# Patient Record
Sex: MALE | Race: WHITE | NOT HISPANIC OR LATINO | Employment: OTHER | URBAN - METROPOLITAN AREA
[De-identification: names, ages, dates, MRNs, and addresses within clinical notes are randomized per-mention and may not be internally consistent; named-entity substitution may affect disease eponyms.]

---

## 2017-04-19 ENCOUNTER — ALLSCRIPTS OFFICE VISIT (OUTPATIENT)
Dept: OTHER | Facility: OTHER | Age: 77
End: 2017-04-19

## 2017-04-19 DIAGNOSIS — E11.42 TYPE 2 DIABETES MELLITUS WITH DIABETIC POLYNEUROPATHY (HCC): ICD-10-CM

## 2017-04-19 DIAGNOSIS — E11.65 TYPE 2 DIABETES MELLITUS WITH HYPERGLYCEMIA (HCC): ICD-10-CM

## 2017-04-22 ENCOUNTER — APPOINTMENT (OUTPATIENT)
Dept: LAB | Facility: CLINIC | Age: 77
End: 2017-04-22
Payer: MEDICARE

## 2017-04-22 ENCOUNTER — TRANSCRIBE ORDERS (OUTPATIENT)
Dept: LAB | Facility: CLINIC | Age: 77
End: 2017-04-22

## 2017-04-22 DIAGNOSIS — E11.65 TYPE 2 DIABETES MELLITUS WITH HYPERGLYCEMIA (HCC): ICD-10-CM

## 2017-04-22 DIAGNOSIS — E11.42 TYPE 2 DIABETES MELLITUS WITH DIABETIC POLYNEUROPATHY (HCC): ICD-10-CM

## 2017-04-22 LAB
ALBUMIN SERPL BCP-MCNC: 4.1 G/DL (ref 3.5–5)
ALP SERPL-CCNC: 61 U/L (ref 46–116)
ALT SERPL W P-5'-P-CCNC: 33 U/L (ref 12–78)
ANION GAP SERPL CALCULATED.3IONS-SCNC: 10 MMOL/L (ref 4–13)
AST SERPL W P-5'-P-CCNC: 27 U/L (ref 5–45)
BILIRUB SERPL-MCNC: 0.7 MG/DL (ref 0.2–1)
BUN SERPL-MCNC: 18 MG/DL (ref 5–25)
CALCIUM SERPL-MCNC: 9.5 MG/DL (ref 8.3–10.1)
CHLORIDE SERPL-SCNC: 105 MMOL/L (ref 100–108)
CO2 SERPL-SCNC: 28 MMOL/L (ref 21–32)
CREAT SERPL-MCNC: 1.01 MG/DL (ref 0.6–1.3)
CREAT UR-MCNC: 82.2 MG/DL
EST. AVERAGE GLUCOSE BLD GHB EST-MCNC: 146 MG/DL
GFR SERPL CREATININE-BSD FRML MDRD: >60 ML/MIN/1.73SQ M
GLUCOSE P FAST SERPL-MCNC: 158 MG/DL (ref 65–99)
HBA1C MFR BLD: 6.7 % (ref 4.2–6.3)
MICROALBUMIN UR-MCNC: 132 MG/L (ref 0–20)
MICROALBUMIN/CREAT 24H UR: 161 MG/G CREATININE (ref 0–30)
POTASSIUM SERPL-SCNC: 4.7 MMOL/L (ref 3.5–5.3)
PROT SERPL-MCNC: 7 G/DL (ref 6.4–8.2)
SODIUM SERPL-SCNC: 143 MMOL/L (ref 136–145)
T4 FREE SERPL-MCNC: 1.51 NG/DL (ref 0.76–1.46)
TSH SERPL DL<=0.05 MIU/L-ACNC: 1.53 UIU/ML (ref 0.36–3.74)

## 2017-04-22 PROCEDURE — 80053 COMPREHEN METABOLIC PANEL: CPT

## 2017-04-22 PROCEDURE — 82570 ASSAY OF URINE CREATININE: CPT

## 2017-04-22 PROCEDURE — 84439 ASSAY OF FREE THYROXINE: CPT

## 2017-04-22 PROCEDURE — 86341 ISLET CELL ANTIBODY: CPT

## 2017-04-22 PROCEDURE — 86337 INSULIN ANTIBODIES: CPT

## 2017-04-22 PROCEDURE — 82043 UR ALBUMIN QUANTITATIVE: CPT

## 2017-04-22 PROCEDURE — 83036 HEMOGLOBIN GLYCOSYLATED A1C: CPT

## 2017-04-22 PROCEDURE — 36415 COLL VENOUS BLD VENIPUNCTURE: CPT

## 2017-04-22 PROCEDURE — 83519 RIA NONANTIBODY: CPT

## 2017-04-22 PROCEDURE — 84443 ASSAY THYROID STIM HORMONE: CPT

## 2017-04-24 ENCOUNTER — GENERIC CONVERSION - ENCOUNTER (OUTPATIENT)
Dept: OTHER | Facility: OTHER | Age: 77
End: 2017-04-24

## 2017-04-24 LAB — GAD65 AB SER-ACNC: <5 U/ML (ref 0–5)

## 2017-04-25 ENCOUNTER — ALLSCRIPTS OFFICE VISIT (OUTPATIENT)
Dept: OTHER | Facility: OTHER | Age: 77
End: 2017-04-25

## 2017-04-25 ENCOUNTER — GENERIC CONVERSION - ENCOUNTER (OUTPATIENT)
Dept: OTHER | Facility: OTHER | Age: 77
End: 2017-04-25

## 2017-04-27 ENCOUNTER — LAB (OUTPATIENT)
Dept: LAB | Facility: CLINIC | Age: 77
End: 2017-04-27
Payer: MEDICARE

## 2017-04-27 ENCOUNTER — TRANSCRIBE ORDERS (OUTPATIENT)
Dept: LAB | Facility: CLINIC | Age: 77
End: 2017-04-27

## 2017-04-27 DIAGNOSIS — R35.1 NOCTURIA: Primary | ICD-10-CM

## 2017-04-27 DIAGNOSIS — R35.1 NOCTURIA: ICD-10-CM

## 2017-04-27 LAB
INSULIN AB SER-ACNC: <5 UU/ML
ISLET CELL512 AB SER-ACNC: <1 U/ML
PSA SERPL-MCNC: 0.9 NG/ML (ref 0–4)

## 2017-04-27 PROCEDURE — 84153 ASSAY OF PSA TOTAL: CPT

## 2017-04-28 ENCOUNTER — GENERIC CONVERSION - ENCOUNTER (OUTPATIENT)
Dept: OTHER | Facility: OTHER | Age: 77
End: 2017-04-28

## 2017-05-02 ENCOUNTER — ALLSCRIPTS OFFICE VISIT (OUTPATIENT)
Dept: OTHER | Facility: OTHER | Age: 77
End: 2017-05-02

## 2017-05-09 ENCOUNTER — ALLSCRIPTS OFFICE VISIT (OUTPATIENT)
Dept: OTHER | Facility: OTHER | Age: 77
End: 2017-05-09

## 2017-05-23 ENCOUNTER — ALLSCRIPTS OFFICE VISIT (OUTPATIENT)
Dept: OTHER | Facility: OTHER | Age: 77
End: 2017-05-23

## 2017-06-13 DIAGNOSIS — E11.65 TYPE 2 DIABETES MELLITUS WITH HYPERGLYCEMIA (HCC): ICD-10-CM

## 2017-06-13 DIAGNOSIS — E11.42 TYPE 2 DIABETES MELLITUS WITH DIABETIC POLYNEUROPATHY (HCC): ICD-10-CM

## 2017-06-15 ENCOUNTER — LAB CONVERSION - ENCOUNTER (OUTPATIENT)
Dept: OTHER | Facility: OTHER | Age: 77
End: 2017-06-15

## 2017-06-15 LAB
T4 FREE SERPL-MCNC: 1.3 NG/DL (ref 0.8–1.8)
TSH SERPL DL<=0.05 MIU/L-ACNC: 0.85 MIU/L (ref 0.4–4.5)

## 2017-06-20 ENCOUNTER — GENERIC CONVERSION - ENCOUNTER (OUTPATIENT)
Dept: OTHER | Facility: OTHER | Age: 77
End: 2017-06-20

## 2017-07-18 ENCOUNTER — ALLSCRIPTS OFFICE VISIT (OUTPATIENT)
Dept: OTHER | Facility: OTHER | Age: 77
End: 2017-07-18

## 2017-07-20 ENCOUNTER — ALLSCRIPTS OFFICE VISIT (OUTPATIENT)
Dept: OTHER | Facility: OTHER | Age: 77
End: 2017-07-20

## 2017-08-03 ENCOUNTER — ALLSCRIPTS OFFICE VISIT (OUTPATIENT)
Dept: OTHER | Facility: OTHER | Age: 77
End: 2017-08-03

## 2017-08-03 DIAGNOSIS — E78.5 HYPERLIPIDEMIA: ICD-10-CM

## 2017-08-03 DIAGNOSIS — E11.65 TYPE 2 DIABETES MELLITUS WITH HYPERGLYCEMIA (HCC): ICD-10-CM

## 2017-08-16 ENCOUNTER — GENERIC CONVERSION - ENCOUNTER (OUTPATIENT)
Dept: OTHER | Facility: OTHER | Age: 77
End: 2017-08-16

## 2017-08-16 ENCOUNTER — LAB CONVERSION - ENCOUNTER (OUTPATIENT)
Dept: OTHER | Facility: OTHER | Age: 77
End: 2017-08-16

## 2017-08-16 LAB
A/G RATIO (HISTORICAL): 2.1 (CALC) (ref 1–2.5)
ALBUMIN SERPL BCP-MCNC: 4.5 G/DL (ref 3.6–5.1)
ALP SERPL-CCNC: 49 U/L (ref 40–115)
ALT SERPL W P-5'-P-CCNC: 23 U/L (ref 9–46)
AST SERPL W P-5'-P-CCNC: 24 U/L (ref 10–35)
BILIRUB SERPL-MCNC: 0.7 MG/DL (ref 0.2–1.2)
BUN SERPL-MCNC: 27 MG/DL (ref 7–25)
BUN/CREA RATIO (HISTORICAL): 28 (CALC) (ref 6–22)
C-PEPTIDE (HISTORICAL): 0.88 NG/ML (ref 0.8–3.85)
CALCIUM SERPL-MCNC: 9.4 MG/DL (ref 8.6–10.3)
CHLORIDE SERPL-SCNC: 108 MMOL/L (ref 98–110)
CHOLEST SERPL-MCNC: 133 MG/DL (ref 125–200)
CHOLEST/HDLC SERPL: 2.3 (CALC)
CO2 SERPL-SCNC: 29 MMOL/L (ref 20–31)
CREAT SERPL-MCNC: 0.98 MG/DL (ref 0.7–1.18)
EGFR AFRICAN AMERICAN (HISTORICAL): 88 ML/MIN/1.73M2
EGFR-AMERICAN CALC (HISTORICAL): 76 ML/MIN/1.73M2
GAMMA GLOBULIN (HISTORICAL): 2.1 G/DL (CALC) (ref 1.9–3.7)
GLUCOSE (HISTORICAL): 96 MG/DL (ref 65–99)
GLUCOSE, PLASMA (HISTORICAL): 91 MG/DL (ref 65–99)
HBA1C MFR BLD HPLC: 6.4 % OF TOTAL HGB
HDLC SERPL-MCNC: 59 MG/DL
LDL CHOLESTEROL (HISTORICAL): 64 MG/DL (CALC)
NON-HDL-CHOL (CHOL-HDL) (HISTORICAL): 74 MG/DL (CALC)
POTASSIUM SERPL-SCNC: 4 MMOL/L (ref 3.5–5.3)
SODIUM SERPL-SCNC: 145 MMOL/L (ref 135–146)
TOTAL PROTEIN (HISTORICAL): 6.6 G/DL (ref 6.1–8.1)
TRIGL SERPL-MCNC: 51 MG/DL

## 2017-11-06 ENCOUNTER — ALLSCRIPTS OFFICE VISIT (OUTPATIENT)
Dept: OTHER | Facility: OTHER | Age: 77
End: 2017-11-06

## 2017-11-07 NOTE — PROGRESS NOTES
Assessment  1  Controlled type 2 diabetes mellitus with diabetic polyneuropathy, with long-term current   use of insulin (250 60,357 2,V58 67) (E11 42,Z79 4)   2  Essential hypertension (401 9) (I10)   3  Dyslipidemia (272 4) (E78 5)    Plan  Controlled type 2 diabetes mellitus with diabetic polyneuropathy, with long-term current  use of insulin    · (1) HEMOGLOBIN A1C; Status:Active; Requested for:06Tpy5195;    Perform:[facility]; OXO:30GSY4913; Ordered; For:Controlled type 2 diabetes mellitus with diabetic polyneuropathy, with long-term current use of insulin; Ordered By:Lew Estevez;   · Continuous Glucose Monitoring CGM; Status:Hold For - Scheduling; Requested  for:20Noo2938;    Perform:Not Applicable; Order Comments:yordan; SNV:02DAV9977; Ordered; For:Controlled type 2 diabetes mellitus with diabetic polyneuropathy, with long-term current use of insulin; Ordered By:Lew Estevez;   · Follow-up visit in 4 Months Evaluation and Treatment  Follow-up  Status: Hold For -  Scheduling  Requested for: 89ZRF5682   Ordered; For: Controlled type 2 diabetes mellitus with diabetic polyneuropathy, with long-term current use of insulin; Ordered By: Bebo Ventura Performed:  Due: 53VJM6977   · Follow-Up With Advanced Practitioner Evaluation and Treatment  Follow-up  Status: Hold  For - Scheduling  Requested for: 40FAI1275   Ordered; For: Controlled type 2 diabetes mellitus with diabetic polyneuropathy, with long-term current use of insulin; Ordered By: Bebo Ventura Performed:  Due: 55EWO9698  Unlinked    · Clopidogrel Bisulfate 75 MG Oral Tablet   Dispense: 0 Days ; #:0 TABS; Refill: 0; SHASHANK = N; Record; Last Updated By: Jared Martínez; 11/6/2017 10:31:52 AM    Discussion/Summary  Discussion Summary:   1  Type 2 diabetes is well controlled based on hemoglobin A1c  We did discuss stopping Lantus and replacing with an oral agent   Today, we have decided to do a continuous glucose monitor and make medication changes based on those results  Check hemoglobin A1c later this month  Hypertension is close to goal   Hyperlipidemia-continue statin  Counseling Documentation With Imm: The patient was counseled regarding diagnostic results,-- impressions  Medication SE Review and Pt Understands Tx: The treatment plan was reviewed with the patient/guardian  The patient/guardian understands and agrees with the treatment plan      Chief Complaint  Chief Complaint Free Text Note Form: Follow Up      History of Present Illness  Diabetes: The patient is being seen for routine follow-up of Diabetes Mellitus 2  The HbA1c was 6 4% performed on 8-16-17  See Medication List for current medication(s)  See Medication List for dosage(s)  By report, there is good compliance with treatment,-- good tolerance of treatment-- and-- good symptom control  Current pertinent lifestyle factors include no obesity,-- no past or present history of a sedentary lifestyle-- and-- no inactivity  The patient is currently asymptomatic   Disease Course and Complications: He has no diabetes complications  Hyperlipidemia (Follow-Up): The patient states his hyperlipidemia has been stable since the last visit  Comorbid Illnesses: diabetes mellitus-- and-- hypertension  He has no significant interval events  Symptoms: The patient is currently asymptomatic  Associated symptoms include no focal neurologic deficits  Hypertension (Follow-Up): The patient presents for follow-up of essential hypertension  The patient states he has been stable with his blood pressure control since the last visit  He has no comorbid illnesses  He has no significant interval events  Symptoms: The patient is currently asymptomatic  Associated symptoms include no headache        Review of Systems  Endo Adult ROS Male Established v2 - [facility]:   Constitutional/General: no recent weight gain,-- no recent weight loss,-- no poor energy/fatigue,-- no increased energy level,-- no insomnia/sleep problems,-- no fever-- and-- no feeling weak  Heart: no high blood pressure,-- no chest pain/tightness,-- no rapid/racing heart rate-- and-- no palpitations  Genitourinary - Urinary excess urination-- and-- urinating during the night, but-- no frequent urination  Eyes: no blurred vision,-- no double vision,-- no bulging eyes,-- no gritty/scratchy eyes-- and-- no excessive tearing  Mouth / Throat: hoarseness, but-- no difficulty swallowing  Neck: no lumps,-- no swollen glands,-- no neck pain,-- no neck stiffness-- and-- no enlarged thyroid  Respiratory: no wheezing,-- no asthma-- and-- no persistent cough  Musculoskeletal: no muscle aches/pain,-- no joint aches/pain-- and-- no muscle weakness  Skin & Hair: no dry skin,-- no acne,-- the hair texture was not oily,-- no hair loss-- and-- no excessive hair growth  Gastrointestinal: no constipation,-- no diarrhea,-- no waking at night to drink-- and-- no stomach ache  Neurological: no blackouts,-- no weakness-- and-- no tremors  Genital: no testicular pain,-- no testicular lumps/bumps/mass-- and-- does not perform monthly testicular exam    Endocrine: no feeling hot frequently,-- no feeling cold frequently,-- no shifts between feeling hot and cold,-- no cold hands or feet,-- no excessive sweating,-- no thyroid problems,-- no blood sugar problems,-- no excessive thirst,-- no excessive hunger,-- no change in shoe size,-- no nausea or vomiting-- and-- no shaky hands  ROS Reviewed:   ROS reviewed  Active Problems  1  Arteriosclerosis of coronary artery (414 00) (I25 10)   2  Atypical chest pain (786 59) (R07 89)   3  Bruising (924 9) (T14 8)   4  Chronic constipation (564 00) (K59 09)   5  Chronic hepatitis B (070 32) (B18 1)   6  Chronic hepatitis C virus infection (070 54) (B18 2)   7  Chronic lymphocytic leukemia (204 10) (C91 10)   8   Controlled type 2 diabetes mellitus with diabetic polyneuropathy, with long-term current   use of insulin (250 60,357 2,V58 67) (E11 42,Z79 4)   9  Dyslipidemia (272 4) (E78 5)   10  Essential hypertension (401 9) (I10)   11  Flu vaccine need (V04 81) (Z23)   12  Lower back pain (724 2) (M54 5)   13  Lymphadenopathy (785 6) (R59 1)   14  Microalbuminuria (791 0) (R80 9)   15  Need for vaccination with 13-polyvalent pneumococcal conjugate vaccine (V03 82) (Z23)   16  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 A1)   17  Rhinitis (472 0) (J31 0)   18  Screening for neurological condition (V80 09) (Z13 89)    Past Medical History  1  History of Cervical lymphadenopathy (785 6) (R59 0)  Active Problems And Past Medical History Reviewed: The active problems and past medical history were reviewed and updated today  Surgical History  1  History of Tonsillectomy With Adenoidectomy  Surgical History Reviewed: The surgical history was reviewed and updated today  Family History  Sister    1  Family history of Non-Hodgkin's Lymphoma  Maternal Grandfather    2  Family history of Diabetes Mellitus (V18 0)  Family History Reviewed: The family history was reviewed and updated today  Social History   · Being A Social Drinker   · Exercising Regularly   · Never A Smoker  Social History Reviewed: The social history was reviewed and updated today  The social history was reviewed and is unchanged  Current Meds   1  Aspirin 81 MG TABS; TAKE 1 TABLET DAILY; Therapy: (Recorded:84Opa0791) to Recorded   2  Atorvastatin Calcium 40 MG Oral Tablet; Therapy: (Recorded:19Apr2017) to Recorded   3  BD Pen Needle Beatrice U/F 32G X 4 MM Miscellaneous; use three times a day as directed; Therapy: 59BFY2002 to (Evaluate:24Apr2016)  Requested for: 72HDX0126; Last   Rx:27Oct2015 Ordered   4  BD Pen Needle Short U/F 31G X 8 MM Miscellaneous; USE ONCE DAILY WITH LANTUS   SOLOSTAR  Requested for: 10FXM6150; Last Rx:01Nov2013 Ordered   5  Clopidogrel Bisulfate 75 MG Oral Tablet; Therapy: (Recorded:19Apr2017) to Recorded   6   Fish Oil CAPS; Therapy: (Recorded:18Npd1702) to Recorded   7  Lantus SoloStar 100 UNIT/ML Subcutaneous Solution Pen-injector; 10units SQ QHS    Requested for: 86OMR8734; Last Rx:40Unp1014 Ordered   8  MetFORMIN HCl - 1000 MG Oral Tablet; TAKE ONE TABLET BY MOUTH TWICE A DAY    Requested for: 77WOV7666; Last Rx:06Dzp5686 Ordered   9  Milk Thistle 500 MG Oral Capsule; 2 caps daily; Therapy: (Recorded:85Whi0316) to Recorded   10  OneTouch Ultra Blue In [employer]; Test 3 times daily or as directed; Therapy: 06PXB5334 to (Evaluate:99Aqk9882)  Requested for: 69IQT3484; Last    Rx:63Uzh0178 Ordered   11  Trulicity 7 42 GJ/1 9JL Subcutaneous Solution Pen-injector; Use 1 per week; Therapy: 55TSV3576 to (Evaluate:13Kky3476) Recorded  Medication List Reviewed: The medication list was reviewed and updated today  Allergies  1  No Known Drug Allergies  2  No Known Environmental Allergies   3  No Known Food Allergies    Vitals  Vital Signs    Recorded: 85JET1060 10:30AM   Heart Rate 82   Systolic 707   Diastolic 78   Height 5 ft 9 in   Weight 148 lb 2 08 oz   BMI Calculated 21 87   BSA Calculated 1 82     Physical Exam    Constitutional   General appearance: No acute distress, well appearing and well nourished  Eyes   Conjunctiva and lids: No swelling, erythema, or discharge  Pupils: Equal, round and reactive to light  The sclera are anicteric  Extraocular movements are intact  Ears, Nose, Mouth, and Throat   External inspection of ears, nose and lips: Normal     Oropharynx: Normal with no erythema, edema, exudate or lesions  Exam of Head: The head is atraumatic and normocephalic  Neck: The neck is supple  The thyroid is normal in size with no palpable nodules  Pulmonary   Auscultation of lungs: Clear to auscultation bilaterally with normal chest expansion  Cardiovascular   Auscultation of heart: Normal rate and rhythm with no murmurs, gallops or rubs      Abdomen   Abdomen: Abdomen is soft, non-tender with normal bowel sounds  Lymphatic   Palpation of lymph nodes: No supraclavicular or suboccipital lymphadenopathy  Musculoskeletal   Gait and station: Normal     Inspection/palpation of joints, bones, and muscles: Muscle bulk and tone is normal     Skin   Skin and subcutaneous tissue: Normal skin temperature and color  Neurologic   Cranial nerves: Cranial nerves 2-12 intact  Reflexes: 2+ and symmetric  Motor Strength: Strength is 5/5 bilaterally  Psychiatric   Orientation to person, place and time: Normal     Mood and affect: Affect and attention span are normal        Results/Data  (1) C-PEPTIDE 86WFM3563 07:47AM Morna      Test Name Result Flag Reference   C PEPTIDE 0 88 ng/mL  0 80-3 85     (1) COMPREHENSIVE METABOLIC PANEL 64XYR6066 32:03BI Morna      Test Name Result Flag Reference   GLUCOSE 96 mg/dL  65-99   Fasting reference interval   UREA NITROGEN (BUN) 27 mg/dL H 7-25   CREATININE 0 98 mg/dL  0 70-1 18   For patients >52years of age, the reference limit  for Creatinine is approximately 13% higher for people  identified as -American  eGFR NON-AFR   AMERICAN 76 mL/min/1 73m2  > OR = 60   eGFR AFRICAN AMERICAN 88 mL/min/1 73m2  > OR = 60   BUN/CREATININE RATIO 28 (calc) H 6-22   SODIUM 145 mmol/L  135-146   POTASSIUM 4 0 mmol/L  3 5-5 3   CHLORIDE 108 mmol/L     CARBON DIOXIDE 29 mmol/L  20-31   CALCIUM 9 4 mg/dL  8 6-10 3   PROTEIN, TOTAL 6 6 g/dL  6 1-8 1   ALBUMIN 4 5 g/dL  3 6-5 1   GLOBULIN 2 1 g/dL (calc)  1 9-3 7   ALBUMIN/GLOBULIN RATIO 2 1 (calc)  1 0-2 5   BILIRUBIN, TOTAL 0 7 mg/dL  0 2-1 2   ALKALINE PHOSPHATASE 49 U/L     AST 24 U/L  10-35   ALT 23 U/L  9-46     (1) LIPID PANEL, FASTING 33EXS9833 07:47AM Collin Mcpherson     Test Name Result Flag Reference   CHOLESTEROL, TOTAL 133 mg/dL  125-200   HDL CHOLESTEROL 59 mg/dL  > OR = 40   TRIGLICERIDES 51 mg/dL  <657   LDL-CHOLESTEROL 64 mg/dL (calc)  <130   Desirable range <100 mg/dL for patients with CHD or  diabetes and <70 mg/dL for diabetic patients with  known heart disease  CHOL/HDLC RATIO 2 3 (calc)  < OR = 5 0   NON HDL CHOLESTEROL 74 mg/dL (calc)     Target for non-HDL cholesterol is 30 mg/dL higher than   LDL cholesterol target  (Q) GLUCOSE, PLASMA 88Yny1499 07:47AM Milan Mcguire     Test Name Result Flag Reference   GLUCOSE, PLASMA 91 mg/dL  65-99   Fasting reference interval     (Q) HEMOGLOBIN A1c 97XVI2874 07:47AM Collin Mcpherson   REPORT COMMENT:  FASTING:YES     Test Name Result Flag Reference   HEMOGLOBIN A1c 6 4 % of total Hgb H <5 7   For someone without known diabetes, a hemoglobin   A1c value between 5 7% and 6 4% is consistent with  prediabetes and should be confirmed with a   follow-up test      For someone with known diabetes, a value <7%  indicates that their diabetes is well controlled  A1c  targets should be individualized based on duration of  diabetes, age, comorbid conditions, and other  considerations  This assay result is consistent with an increased risk  of diabetes  Currently, no consensus exists regarding use of  hemoglobin A1c for diagnosis of diabetes for children       (1) T4, FREE 07UCU6752 10:32AM Lew Estevez     Test Name Result Flag Reference   T4, FREE 1 3 ng/dL  0 8-1 8     (Q) TSH, 3RD GENERATION 48RDX3483 10:32AM Lew Estevez   REPORT COMMENT:  FASTING:NO     Test Name Result Flag Reference   TSH 0 85 mIU/L  0 40-4 50     Signatures   Electronically signed by : MORE Crowder ; Nov 6 2017 10:59AM EST                       (Author)

## 2017-11-15 DIAGNOSIS — E11.42 TYPE 2 DIABETES MELLITUS WITH DIABETIC POLYNEUROPATHY (HCC): ICD-10-CM

## 2017-11-18 ENCOUNTER — APPOINTMENT (OUTPATIENT)
Dept: LAB | Facility: CLINIC | Age: 77
End: 2017-11-18
Payer: MEDICARE

## 2017-11-18 DIAGNOSIS — E11.42 TYPE 2 DIABETES MELLITUS WITH DIABETIC POLYNEUROPATHY (HCC): ICD-10-CM

## 2017-11-18 LAB
EST. AVERAGE GLUCOSE BLD GHB EST-MCNC: 123 MG/DL
HBA1C MFR BLD: 5.9 % (ref 4.2–6.3)

## 2017-11-18 PROCEDURE — 83036 HEMOGLOBIN GLYCOSYLATED A1C: CPT

## 2017-11-18 PROCEDURE — 36415 COLL VENOUS BLD VENIPUNCTURE: CPT

## 2017-11-20 ENCOUNTER — GENERIC CONVERSION - ENCOUNTER (OUTPATIENT)
Dept: OTHER | Facility: OTHER | Age: 77
End: 2017-11-20

## 2018-01-09 NOTE — PROGRESS NOTES
Plan    1  DSMT/MNT Time Record; Status:Complete;   Done: 96VLN2460 12:22PM    Discussion/Summary    PATIENT EDUCATION RECORD   Indication for Services: type 2 Diabetes Mellitus  He is ready to learn  He has no barriers to learning  Living Well with Diabetes Class 3 Education Content:Oral/Injectable medication, Prevention, Short-term complications, Long-term complications, Foot care, Sick day management (illness and stress), Travel       Chief Complaint  Attended diabetes class      Current Meds   1  Aspirin 81 MG TABS; TAKE 1 TABLET DAILY; Therapy: (Recorded:30Dho9524) to Recorded   2  Atorvastatin Calcium 40 MG Oral Tablet; Therapy: (Recorded:19Apr2017) to Recorded   3  BD Pen Needle Beatrice U/F 32G X 4 MM Miscellaneous; use three times a day as directed; Therapy: 03OSK4141 to (Evaluate:24Apr2016)  Requested for: 48IWO2003; Last   Rx:27Oct2015 Ordered   4  BD Pen Needle Short U/F 31G X 8 MM Miscellaneous; USE ONCE DAILY WITH LANTUS   SOLOSTAR  Requested for: 40OCA6370; Last Rx:01Nov2013 Ordered   5  Clopidogrel Bisulfate 75 MG Oral Tablet; Therapy: (Recorded:19Apr2017) to Recorded   6  DrRx Medrol Dose Pack 4 MG #21; take as directed; Therapy: 93GWG5313 to (Last Rx:18Jun2015) Ordered   7  Fish Oil CAPS; Therapy: (Recorded:19Apr2017) to Recorded   8  Lantus SoloStar 100 UNIT/ML Subcutaneous Solution Pen-injector; 10units SQ QHS    Requested for: 61OOT4245; Last Rx:18Jun2015 Ordered   9  Losartan Potassium 100 MG Oral Tablet; take 1 tablet by mouth every day; Therapy: 13WSY6957 to (Evaluate:12Jun2016)  Requested for: 74IXF9093; Last   Rx:18Jun2015 Ordered   10  MetFORMIN HCl - 1000 MG Oral Tablet; TAKE ONE TABLET BY MOUTH TWICE A DAY     Requested for: 54LDY9250; Last Rx:48Qet9283 Ordered   11  Milk Thistle 500 MG Oral Capsule; 2 caps daily; Therapy: (Recorded:57Kwv8529) to Recorded   12   NovoLOG FlexPen 100 UNIT/ML Subcutaneous Solution Pen-injector; 3-5 units before    lunch and dinner; Therapy: 26Knt1077 to Recorded   13  OneTouch Ultra Blue In [employer]; Test 3 times daily or as directed; Therapy: 98JWW0682 to (Evaluate:09Feb2018)  Requested for: 02PRE6403; Last    Rx:15May2017 Ordered    End of Encounter Meds    1  BD Pen Needle Beatrice U/F 32G X 4 MM Miscellaneous; use three times a day as directed; Therapy: 50EBT0183 to (Evaluate:24Apr2016)  Requested for: 28POT7163; Last   Rx:27Oct2015 Ordered   2  Lantus SoloStar 100 UNIT/ML Subcutaneous Solution Pen-injector; 10units SQ QHS    Requested for: 71UQV9322; Last Rx:18Jun2015 Ordered   3  MetFORMIN HCl - 1000 MG Oral Tablet; TAKE ONE TABLET BY MOUTH TWICE A DAY    Requested for: 21BTI2196; Last Rx:15May2017 Ordered   4  NovoLOG FlexPen 100 UNIT/ML Subcutaneous Solution Pen-injector; 3-5 units before   lunch and dinner; Therapy: 99Cvb4480 to Recorded   5  OneTouch Ultra Blue In [employer]; Test 3 times daily or as directed; Therapy: 47DQQ0122 to (Evaluate:09Feb2018)  Requested for: 12ZZN1947; Last   Rx:15May2017 Ordered    6  Losartan Potassium 100 MG Oral Tablet; take 1 tablet by mouth every day; Therapy: 24SJA5590 to (Evaluate:12Jun2016)  Requested for: 61MKC0033; Last   Rx:18Jun2015 Ordered    7  Aspirin 81 MG TABS; TAKE 1 TABLET DAILY; Therapy: (Recorded:92Zhm9859) to Recorded   8  Milk Thistle 500 MG Oral Capsule; 2 caps daily; Therapy: (Recorded:57Tqc1619) to Recorded    9  DrRx Medrol Dose Pack 4 MG #21; take as directed; Therapy: 69UFC0484 to (Last Rx:18Jun2015) Ordered    10  BD Pen Needle Short U/F 31G X 8 MM Miscellaneous; USE ONCE DAILY WITH LANTUS    SOLOSTAR  Requested for: 03OZA5291; Last Rx:01Nov2013 Ordered    11  Atorvastatin Calcium 40 MG Oral Tablet; Therapy: (Recorded:19Apr2017) to Recorded   12  Clopidogrel Bisulfate 75 MG Oral Tablet; Therapy: (Recorded:19Apr2017) to Recorded   13  Fish Oil CAPS;     Therapy: (Recorded:19Apr2017) to Recorded    Future Appointments    Date/Time Provider Specialty Site 07/20/2017 06:00 PM Kimberly Apodaca RD Diabetes Educator [address] [facility] DIABETES EDUCATION   08/03/2017 09:00 AM Donato Blunt [facility] [address] [facility] ENDOCRINOLOGY     Signatures   Electronically signed by : HATTIE Alaniz; Jul 19 2017 12:24PM EST                       (Author)    Electronically signed by : MORE Chacon ; Aug  3 2017  3:40PM EST

## 2018-01-10 NOTE — RESULT NOTES
Discussion/Summary   One of the type 1 diabetes antibodies is negative  Verified Results  (1) GLUTAMIC ACID DECARBOXYLASE 90Wlx6464 09:31AM Fela Zazueta Order Number: [id number]     Test Name Result Flag Reference   GLUTAMIC ACID DECARBOXYLASE <5 0 U/mL  0 0 - 5 0   Performed at:  [address]  [id number]  : Seda Rocha MD, Phone:  [phone]       Plan  Controlled type 2 diabetes mellitus with diabetic polyneuropathy, with long-term current  use of insulin, Diabetes mellitus type 2, uncontrolled    · (1) T4, FREE; Status:Active; Requested GVE:18WHX1780;    · (1) TSH; Status:Active;  Requested ZIS:23HFT3346;

## 2018-01-11 NOTE — RESULT NOTES
Discussion/Summary   Normal     Verified Results  (1) T4, FREE 43OZW4685 10:32AM Lew Estevez     Test Name Result Flag Reference   T4, FREE 1 3 ng/dL  0 8-1 8     (Q) TSH, 3RD GENERATION 03IHA9907 10:32AM Lew Estevez   REPORT COMMENT:  FASTING:NO     Test Name Result Flag Reference   TSH 0 85 mIU/L  0 40-4 50

## 2018-01-12 VITALS
WEIGHT: 148.13 LBS | HEART RATE: 82 BPM | BODY MASS INDEX: 21.94 KG/M2 | HEIGHT: 69 IN | SYSTOLIC BLOOD PRESSURE: 138 MMHG | DIASTOLIC BLOOD PRESSURE: 78 MMHG

## 2018-01-12 NOTE — PROGRESS NOTES
Plan    1  DSMT/MNT Time Record; Status:Complete;   Done: 39BYP5928 06:09PM    Discussion/Summary    PATIENT EDUCATION RECORD   Indication for Services: type 2 Diabetes Mellitus and hyperlipidemia  He is ready to learn  Healthy Eating:   Discussed importance of meal timing/consistency: Method: Instruction and Handout  Response: Verbalizes Understanding   Discussed nutrient types ( Cho/Fat/Protein): Method: Instruction and Handout  Response: Verbalizes Understanding   Discussed portion sizes: Method: Instruction, Handout and Demonstration  Response: Verbalizes Understanding   Discussed food label reading: Method: Instruction, Handout and Demonstration  Response: Verbalizes Understanding  Provided food diary and instructions on use: Method: Instruction and HandoutResponse: Verbalizes Understanding   Provided meal planning: Method: Instruction, Handout and Demonstration  Response: Verbalizes Understanding His CHO's per meal are 128 g/day 30% carb 3,3,3  He/She was provided a meal plan for: fixed carbohydrates  Discussed weight management/weight loss: Method: Instruction  Response: Verbalizes Understanding   His current BMI 22  Discussed basic carbohydrate counting: Method: Instruction, Handout and Demonstration  Response: Verbalizes Understanding   Monitoring:   Discussed target blood glucose ranges: Method: Instruction and Handout  Response: Verbalizes Understanding  Discussed testing times: Method: Instruction and Handout  Response: Verbalizes Understanding  Healthy Coping Class:   Identified lifestyle behaviors that need to change: Method: Instruction  Response: Verbalizes Understanding   Discussed motivation to change: Method: Instruction  Response: Verbalizes Understanding   Identified goals for behavior change: Method: Instruction  Response: Verbalizes Understanding   Discussed strategies for change: Method: Instruction  Response: Verbalizes Understanding   He participated in goal setting   He was given the following educational materials: Personal Meal Plan calories, Planning Healthy Meals and Calorie and Carbohydrate Tracking Books/Websites/Phone Apps   Chief Complaint  Patient is here today for Medical Nutrition Therapy for T2DM      History of Present Illness  Patient is a 77 y/o male with uncontrolled T2DM, Polyneuropathy, Dyslipidemia, CAD, A1c 6 7 (from 8 9), BMI 23  Patient is testing his BG fasting, before dinner and bedtime  Readings are above target (170-210)  Today PA discontinued bolus insulin and put on GLP1  Patient admits to sometimes overeating carbs like pasta and binge eating sweets on occasion  He stated that he has been trying to reduce his carb intake and portion sizes  His food history shows a lower carb intake  We discussed keeping calories adequate to limit further weight loss  He was educated on meal planning/carb counting and given a low carb (30%) personal meal plan  This is his initial assessment    Present at session: patient    He has no special learning needs  His caloric needs are 1701  Recent weight change: -4    Spouse  shops for food  Spouse  cooks the food  He eats breakfast at  AM 1/2 protein bar, 1 pc  bread with peanut butter and jelly   He snacks at AM Cheese   He eats lunch at  PM Lunchmeat sandwich, beer    He eats dinner at  PM Chicken, potatoes, salad, wine   He snacks at PM 1 jackie cracker if -140     NUTRITION DIAGNOSES   Food And Nutrition Related Knowledge Defici   Food and nutrition related knowledge deficit related to  lack of prior exposure to accurate nutrition related information  As evidenced by  no prior knowledge of need for food and nutrition related recommendations  Medical Nutrition Therapy Intervention: Carbohydrate counting, Meal planning, Individualized meal plan, Strategies to monitor portion control, Label reading, Meal timing, Behavior modification strategies and Weight/BMI Goals  His comprehension was good      His motivation was good   His compliance was good   Goals:  1  Follow meal plan/count carbs  2  SMBG using paired testing  3  Decrease portion sizes of high carb foods  4  Eat throughout day (get adequate calories) to avoid hunger and binge eating      Active Problems    1  Arteriosclerosis of coronary artery (414 00) (I25 10)   2  Atypical chest pain (786 59) (R07 89)   3  Bruising (924 9) (T14 8)   4  Chronic constipation (564 00) (K59 09)   5  Chronic hepatitis B (070 32) (B18 1)   6  Chronic hepatitis C virus infection (070 54) (B18 2)   7  Chronic lymphocytic leukemia (204 10) (C91 10)   8  Controlled type 2 diabetes mellitus with diabetic polyneuropathy, with long-term current   use of insulin (250 60,357 2,V58 67) (E11 42,Z79 4)   9  Diabetes mellitus type 2, uncontrolled (250 02) (E11 65)   10  Dyslipidemia (272 4) (E78 5)   11  Essential hypertension (401 9) (I10)   12  Flu vaccine need (V04 81) (Z23)   13  Lower back pain (724 2) (M54 5)   14  Lymphadenopathy (785 6) (R59 1)   15  Microalbuminuria (791 0) (R80 9)   16  Need for vaccination with 13-polyvalent pneumococcal conjugate vaccine (V03 82) (Z23)   17  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 A1)   18  Rhinitis (472 0) (J31 0)   19  Screening for neurological condition (V80 09) (Z13 89)    Past Medical History    1  History of Cervical lymphadenopathy (785 6) (R59 0)    Surgical History    1  History of Tonsillectomy With Adenoidectomy    Family History  Sister    1  Family history of Non-Hodgkin's Lymphoma  Maternal Grandfather    2  Family history of Diabetes Mellitus (V18 0)    Social History    · Being A Social Drinker   · Exercising Regularly   · Never A Smoker    Current Meds   1  Aspirin 81 MG TABS; TAKE 1 TABLET DAILY; Therapy: (Recorded:15Wwn8211) to Recorded   2  Atorvastatin Calcium 40 MG Oral Tablet; Therapy: (Recorded:31Ymq7846) to Recorded   3   BD Pen Needle Beatrice U/F 32G X 4 MM Miscellaneous; use three times a day as directed; Therapy: 05HXL5666 to (Evaluate:24Apr2016)  Requested for: 53LVP1876; Last   Rx:27Oct2015 Ordered   4  BD Pen Needle Short U/F 31G X 8 MM Miscellaneous; USE ONCE DAILY WITH LANTUS   SOLOSTAR  Requested for: 11FZP6275; Last Rx:01Nov2013 Ordered   5  Clopidogrel Bisulfate 75 MG Oral Tablet; Therapy: (Recorded:19Apr2017) to Recorded   6  Fish Oil CAPS; Therapy: (Recorded:19Apr2017) to Recorded   7  Lantus SoloStar 100 UNIT/ML Subcutaneous Solution Pen-injector; 10units SQ QHS    Requested for: 57FLY2003; Last Rx:18Jun2015 Ordered   8  MetFORMIN HCl - 1000 MG Oral Tablet; TAKE ONE TABLET BY MOUTH TWICE A DAY    Requested for: 15TSK0333; Last Rx:15May2017 Ordered   9  Milk Thistle 500 MG Oral Capsule; 2 caps daily; Therapy: (Recorded:33Xla7381) to Recorded   10  OneTouch Ultra Blue In [employer]; Test 3 times daily or as directed; Therapy: 37YNI6016 to (Evaluate:09Feb2018)  Requested for: 70PYA9732; Last    Rx:62Wir3461 Ordered   11  Trulicity 3 21 KQ/6 9WL Subcutaneous Solution Pen-injector; Use 1 per week; Therapy: 89Hts3918 to (Evaluate:30Jan2018); Last Rx:72Bmi6938 Ordered    Allergies    1  No Known Drug Allergies    2  No Known Environmental Allergies   3  No Known Food Allergies    Vitals  Signs   Recorded: 03Aug2017 06:09PM   Weight: 151 lb 2 oz  BMI Calculated: 22 32  BSA Calculated: 1 83    Future Appointments    Date/Time Provider Specialty Site   11/06/2017 10:50 AM Beryle Kayser, M D   Endocrinology [facility] ENDOCRINOLOGY     Signatures   Electronically signed by : Mechelle Salomon RD; Aug  3 2017  6:31PM EST                       (Author)    Electronically signed by : MORE Goodman ; Aug  8 2017  9:09AM EST

## 2018-01-13 VITALS — WEIGHT: 151.13 LBS | BODY MASS INDEX: 22.32 KG/M2

## 2018-01-13 VITALS
WEIGHT: 150.03 LBS | BODY MASS INDEX: 22.22 KG/M2 | SYSTOLIC BLOOD PRESSURE: 126 MMHG | HEIGHT: 69 IN | DIASTOLIC BLOOD PRESSURE: 76 MMHG | HEART RATE: 80 BPM

## 2018-01-14 VITALS
SYSTOLIC BLOOD PRESSURE: 128 MMHG | BODY MASS INDEX: 22.96 KG/M2 | HEIGHT: 69 IN | WEIGHT: 155 LBS | DIASTOLIC BLOOD PRESSURE: 78 MMHG | HEART RATE: 80 BPM

## 2018-01-14 NOTE — MISCELLANEOUS
Provider Comments  Pt no showed for appointment on 5/23/17        Signatures   Electronically signed by : Francesco Funez, ; May 24 2017  3:25PM EST                       (Author)

## 2018-01-15 NOTE — RESULT NOTES
Discussion/Summary   A1C 6 4- Diabetes is under good control  Rest of labs look good  Verified Results  (1) C-PEPTIDE 43MDG0645 07:47AM Revivn     Test Name Result Flag Reference   C PEPTIDE 0 88 ng/mL  0 80-3 85     (1) COMPREHENSIVE METABOLIC PANEL 77EGM5794 13:09DK Revivn     Test Name Result Flag Reference   GLUCOSE 96 mg/dL  65-99   Fasting reference interval   UREA NITROGEN (BUN) 27 mg/dL H 7-25   CREATININE 0 98 mg/dL  0 70-1 18   For patients >52years of age, the reference limit  for Creatinine is approximately 13% higher for people  identified as -American  eGFR NON-AFR  AMERICAN 76 mL/min/1 73m2  > OR = 60   eGFR AFRICAN AMERICAN 88 mL/min/1 73m2  > OR = 60   BUN/CREATININE RATIO 28 (calc) H 6-22   SODIUM 145 mmol/L  135-146   POTASSIUM 4 0 mmol/L  3 5-5 3   CHLORIDE 108 mmol/L     CARBON DIOXIDE 29 mmol/L  20-31   CALCIUM 9 4 mg/dL  8 6-10 3   PROTEIN, TOTAL 6 6 g/dL  6 1-8 1   ALBUMIN 4 5 g/dL  3 6-5 1   GLOBULIN 2 1 g/dL (calc)  1 9-3 7   ALBUMIN/GLOBULIN RATIO 2 1 (calc)  1 0-2 5   BILIRUBIN, TOTAL 0 7 mg/dL  0 2-1 2   ALKALINE PHOSPHATASE 49 U/L     AST 24 U/L  10-35   ALT 23 U/L  9-46     (1) LIPID PANEL, FASTING 59GRZ7941 07:47AM Collin Mcpherson     Test Name Result Flag Reference   CHOLESTEROL, TOTAL 133 mg/dL  125-200   HDL CHOLESTEROL 59 mg/dL  > OR = 40   TRIGLICERIDES 51 mg/dL  <341   LDL-CHOLESTEROL 64 mg/dL (calc)  <130   Desirable range <100 mg/dL for patients with CHD or  diabetes and <70 mg/dL for diabetic patients with  known heart disease  CHOL/HDLC RATIO 2 3 (calc)  < OR = 5 0   NON HDL CHOLESTEROL 74 mg/dL (calc)     Target for non-HDL cholesterol is 30 mg/dL higher than   LDL cholesterol target       (Q) GLUCOSE, PLASMA 95Wlk4334 07:47AM Rev[facility]     Test Name Result Flag Reference   GLUCOSE, PLASMA 91 mg/dL  65-99   Fasting reference interval     (Q) HEMOGLOBIN A1c 22XTU7509 07:47AM Collin Mcpherson   REPORT COMMENT:  FASTING:YES     Test Name Result Flag Reference   HEMOGLOBIN A1c 6 4 % of total Hgb H <5 7   For someone without known diabetes, a hemoglobin   A1c value between 5 7% and 6 4% is consistent with  prediabetes and should be confirmed with a   follow-up test      For someone with known diabetes, a value <7%  indicates that their diabetes is well controlled  A1c  targets should be individualized based on duration of  diabetes, age, comorbid conditions, and other  considerations  This assay result is consistent with an increased risk  of diabetes  Currently, no consensus exists regarding use of  hemoglobin A1c for diagnosis of diabetes for children

## 2018-01-15 NOTE — RESULT NOTES
Discussion/Summary   Diabetes is under good control  The free T4 level is slightly elevated  Repeat TSH and free T4 in 6-8 weeks  The rest of the laboratory testing is essentially normal      Verified Results  (1) COMPREHENSIVE METABOLIC PANEL 73MOW9347 23:20UZ [employer] Order Number: [id number]     Test Name Result Flag Reference   SODIUM 143 mmol/L  136-145   POTASSIUM 4 7 mmol/L  3 5-5 3   CHLORIDE 105 mmol/L  100-108   CARBON DIOXIDE 28 mmol/L  21-32   ANION GAP (CALC) 10 mmol/L  4-13   BLOOD UREA NITROGEN 18 mg/dL  5-25   CREATININE 1 01 mg/dL  0 60-1 30   Standardized to IDMS reference method   CALCIUM 9 5 mg/dL  8 3-10 1   BILI, TOTAL 0 70 mg/dL  0 20-1 00   ALK PHOSPHATAS 61 U/L     ALT (SGPT) 33 U/L  12-78   AST(SGOT) 27 U/L  5-45   ALBUMIN 4 1 g/dL  3 5-5 0   TOTAL PROTEIN 7 0 g/dL  6 4-8 2   eGFR Non-African American      >60 0 ml/min/1 73sq [facility] Disease Education Program recommendations are as follows:  GFR calculation is accurate only with a steady state creatinine  Chronic Kidney disease less than 60 ml/min/1 73 sq  meters  Kidney failure less than 15 ml/min/1 73 sq  meters  GLUCOSE FASTING 158 mg/dL H 65-99     (1) HEMOGLOBIN A1C 22Apr2017 09:31AM [employer] Order Number: [id number]     Test Name Result Flag Reference   HEMOGLOBIN A1C 6 7 % H 4 2-6 3   EST  AVG   GLUCOSE 146 mg/dl       (1) MICROALBUMIN CREATININE RATIO, RANDOM URINE 22Apr2017 09:31AM [employer] Order Number: [id number]     Test Name Result Flag Reference   MICROALBUMIN/ CREAT R 161 mg/g creatinine H 0-30   MICROALBUMIN,URINE 132 0 mg/L H 0 0-20 0   CREATININE URINE 82 2 mg/dL       (1) T4, FREE 22Apr2017 09:31AM [employer] Order Number: [id number]     Test Name Result Flag Reference   T4,FREE 1 51 ng/dL H 0 76-1 46     (1) TSH 22Apr2017 09:31AM Aura Finnegan Order Number: [id number]     Test Name Result Flag Reference TSH 1 531 uIU/mL  0 358-3 740   Patients undergoing fluorescein dye angiography may retain small amounts of fluorescein in the body for 48-72 hours post procedure  Samples containing fluorescein can produce falsely depressed TSH values  If the patient had this procedure,a specimen should be resubmitted post fluorescein clearance

## 2018-01-15 NOTE — PROGRESS NOTES
Plan    1  DSMT/MNT Time Record; Status:Complete;   Done: 66QOR7986 12:32PM    Discussion/Summary    PATIENT EDUCATION RECORD   Indication for Services: type 2 Diabetes Mellitus  He is ready to learn  He has no barriers to learning  Living Well with Diabetes Class 1 Education Content: What is diabetes, Types of diabetes, How is diabetes diagnosed, Management skills, Role of exercise, Glucose monitoring, Target range goals        Chief Complaint  Attending diabetes class      Current Meds   1  Aspirin 81 MG TABS; TAKE 1 TABLET DAILY; Therapy: (Recorded:44Lia0422) to Recorded   2  Atorvastatin Calcium 40 MG Oral Tablet; Therapy: (Recorded:19Apr2017) to Recorded   3  BD Pen Needle Beatrice U/F 32G X 4 MM Miscellaneous; use three times a day as directed; Therapy: 50YKC4601 to (Evaluate:24Apr2016)  Requested for: 25HSN8584; Last   Rx:27Oct2015 Ordered   4  BD Pen Needle Short U/F 31G X 8 MM Miscellaneous; USE ONCE DAILY WITH LANTUS   SOLOSTAR  Requested for: 15YZJ4916; Last Rx:01Nov2013 Ordered   5  Clopidogrel Bisulfate 75 MG Oral Tablet; Therapy: (Recorded:19Apr2017) to Recorded   6  DrRx Medrol Dose Pack 4 MG #21; take as directed; Therapy: 82KOK6787 to (Last Rx:18Jun2015) Ordered   7  Fish Oil CAPS; Therapy: (Recorded:19Apr2017) to Recorded   8  Lantus SoloStar 100 UNIT/ML Subcutaneous Solution Pen-injector; 10units SQ QHS    Requested for: 95XFR8487; Last Rx:18Jun2015 Ordered   9  Losartan Potassium 100 MG Oral Tablet; take 1 tablet by mouth every day; Therapy: 10XDO3939 to (Evaluate:12Jun2016)  Requested for: 57FKP8621; Last   Rx:18Jun2015 Ordered   10  MetFORMIN HCl - 1000 MG Oral Tablet; TAKE ONE TABLET BY MOUTH TWICE A DAY     Requested for: 84JZG3848; Last Rx:18Jun2015 Ordered   11  Milk Thistle 500 MG Oral Capsule; 2 caps daily; Therapy: (Recorded:80Hoy7139) to Recorded   12  NovoLOG FlexPen 100 UNIT/ML Subcutaneous Solution Pen-injector; 3-5 units before    lunch and dinner;     Therapy: 70XHL8860 to Recorded   13  OneTouch Ultra Blue In [employer]; Test 3 times daily or as directed; Therapy: 57WHY3751 to [phone])  Requested for: 10Aug2015; Last    Rx:10Aug2015 Ordered    Results/Data  DSMT/MNT Time Record 35VIN4868 12:32PM Jeremiah Exon     Test Name Result Flag Reference   Date of Service 5/2/17     Start - Stop Time 6 pm - 8 pm     Total MInutes 120     Group Or Individual Instruction grp - C 1       End of Encounter Meds    1  BD Pen Needle Beatrice U/F 32G X 4 MM Miscellaneous; use three times a day as directed; Therapy: 55UGK8642 to (Evaluate:24Apr2016)  Requested for: 01JLG7593; Last   Rx:27Oct2015 Ordered   2  Lantus SoloStar 100 UNIT/ML Subcutaneous Solution Pen-injector; 10units SQ QHS    Requested for: 56QRH2795; Last Rx:18Jun2015 Ordered   3  MetFORMIN HCl - 1000 MG Oral Tablet; TAKE ONE TABLET BY MOUTH TWICE A DAY    Requested for: 12VZF7207; Last Rx:18Jun2015 Ordered   4  NovoLOG FlexPen 100 UNIT/ML Subcutaneous Solution Pen-injector; 3-5 units before   lunch and dinner; Therapy: 61Xrm6630 to Recorded   5  OneTouch Ultra Blue In [employer]; Test 3 times daily or as directed; Therapy: 42HIS1918 to [phone])  Requested for: 10Aug2015; Last   Rx:10Aug2015 Ordered    6  Losartan Potassium 100 MG Oral Tablet; take 1 tablet by mouth every day; Therapy: 16RTT2045 to (Evaluate:12Jun2016)  Requested for: 85WQB5153; Last   Rx:18Jun2015 Ordered    7  Aspirin 81 MG TABS; TAKE 1 TABLET DAILY; Therapy: (Recorded:37Lox0929) to Recorded   8  Milk Thistle 500 MG Oral Capsule; 2 caps daily; Therapy: (Recorded:93Rwl7054) to Recorded    9  DrRx Medrol Dose Pack 4 MG #21; take as directed; Therapy: 08SIT3268 to (Last Rx:18Jun2015) Ordered    10  BD Pen Needle Short U/F 31G X 8 MM Miscellaneous; USE ONCE DAILY WITH LANTUS    SOLOSTAR  Requested for: 98DWU6971; Last Rx:01Nov2013 Ordered    11  Atorvastatin Calcium 40 MG Oral Tablet;     Therapy: (Recorded:19Apr2017) to Recorded   12  Clopidogrel Bisulfate 75 MG Oral Tablet; Therapy: (Recorded:03Plt6373) to Recorded   13  Fish Oil CAPS; Therapy: (Recorded:57Pzi1418) to Recorded    Future Appointments    Date/Time Provider Specialty Site   08/03/2017 08:10 MORE Abdul   Endocrinology [facility] ENDOCRINOLOGY   05/09/2017 06:00 PM Charo Castaneda RD Diabetes Educator [place] [address] DIABETES EDUCATION   05/23/2017 06:00 PM [facility]   05/16/2017 06:00 PM HATTIE Ellison Diabetes Educator [address] DIABETES EDUCATION     Signatures   Electronically signed by : HATTIE Larson; May  3 2017 12:35PM EST                       (Author)    Electronically signed by : MORE Loving ; May  4 2017 12:56PM EST

## 2018-01-15 NOTE — PROGRESS NOTES
Plan    1  DSMT/MNT Time Record; Status:Complete;   Done: 03HDG8962 09:20AM    Discussion/Summary    PATIENT EDUCATION RECORD   Indication for Services: type 2 Diabetes Mellitus  He is ready to learn  He has no barriers to learning  Living Well with Diabetes Class 2 Education Content: Macronutrients, Carbohydrate sources, What one serving of carbohydrate equals, Effects of diet on blood glucose levels, effects of carbohydrates on blood glucose levels, Basics of meal planning: balance, portions, and meal times, Measurements, Reading food labels to determine carbohydrates       Active Problems    1  Arteriosclerosis of coronary artery (414 00) (I25 10)   2  Atypical chest pain (786 59) (R07 89)   3  Bruising (924 9) (T14 8)   4  Chronic constipation (564 00) (K59 09)   5  Chronic hepatitis B (070 32) (B18 1)   6  Chronic hepatitis C virus infection (070 54) (B18 2)   7  Chronic lymphocytic leukemia (204 10) (C91 10)   8  Controlled type 2 diabetes mellitus with diabetic polyneuropathy, with long-term current   use of insulin (250 60,357 2,V58 67) (E11 42,Z79 4)   9  Diabetes mellitus type 2, uncontrolled (250 02) (E11 65)   10  Dyslipidemia (272 4) (E78 5)   11  Essential hypertension (401 9) (I10)   12  Flu vaccine need (V04 81) (Z23)   13  Lower back pain (724 2) (M54 5)   14  Lymphadenopathy (785 6) (R59 1)   15  Microalbuminuria (791 0) (R80 9)   16  Need for vaccination with 13-polyvalent pneumococcal conjugate vaccine (V03 82) (Z23)   17  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 A1)   18  Rhinitis (472 0) (J31 0)   19  Screening for neurological condition (V80 09) (Z13 89)    Past Medical History    1  History of Cervical lymphadenopathy (785 6) (R59 0)    Surgical History    1  History of Tonsillectomy With Adenoidectomy    Family History  Sister    1  Family history of Non-Hodgkin's Lymphoma  Maternal Grandfather    2   Family history of Diabetes Mellitus (V18 0)    Social History    · Being A Social Drinker   · Exercising Regularly   · Never A Smoker    Current Meds   1  Aspirin 81 MG TABS; TAKE 1 TABLET DAILY; Therapy: (Recorded:06Rtv1446) to Recorded   2  Atorvastatin Calcium 40 MG Oral Tablet; Therapy: (Recorded:19Apr2017) to Recorded   3  BD Pen Needle Beatrice U/F 32G X 4 MM Miscellaneous; use three times a day as directed; Therapy: 21CAR9175 to (Evaluate:24Apr2016)  Requested for: 48VOY1775; Last   Rx:27Oct2015 Ordered   4  BD Pen Needle Short U/F 31G X 8 MM Miscellaneous; USE ONCE DAILY WITH LANTUS   SOLOSTAR  Requested for: 58FWF0601; Last Rx:01Nov2013 Ordered   5  Clopidogrel Bisulfate 75 MG Oral Tablet; Therapy: (Recorded:19Apr2017) to Recorded   6  DrRx Medrol Dose Pack 4 MG #21; take as directed; Therapy: 15XPJ0956 to (Last Rx:18Jun2015) Ordered   7  Fish Oil CAPS; Therapy: (Recorded:19Apr2017) to Recorded   8  Lantus SoloStar 100 UNIT/ML Subcutaneous Solution Pen-injector; 10units SQ QHS    Requested for: 71YUV6759; Last Rx:18Jun2015 Ordered   9  Losartan Potassium 100 MG Oral Tablet; take 1 tablet by mouth every day; Therapy: 11NGI7365 to (Evaluate:12Jun2016)  Requested for: 22IOI9697; Last   Rx:18Jun2015 Ordered   10  MetFORMIN HCl - 1000 MG Oral Tablet; TAKE ONE TABLET BY MOUTH TWICE A DAY     Requested for: 88FMX4534; Last Rx:18Jun2015 Ordered   11  Milk Thistle 500 MG Oral Capsule; 2 caps daily; Therapy: (Recorded:62Ksv0971) to Recorded   12  NovoLOG FlexPen 100 UNIT/ML Subcutaneous Solution Pen-injector; 3-5 units before    lunch and dinner; Therapy: 15Smx2273 to Recorded   13  OneTouch Ultra Blue In [employer]; Test 3 times daily or as directed; Therapy: 16SUN5747 to (Claudetta Overlie)  Requested for: 10Aug2015; Last    Rx:10Aug2015 Ordered    Allergies    1  No Known Drug Allergies    2  No Known Environmental Allergies   3  No Known Food Allergies    End of Encounter Meds    1   BD Pen Needle Beatrice U/F 32G X 4 MM Miscellaneous; use three times a day as directed; Therapy: 04JZH0916 to (Evaluate:24Apr2016)  Requested for: 44NRQ6519; Last   Rx:27Oct2015 Ordered   2  Lantus SoloStar 100 UNIT/ML Subcutaneous Solution Pen-injector; 10units SQ QHS    Requested for: 72ZPY0216; Last Rx:18Jun2015 Ordered   3  MetFORMIN HCl - 1000 MG Oral Tablet; TAKE ONE TABLET BY MOUTH TWICE A DAY    Requested for: 67VYQ8740; Last Rx:18Jun2015 Ordered   4  NovoLOG FlexPen 100 UNIT/ML Subcutaneous Solution Pen-injector; 3-5 units before   lunch and dinner; Therapy: 33Yll2835 to Recorded   5  OneTouch Ultra Blue In [employer]; Test 3 times daily or as directed; Therapy: 61AJF7245 to (Nikolai Mcmanus)  Requested for: 10Aug2015; Last   Rx:10Aug2015 Ordered    6  Losartan Potassium 100 MG Oral Tablet; take 1 tablet by mouth every day; Therapy: 11WEA8727 to (Evaluate:12Jun2016)  Requested for: 39YXR4859; Last   Rx:18Jun2015 Ordered    7  Aspirin 81 MG TABS; TAKE 1 TABLET DAILY; Therapy: (Recorded:06Oed5322) to Recorded   8  Milk Thistle 500 MG Oral Capsule; 2 caps daily; Therapy: (Recorded:79Tns6021) to Recorded    9  DrRx Medrol Dose Pack 4 MG #21; take as directed; Therapy: 58EFN1404 to (Last Rx:18Jun2015) Ordered    10  BD Pen Needle Short U/F 31G X 8 MM Miscellaneous; USE ONCE DAILY WITH LANTUS    SOLOSTAR  Requested for: 16YCD7794; Last Rx:01Nov2013 Ordered    11  Atorvastatin Calcium 40 MG Oral Tablet; Therapy: (Recorded:87Hsa6185) to Recorded   12  Clopidogrel Bisulfate 75 MG Oral Tablet; Therapy: (Recorded:34Hfa3652) to Recorded   13  Fish Oil CAPS; Therapy: (Recorded:10Ddz4888) to Recorded    Future Appointments    Date/Time Provider Specialty Site   08/03/2017 08:10 MORE Posadas   Endocrinology [facility] ENDOCRINOLOGY   05/23/2017 06:00 PM Danial Chacon RD Diabetes Educator [facility] DIABETES EDUCATION   05/16/2017 06:00 PM HATTIE Sullivan Diabetes Educator [facility] DIABETES EDUCATION     Signatures   Electronically signed by : Sy Rico RD; May 10 2017  9:23AM EST                       (Author)    Electronically signed by : MORE Santos ; May 11 2017  8:23AM EST

## 2018-01-16 NOTE — PROGRESS NOTES
History of Present Illness  HPI: Patient here for [employer] Pro CGM placement as ordered by Dr Marla Cobb  Active Problems    1  Arteriosclerosis of coronary artery (414 00) (I25 10)   2  Atypical chest pain (786 59) (R07 89)   3  Bruising (924 9) (T14 8)   4  Chronic constipation (564 00) (K59 09)   5  Chronic hepatitis B (070 32) (B18 1)   6  Chronic hepatitis C virus infection (070 54) (B18 2)   7  Chronic lymphocytic leukemia (204 10) (C91 10)   8  Controlled type 2 diabetes mellitus with diabetic polyneuropathy, with long-term current   use of insulin (250 60,357 2,V58 67) (E11 42,Z79 4)   9  Dyslipidemia (272 4) (E78 5)   10  Essential hypertension (401 9) (I10)   11  Flu vaccine need (V04 81) (Z23)   12  Lower back pain (724 2) (M54 5)   13  Lymphadenopathy (785 6) (R59 1)   14  Microalbuminuria (791 0) (R80 9)   15  Need for vaccination with 13-polyvalent pneumococcal conjugate vaccine (V03 82) (Z23)   16  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 A1)   17  Rhinitis (472 0) (J31 0)   18  Screening for neurological condition (V80 09) (Z13 89)    Current Meds   1  Aspirin 81 MG TABS; TAKE 1 TABLET DAILY; Therapy: (Recorded:19Kes1174) to Recorded   2  Atorvastatin Calcium 40 MG Oral Tablet; Therapy: (Recorded:19Apr2017) to Recorded   3  BD Pen Needle Beatrice U/F 32G X 4 MM Miscellaneous; use three times a day as directed; Therapy: 15LPI3151 to (Evaluate:24Apr2016)  Requested for: 48TFR7962; Last   Rx:27Oct2015 Ordered   4  BD Pen Needle Short U/F 31G X 8 MM Miscellaneous; USE ONCE DAILY WITH LANTUS   SOLOSTAR  Requested for: 12EVU3723; Last Rx:01Nov2013 Ordered   5  Clopidogrel Bisulfate 75 MG Oral Tablet; Therapy: (Recorded:19Apr2017) to Recorded   6  Fish Oil CAPS; Therapy: (Recorded:19Apr2017) to Recorded   7  Lantus SoloStar 100 UNIT/ML Subcutaneous Solution Pen-injector; 10units SQ QHS    Requested for: 41JMR5547; Last Rx:18Jun2015 Ordered   8   MetFORMIN HCl - 1000 MG Oral Tablet; TAKE ONE TABLET BY MOUTH TWICE A DAY    Requested for: 37TIA6762; Last Rx:76Ull9329 Ordered   9  Milk Thistle 500 MG Oral Capsule; 2 caps daily; Therapy: (Recorded:93Uip1065) to Recorded   10  OneTouch Ultra Blue In [employer]; Test 3 times daily or as directed; Therapy: 02IIX8773 to (Evaluate:01Ypm0498)  Requested for: 26SNJ1459; Last    Rx:37Rhf8756 Ordered   11  Trulicity 3 66 NE/0 8BW Subcutaneous Solution Pen-injector; Use 1 per week; Therapy: 22UBT6917 to (Evaluate:41Ixu0822) Recorded    Allergies    1  No Known Drug Allergies    2  No Known Environmental Allergies   3  No Known Food Allergies    Vitals  Signs    Heart Rate: 82  Systolic: 084  Diastolic: 78  Height: 5 ft 9 in  Weight: 148 lb 2 08 oz  BMI Calculated: 21 87  BSA Calculated: 1 82    Assessment    1  Controlled type 2 diabetes mellitus with diabetic polyneuropathy, with long-term current   use of insulin (250 60,357 2,V58 67) (E11 42,Z79 4)   2  Essential hypertension (401 9) (I10)   3  Dyslipidemia (272 4) (E78 5)    Plan  Controlled type 2 diabetes mellitus with diabetic polyneuropathy, with long-term current  use of insulin    · (1) HEMOGLOBIN A1C; Status:Active; Requested for:96Ktj4116;    · Continuous Glucose Monitoring CGM; Status:Hold For - Scheduling; Requested  for:56Wea1206;    · Follow-up visit in 4 Months Evaluation and Treatment  Follow-up  Status: Hold For -  Scheduling  Requested for: 98SIE4825   · Follow-Up With Advanced Practitioner Evaluation and Treatment  Follow-up  Status: Hold  For - Scheduling  Requested for: 98KFP2722  Unlinked    · Clopidogrel Bisulfate 75 MG Oral Tablet    Sensor placed at visit today on left arm  Consent signed  Documents scanned  Signatures   Electronically signed by : Mary Grace, [phone];  Nov 6 2017 11:02AM EST                       (Author)    Electronically signed by : MORE Reed ; Nov 6 2017 11:03AM EST

## 2018-01-16 NOTE — PROGRESS NOTES
Plan    1  DSMT/MNT Time Record; Status:Complete;   Done: 43TLT9080 01:53PM    Discussion/Summary    PATIENT EDUCATION RECORD   Indication for Services: type 2 Diabetes Mellitus  He is ready to learn  He has no barriers to learning  Diabetes Disease Process:   He understands the pathophysiology of diabetes: Method: Instruction  Response: Verbalizes Understanding   Discussed patient's type of diabetes: Method: Instruction  Response: Verbalizes Understanding   Discussed diagnosis criteria: Method: Instruction  Response: Verbalizes Understanding   Discussed treatment goals: Method: Instruction  Response: Verbalizes Understanding   Discussed benefits of control: Method: Instruction  Response: Verbalizes Understanding   Discussed treatment options: Method: Instruction  Response: Verbalizes Understanding   Healthy Eating:   Discussed general nutrition topics: Method: Instruction  Response: Verbalizes Understanding   His blood glucose targets are: Pre-meal target , Post-meal target <140 and HS target 100-140  Monitoring:   Discussed target blood glucose ranges: Method: Instruction and Handout  Response: Verbalizes Understanding  Discussed target hemoglobin A1c: Method: Instruction  Response: Verbalizes Understanding  Discussed testing times: Method: Instruction  Response: Verbalizes Understanding  Discussed safe lancet disposal: Method: Instruction  Response: Verbalizes Understanding  Discussed options for record keeping: Method: Instruction  Response: Verbalizes Understanding  Discussed reporting of readings to M D : Method: Instruction  Response: Verbalizes Understanding  He is currently using a One Touch Mini meter  Discussed Insulin Types  Method: Instruction  Taking Medication:   Discussed devices-syringe/pen  Method: Instruction  Response: [facility]   He uses Pen  Discussed administration of insulin using syringe/pen  Method: Instruction  Response: [facility] [employer] [facility]  Discussed site selection and rotation of injections  Method: Instruction  Response: [facility]  Discussed safe needle disposal  Method: Instruction  Response: [facility]  Discussed medication storage  Method: Instruction  Response: [facility]  Response: [facility]  He is taking   He is taking insulin Novolog before meals and Lantus before bed  Discussed onset, peak, and duration of insulin  Method: Instruction  Response: [facility]  Discussed side effects and precautions of insulin  Method: Instruction  Response: [facility]  Discussed basal-bolus concept  Method: Instruction  Response: [facility]  He was given Fast 15 List   Problem Solving: He is on medications that cause hypoglycemia, He is able to state the symptoms, prevention, and treatment of hypoglycemia   Hypoglycemia: Stated definition and causes: Method: Instruction  Response: Verbalizes Understanding   Described signs and symptoms: Method: Instruction and Handout  Response: Verbalizes Understanding   Discussed prevention: Method: Instruction  Response: Verbalizes Instruction   Discussed treatment: Method: Instruction  Response: Verbalizes Instruction   Hyperglycemia: Stated definition and causes: Method: Instruction  Response: Verbalizes Understanding   Described signs and symptoms: Method: Instruction and Handout  Response: Verbalizes Instruction   Reducing Risk:   Discussed yearly recommended exams  Method: Instruction  Response: [facility]  I recommend he see a Podiatrist yearly  an Eye Doctor yearly  Education Plan/Path:  He needs the Living Well Class  TBD He was given the following educational materials: Know Your Blood Glucose Numbers, The 15/15 Rule for Treating Low Blood Sugar and Hyperglycemia/Hypoglycemia         Chief Complaint  Patient with T2DM seen for class assessment per MD request       History of Present Illness  Patient reports he does not always take 5 units of insulin before meals  He admits to adjusting the insulin according to his BG and sometimes only takes 2 units  Jarednena Mejia states, "The doctor is trying to determine what kind of diabetes I have  He said I may not have to continue taking insulin  I might be able to take oral medication"  Patient was given a class schedule to take with him so his wife can choose classes based on their schedules  Educator will remain available for further questions/concerns  Results/Data  DSMT/MNT Time Record 25Apr2017 01:53PM Gutierrez William     Test Name Result Flag Reference   Date of Service 4/25/2017     Start - Stop Time 10:45-11:45AM     Total MInutes 60 minutes     Group Or Individual Instruction DSMT-I       Future Appointments    Date/Time Provider Specialty Site   08/03/2017 08:10 AM MORE Callaway   Endocrinology [facility] ENDOCRINOLOGY     Signatures   Electronically signed by : Trena Garcia Bar Jeffery; Apr 25 2017  2:14PM EST                       (Author)    Electronically signed by : MORE Lopez ; May  1 2017 10:10AM EST

## 2018-01-16 NOTE — PROGRESS NOTES
Plan    1  DSMT/MNT Time Record; Status:Complete;   Done: 91YAU1956 09:24PM    Discussion/Summary    PATIENT EDUCATION RECORD   Living Well with Diabetes Class 4 Education Content: Types of cholesterol, Dietary sources of cholesterol, Types of fat, Types of fiber, Reading food labels- in depth, Healthy choices when dining out        Active Problems    1  Arteriosclerosis of coronary artery (414 00) (I25 10)   2  Atypical chest pain (786 59) (R07 89)   3  Bruising (924 9) (T14 8)   4  Chronic constipation (564 00) (K59 09)   5  Chronic hepatitis B (070 32) (B18 1)   6  Chronic hepatitis C virus infection (070 54) (B18 2)   7  Chronic lymphocytic leukemia (204 10) (C91 10)   8  Controlled type 2 diabetes mellitus with diabetic polyneuropathy, with long-term current   use of insulin (250 60,357 2,V58 67) (E11 42,Z79 4)   9  Diabetes mellitus type 2, uncontrolled (250 02) (E11 65)   10  Dyslipidemia (272 4) (E78 5)   11  Essential hypertension (401 9) (I10)   12  Flu vaccine need (V04 81) (Z23)   13  Lower back pain (724 2) (M54 5)   14  Lymphadenopathy (785 6) (R59 1)   15  Microalbuminuria (791 0) (R80 9)   16  Need for vaccination with 13-polyvalent pneumococcal conjugate vaccine (V03 82) (Z23)   17  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 A1)   18  Rhinitis (472 0) (J31 0)   19  Screening for neurological condition (V80 09) (Z13 89)    Past Medical History    1  History of Cervical lymphadenopathy (785 6) (R59 0)    Surgical History    1  History of Tonsillectomy With Adenoidectomy    Family History  Sister    1  Family history of Non-Hodgkin's Lymphoma  Maternal Grandfather    2  Family history of Diabetes Mellitus (V18 0)    Social History    · Being A Social Drinker   · Exercising Regularly   · Never A Smoker    Current Meds   1  Aspirin 81 MG TABS; TAKE 1 TABLET DAILY; Therapy: (Recorded:33Pgi1050) to Recorded   2  Atorvastatin Calcium 40 MG Oral Tablet; Therapy: (Recorded:26Lmo4444) to Recorded   3  BD Pen Needle Beatrice U/F 32G X 4 MM Miscellaneous; use three times a day as directed; Therapy: 64BQE0112 to (Evaluate:24Apr2016)  Requested for: 94UZE4166; Last   Rx:27Oct2015 Ordered   4  BD Pen Needle Short U/F 31G X 8 MM Miscellaneous; USE ONCE DAILY WITH LANTUS   SOLOSTAR  Requested for: 71TBX1068; Last Rx:01Nov2013 Ordered   5  Clopidogrel Bisulfate 75 MG Oral Tablet; Therapy: (Recorded:19Apr2017) to Recorded   6  DrRx Medrol Dose Pack 4 MG #21; take as directed; Therapy: 96DIA1290 to (Last Rx:18Jun2015) Ordered   7  Fish Oil CAPS; Therapy: (Recorded:19Apr2017) to Recorded   8  Lantus SoloStar 100 UNIT/ML Subcutaneous Solution Pen-injector; 10units SQ QHS    Requested for: 22YRI5651; Last Rx:18Jun2015 Ordered   9  Losartan Potassium 100 MG Oral Tablet; take 1 tablet by mouth every day; Therapy: 27SAP3795 to (Evaluate:12Jun2016)  Requested for: 12ODJ9419; Last   Rx:18Jun2015 Ordered   10  MetFORMIN HCl - 1000 MG Oral Tablet; TAKE ONE TABLET BY MOUTH TWICE A DAY     Requested for: 17DVY7315; Last Rx:58Hon7818 Ordered   11  Milk Thistle 500 MG Oral Capsule; 2 caps daily; Therapy: (Recorded:28Jnq9506) to Recorded   12  NovoLOG FlexPen 100 UNIT/ML Subcutaneous Solution Pen-injector; 3-5 units before    lunch and dinner; Therapy: 58Bsh2482 to Recorded   13  OneTouch Ultra Blue In [employer]; Test 3 times daily or as directed; Therapy: 42LDN6888 to (Evaluate:87Gdw8556)  Requested for: 45OSR3299; Last    Rx:57Gsy9825 Ordered    Allergies    1  No Known Drug Allergies    2  No Known Environmental Allergies   3  No Known Food Allergies    Results/Data  DSMT/MNT Time Record 68Lwx4714 09:24PM Dania Bath     Test Name Result Flag Reference   Date of Service 7/20/17     Start - Stop Time 6-8     Total MInutes 120     Group Or Individual Instruction G-[id number]       End of Encounter Meds    1  BD Pen Needle Beatrice U/F 32G X 4 MM Miscellaneous; use three times a day as directed;    Therapy: 01YUU7331 to (Evaluate:24Apr2016)  Requested for: 48WQD3489; Last   Rx:27Oct2015 Ordered   2  Lantus SoloStar 100 UNIT/ML Subcutaneous Solution Pen-injector; 10units SQ QHS    Requested for: 85YSP7848; Last Rx:18Jun2015 Ordered   3  MetFORMIN HCl - 1000 MG Oral Tablet; TAKE ONE TABLET BY MOUTH TWICE A DAY    Requested for: 52AKJ1883; Last Rx:15May2017 Ordered   4  NovoLOG FlexPen 100 UNIT/ML Subcutaneous Solution Pen-injector; 3-5 units before   lunch and dinner; Therapy: 29Mkh3634 to Recorded   5  OneTouch Ultra Blue In [employer]; Test 3 times daily or as directed; Therapy: 44NUX8794 to (Evaluate:14Mfb4809)  Requested for: 85QZZ3209; Last   Rx:15May2017 Ordered    6  Losartan Potassium 100 MG Oral Tablet; take 1 tablet by mouth every day; Therapy: 42PHL6132 to (Evaluate:12Jun2016)  Requested for: 89IKR2577; Last   Rx:18Jun2015 Ordered    7  Aspirin 81 MG TABS; TAKE 1 TABLET DAILY; Therapy: (Recorded:84Pht7553) to Recorded   8  Milk Thistle 500 MG Oral Capsule; 2 caps daily; Therapy: (Recorded:99Kgu6321) to Recorded    9  DrRx Medrol Dose Pack 4 MG #21; take as directed; Therapy: 85QGT0661 to (Last Rx:18Jun2015) Ordered    10  BD Pen Needle Short U/F 31G X 8 MM Miscellaneous; USE ONCE DAILY WITH LANTUS    SOLOSTAR  Requested for: 91OCU7175; Last Rx:01Nov2013 Ordered    11  Atorvastatin Calcium 40 MG Oral Tablet; Therapy: (Recorded:41Qqa3501) to Recorded   12  Clopidogrel Bisulfate 75 MG Oral Tablet; Therapy: (Recorded:19Apr2017) to Recorded   13  Fish Oil CAPS;     Therapy: (Recorded:19Apr2017) to Recorded    Future Appointments    Date/Time Provider Specialty Site   08/03/2017 09:00 AM Fatimah Archuleta [facility] ENDOCRINOLOGY     Signatures   Electronically signed by : Alee Tuttle RD; Jul 20 2017  9:25PM EST                       (Author)    Electronically signed by : MORE Garrett ; Jul 20 2017 11:08PM EST                       (Author)

## 2018-01-17 NOTE — RESULT NOTES
Discussion/Summary   Hemoglobin A1c has improved  Continue current regimen as long as she is not having low blood sugar  Verified Results  (1) HEMOGLOBIN A1C 57CFJ1469 10:21AM [facility] Order Number: [id number]     Test Name Result Flag Reference   HEMOGLOBIN A1C 5 9 %  4 2-6 3   EST  AVG   GLUCOSE 123 mg/dl

## 2018-01-18 NOTE — RESULT NOTES
Discussion/Summary   Normal antibody testing  Verified Results  (1) IA2 AUTOANTIBODIES 22Apr2017 09:31AM [facility]: [id number]     Test Name Result Flag Reference   IA2 AUTOANTIBODIES <1 0 U/mL     Reference Range:  <1 0        Negative  > or = 1 0  Positive    Performed at:  [facility]  [facility]  [address] [place] [address]  [de-identified]  : Sparkle Alarcon MD, Phone:  [phone]     (1) INSULIN ANTIBODIES 22Apr2017 09:31AM [facility] Order Number: [id number]     Test Name Result Flag Reference   INSULIN ANTIBODIES <5 0 uU/mL     This test is also known as insulin autoantibody or IAA    Reference Range:  <5 0        Negative  > or = 5 0  Positive    Performed at:  01 [facility]IR[facility] DANI[facility] Endocrinology  [address] [place] [address]  [de-identified]  : Sparkle Alarcon MD, Phone:  [phone]

## 2018-03-05 RX ORDER — MILK THISTLE 500 MG
1 CAPSULE ORAL DAILY
COMMUNITY
End: 2018-07-17

## 2018-03-05 RX ORDER — ATORVASTATIN CALCIUM 40 MG/1
1 TABLET, FILM COATED ORAL EVERY EVENING
COMMUNITY
End: 2019-01-28 | Stop reason: ALTCHOICE

## 2018-03-06 ENCOUNTER — OFFICE VISIT (OUTPATIENT)
Dept: ENDOCRINOLOGY | Facility: CLINIC | Age: 78
End: 2018-03-06
Payer: MEDICARE

## 2018-03-06 VITALS
BODY MASS INDEX: 22.81 KG/M2 | HEIGHT: 69 IN | WEIGHT: 154 LBS | DIASTOLIC BLOOD PRESSURE: 70 MMHG | SYSTOLIC BLOOD PRESSURE: 122 MMHG | HEART RATE: 88 BPM

## 2018-03-06 DIAGNOSIS — R20.9 DISTURBANCE OF SKIN SENSATION: ICD-10-CM

## 2018-03-06 DIAGNOSIS — E11.42 CONTROLLED TYPE 2 DIABETES MELLITUS WITH DIABETIC POLYNEUROPATHY, WITH LONG-TERM CURRENT USE OF INSULIN (HCC): ICD-10-CM

## 2018-03-06 DIAGNOSIS — I10 ESSENTIAL HYPERTENSION: ICD-10-CM

## 2018-03-06 DIAGNOSIS — Z79.4 CONTROLLED TYPE 2 DIABETES MELLITUS WITH DIABETIC POLYNEUROPATHY, WITH LONG-TERM CURRENT USE OF INSULIN (HCC): ICD-10-CM

## 2018-03-06 DIAGNOSIS — E11.8 TYPE 2 DIABETES MELLITUS WITH COMPLICATION, UNSPECIFIED LONG TERM INSULIN USE STATUS: Primary | ICD-10-CM

## 2018-03-06 DIAGNOSIS — E78.5 DYSLIPIDEMIA: ICD-10-CM

## 2018-03-06 PROBLEM — I25.10 ARTERIOSCLEROSIS OF CORONARY ARTERY: Status: ACTIVE | Noted: 2017-04-19

## 2018-03-06 LAB
CREAT UR-MCNC: 34.7 MG/DL
EST. AVERAGE GLUCOSE BLD GHB EST-MCNC: 128 MG/DL
HBA1C MFR BLD: 6.1 % (ref 4.2–6.3)
MICROALBUMIN UR-MCNC: 63.6 MG/L (ref 0–20)
MICROALBUMIN/CREAT 24H UR: 183 MG/G CREATININE (ref 0–30)
VIT B12 SERPL-MCNC: 365 PG/ML (ref 100–900)

## 2018-03-06 PROCEDURE — 99214 OFFICE O/P EST MOD 30 MIN: CPT | Performed by: PHYSICIAN ASSISTANT

## 2018-03-06 PROCEDURE — 36415 COLL VENOUS BLD VENIPUNCTURE: CPT | Performed by: PHYSICIAN ASSISTANT

## 2018-03-06 PROCEDURE — 82607 VITAMIN B-12: CPT | Performed by: PHYSICIAN ASSISTANT

## 2018-03-06 PROCEDURE — 82570 ASSAY OF URINE CREATININE: CPT | Performed by: PHYSICIAN ASSISTANT

## 2018-03-06 PROCEDURE — 83036 HEMOGLOBIN GLYCOSYLATED A1C: CPT | Performed by: PHYSICIAN ASSISTANT

## 2018-03-06 PROCEDURE — 82043 UR ALBUMIN QUANTITATIVE: CPT | Performed by: PHYSICIAN ASSISTANT

## 2018-03-06 NOTE — PROGRESS NOTES
Established Patient Progress Note      Chief Complaint   Patient presents with    Diabetes Type 2    Hyperlipidemia          History of Present Illness:   Annabelle Cabral is a 78 y o  male with a history of type 2 diabetes with long term use of insulin   Lab testing showed normal antibodies and normal C-Peptide consistent with Type 2 Diabetes   Reports complications of Neuropathy and microalbuminuria  Since last visit, he had a cardiac stent after abnormal stress testing  Since last visit he wore yordan diagnostic CGM which showed some hyperglycemia throughout the day but adjustments were not made  He has met with dietician and has tried to make dietary improvements but says he still sometimes eats too much  Home blood glucose readings:   Before breakfast: usually 100-140s  Before lunch: nto checking  Before dinner: not checking  Bedtime: two readings of 198 and 256- he says this was after eating a big meal      Current regimen: Lantus 10 units daily in morning, Metofrmin, Trulicity 0 78SV weekly  Last Eye Exam: yearly in may  Last Foot Exam: today at visit, gets occasional tingling/burning which is mild  Has hypertension: Taking no meds previously treated with ARB  Has hyperlipidemia: Taking atorvastatin and fish oil      Hx CLL-- follows with hematology  Hx Hep B/Hep C--- hep C treated/cured with Harvoni  Patient Active Problem List   Diagnosis    Arteriosclerosis of coronary artery    Atypical chest pain    Bruising    CAD (coronary artery disease), native coronary artery    Chronic constipation    Chronic hepatitis B (HCC)    Chronic lymphocytic leukemia (HCC)    Controlled type 2 diabetes mellitus with diabetic polyneuropathy, with long-term current use of insulin (HCC)    Dyslipidemia    Essential hypertension    Hepatitis C    S/P coronary artery stent placement      No past medical history on file  No past surgical history on file     No family history on file   Social History   Substance Use Topics    Smoking status: Never Smoker    Smokeless tobacco: Never Used    Alcohol use No     No Known Allergies      Current Outpatient Prescriptions:     aspirin 81 MG tablet, Take 1 tablet by mouth daily, Disp: , Rfl:     atorvastatin (LIPITOR) 40 mg tablet, Take 1 tablet by mouth daily, Disp: , Rfl:     glucose blood (ONE TOUCH ULTRA TEST) test strip, 1 applicator by In Vitro route 3 (three) times a day, Disp: , Rfl:     metFORMIN (GLUCOPHAGE) 1000 MG tablet, Take 1 tablet by mouth 2 (two) times a day, Disp: , Rfl:     Milk Thistle 500 MG CAPS, Take 1 capsule by mouth daily, Disp: , Rfl:     Omega-3 Fatty Acids (FISH OIL) 645 MG CAPS, Take 1 capsule by mouth daily, Disp: , Rfl:     Dulaglutide 1 5 MG/0 5ML SOPN, Inject 0 5 mL (1 5 mg total) under the skin once a week for 30 days, Disp: 12 pen, Rfl: 1    Review of Systems    Physical Exam:  Body mass index is 22 74 kg/m²    /70   Pulse 88   Ht 5' 9" (1 753 m)   Wt 69 9 kg (154 lb)   BMI 22 74 kg/m²    Wt Readings from Last 3 Encounters:   03/06/18 69 9 kg (154 lb)   11/06/17 67 2 kg (148 lb 2 1 oz)   08/03/17 68 6 kg (151 lb 2 1 oz)       Physical Exam  Diabetic Foot Exam    Labs:     Lab Results   Component Value Date    HGBA1C 5 9 11/18/2017     No results found for: GLU  Lab Results   Component Value Date    HGBA1C 5 9 11/18/2017       Lab Results   Component Value Date    CREATININE 0 98 08/14/2017    CREATININE 1 01 04/22/2017    CREATININE 1 16 08/31/2016    BUN 27 (H) 08/14/2017     08/14/2017    K 4 0 08/14/2017     08/14/2017    CO2 29 08/14/2017     eGFR   Date Value Ref Range Status   04/22/2017 >60 0 ml/min/1 73sq m Final       Lab Results   Component Value Date    CHOL 133 08/14/2017    HDL 59 08/14/2017    TRIG 51 08/14/2017       Lab Results   Component Value Date    ALT 23 08/14/2017    AST 24 08/14/2017    ALKPHOS 49 08/14/2017    BILITOT 0 7 08/14/2017       Lab Results Component Value Date    KOK7FJQSDVSX 0 85 06/14/2017    IWV0OBHGYARA 1 531 04/22/2017    KRG6YAPCWDCG 1 423 12/08/2015     Lab Results   Component Value Date    FREET4 1 3 06/14/2017       Impression & Plan:    Problem List Items Addressed This Visit     Controlled type 2 diabetes mellitus with diabetic polyneuropathy, with long-term current use of insulin ([address] )     Diabetes is under good control based on last A1C of 5 8  He is on very low dose of lantus 10 units daily  At this time, will try to discontinue lantus and increase trulicity to 2 3VI weekly  Check BG 2x per day and send log in two weeks for review  If he notices significant hyperglycemia after stopping insulin (blood sugars frequently over 150) he should resume lantus 10 units daily  Check A1C today  He was given list of alternative GLP-1 agonists to trulicity including victoza which may have some additional cardiac benefits, ozempic, and bydureon  If any of these are less expensive, we can switch medications  Relevant Medications    metFORMIN (GLUCOPHAGE) 1000 MG tablet    Dulaglutide 1 5 MG/0 5ML SOPN    Dyslipidemia     Continue Atorvastatin  Essential hypertension     He is not on any medications  BP under good control  IF microalbuminuria persists will consider low dose ACE inhibitor or ARB              Other Visit Diagnoses     Type 2 diabetes mellitus with complication, unspecified long term insulin use status (HCC)    -  Primary    Relevant Medications    metFORMIN (GLUCOPHAGE) 1000 MG tablet    Dulaglutide 1 5 MG/0 5ML SOPN    Other Relevant Orders    POCT hemoglobin A1c    Vitamin B12    HEMOGLOBIN A1C W/ EAG ESTIMATION    Microalbumin / creatinine urine ratio    Disturbance of skin sensation         Relevant Orders    Vitamin B12          Orders Placed This Encounter   Procedures    Vitamin B12    HEMOGLOBIN A1C W/ EAG ESTIMATION    Microalbumin / creatinine urine ratio    POCT hemoglobin A1c       Patient Instructions   Stop Lantus  Increase Trulicity to 5 8JG weekly  Check BG 2x per day at various times before meals and bedtime  If blood sugars significantly higher, resume lantus 10 units daily and let us know  Discussed with the patient and all questioned fully answered  He will call me if any problems arise  Follow-up appointment in 3 months       Counseled patient on diagnostic results, prognosis, risk and benefit of treatment options, instruction for management, importance of treatment compliance, Risk  factor reduction and impressions      Melida Mayer PA-C

## 2018-03-06 NOTE — ASSESSMENT & PLAN NOTE
Diabetes is under good control based on last A1C of 5 8  He is on very low dose of lantus 10 units daily  At this time, will try to discontinue lantus and increase trulicity to 0 5UK weekly  Check BG 2x per day and send log in two weeks for review  If he notices significant hyperglycemia after stopping insulin (blood sugars frequently over 150) he should resume lantus 10 units daily  Check A1C today  He was given list of alternative GLP-1 agonists to trulicity including victoza which may have some additional cardiac benefits, ozempic, and bydureon  If any of these are less expensive, we can switch medications

## 2018-03-06 NOTE — PATIENT INSTRUCTIONS
Stop Lantus  Increase Trulicity to 2 7GO weekly  Check BG 2x per day at various times before meals and bedtime  If blood sugars significantly higher, resume lantus 10 units daily and let us know

## 2018-03-06 NOTE — ASSESSMENT & PLAN NOTE
He is not on any medications  BP under good control  IF microalbuminuria persists will consider low dose ACE inhibitor or ARB

## 2018-03-19 NOTE — PROGRESS NOTES
Added as original note did not contain PE/ROS      Review of Systems   Constitutional: Negative for activity change, appetite change and fatigue  HENT: Negative for sore throat, trouble swallowing and voice change  Eyes: Negative for visual disturbance  Respiratory: Negative for choking, chest tightness and shortness of breath  Cardiovascular: Negative for chest pain, palpitations and leg swelling  Gastrointestinal: Negative for abdominal pain, constipation and diarrhea  Endocrine: Negative for cold intolerance, heat intolerance, polydipsia, polyphagia and polyuria  Genitourinary: Negative for frequency  Musculoskeletal: Negative for arthralgias and myalgias  Skin: Negative for rash  Neurological: Negative for dizziness and syncope  Hematological: Negative for adenopathy  Psychiatric/Behavioral: Negative for sleep disturbance  All other systems reviewed and are negative  Physical Exam:  Body mass index is 22 74 kg/m²  /70   Pulse 88   Ht 5' 9" (1 753 m)   Wt 69 9 kg (154 lb)   BMI 22 74 kg/m²    Wt Readings from Last 3 Encounters:   03/06/18 69 9 kg (154 lb)   11/06/17 67 2 kg (148 lb 2 1 oz)   08/03/17 68 6 kg (151 lb 2 1 oz)       Physical Exam   Constitutional: He is oriented to person, place, and time  He appears well-developed and well-nourished  No distress  HENT:   Head: Normocephalic and atraumatic  Mouth/Throat: Oropharynx is clear and moist    Eyes: Conjunctivae and EOM are normal  Pupils are equal, round, and reactive to light  Neck: Normal range of motion  Neck supple  No thyromegaly present  Cardiovascular: Normal rate, regular rhythm and normal heart sounds  No murmur heard  Pulses:       Dorsalis pedis pulses are 2+ on the right side, and 2+ on the left side  Posterior tibial pulses are 2+ on the right side, and 2+ on the left side  Pulmonary/Chest: Effort normal and breath sounds normal  No respiratory distress  He has no wheezes   He has no rales  Abdominal: Soft  Bowel sounds are normal  He exhibits no distension  There is no tenderness  Musculoskeletal: Normal range of motion  He exhibits no edema  Feet:   Right Foot:   Skin Integrity: Negative for ulcer, skin breakdown, erythema, warmth, callus or dry skin  Left Foot:   Skin Integrity: Negative for ulcer, skin breakdown, erythema, warmth, callus or dry skin  Lymphadenopathy:     He has no cervical adenopathy  Neurological: He is alert and oriented to person, place, and time  Skin: Skin is warm and dry  Psychiatric: He has a normal mood and affect  Vitals reviewed  Patient's shoes and socks removed  Right Foot/Ankle   Right Foot Inspection  Skin Exam: skin normal and skin intact no dry skin, no warmth, no callus, no erythema, no maceration, no abnormal color, no pre-ulcer, no ulcer and no callus                          Toe Exam: ROM and strength within normal limitsno swelling, no tenderness, erythema and  no right toe deformity  Sensory       Monofilament testing: intact  Vascular  Capillary refills: < 3 seconds  The right DP pulse is 2+  The right PT pulse is 2+  Left Foot/Ankle  Left Foot Inspection  Skin Exam: skin normal and skin intactno dry skin, no warmth, no erythema, no maceration, normal color, no pre-ulcer, no ulcer and no callus                         Toe Exam: ROM and strength within normal limitsno swelling, no tenderness, no erythema and no left toe deformity                   Sensory       Monofilament: intact  Vascular  Capillary refills: < 3 seconds  The left DP pulse is 2+  The left PT pulse is 2+

## 2018-03-21 ENCOUNTER — TELEPHONE (OUTPATIENT)
Dept: ENDOCRINOLOGY | Facility: CLINIC | Age: 78
End: 2018-03-21

## 2018-03-21 DIAGNOSIS — I10 HYPERTENSION, UNSPECIFIED TYPE: Primary | ICD-10-CM

## 2018-03-21 NOTE — TELEPHONE ENCOUNTER
----- Message from Herve Martin PA-C sent at 3/19/2018  8:52 AM EDT -----  A1C 6 1- Diabetes is under good control  There is again a small amount of protein in urine which is an early sign of kidney disease in Diabetes  Would start a medication called Ramipril 5mg daily for kidney protection  If he has prior history of allergy/intolerance to these medications let us know  Side effects may include dry cough or low blood pressure or high potassium  Rarely can casuse an allergic reaction with lip/tongue swelling  If she has this problem, should stop medication go to Er  Please mail order to Check BMP in two weeks to make sure kidney function/potassium stable after starting new medication

## 2018-03-26 RX ORDER — RAMIPRIL 5 MG/1
5 CAPSULE ORAL DAILY
Qty: 90 CAPSULE | Refills: 1 | Status: SHIPPED | OUTPATIENT
Start: 2018-03-26 | End: 2018-06-12 | Stop reason: ALTCHOICE

## 2018-04-07 LAB
BUN SERPL-MCNC: 23 MG/DL (ref 7–25)
BUN/CREAT SERPL: ABNORMAL (CALC) (ref 6–22)
CALCIUM SERPL-MCNC: 9.9 MG/DL (ref 8.6–10.3)
CHLORIDE SERPL-SCNC: 103 MMOL/L (ref 98–110)
CO2 SERPL-SCNC: 29 MMOL/L (ref 20–31)
CREAT SERPL-MCNC: 1.05 MG/DL (ref 0.7–1.18)
GLUCOSE SERPL-MCNC: 119 MG/DL (ref 65–99)
POTASSIUM SERPL-SCNC: 4.3 MMOL/L (ref 3.5–5.3)
SL AMB EGFR AFRICAN AMERICAN: 80 ML/MIN/1.73M2
SL AMB EGFR NON AFRICAN AMERICAN: 69 ML/MIN/1.73M2
SODIUM SERPL-SCNC: 141 MMOL/L (ref 135–146)

## 2018-04-16 ENCOUNTER — TELEPHONE (OUTPATIENT)
Dept: ENDOCRINOLOGY | Facility: CLINIC | Age: 78
End: 2018-04-16

## 2018-04-19 ENCOUNTER — HOSPITAL ENCOUNTER (EMERGENCY)
Facility: HOSPITAL | Age: 78
Discharge: LEFT BEFORE DISCHARGE | End: 2018-04-20
Attending: EMERGENCY MEDICINE
Payer: MEDICARE

## 2018-04-19 VITALS
HEART RATE: 84 BPM | OXYGEN SATURATION: 99 % | SYSTOLIC BLOOD PRESSURE: 153 MMHG | RESPIRATION RATE: 18 BRPM | DIASTOLIC BLOOD PRESSURE: 77 MMHG | TEMPERATURE: 97.6 F

## 2018-04-19 DIAGNOSIS — K59.00 CONSTIPATION: ICD-10-CM

## 2018-04-19 DIAGNOSIS — R10.9 ABDOMINAL PAIN: Primary | ICD-10-CM

## 2018-04-19 PROCEDURE — 93005 ELECTROCARDIOGRAM TRACING: CPT

## 2018-04-19 RX ORDER — ONDANSETRON 2 MG/ML
4 INJECTION INTRAMUSCULAR; INTRAVENOUS ONCE
Status: DISCONTINUED | OUTPATIENT
Start: 2018-04-19 | End: 2018-04-20

## 2018-04-19 RX ORDER — SODIUM CHLORIDE 9 MG/ML
125 INJECTION, SOLUTION INTRAVENOUS CONTINUOUS
Status: DISCONTINUED | OUTPATIENT
Start: 2018-04-19 | End: 2018-04-20

## 2018-04-20 ENCOUNTER — APPOINTMENT (EMERGENCY)
Dept: RADIOLOGY | Facility: HOSPITAL | Age: 78
End: 2018-04-20
Payer: MEDICARE

## 2018-04-20 ENCOUNTER — APPOINTMENT (EMERGENCY)
Dept: CT IMAGING | Facility: HOSPITAL | Age: 78
End: 2018-04-20
Payer: MEDICARE

## 2018-04-20 LAB
ATRIAL RATE: 78 BPM
P AXIS: 62 DEGREES
PR INTERVAL: 162 MS
QRS AXIS: 36 DEGREES
QRSD INTERVAL: 96 MS
QT INTERVAL: 374 MS
QTC INTERVAL: 426 MS
T WAVE AXIS: 61 DEGREES
VENTRICULAR RATE: 78 BPM

## 2018-04-20 PROCEDURE — 99284 EMERGENCY DEPT VISIT MOD MDM: CPT

## 2018-04-20 PROCEDURE — 93010 ELECTROCARDIOGRAM REPORT: CPT | Performed by: INTERNAL MEDICINE

## 2018-04-20 NOTE — ED NOTES
Pt refusing tx  Pt states "i'm here because I'm constipated  I just had blood work, my urine examined, and and EKG I don't need more testing " Explained the reasoning for the testing to the pt  Gave pt several minutes alone in the room to make a decision  Informed Dr Fiorella Baldwin of the pts wishes  Pt not upset with the care he received        Katha Gosselin, RN  04/20/18 9941

## 2018-04-20 NOTE — DISCHARGE INSTRUCTIONS
Abdominal Pain   WHAT YOU NEED TO KNOW:   Abdominal pain can be dull, achy, or sharp  You may have pain in one area of your abdomen, or in your entire abdomen  Your pain may be caused by a condition such as constipation, food sensitivity or poisoning, infection, or a blockage  Abdominal pain can also be from a hernia, appendicitis, or an ulcer  Liver, gallbladder, or kidney conditions can also cause abdominal pain  The cause of your abdominal pain may be unknown  DISCHARGE INSTRUCTIONS:   Return to the emergency department if:   · You have new chest pain or shortness of breath  · You have pulsing pain in your upper abdomen or lower back that suddenly becomes constant  · Your pain is in the right lower abdominal area and worsens with movement  · You have a fever over 100 4°F (38°C) or shaking chills  · You are vomiting and cannot keep food or liquids down  · Your pain does not improve or gets worse over the next 8 to 12 hours  · You see blood in your vomit or bowel movements, or they look black and tarry  · Your skin or the whites of your eyes turn yellow  · You are a woman and have a large amount of vaginal bleeding that is not your monthly period  Contact your healthcare provider if:   · You have pain in your lower back  · You are a man and have pain in your testicles  · You have pain when you urinate  · You have questions or concerns about your condition or care  Follow up with your healthcare provider within 24 hours or as directed:  Write down your questions so you remember to ask them during your visits  Medicines:   · Medicines  may be given to calm your stomach and prevent vomiting or to decrease pain  Ask how to take pain medicine safely  · Take your medicine as directed  Contact your healthcare provider if you think your medicine is not helping or if you have side effects  Tell him of her if you are allergic to any medicine   Keep a list of the medicines, vitamins, and herbs you take  Include the amounts, and when and why you take them  Bring the list or the pill bottles to follow-up visits  Carry your medicine list with you in case of an emergency  © 2017 [employer]  Information is for End User's use only and may not be sold, redistributed or otherwise used for commercial purposes  All illustrations and images included in CareNotes® are the copyrighted property of A D [employer] M , Inc  or [employer]  The above information is an  only  It is not intended as medical advice for individual conditions or treatments  Talk to your doctor, nurse or pharmacist before following any medical regimen to see if it is safe and effective for you  Constipation   WHAT YOU NEED TO KNOW:   Constipation is when you have hard, dry bowel movements, or you go longer than usual between bowel movements  DISCHARGE INSTRUCTIONS:   Return to the emergency department if:   · You have blood in your bowel movements  · You have a fever and abdominal pain with the constipation  Contact your healthcare provider if:   · Your constipation gets worse  · You start to vomit  · You have questions or concerns about your condition or care  Medicines:   · Medicine or a fiber supplement  may help make your bowel movement softer  A laxative may help relax and loosen your intestines to help you have a bowel movement  You may also be given medicine to increase fluid in your intestines  The fluid may help move bowel movements through your intestines  · Take your medicine as directed  Contact your healthcare provider if you think your medicine is not helping or if you have side effects  Tell him of her if you are allergic to any medicine  Keep a list of the medicines, vitamins, and herbs you take  Include the amounts, and when and why you take them  Bring the list or the pill bottles to follow-up visits   Carry your medicine list with you in case of an emergency  Manage your constipation:   · Drink liquids as directed  You may need to drink extra liquids to help soften and move your bowels  Ask how much liquid to drink each day and which liquids are best for you  · Eat high-fiber foods  This may help decrease constipation by adding bulk to your bowel movements  High-fiber foods include fruit, vegetables, whole-grain breads and cereals, and beans  Your healthcare provider or dietitian can help you create a high-fiber meal plan  · Exercise regularly  Regular physical activity can help stimulate your intestines  Ask which exercises are best for you  · Schedule a time each day to have a bowel movement  This may help train your body to have regular bowel movements  Bend forward while you are on the toilet to help move the bowel movement out  Sit on the toilet for at least 10 minutes, even if you do not have a bowel movement  Follow up with your healthcare provider as directed:  Write down your questions so you remember to ask them during your visits  © 2017 [employer]  Information is for End User's use only and may not be sold, redistributed or otherwise used for commercial purposes  All illustrations and images included in CareNotes® are the copyrighted property of [employer]  or [employer]  The above information is an  only  It is not intended as medical advice for individual conditions or treatments  Talk to your doctor, nurse or pharmacist before following any medical regimen to see if it is safe and effective for you

## 2018-04-20 NOTE — ED PROVIDER NOTES
History  Chief Complaint   Patient presents with    Abdominal Pain     pt states he is having abd pain with nausea and constipation no bowel movement since      Patient is a 78year old male with about 4-5 days of worsening constant abdominal pain with nausea  No vomiting or diarrhea  No GI bleeding  No urinary sx  No fever  Patient states he drove here  Has been constipated for past 6 days with no BM but has had flatus  No abdominal surgery  Has a h/o CLL  Was seen in this ED on 3/3/15 for chest pain  [facility] website checked on this patient and patient not found  History provided by:  Patient   used: No    Abdominal Pain   Associated symptoms: constipation and nausea    Associated symptoms: no diarrhea, no fever and no vomiting        Prior to Admission Medications   Prescriptions Last Dose Informant Patient Reported? Taking? Dulaglutide 1 5 MG/0 5ML SOPN   No No   Sig: Inject 0 5 mL (1 5 mg total) under the skin once a week for 30 days   Milk Thistle 500 MG CAPS  Self Yes No   Sig: Take 1 capsule by mouth daily   Omega-3 Fatty Acids (FISH OIL) 645 MG CAPS  Self Yes No   Sig: Take 1 capsule by mouth daily   aspirin 81 MG tablet  Self Yes No   Sig: Take 1 tablet by mouth daily   atorvastatin (LIPITOR) 40 mg tablet  Self Yes No   Sig: Take 1 tablet by mouth daily   glucose blood (ONE TOUCH ULTRA TEST) test strip  Self Yes No   Si applicator by In Vitro route 3 (three) times a day   metFORMIN (GLUCOPHAGE) 1000 MG tablet  Self Yes No   Sig: Take 1 tablet by mouth 2 (two) times a day   ramipril (ALTACE) 5 mg capsule   No No   Sig: Take 1 capsule (5 mg total) by mouth daily for 180 days      Facility-Administered Medications: None       Past Medical History:   Diagnosis Date    Cancer [facility])     Cardiac disease        Past Surgical History:   Procedure Laterality Date    CARDIAC SURGERY         No family history on file    I have reviewed and agree with the history as documented  Social History   Substance Use Topics    Smoking status: Never Smoker    Smokeless tobacco: Never Used    Alcohol use No        Review of Systems   Constitutional: Negative for fever  Gastrointestinal: Positive for abdominal pain, constipation and nausea  Negative for blood in stool, diarrhea and vomiting  Genitourinary: Negative for difficulty urinating  All other systems reviewed and are negative  Physical Exam  ED Triage Vitals [04/19/18 2254]   Temperature Pulse Respirations Blood Pressure SpO2   97 6 °F (36 4 °C) (!) 5 18 153/77 99 %      Temp Source Heart Rate Source Patient Position - Orthostatic VS BP Location FiO2 (%)   Oral Monitor Sitting Left arm --      Pain Score       6           Orthostatic Vital Signs  Vitals:    04/19/18 2254 04/19/18 2257   BP: 153/77    Pulse: (!) 5 84   Patient Position - Orthostatic VS: Sitting        Physical Exam   Constitutional: He is oriented to person, place, and time  He appears distressed (moderate)  HENT:   Head: Normocephalic and atraumatic  Mouth/Throat: Oropharynx is clear and moist    Eyes: No scleral icterus  Neck: No tracheal deviation present  Cardiovascular: Normal rate, regular rhythm and normal heart sounds  No murmur heard  Pulmonary/Chest: Effort normal and breath sounds normal  No stridor  No respiratory distress  Abdominal: Soft  Bowel sounds are normal  He exhibits no distension  There is tenderness (diffuse)  There is no rebound and no guarding  Musculoskeletal: He exhibits no edema or deformity  Neurological: He is alert and oriented to person, place, and time  Skin: Skin is warm and dry  No rash noted  Psychiatric: He has a normal mood and affect  Nursing note and vitals reviewed        ED Medications  Medications - No data to display    Diagnostic Studies  Results Reviewed     None                 CT abdomen pelvis with contrast    (Results Pending)              Procedures  ECG 12 Lead Documentation  Date/Time: 4/19/2018 11:56 PM  Performed by: Wilberto Rios  Authorized by: Wilberto Rios     Indications / Diagnosis:  Abdominal pain  ECG reviewed by me, the ED Provider: yes    Patient location:  ED  Previous ECG:     Previous ECG:  Compared to current    Comparison ECG info:  3/3/15    Similarity:  No change  Interpretation:     Interpretation: normal    Rate:     ECG rate:  78    ECG rate assessment: normal    Rhythm:     Rhythm: sinus rhythm    Ectopy:     Ectopy: none    QRS:     QRS axis:  Normal    QRS intervals:  Normal  Conduction:     Conduction: normal    ST segments:     ST segments:  Normal  T waves:     T waves: normal             Phone Contacts  ED Phone Contact    ED Course  ED Course as of Apr 20 0042   Fri Apr 20, 2018   0010 Patient refused CXR      0039 Patient does not want any lab testing or CT at this time  Has the capacity to understand implications of signing out AMA  Patient signed out AMA  MDM  Number of Diagnoses or Management Options  Diagnosis management comments: DDx including but not limited to: appendicitis, gastroenteritis, gastritis, PUD, GERD, gastroparesis, hepatitis, pancreatitis, colitis, enteritis, diverticulitis, food poisoning, mesenteric adenitis, mesenteric ischemia, IBD, IBS, ileus, bowel obstruction, volvulus, internal hernia, cholecystitis, biliary colic, choledocholithiasis, perforated viscus, splenic etiology, constipation, AAA, renal colic, pyelonephritis, UTI, constipation, obstipation; doubt cardiac etiology          Amount and/or Complexity of Data Reviewed  Clinical lab tests: ordered and reviewed  Tests in the radiology section of CPT®: ordered and reviewed  Decide to obtain previous medical records or to obtain history from someone other than the patient: yes  Independent visualization of images, tracings, or specimens: yes      CritCare Time    Disposition  Final diagnoses:   Abdominal pain Constipation     Time reflects when diagnosis was documented in both MDM as applicable and the Disposition within this note     Time User Action Codes Description Comment    4/20/2018 12:40 AM Levi Room Add [R10 9] Abdominal pain     4/20/2018 12:40 AM Levi Room Add [K59 00] Constipation       ED Disposition     ED Disposition Condition Comment    AMA  Date: 4/20/2018  Patient: Ernesto Tim  Admitted: 4/19/2018 11:32 PM  Attending Provider: Maranda Ledbetter MD    Ernesto Tim or his authorized caregiver has made the decision for the patient to leave the emergency department against t he advice of the emergency department staff  He or his authorized caregiver has been informed and understands the inherent risks, including death, permanent disability, worsening pain, obstruction  He or his authorized caregiver has decided to accept th e responsibility for this decision  Kelly Tim and all necessary parties have been advised that he may return for further evaluation or treatment  His condition at time of discharge was stable  Ernesto Tim had current vital signs as f ollows:  /77 (BP Location: Left arm)   Pulse 84   Temp 97 6 °F (36 4 °C) (Oral)   Resp 18         Follow-up Information     Follow up With Specialties Details Why Contact Info    Jacobo Pacheco MD Family Medicine Call in 1 day Return sooner if increased pain, fever, vomiting, diarrhea, difficulty breathing or urinating, bleeding  [address]  [address]  [address] [place],          Patient's Medications   Discharge Prescriptions    No medications on file     No discharge procedures on file      ED Provider  Electronically Signed by           Maranda Ledbetter MD  04/20/18 0774

## 2018-05-11 DIAGNOSIS — E11.65 TYPE 2 DIABETES MELLITUS WITH HYPERGLYCEMIA, UNSPECIFIED WHETHER LONG TERM INSULIN USE (HCC): Primary | ICD-10-CM

## 2018-05-31 ENCOUNTER — APPOINTMENT (OUTPATIENT)
Dept: LAB | Facility: CLINIC | Age: 78
End: 2018-05-31
Payer: MEDICARE

## 2018-05-31 ENCOUNTER — TRANSCRIBE ORDERS (OUTPATIENT)
Dept: LAB | Facility: CLINIC | Age: 78
End: 2018-05-31

## 2018-05-31 DIAGNOSIS — B18.2 CHRONIC HEPATITIS C WITH HEPATIC COMA (HCC): ICD-10-CM

## 2018-05-31 DIAGNOSIS — K59.00 CONSTIPATION, UNSPECIFIED CONSTIPATION TYPE: ICD-10-CM

## 2018-05-31 DIAGNOSIS — K59.00 CONSTIPATION, UNSPECIFIED CONSTIPATION TYPE: Primary | ICD-10-CM

## 2018-05-31 LAB
T4 FREE SERPL-MCNC: 1.32 NG/DL (ref 0.76–1.46)
TSH SERPL DL<=0.05 MIU/L-ACNC: 0.85 UIU/ML (ref 0.36–3.74)

## 2018-05-31 PROCEDURE — 84439 ASSAY OF FREE THYROXINE: CPT

## 2018-05-31 PROCEDURE — 84443 ASSAY THYROID STIM HORMONE: CPT

## 2018-05-31 PROCEDURE — 87522 HEPATITIS C REVRS TRNSCRPJ: CPT

## 2018-05-31 PROCEDURE — 36415 COLL VENOUS BLD VENIPUNCTURE: CPT

## 2018-06-04 LAB
HCV RNA SERPL NAA+PROBE-ACNC: NORMAL IU/ML
TEST INFORMATION: NORMAL

## 2018-06-07 DIAGNOSIS — E11.65 TYPE 2 DIABETES MELLITUS WITH HYPERGLYCEMIA, UNSPECIFIED WHETHER LONG TERM INSULIN USE (HCC): Primary | ICD-10-CM

## 2018-06-12 ENCOUNTER — OFFICE VISIT (OUTPATIENT)
Dept: ENDOCRINOLOGY | Facility: CLINIC | Age: 78
End: 2018-06-12
Payer: MEDICARE

## 2018-06-12 VITALS
HEART RATE: 80 BPM | BODY MASS INDEX: 21.48 KG/M2 | WEIGHT: 145 LBS | SYSTOLIC BLOOD PRESSURE: 128 MMHG | DIASTOLIC BLOOD PRESSURE: 80 MMHG | HEIGHT: 69 IN

## 2018-06-12 DIAGNOSIS — E11.65 TYPE 2 DIABETES MELLITUS WITH HYPERGLYCEMIA, UNSPECIFIED WHETHER LONG TERM INSULIN USE (HCC): ICD-10-CM

## 2018-06-12 DIAGNOSIS — I10 ESSENTIAL HYPERTENSION: ICD-10-CM

## 2018-06-12 DIAGNOSIS — E78.5 DYSLIPIDEMIA: ICD-10-CM

## 2018-06-12 DIAGNOSIS — E11.42 CONTROLLED TYPE 2 DIABETES MELLITUS WITH DIABETIC POLYNEUROPATHY, WITH LONG-TERM CURRENT USE OF INSULIN (HCC): Primary | ICD-10-CM

## 2018-06-12 DIAGNOSIS — Z79.4 CONTROLLED TYPE 2 DIABETES MELLITUS WITH DIABETIC POLYNEUROPATHY, WITH LONG-TERM CURRENT USE OF INSULIN (HCC): Primary | ICD-10-CM

## 2018-06-12 LAB
ALBUMIN SERPL BCP-MCNC: 4.2 G/DL (ref 3.5–5)
ALP SERPL-CCNC: 58 U/L (ref 46–116)
ALT SERPL W P-5'-P-CCNC: 20 U/L (ref 12–78)
ANION GAP SERPL CALCULATED.3IONS-SCNC: 8 MMOL/L (ref 4–13)
AST SERPL W P-5'-P-CCNC: 14 U/L (ref 5–45)
BILIRUB SERPL-MCNC: 0.79 MG/DL (ref 0.2–1)
BUN SERPL-MCNC: 21 MG/DL (ref 5–25)
CALCIUM SERPL-MCNC: 9.8 MG/DL (ref 8.3–10.1)
CHLORIDE SERPL-SCNC: 103 MMOL/L (ref 100–108)
CHOLEST SERPL-MCNC: 116 MG/DL (ref 50–200)
CO2 SERPL-SCNC: 29 MMOL/L (ref 21–32)
CREAT SERPL-MCNC: 1.13 MG/DL (ref 0.6–1.3)
CREAT UR-MCNC: 58.9 MG/DL
EST. AVERAGE GLUCOSE BLD GHB EST-MCNC: 169 MG/DL
GFR SERPL CREATININE-BSD FRML MDRD: 63 ML/MIN/1.73SQ M
GLUCOSE P FAST SERPL-MCNC: 122 MG/DL (ref 65–99)
HBA1C MFR BLD: 7.5 % (ref 4.2–6.3)
HDLC SERPL-MCNC: 58 MG/DL (ref 40–60)
LDLC SERPL CALC-MCNC: 44 MG/DL (ref 0–100)
MICROALBUMIN UR-MCNC: 43.8 MG/L (ref 0–20)
MICROALBUMIN/CREAT 24H UR: 74 MG/G CREATININE (ref 0–30)
POTASSIUM SERPL-SCNC: 4.4 MMOL/L (ref 3.5–5.3)
PROT SERPL-MCNC: 7 G/DL (ref 6.4–8.2)
SODIUM SERPL-SCNC: 140 MMOL/L (ref 136–145)
TRIGL SERPL-MCNC: 68 MG/DL

## 2018-06-12 PROCEDURE — 82043 UR ALBUMIN QUANTITATIVE: CPT | Performed by: PHYSICIAN ASSISTANT

## 2018-06-12 PROCEDURE — 80053 COMPREHEN METABOLIC PANEL: CPT | Performed by: PHYSICIAN ASSISTANT

## 2018-06-12 PROCEDURE — 80061 LIPID PANEL: CPT | Performed by: PHYSICIAN ASSISTANT

## 2018-06-12 PROCEDURE — 83036 HEMOGLOBIN GLYCOSYLATED A1C: CPT | Performed by: PHYSICIAN ASSISTANT

## 2018-06-12 PROCEDURE — 82570 ASSAY OF URINE CREATININE: CPT | Performed by: PHYSICIAN ASSISTANT

## 2018-06-12 PROCEDURE — 36415 COLL VENOUS BLD VENIPUNCTURE: CPT | Performed by: PHYSICIAN ASSISTANT

## 2018-06-12 PROCEDURE — 99214 OFFICE O/P EST MOD 30 MIN: CPT | Performed by: PHYSICIAN ASSISTANT

## 2018-06-12 NOTE — PROGRESS NOTES
Established Patient Progress Note      Chief Complaint   Patient presents with    Diabetes Type 2          History of Present Illness:   Linda Cuellar is a 78 y o  male with a history of type 2 diabetes without long term use of insulin  Reports complications of neuropathy and microalbuminuria  Denies recent illness or hospitalizations  Denies recent severe hypoglycemic or severe hyperglycemic episodes  Denies any issues with his current regimen  home glucose monitoring: are performed regularly  Last A1C 6 1  Morning blood sugars have been a little higher since Lantus was stopped at last visit  He has been having problems with constipation also decreased appetite and weight loss  Saw GI and given linzess which helped but was too expensive  HE is taking miralax daily which is helping with constipation  Home blood glucose readings:   Before breakfast: 160-170 in the morning, checking morning only  Before lunch: not checking  Before dinner: not checking  Bedtime: not checking    Current regimen: Trulicity 2 4LO weekly, Metformin 1000mg twice per day  Last Eye Exam: due in july  Last Foot Exam: last visit 3/6/18- no problems with feet  Has hypertension: Taking no meds  Ramipril prescribed after last visit, doesn't think he is taking  Has hyperlipidemia: Taking atorvastatin  Has HX CAD with Stent       Patient Active Problem List   Diagnosis    Arteriosclerosis of coronary artery    Atypical chest pain    Bruising    CAD (coronary artery disease), native coronary artery    Chronic constipation    Chronic hepatitis B (HCC)    Chronic lymphocytic leukemia (HCC)    Controlled type 2 diabetes mellitus with diabetic polyneuropathy, with long-term current use of insulin (HCC)    Dyslipidemia    Essential hypertension    Hepatitis C    S/P coronary artery stent placement      Past Medical History:   Diagnosis Date    Cancer [facility])     Cardiac disease       Past Surgical History: Procedure Laterality Date    CARDIAC SURGERY        Family History   Problem Relation Age of Onset    Diabetes unspecified Brother     Diabetes unspecified Maternal Grandfather      Social History   Substance Use Topics    Smoking status: Never Smoker    Smokeless tobacco: Never Used    Alcohol use No     No Known Allergies      Current Outpatient Prescriptions:     aspirin 81 MG tablet, Take 1 tablet by mouth daily, Disp: , Rfl:     atorvastatin (LIPITOR) 40 mg tablet, Take 1 tablet by mouth daily, Disp: , Rfl:     metFORMIN (GLUCOPHAGE) 1000 MG tablet, TAKE ONE TABLET BY MOUTH TWICE A DAY, Disp: 180 tablet, Rfl: 3    ONE TOUCH ULTRA TEST test strip, TEST 3 TIMES DAILY OR AS DIRECTED , Disp: 300 each, Rfl: 3    Empagliflozin (JARDIANCE) 25 MG TABS, Take 1 tablet (25 mg total) by mouth every morning for 14 days, Disp: 14 tablet, Rfl: 0    glucose blood test strip, One touch ultra test strips Check BG 2x per day DX E11 9, Disp: 200 each, Rfl: 3    Milk Thistle 500 MG CAPS, Take 1 capsule by mouth daily, Disp: , Rfl:     Omega-3 Fatty Acids (FISH OIL) 645 MG CAPS, Take 1 capsule by mouth daily, Disp: , Rfl:     Review of Systems   Constitutional: Positive for appetite change  Negative for activity change and fatigue  Unexpected weight change: weight loss  HENT: Negative for sore throat, trouble swallowing and voice change  Eyes: Negative for visual disturbance  Respiratory: Negative for choking, chest tightness and shortness of breath  Cardiovascular: Negative for chest pain, palpitations and leg swelling  Gastrointestinal: Positive for constipation  Negative for abdominal pain and diarrhea  Endocrine: Negative for cold intolerance, heat intolerance, polydipsia, polyphagia and polyuria  Genitourinary: Negative for frequency  Musculoskeletal: Negative for arthralgias and myalgias  Skin: Negative for rash  Neurological: Negative for dizziness and syncope     Hematological: Negative for adenopathy  Psychiatric/Behavioral: Negative for sleep disturbance  All other systems reviewed and are negative  Physical Exam:  Body mass index is 21 41 kg/m²  /80   Pulse 80   Ht 5' 9" (1 753 m)   Wt 65 8 kg (145 lb)   BMI 21 41 kg/m²    Wt Readings from Last 3 Encounters:   06/12/18 65 8 kg (145 lb)   03/06/18 69 9 kg (154 lb)   11/06/17 67 2 kg (148 lb 2 1 oz)       Physical Exam   Constitutional: He is oriented to person, place, and time  He appears well-developed and well-nourished  No distress  HENT:   Head: Normocephalic and atraumatic  Mouth/Throat: Oropharynx is clear and moist    Eyes: Conjunctivae and EOM are normal  Pupils are equal, round, and reactive to light  Neck: Normal range of motion  Neck supple  No thyromegaly present  Cardiovascular: Normal rate, regular rhythm and normal heart sounds  No murmur heard  Pulmonary/Chest: Effort normal and breath sounds normal  No respiratory distress  He has no wheezes  He has no rales  Abdominal: Soft  Bowel sounds are normal  He exhibits no distension  There is no tenderness  Musculoskeletal: Normal range of motion  He exhibits no edema  Lymphadenopathy:     He has no cervical adenopathy  Neurological: He is alert and oriented to person, place, and time  Skin: Skin is warm and dry  Psychiatric: He has a normal mood and affect  Vitals reviewed      Diabetic Foot Exam    Labs:     Lab Results   Component Value Date    HGBA1C 6 1 03/06/2018       Lab Results   Component Value Date    CREATININE 1 05 04/07/2018    CREATININE 0 98 08/14/2017    CREATININE 1 01 04/22/2017    BUN 23 04/07/2018     08/14/2017    K 4 0 08/14/2017     08/14/2017    CO2 29 08/14/2017     eGFR   Date Value Ref Range Status   04/22/2017 >60 0 ml/min/1 73sq m Final       Lab Results   Component Value Date    CHOL 133 08/14/2017    HDL 59 08/14/2017    TRIG 51 08/14/2017       Lab Results   Component Value Date    ALT 23 08/14/2017    AST 24 08/14/2017    ALKPHOS 49 08/14/2017    BILITOT 0 7 08/14/2017       Lab Results   Component Value Date    GKL5KCIVJAJV 0 850 05/31/2018    NNM4BHCYGNQY 0 85 06/14/2017    QSJ7BEDDEFFU 1 531 04/22/2017     Lab Results   Component Value Date    FREET4 1 32 05/31/2018       Impression & Plan:    Problem List Items Addressed This Visit     Controlled type 2 diabetes mellitus with diabetic polyneuropathy, with long-term current use of insulin ([address] ) - Primary     Well controlled based on last A1C of 6 1  He is reporting appetite reduction, weight loss, and constipation which may be side effects of Trulicity  For now, stop the trulicity and start Jardiance 25mg daily  Reviewed medication risks/side effects  Check BG 2x per day and send log in two weeks for review  If feeling better and blood sugars are acceptable will RX medication  If symptoms haven't changed or if he is having problems or high blood sugars on jardiance, will resume the Trulicity  Refer to dietician for annual medical nutrition therapy follow up   Ramipril was prescribed after last visit for kidney protection for microalbuminuria but he doesn't think he is taking this medication  Complete lab testing as ordered and will prescribe again if needed  He will double check at home if he is taking this medication  Relevant Medications    glucose blood test strip    Empagliflozin (JARDIANCE) 25 MG TABS    Other Relevant Orders    Comprehensive metabolic panel    Hemoglobin A1C    Lipid Panel with Direct LDL reflex    Microalbumin / creatinine urine ratio    Ambulatory referral to medical nutrition therapy for diabetes    Dyslipidemia     Continue Statin  Relevant Orders    Lipid Panel with Direct LDL reflex    Essential hypertension     Under good control  He doesn't think he is taking the ramipril  He will double check and let us know              Other Visit Diagnoses     Type 2 diabetes mellitus with hyperglycemia, unspecified whether long term insulin use (HCC)        Relevant Medications    Empagliflozin (JARDIANCE) 25 MG TABS          Orders Placed This Encounter   Procedures    Comprehensive metabolic panel     This is a patient instruction: Patient fasting for 8 hours or longer recommended   Hemoglobin A1C    Lipid Panel with Direct LDL reflex     This is a patient instruction: This test requires patient fasting for 10-12 hours or longer  Drinking of black coffee or black tea is acceptable   Microalbumin / creatinine urine ratio    Ambulatory referral to medical nutrition therapy for diabetes     Standing Status:   Future     Standing Expiration Date:   12/12/2018     Referral Priority:   Routine     Referral Type:   Consult - AMB     Referral Reason:   Specialty Services Required     Requested Specialty:   Endocrinology     Number of Visits Requested:   1     Expiration Date:   6/12/2019       Patient Instructions   Starting tomorrow,  STOP Trulicity as it can be the cause of your appetite loss and constipation  Start Jardiance 25mg daily  Keep well hydrated  Check Blood sugars 2x per day and send log in two weeks or sooner if problems  Check if taking altace (ramipril)        Discussed with the patient and all questioned fully answered  He will call me if any problems arise  Follow-up appointment in 3 months       Counseled patient on diagnostic results, prognosis, risk and benefit of treatment options, instruction for management, importance of treatment compliance, Risk  factor reduction and impressions      Gerry Nunez PA-C

## 2018-06-12 NOTE — LETTER
June 12, 2018     MD [address]  [address]  [address] [facility] [address]    Patient: Eveline Tim   YOB: 1940   Date of Visit: 6/12/2018       Dear Dr Katie Sullivan: Thank you for referring Josh Aranda to me for evaluation  Below are my notes for this consultation  If you have questions, please do not hesitate to call me  I look forward to following your patient along with you  Sincerely,        Nhan Cleary PA-C        CC: No Recipients  Nhan Cleary [place]  6/12/2018  2:04 PM  Sign at close encounter      Established Patient Progress Note      Chief Complaint   Patient presents with    Diabetes Type 2          History of Present Illness:   Kumar Sharp is a 78 y o  male with a history of type 2 diabetes without long term use of insulin  Reports complications of neuropathy and microalbuminuria  Denies recent illness or hospitalizations  Denies recent severe hypoglycemic or severe hyperglycemic episodes  Denies any issues with his current regimen  home glucose monitoring: are performed regularly  Last A1C 6 1  Morning blood sugars have been a little higher since Lantus was stopped at last visit  He has been having problems with constipation also decreased appetite and weight loss  Saw GI and given linzess which helped but was too expensive  HE is taking miralax daily which is helping with constipation  Home blood glucose readings:   Before breakfast: 160-170 in the morning, checking morning only  Before lunch: not checking  Before dinner: not checking  Bedtime: not checking    Current regimen: Trulicity 5 3KF weekly, Metformin 1000mg twice per day  Last Eye Exam: due in july  Last Foot Exam: last visit 3/6/18- no problems with feet  Has hypertension: Taking no meds  Ramipril prescribed after last visit, doesn't think he is taking  Has hyperlipidemia: Taking atorvastatin  Has HX CAD with Stent       Patient Active Problem List   Diagnosis    Arteriosclerosis of coronary artery    Atypical chest pain    Bruising    CAD (coronary artery disease), native coronary artery    Chronic constipation    Chronic hepatitis B (HCC)    Chronic lymphocytic leukemia (HCC)    Controlled type 2 diabetes mellitus with diabetic polyneuropathy, with long-term current use of insulin (HCC)    Dyslipidemia    Essential hypertension    Hepatitis C    S/P coronary artery stent placement      Past Medical History:   Diagnosis Date    Cancer [facility])     Cardiac disease       Past Surgical History:   Procedure Laterality Date    CARDIAC SURGERY        Family History   Problem Relation Age of Onset    Diabetes unspecified Brother     Diabetes unspecified Maternal Grandfather      Social History   Substance Use Topics    Smoking status: Never Smoker    Smokeless tobacco: Never Used    Alcohol use No     No Known Allergies      Current Outpatient Prescriptions:     aspirin 81 MG tablet, Take 1 tablet by mouth daily, Disp: , Rfl:     atorvastatin (LIPITOR) 40 mg tablet, Take 1 tablet by mouth daily, Disp: , Rfl:     metFORMIN (GLUCOPHAGE) 1000 MG tablet, TAKE ONE TABLET BY MOUTH TWICE A DAY, Disp: 180 tablet, Rfl: 3    ONE TOUCH ULTRA TEST test strip, TEST 3 TIMES DAILY OR AS DIRECTED , Disp: 300 each, Rfl: 3    Empagliflozin (JARDIANCE) 25 MG TABS, Take 1 tablet (25 mg total) by mouth every morning for 14 days, Disp: 14 tablet, Rfl: 0    glucose blood test strip, One touch ultra test strips Check BG 2x per day DX E11 9, Disp: 200 each, Rfl: 3    Milk Thistle 500 MG CAPS, Take 1 capsule by mouth daily, Disp: , Rfl:     Omega-3 Fatty Acids (FISH OIL) 645 MG CAPS, Take 1 capsule by mouth daily, Disp: , Rfl:     Review of Systems   Constitutional: Positive for appetite change  Negative for activity change and fatigue  Unexpected weight change: weight loss  HENT: Negative for sore throat, trouble swallowing and voice change      Eyes: Negative for visual disturbance  Respiratory: Negative for choking, chest tightness and shortness of breath  Cardiovascular: Negative for chest pain, palpitations and leg swelling  Gastrointestinal: Positive for constipation  Negative for abdominal pain and diarrhea  Endocrine: Negative for cold intolerance, heat intolerance, polydipsia, polyphagia and polyuria  Genitourinary: Negative for frequency  Musculoskeletal: Negative for arthralgias and myalgias  Skin: Negative for rash  Neurological: Negative for dizziness and syncope  Hematological: Negative for adenopathy  Psychiatric/Behavioral: Negative for sleep disturbance  All other systems reviewed and are negative  Physical Exam:  Body mass index is 21 41 kg/m²  /80   Pulse 80   Ht 5' 9" (1 753 m)   Wt 65 8 kg (145 lb)   BMI 21 41 kg/m²     Wt Readings from Last 3 Encounters:   06/12/18 65 8 kg (145 lb)   03/06/18 69 9 kg (154 lb)   11/06/17 67 2 kg (148 lb 2 1 oz)       Physical Exam   Constitutional: He is oriented to person, place, and time  He appears well-developed and well-nourished  No distress  HENT:   Head: Normocephalic and atraumatic  Mouth/Throat: Oropharynx is clear and moist    Eyes: Conjunctivae and EOM are normal  Pupils are equal, round, and reactive to light  Neck: Normal range of motion  Neck supple  No thyromegaly present  Cardiovascular: Normal rate, regular rhythm and normal heart sounds  No murmur heard  Pulmonary/Chest: Effort normal and breath sounds normal  No respiratory distress  He has no wheezes  He has no rales  Abdominal: Soft  Bowel sounds are normal  He exhibits no distension  There is no tenderness  Musculoskeletal: Normal range of motion  He exhibits no edema  Lymphadenopathy:     He has no cervical adenopathy  Neurological: He is alert and oriented to person, place, and time  Skin: Skin is warm and dry  Psychiatric: He has a normal mood and affect     Vitals reviewed  Diabetic Foot Exam    Labs:     Lab Results   Component Value Date    HGBA1C 6 1 03/06/2018       Lab Results   Component Value Date    CREATININE 1 05 04/07/2018    CREATININE 0 98 08/14/2017    CREATININE 1 01 04/22/2017    BUN 23 04/07/2018     08/14/2017    K 4 0 08/14/2017     08/14/2017    CO2 29 08/14/2017     eGFR   Date Value Ref Range Status   04/22/2017 >60 0 ml/min/1 73sq m Final       Lab Results   Component Value Date    CHOL 133 08/14/2017    HDL 59 08/14/2017    TRIG 51 08/14/2017       Lab Results   Component Value Date    ALT 23 08/14/2017    AST 24 08/14/2017    ALKPHOS 49 08/14/2017    BILITOT 0 7 08/14/2017       Lab Results   Component Value Date    JDK3CFHOQXHI 0 850 05/31/2018    UOA3BNJYAWRT 0 85 06/14/2017    LXW8DOFSFMOT 1 531 04/22/2017     Lab Results   Component Value Date    FREET4 1 32 05/31/2018       Impression & Plan:    Problem List Items Addressed This Visit     Controlled type 2 diabetes mellitus with diabetic polyneuropathy, with long-term current use of insulin ([address] ) - Primary     Well controlled based on last A1C of 6 1  He is reporting appetite reduction, weight loss, and constipation which may be side effects of Trulicity  For now, stop the trulicity and start Jardiance 25mg daily  Reviewed medication risks/side effects  Check BG 2x per day and send log in two weeks for review  If feeling better and blood sugars are acceptable will RX medication  If symptoms haven't changed or if he is having problems or high blood sugars on jardiance, will resume the Trulicity  Refer to dietician for annual medical nutrition therapy follow up   Ramipril was prescribed after last visit for kidney protection for microalbuminuria but he doesn't think he is taking this medication  Complete lab testing as ordered and will prescribe again if needed  He will double check at home if he is taking this medication            Relevant Medications    glucose blood test strip    Empagliflozin (JARDIANCE) 25 MG TABS    Other Relevant Orders    Comprehensive metabolic panel    Hemoglobin A1C    Lipid Panel with Direct LDL reflex    Microalbumin / creatinine urine ratio    Ambulatory referral to medical nutrition therapy for diabetes    Dyslipidemia     Continue Statin  Relevant Orders    Lipid Panel with Direct LDL reflex    Essential hypertension     Under good control  He doesn't think he is taking the ramipril  He will double check and let us know  Other Visit Diagnoses     Type 2 diabetes mellitus with hyperglycemia, unspecified whether long term insulin [facility] )        Relevant Medications    Empagliflozin (JARDIANCE) 25 MG TABS          Orders Placed This Encounter   Procedures    Comprehensive metabolic panel     This is a patient instruction: Patient fasting for 8 hours or longer recommended   Hemoglobin A1C    Lipid Panel with Direct LDL reflex     This is a patient instruction: This test requires patient fasting for 10-12 hours or longer  Drinking of black coffee or black tea is acceptable   Microalbumin / creatinine urine ratio    Ambulatory referral to medical nutrition therapy for diabetes     Standing Status:   Future     Standing Expiration Date:   12/12/2018     Referral Priority:   Routine     Referral Type:   Consult - AMB     Referral Reason:   Specialty Services Required     Requested Specialty:   Endocrinology     Number of Visits Requested:   1     Expiration Date:   6/12/2019       Patient Instructions   Starting tomorrow,  STOP Trulicity as it can be the cause of your appetite loss and constipation  Start Jardiance 25mg daily  Keep well hydrated  Check Blood sugars 2x per day and send log in two weeks or sooner if problems  Check if taking altace (ramipril)        Discussed with the patient and all questioned fully answered  He will call me if any problems arise  Follow-up appointment in 3 months  Counseled patient on diagnostic results, prognosis, risk and benefit of treatment options, instruction for management, importance of treatment compliance, Risk  factor reduction and impressions      Gerry Nunez PA-C

## 2018-06-12 NOTE — ASSESSMENT & PLAN NOTE
Under good control  He doesn't think he is taking the ramipril  He will double check and let us know  Depth Of Tumor Invasion (For Histology): dermis

## 2018-06-12 NOTE — ASSESSMENT & PLAN NOTE
Well controlled based on last A1C of 6 1  He is reporting appetite reduction, weight loss, and constipation which may be side effects of Trulicity  For now, stop the trulicity and start Jardiance 25mg daily  Reviewed medication risks/side effects  Check BG 2x per day and send log in two weeks for review  If feeling better and blood sugars are acceptable will RX medication  If symptoms haven't changed or if he is having problems or high blood sugars on jardiance, will resume the Trulicity  Refer to dietician for annual medical nutrition therapy follow up   Ramipril was prescribed after last visit for kidney protection for microalbuminuria but he doesn't think he is taking this medication  Complete lab testing as ordered and will prescribe again if needed  He will double check at home if he is taking this medication

## 2018-06-12 NOTE — PATIENT INSTRUCTIONS
Starting tomorrow,  STOP Trulicity as it can be the cause of your appetite loss and constipation  Start Jardiance 25mg daily  Keep well hydrated  Check Blood sugars 2x per day and send log in two weeks or sooner if problems       Check if taking altace (ramipril)

## 2018-06-25 ENCOUNTER — TELEPHONE (OUTPATIENT)
Dept: ENDOCRINOLOGY | Facility: CLINIC | Age: 78
End: 2018-06-25

## 2018-06-25 DIAGNOSIS — E11.8 UNCONTROLLED TYPE 2 DIABETES MELLITUS WITH COMPLICATION, UNSPECIFIED WHETHER LONG TERM INSULIN USE: Primary | ICD-10-CM

## 2018-06-25 DIAGNOSIS — E11.65 UNCONTROLLED TYPE 2 DIABETES MELLITUS WITH COMPLICATION, UNSPECIFIED WHETHER LONG TERM INSULIN USE: Primary | ICD-10-CM

## 2018-06-25 NOTE — TELEPHONE ENCOUNTER
Patient called and wanted to let you know that he does not want to be on Jardiance and doesn't care for it  Patient will prefer to be on Lantus as discuss before

## 2018-06-25 NOTE — TELEPHONE ENCOUNTER
OK stop jardiance and resume Lantus at 10 units daily  Send Bg log for review in 2 weeks or sooner if problems  Is the appetite/constipation better off trulicity?

## 2018-06-25 NOTE — TELEPHONE ENCOUNTER
Spoke to patient and advised ok to stop Jardiance and resume Lantus 10 units daily   Prescription for Lantus submitted  He will send in BS log in two weeks    He stated that the appetite/constipation are getting better since stopping trulicity

## 2018-06-26 ENCOUNTER — TELEPHONE (OUTPATIENT)
Dept: ENDOCRINOLOGY | Facility: CLINIC | Age: 78
End: 2018-06-26

## 2018-06-26 DIAGNOSIS — IMO0002 UNCONTROLLED TYPE 2 DIABETES MELLITUS WITH COMPLICATION, WITHOUT LONG-TERM CURRENT USE OF INSULIN: Primary | ICD-10-CM

## 2018-06-26 DIAGNOSIS — E11.65 UNCONTROLLED TYPE 2 DIABETES MELLITUS WITH COMPLICATION, UNSPECIFIED WHETHER LONG TERM INSULIN USE: ICD-10-CM

## 2018-06-26 DIAGNOSIS — E11.8 UNCONTROLLED TYPE 2 DIABETES MELLITUS WITH COMPLICATION, UNSPECIFIED WHETHER LONG TERM INSULIN USE: ICD-10-CM

## 2018-06-26 DIAGNOSIS — I10 ESSENTIAL HYPERTENSION: Primary | ICD-10-CM

## 2018-06-26 RX ORDER — RAMIPRIL 5 MG/1
5 CAPSULE ORAL DAILY
Qty: 30 CAPSULE | Refills: 5 | Status: SHIPPED | OUTPATIENT
Start: 2018-06-26 | End: 2018-09-28 | Stop reason: SDUPTHER

## 2018-06-26 RX ORDER — INSULIN GLARGINE 100 [IU]/ML
INJECTION, SOLUTION SUBCUTANEOUS
Qty: 5 PEN | Refills: 3 | Status: SHIPPED | OUTPATIENT
Start: 2018-06-26 | End: 2019-06-04 | Stop reason: SDUPTHER

## 2018-06-26 NOTE — TELEPHONE ENCOUNTER
----- Message from Arielle Tran PA-C sent at 6/25/2018  1:52 PM EDT -----  A1C higher at 7 5-- per his phone call today going to remain off trulicity for now and jardiance due to possible side effects and resume Lantus at 10 units per day  Their is again protein in the urine  He should resume the ramipril for kidney protection (or start the ramipril, if never actually started it) at 5mg per day and repeat BMP in two weeks

## 2018-07-03 ENCOUNTER — OFFICE VISIT (OUTPATIENT)
Dept: DIABETES SERVICES | Facility: CLINIC | Age: 78
End: 2018-07-03
Payer: MEDICARE

## 2018-07-03 VITALS — BODY MASS INDEX: 21.62 KG/M2 | WEIGHT: 146 LBS | HEIGHT: 69 IN

## 2018-07-03 DIAGNOSIS — E11.42 TYPE 2 DIABETES MELLITUS WITH DIABETIC POLYNEUROPATHY, WITH LONG-TERM CURRENT USE OF INSULIN (HCC): Primary | ICD-10-CM

## 2018-07-03 DIAGNOSIS — Z79.4 TYPE 2 DIABETES MELLITUS WITH DIABETIC POLYNEUROPATHY, WITH LONG-TERM CURRENT USE OF INSULIN (HCC): Primary | ICD-10-CM

## 2018-07-03 PROCEDURE — 97803 MED NUTRITION INDIV SUBSEQ: CPT | Performed by: DIETITIAN, REGISTERED

## 2018-07-03 NOTE — PATIENT INSTRUCTIONS
1  Test blood sugar before and 2 hours after different meals or get [employer] glucose monitor to find where blood sugar is high  This will allow you to change portion sizes of carbs so that your blood sugars are at target    2  Follow meal plan and reduce high carb snacks and processed foods

## 2018-07-03 NOTE — PROGRESS NOTES
Medical Nutrition Therapy     Assessment    Visit Type: Follow-up visit  Chief complaint:  T2DM    HPI:  Patient is a 79 y/o male with T2DM with polyneuropathy, microalbuminuria  He stated that his blood sugar was under control when taking Trulicity but was stopped due to constipation  He continues on Lantus and Metformin  We discussed ways to decrease constipation (physillium husk fiber) and adequate fluid intake  He stated he has been lack on his diabetes self management and hasn't been testing his blood sugar and has been eating lots of snacks and high carb foods  He sometimes tests his fasting sugar and is usually 110-115  We discussed testing before and 2 hours after different meals to find areas of hyperglycemia to facilitate changes in diet or medications  Also suggested Catalina CGM personal and gave information for his review  He stated he has no energy and asked about beet root supplement which increases nitric oxide for vessel dilation  Chelsie be low energy due to high blood sugar after consuming larger than allowed processed carb intake  We reviewed his meal plan and gave suggestions for changes to adhere to plan and achieve better control of his blood sugar  He was advised to speak with his physician if dietary improvements to not bring him to target and he is in need of medication adjustments  Ht Readings from Last 1 Encounters:   07/03/18 5' 9" (1 753 m)     Wt Readings from Last 3 Encounters:   07/03/18 66 2 kg (146 lb)   06/12/18 65 8 kg (145 lb)   03/06/18 69 9 kg (154 lb)        Body mass index is 21 56 kg/m²       Lab Results   Component Value Date    HGBA1C 7 5 (H) 06/12/2018    HGBA1C 6 1 03/06/2018    HGBA1C 5 9 11/18/2017       Lab Results   Component Value Date    CHOL 116 06/12/2018    CHOL 133 08/14/2017    CHOL 190 11/28/2014     Lab Results   Component Value Date    HDL 58 06/12/2018    HDL 59 08/14/2017    HDL 92 11/28/2014     Lab Results   Component Value Date    LDLCALC 44 06/12/2018 Lab Results   Component Value Date    TRIG 68 06/12/2018    TRIG 51 08/14/2017    TRIG 47 11/28/2014     No components found for: CHOLHDL        Weight Change: No  Barriers to Learning: no barriers    Do you follow any special diet presently?: No  Who shops: spouse  Who cooks: spouse    Food Log: Completed via the method of food recall    Breakfast:1 bowl cereal, 1/2 banana or blueberries, milk, 2 c decaf coffee with half and half  Morning Snack:1 pc toast  Lunch:Chicken and rice with vegetables  Afternoon Snack: Chips or medium apple  Dinner:Chicken and rice with vegetables  Evening Snack:Rodney crackers or wasabi pea  Beverages: coffee, water  Eating out/Take out:1-2 x week  Exercise None      Nutrition Diagnosis:  Limited adherence to nutrition related recommendations  related to Food and nutrition-related knowledge deficit concerning how to make nutrition related changes as evidenced by Uncertainty as to how to consistently apply food/nutrition information    Intervention: behavior modification strategies and carbohydrate counting     Treatment Goals: Patient will monitor portion control and Patient will count carbohydrates    Monitoring and evaluation:    Term code indicator  CH 1 1 Personal Data Criteria: Hba1c <6 5    Education Material Given  Baldo Connell was provided the following education material: Low Carb Snacks     Patients Response to Instruction  Comprehensiongood  Motivationgood  Expected Compliancegood    Thank you for coming to the [address] [place] [address] for education today  Please feel free to call with any questions or concerns      Ervin  [phone]

## 2018-07-17 RX ORDER — DOCUSATE SODIUM 100 MG/1
100 CAPSULE, LIQUID FILLED ORAL EVERY MORNING
COMMUNITY
End: 2020-01-20 | Stop reason: ALTCHOICE

## 2018-07-17 RX ORDER — PRAMIPEXOLE DIHYDROCHLORIDE 0.25 MG/1
0.25 TABLET ORAL EVERY MORNING
Status: ON HOLD | COMMUNITY
End: 2022-01-01

## 2018-07-17 RX ORDER — SENNA AND DOCUSATE SODIUM 50; 8.6 MG/1; MG/1
1 TABLET, FILM COATED ORAL
COMMUNITY
End: 2018-09-27

## 2018-07-17 NOTE — PRE-PROCEDURE INSTRUCTIONS
Pre-Surgery Instructions:   Medication Instructions    aspirin 81 MG tablet Instructed patient per Anesthesia Guidelines   atorvastatin (LIPITOR) 40 mg tablet Instructed patient per Anesthesia Guidelines   docusate sodium (COLACE) 100 mg capsule Instructed patient per Anesthesia Guidelines   glucose blood test strip Instructed patient per Anesthesia Guidelines   LANTUS SOLOSTAR 100 units/mL injection pen Instructed patient per Anesthesia Guidelines   metFORMIN (GLUCOPHAGE) 1000 MG tablet Instructed patient per Anesthesia Guidelines   ONE TOUCH ULTRA TEST test strip Instructed patient per Anesthesia Guidelines   pramipexole (MIRAPEX) 0 25 mg tablet Instructed patient per Anesthesia Guidelines   ramipril (ALTACE) 5 mg capsule Instructed patient per Anesthesia Guidelines   senna-docusate sodium (SENNALAX-S) 8 6-50 mg per tablet Instructed patient per Anesthesia Guidelines  Pre op instructions reviewed with pt via phone  FBS on arrival  Pt to take lantus per anesthesia guidelines  Pt to take ramipril a m of surgery   wife Maximiliano Caceres, to provide mobile # on arrival  My Surgical Experience    The following information was developed to assist you to prepare for your operation  What do I need to do before coming to the hospital?   Arrange for a responsible person to drive you to and from the hospital    Arrange care for your children at home  Children are not allowed in the recovery areas of the hospital   Plan to wear clothing that is easy to put on and take off  If you are having shoulder surgery, wear a shirt that buttons or zippers in the front  Bathing  o Shower the evening before and the morning of your surgery with an antibacterial soap   Please refer to the Pre Op Showering Instructions for Surgery Patients Sheet   o Remove nail polish and all body piercing jewelry  o Do not shave any body part for at least 24 hours before surgery-this includes face, arms, legs and upper body  Food  o Nothing to eat or drink after midnight the night before your surgery  This includes candy and chewing gum  o Exception: If your surgery is after 12:00pm (noon), you may have clear liquids such as 7-Up®, ginger ale, apple or cranberry juice, Jell-O®, water, or clear broth until 8:00 am  o Do not drink milk or juice with pulp on the morning before surgery  o Do not drink alcohol 24 hours before surgery  Medicine  o Follow instructions you received from your surgeon about which medicines you may take on the day of surgery  o If instructed to take medicine on the morning of surgery, take pills with just a small sip of water  Call your prescribing doctor for specific information on what to do if you take insulin    What should I bring to the hospital?    Bring:  Liliane Hauser or a walker, if you have them, for foot or knee surgery   A list of the daily medicines, vitamins, minerals, herbals and nutritional supplements you take  Include the dosages of medicines and the time you take them each day   Glasses, dentures or hearing aids   Minimal clothing; you will be wearing hospital sleepwear   Photo ID; required to verify your identity   If you have a Living Will or Power of , bring a copy of the documents   If you have an ostomy, bring an extra pouch and any supplies you use    Do not bring   Medicines or inhalers   Money, valuables or jewelry    What other information should I know about the day of surgery?  Notify your surgeons if you develop a cold, sore throat, cough, fever, rash or any other illness   Report to the Ambulatory Surgical/Same Day Surgery Unit   You will be instructed to stop at Registration only if you have not been pre-registered   Inform your  fi they do not stay that they will be asked by the staff to leave a phone number where they can be reached   Be available to be reached before surgery   In the event the operating room schedule changes, you may be asked to come in earlier or later than expected    *It is important to tell your doctor and others involved in your health care if you are taking or have been taking any non-prescription drugs, vitamins, minerals, herbals or other nutritional supplements   Any of these may interact with some food or medicines and cause a reaction

## 2018-07-23 ENCOUNTER — ANESTHESIA (OUTPATIENT)
Dept: PERIOP | Facility: AMBULARY SURGERY CENTER | Age: 78
End: 2018-07-23
Payer: MEDICARE

## 2018-07-23 ENCOUNTER — HOSPITAL ENCOUNTER (OUTPATIENT)
Facility: AMBULARY SURGERY CENTER | Age: 78
Setting detail: OUTPATIENT SURGERY
Discharge: HOME | End: 2018-07-23
Attending: OPHTHALMOLOGY | Admitting: OPHTHALMOLOGY
Payer: MEDICARE

## 2018-07-23 ENCOUNTER — ANESTHESIA EVENT (OUTPATIENT)
Dept: PERIOP | Facility: AMBULARY SURGERY CENTER | Age: 78
End: 2018-07-23
Payer: MEDICARE

## 2018-07-23 VITALS
HEIGHT: 69 IN | RESPIRATION RATE: 18 BRPM | HEART RATE: 68 BPM | TEMPERATURE: 97 F | DIASTOLIC BLOOD PRESSURE: 75 MMHG | WEIGHT: 146 LBS | OXYGEN SATURATION: 97 % | SYSTOLIC BLOOD PRESSURE: 128 MMHG | BODY MASS INDEX: 21.62 KG/M2

## 2018-07-23 DIAGNOSIS — H25.11 AGE-RELATED NUCLEAR CATARACT OF RIGHT EYE: Primary | ICD-10-CM

## 2018-07-23 LAB
GLUCOSE SERPL-MCNC: 112 MG/DL (ref 65–140)
GLUCOSE SERPL-MCNC: 95 MG/DL (ref 65–140)

## 2018-07-23 PROCEDURE — V2632 POST CHMBR INTRAOCULAR LENS: HCPCS | Performed by: OPHTHALMOLOGY

## 2018-07-23 PROCEDURE — 82948 REAGENT STRIP/BLOOD GLUCOSE: CPT

## 2018-07-23 DEVICE — IOL SN60WF 21.0: Type: IMPLANTABLE DEVICE | Site: EYE | Status: FUNCTIONAL

## 2018-07-23 RX ORDER — KETOROLAC TROMETHAMINE 5 MG/ML
1 SOLUTION OPHTHALMIC
Status: COMPLETED | OUTPATIENT
Start: 2018-07-23 | End: 2018-07-23

## 2018-07-23 RX ORDER — PHENYLEPHRINE HCL 2.5 %
1 DROPS OPHTHALMIC (EYE)
Status: COMPLETED | OUTPATIENT
Start: 2018-07-23 | End: 2018-07-23

## 2018-07-23 RX ORDER — GATIFLOXACIN 5 MG/ML
SOLUTION/ DROPS OPHTHALMIC AS NEEDED
Status: DISCONTINUED | OUTPATIENT
Start: 2018-07-23 | End: 2018-07-23 | Stop reason: HOSPADM

## 2018-07-23 RX ORDER — LIDOCAINE HYDROCHLORIDE 20 MG/ML
1 JELLY TOPICAL
Status: COMPLETED | OUTPATIENT
Start: 2018-07-23 | End: 2018-07-23

## 2018-07-23 RX ORDER — TETRACAINE HYDROCHLORIDE 5 MG/ML
SOLUTION OPHTHALMIC AS NEEDED
Status: DISCONTINUED | OUTPATIENT
Start: 2018-07-23 | End: 2018-07-23 | Stop reason: HOSPADM

## 2018-07-23 RX ORDER — TETRACAINE HYDROCHLORIDE 5 MG/ML
1 SOLUTION OPHTHALMIC ONCE
Status: COMPLETED | OUTPATIENT
Start: 2018-07-23 | End: 2018-07-23

## 2018-07-23 RX ORDER — MIDAZOLAM HYDROCHLORIDE 1 MG/ML
INJECTION INTRAMUSCULAR; INTRAVENOUS AS NEEDED
Status: DISCONTINUED | OUTPATIENT
Start: 2018-07-23 | End: 2018-07-23 | Stop reason: SURG

## 2018-07-23 RX ORDER — CYCLOPENTOLATE HYDROCHLORIDE 10 MG/ML
1 SOLUTION/ DROPS OPHTHALMIC
Status: COMPLETED | OUTPATIENT
Start: 2018-07-23 | End: 2018-07-23

## 2018-07-23 RX ORDER — BALANCED SALT SOLUTION 6.4; .75; .48; .3; 3.9; 1.7 MG/ML; MG/ML; MG/ML; MG/ML; MG/ML; MG/ML
SOLUTION OPHTHALMIC AS NEEDED
Status: DISCONTINUED | OUTPATIENT
Start: 2018-07-23 | End: 2018-07-23 | Stop reason: HOSPADM

## 2018-07-23 RX ORDER — LIDOCAINE HYDROCHLORIDE 10 MG/ML
INJECTION, SOLUTION EPIDURAL; INFILTRATION; INTRACAUDAL; PERINEURAL AS NEEDED
Status: DISCONTINUED | OUTPATIENT
Start: 2018-07-23 | End: 2018-07-23 | Stop reason: HOSPADM

## 2018-07-23 RX ORDER — GATIFLOXACIN 5 MG/ML
1 SOLUTION/ DROPS OPHTHALMIC 2 TIMES DAILY
Qty: 3 ML | Refills: 0
Start: 2018-07-23 | End: 2018-09-27

## 2018-07-23 RX ADMIN — CYCLOPENTOLATE HYDROCHLORIDE 1 DROP: 10 SOLUTION/ DROPS OPHTHALMIC at 06:50

## 2018-07-23 RX ADMIN — KETOROLAC TROMETHAMINE 1 DROP: 5 SOLUTION OPHTHALMIC at 07:05

## 2018-07-23 RX ADMIN — LIDOCAINE HYDROCHLORIDE 1 APPLICATION: 20 JELLY TOPICAL at 06:50

## 2018-07-23 RX ADMIN — LIDOCAINE HYDROCHLORIDE 1 APPLICATION: 20 JELLY TOPICAL at 07:20

## 2018-07-23 RX ADMIN — TETRACAINE HYDROCHLORIDE 1 DROP: 5 SOLUTION OPHTHALMIC at 06:50

## 2018-07-23 RX ADMIN — CYCLOPENTOLATE HYDROCHLORIDE 1 DROP: 10 SOLUTION/ DROPS OPHTHALMIC at 07:05

## 2018-07-23 RX ADMIN — LIDOCAINE HYDROCHLORIDE 1 APPLICATION: 20 JELLY TOPICAL at 07:05

## 2018-07-23 RX ADMIN — PHENYLEPHRINE HYDROCHLORIDE 1 DROP: 25 SOLUTION/ DROPS OPHTHALMIC at 06:50

## 2018-07-23 RX ADMIN — LIDOCAINE HYDROCHLORIDE 1 APPLICATION: 20 JELLY TOPICAL at 07:36

## 2018-07-23 RX ADMIN — KETOROLAC TROMETHAMINE 1 DROP: 5 SOLUTION OPHTHALMIC at 07:20

## 2018-07-23 RX ADMIN — MIDAZOLAM HYDROCHLORIDE 2 MG: 1 INJECTION, SOLUTION INTRAMUSCULAR; INTRAVENOUS at 07:37

## 2018-07-23 RX ADMIN — KETOROLAC TROMETHAMINE 1 DROP: 5 SOLUTION OPHTHALMIC at 06:50

## 2018-07-23 RX ADMIN — CYCLOPENTOLATE HYDROCHLORIDE 1 DROP: 10 SOLUTION/ DROPS OPHTHALMIC at 07:35

## 2018-07-23 RX ADMIN — PHENYLEPHRINE HYDROCHLORIDE 1 DROP: 25 SOLUTION/ DROPS OPHTHALMIC at 07:05

## 2018-07-23 RX ADMIN — PHENYLEPHRINE HYDROCHLORIDE 1 DROP: 25 SOLUTION/ DROPS OPHTHALMIC at 07:35

## 2018-07-23 RX ADMIN — CYCLOPENTOLATE HYDROCHLORIDE 1 DROP: 10 SOLUTION/ DROPS OPHTHALMIC at 07:20

## 2018-07-23 RX ADMIN — PHENYLEPHRINE HYDROCHLORIDE 1 DROP: 25 SOLUTION/ DROPS OPHTHALMIC at 07:20

## 2018-07-23 RX ADMIN — KETOROLAC TROMETHAMINE 1 DROP: 5 SOLUTION OPHTHALMIC at 07:36

## 2018-07-23 NOTE — ANESTHESIA PREPROCEDURE EVALUATION
Review of Systems/Medical History  Patient summary reviewed  Chart reviewed  No history of anesthetic complications     Cardiovascular  Exercise tolerance (METS): >4,  Hyperlipidemia, Hypertension , CAD ,    Pulmonary       GI/Hepatic    Liver disease , Hepatitis Chronic and C,        Kidney disease CKD,        Endo/Other  Diabetes type 2 Insulin,      GYN       Hematology      Comment: Chronic lymphocytic leukemia [facility] Musculoskeletal    Arthritis     Neurology   Psychology           Physical Exam    Airway    Mallampati score: II  TM Distance: >3 FB  Neck ROM: full     Dental   No notable dental hx     Cardiovascular  Cardiovascular exam normal    Pulmonary  Pulmonary exam normal     Other Findings        Anesthesia Plan  ASA Score- 3     Anesthesia Type- IV sedation with anesthesia with ASA Monitors  Additional Monitors:   Airway Plan:         Plan Factors-    Induction-     Postoperative Plan-     Informed Consent- Anesthetic plan and risks discussed with patient

## 2018-07-23 NOTE — OP NOTE
OPERATIVE REPORT    PATIENT NAME: Julio Tim    :  1940  MRN: [id number]  Pt Location: [facility] OR ROOM 01    Surgery Date: 2018    Surgeon(s) and Role:     * Aminah Carrera MD - Primary    Age-related nuclear cataract of right eye [H25 11]    Post-Op Diagnosis Codes:     * Age-related nuclear cataract of right eye [H25 11]    Procedure(s):  EXTRACTION EXTRACAPSULAR CATARACT PHACO INTRAOCULAR LENS (IOL)    Anesthesia Type:   IV Sedation with Anesthesia    Operative Indications:  Age-related nuclear cataract of right eye [H25 11]  Decreased vision to 20/40  With problems driving  Pt requested cataract sx the right eye    Procedure and Technique:    Procedure Details     The patient was brought in the OR in stable condition and placed on the operative table  The right eye was prepped and draped in the usual sterile manner  Attention was directed to the right eye where a lid speculum was placed  A 2 4 mm clear corneal incision was made temperally  1/2 cc of 1% MPF Lidocaine was irrigated into the anterior chamber followed by viscoat  The side port incision was placed superiorly  The capsularrhexis was made and the nucleus was hydrodissected with BSS  The nucleus was then removed with the phaco handpiece followed by removal of the cortical material with the I/A handpiece  The capsular bag was then filled with Provisc  The IOL was folded and placed in to the capsular bag and centered well  The remaining Provisc was removed from the eye with the I/A  The wounds were hydrated with BSS and found to be water tight  The lid speculum was removed and 2 drops of Gatifloxicin were placed over the cornea  A protective eye shield was taped over the eye and the patient went to PACU in stable condition  I will see the patient in the office tomorrow and the expected post op period is a few weeks         Complications: None        Disposition: PACU   Condition: Stable    SIGNATURE: Aminah Carrera MD  DATE: July 23, 2018  TIME: 7:57 AM

## 2018-07-23 NOTE — DISCHARGE INSTRUCTIONS
Dr Dixie Marroquin Cataract Instructions    Activity:     1  No Driving until instructed   2  Keep shield on until seen tomorrow except when administering drops   3  No heavy lifting   4  No water in eye     Diet:     1  Resume normal diet    Normal Symptoms:     1  Mild Headache   2  Scratchy or picky feeling around eye    Call the office if:     1  You have any questions or concerns   2  If eye pain is not relieved by extra strength tylenol    Office phone number:  [phone]      Next appointment:     1  See Dr Dixie Marroquin at his office tomorrow as scheduled   __________________________________________________________   2  Bring blue eye kit with you and eyedrops to the office    A new set of comprehensive instructions will be given and reviewed with you during your office visit tomorrow

## 2018-09-27 NOTE — PRE-PROCEDURE INSTRUCTIONS
Pre-Surgery Instructions:   Medication Instructions    aspirin 81 MG tablet Instructed patient per Anesthesia Guidelines   atorvastatin (LIPITOR) 40 mg tablet Instructed patient per Anesthesia Guidelines   docusate sodium (COLACE) 100 mg capsule Instructed patient per Anesthesia Guidelines   glucose blood test strip Instructed patient per Anesthesia Guidelines   LANTUS SOLOSTAR 100 units/mL injection pen Instructed patient per Anesthesia Guidelines   metFORMIN (GLUCOPHAGE) 1000 MG tablet Instructed patient per Anesthesia Guidelines   NON FORMULARY Instructed patient per Anesthesia Guidelines   ONE TOUCH ULTRA TEST test strip Instructed patient per Anesthesia Guidelines   pramipexole (MIRAPEX) 0 25 mg tablet Instructed patient per Anesthesia Guidelines   ramipril (ALTACE) 5 mg capsule Instructed patient per Anesthesia Guidelines  Pre op instructions given  Pt to take blood sugar, ramipril (if he gets a new RX) the am of surgery   wife Jose L Surgical Experience    The following information was developed to assist you to prepare for your operation  What do I need to do before coming to the hospital?   Arrange for a responsible person to drive you to and from the hospital    Arrange care for your children at home  Children are not allowed in the recovery areas of the hospital   Plan to wear clothing that is easy to put on and take off  If you are having shoulder surgery, wear a shirt that buttons or zippers in the front  Bathing  o Shower the evening before and the morning of your surgery with an antibacterial soap  Please refer to the Pre Op Showering Instructions for Surgery Patients Sheet   o Remove nail polish and all body piercing jewelry  o Do not shave any body part for at least 24 hours before surgery-this includes face, arms, legs and upper body  Food  o Nothing to eat or drink after midnight the night before your surgery   This includes candy and chewing gum  o Exception: If your surgery is after 12:00pm (noon), you may have clear liquids such as 7-Up®, ginger ale, apple or cranberry juice, Jell-O®, water, or clear broth until 8:00 am  o Do not drink milk or juice with pulp on the morning before surgery  o Do not drink alcohol 24 hours before surgery  Medicine  o Follow instructions you received from your surgeon about which medicines you may take on the day of surgery  o If instructed to take medicine on the morning of surgery, take pills with just a small sip of water  Call your prescribing doctor for specific infroamtion on what to do if you take insulin    What should I bring to the hospital?    Bring:  Toya Duster or a walker, if you have them, for foot or knee surgery   A list of the daily medicines, vitamins, minerals, herbals and nutritional supplements you take  Include the dosages of medicines and the time you take them each day   Glasses, dentures or hearing aids   Minimal clothing; you will be wearing hospital sleepwear   Photo ID; required to verify your identity   If you have a Living Will or Power of , bring a copy of the documents   If you have an ostomy, bring an extra pouch and any supplies you use    Do not bring   Medicines or inhalers   Money, valuables or jewelry    What other information should I know about the day of surgery?  Notify your surgeons if you develop a cold, sore throat, cough, fever, rash or any other illness   Report to the Ambulatory Surgical/Same Day Surgery Unit   You will be instructed to stop at Registration only if you have not been pre-registered   Inform your  fi they do not stay that they will be asked by the staff to leave a phone number where they can be reached   Be available to be reached before surgery   In the event the operating room schedule changes, you may be asked to come in earlier or later than expected    *It is important to tell your doctor and others involved in your health care if you are taking or have been taking any non-prescription drugs, vitamins, minerals, herbals or other nutritional supplements   Any of these may interact with some food or medicines and cause a reaction

## 2018-09-28 DIAGNOSIS — IMO0002 UNCONTROLLED TYPE 2 DIABETES MELLITUS WITH COMPLICATION, WITHOUT LONG-TERM CURRENT USE OF INSULIN: ICD-10-CM

## 2018-09-28 RX ORDER — RAMIPRIL 5 MG/1
5 CAPSULE ORAL DAILY
Qty: 30 CAPSULE | Refills: 0 | Status: SHIPPED | OUTPATIENT
Start: 2018-09-28 | End: 2018-10-19 | Stop reason: SDUPTHER

## 2018-09-30 ENCOUNTER — ANESTHESIA EVENT (OUTPATIENT)
Dept: PERIOP | Facility: AMBULARY SURGERY CENTER | Age: 78
End: 2018-09-30
Payer: MEDICARE

## 2018-09-30 NOTE — ANESTHESIA PREPROCEDURE EVALUATION
Review of Systems/Medical History  Patient summary reviewed  Chart reviewed      Cardiovascular  Hyperlipidemia, Hypertension , CAD ,    Pulmonary       GI/Hepatic    Liver disease , Hepatitis ,             Endo/Other  Diabetes ,      GYN       Hematology   Musculoskeletal    Arthritis     Neurology   Psychology         Cancer [facility])    Cardiac disease abnormal stress test 2012   Constipation    Coronary artery disease    Hyperlipidemia    Hypertension    CLL (chronic lymphocytic leukemia) ([facility] [address] ) 6 months chemo   History of chemotherapy Tx for CLL currently in remission since 2016   [facility] type 2   Liver disease    Arthritis    Infectious viral hepatitis Hep C Hx remission since 2016 post tx   Chronic kidney disease protein urine   Proteinuria          Physical Exam    Airway    Mallampati score: II  TM Distance: >3 FB       Dental       Cardiovascular  Rhythm: regular, Rate: normal,     Pulmonary  Breath sounds clear to auscultation,     Other Findings        Anesthesia Plan  ASA Score- 3     Anesthesia Type- IV sedation with anesthesia with ASA Monitors  Additional Monitors:   Airway Plan:         Plan Factors-    Induction- intravenous  Postoperative Plan-     Informed Consent- Anesthetic plan and risks discussed with patient

## 2018-10-01 ENCOUNTER — ANESTHESIA (OUTPATIENT)
Dept: PERIOP | Facility: AMBULARY SURGERY CENTER | Age: 78
End: 2018-10-01
Payer: MEDICARE

## 2018-10-01 ENCOUNTER — HOSPITAL ENCOUNTER (OUTPATIENT)
Facility: AMBULARY SURGERY CENTER | Age: 78
Setting detail: OUTPATIENT SURGERY
Discharge: HOME | End: 2018-10-01
Attending: OPHTHALMOLOGY | Admitting: OPHTHALMOLOGY
Payer: MEDICARE

## 2018-10-01 VITALS
SYSTOLIC BLOOD PRESSURE: 163 MMHG | HEART RATE: 74 BPM | OXYGEN SATURATION: 99 % | HEIGHT: 69 IN | BODY MASS INDEX: 21.48 KG/M2 | DIASTOLIC BLOOD PRESSURE: 87 MMHG | WEIGHT: 145 LBS | RESPIRATION RATE: 16 BRPM | TEMPERATURE: 96.2 F

## 2018-10-01 DIAGNOSIS — H25.12 AGE-RELATED NUCLEAR CATARACT OF LEFT EYE: Primary | ICD-10-CM

## 2018-10-01 PROCEDURE — V2632 POST CHMBR INTRAOCULAR LENS: HCPCS | Performed by: OPHTHALMOLOGY

## 2018-10-01 DEVICE — IOL SN60WF 21.0: Type: IMPLANTABLE DEVICE | Site: EYE | Status: FUNCTIONAL

## 2018-10-01 RX ORDER — GATIFLOXACIN 5 MG/ML
1 SOLUTION/ DROPS OPHTHALMIC 2 TIMES DAILY
Qty: 3 ML | Refills: 0
Start: 2018-10-01 | End: 2018-10-19

## 2018-10-01 RX ORDER — SODIUM CHLORIDE, SODIUM LACTATE, POTASSIUM CHLORIDE, CALCIUM CHLORIDE 600; 310; 30; 20 MG/100ML; MG/100ML; MG/100ML; MG/100ML
75 INJECTION, SOLUTION INTRAVENOUS CONTINUOUS
Status: DISCONTINUED | OUTPATIENT
Start: 2018-10-01 | End: 2018-10-01 | Stop reason: HOSPADM

## 2018-10-01 RX ORDER — ONDANSETRON 2 MG/ML
4 INJECTION INTRAMUSCULAR; INTRAVENOUS ONCE AS NEEDED
Status: DISCONTINUED | OUTPATIENT
Start: 2018-10-01 | End: 2018-10-01 | Stop reason: HOSPADM

## 2018-10-01 RX ORDER — MIDAZOLAM HYDROCHLORIDE 1 MG/ML
INJECTION INTRAMUSCULAR; INTRAVENOUS AS NEEDED
Status: DISCONTINUED | OUTPATIENT
Start: 2018-10-01 | End: 2018-10-01 | Stop reason: SURG

## 2018-10-01 RX ORDER — LIDOCAINE HYDROCHLORIDE 20 MG/ML
1 JELLY TOPICAL
Status: COMPLETED | OUTPATIENT
Start: 2018-10-01 | End: 2018-10-01

## 2018-10-01 RX ORDER — FENTANYL CITRATE/PF 50 MCG/ML
25 SYRINGE (ML) INJECTION
Status: DISCONTINUED | OUTPATIENT
Start: 2018-10-01 | End: 2018-10-01 | Stop reason: HOSPADM

## 2018-10-01 RX ORDER — BACITRACIN 500 [USP'U]/G
OINTMENT OPHTHALMIC
Qty: 3.5 G | Refills: 0 | Status: SHIPPED | OUTPATIENT
Start: 2018-10-01 | End: 2020-01-20 | Stop reason: ALTCHOICE

## 2018-10-01 RX ORDER — PHENYLEPHRINE HCL 2.5 %
1 DROPS OPHTHALMIC (EYE)
Status: COMPLETED | OUTPATIENT
Start: 2018-10-01 | End: 2018-10-01

## 2018-10-01 RX ORDER — KETOROLAC TROMETHAMINE 5 MG/ML
1 SOLUTION OPHTHALMIC
Status: COMPLETED | OUTPATIENT
Start: 2018-10-01 | End: 2018-10-01

## 2018-10-01 RX ORDER — TETRACAINE HYDROCHLORIDE 5 MG/ML
1 SOLUTION OPHTHALMIC ONCE
Status: COMPLETED | OUTPATIENT
Start: 2018-10-01 | End: 2018-10-01

## 2018-10-01 RX ORDER — LIDOCAINE HYDROCHLORIDE 10 MG/ML
INJECTION, SOLUTION EPIDURAL; INFILTRATION; INTRACAUDAL; PERINEURAL AS NEEDED
Status: DISCONTINUED | OUTPATIENT
Start: 2018-10-01 | End: 2018-10-01 | Stop reason: HOSPADM

## 2018-10-01 RX ORDER — TETRACAINE HYDROCHLORIDE 5 MG/ML
SOLUTION OPHTHALMIC AS NEEDED
Status: DISCONTINUED | OUTPATIENT
Start: 2018-10-01 | End: 2018-10-01 | Stop reason: HOSPADM

## 2018-10-01 RX ORDER — CYCLOPENTOLATE HYDROCHLORIDE 10 MG/ML
1 SOLUTION/ DROPS OPHTHALMIC
Status: COMPLETED | OUTPATIENT
Start: 2018-10-01 | End: 2018-10-01

## 2018-10-01 RX ORDER — GATIFLOXACIN 5 MG/ML
SOLUTION/ DROPS OPHTHALMIC AS NEEDED
Status: DISCONTINUED | OUTPATIENT
Start: 2018-10-01 | End: 2018-10-01 | Stop reason: HOSPADM

## 2018-10-01 RX ORDER — BALANCED SALT SOLUTION 6.4; .75; .48; .3; 3.9; 1.7 MG/ML; MG/ML; MG/ML; MG/ML; MG/ML; MG/ML
SOLUTION OPHTHALMIC AS NEEDED
Status: DISCONTINUED | OUTPATIENT
Start: 2018-10-01 | End: 2018-10-01 | Stop reason: HOSPADM

## 2018-10-01 RX ADMIN — CYCLOPENTOLATE HYDROCHLORIDE 1 DROP: 10 SOLUTION/ DROPS OPHTHALMIC at 07:00

## 2018-10-01 RX ADMIN — CYCLOPENTOLATE HYDROCHLORIDE 1 DROP: 10 SOLUTION/ DROPS OPHTHALMIC at 06:15

## 2018-10-01 RX ADMIN — CYCLOPENTOLATE HYDROCHLORIDE 1 DROP: 10 SOLUTION/ DROPS OPHTHALMIC at 06:45

## 2018-10-01 RX ADMIN — KETOROLAC TROMETHAMINE 1 DROP: 5 SOLUTION OPHTHALMIC at 06:45

## 2018-10-01 RX ADMIN — KETOROLAC TROMETHAMINE 1 DROP: 5 SOLUTION OPHTHALMIC at 06:30

## 2018-10-01 RX ADMIN — MIDAZOLAM HYDROCHLORIDE 1 MG: 1 INJECTION, SOLUTION INTRAMUSCULAR; INTRAVENOUS at 07:10

## 2018-10-01 RX ADMIN — CYCLOPENTOLATE HYDROCHLORIDE 1 DROP: 10 SOLUTION/ DROPS OPHTHALMIC at 06:30

## 2018-10-01 RX ADMIN — LIDOCAINE HYDROCHLORIDE 1 APPLICATION: 20 JELLY TOPICAL at 06:45

## 2018-10-01 RX ADMIN — PHENYLEPHRINE HYDROCHLORIDE 1 DROP: 25 SOLUTION/ DROPS OPHTHALMIC at 06:15

## 2018-10-01 RX ADMIN — PHENYLEPHRINE HYDROCHLORIDE 1 DROP: 25 SOLUTION/ DROPS OPHTHALMIC at 06:45

## 2018-10-01 RX ADMIN — LIDOCAINE HYDROCHLORIDE 1 APPLICATION: 20 JELLY TOPICAL at 06:30

## 2018-10-01 RX ADMIN — LIDOCAINE HYDROCHLORIDE 1 APPLICATION: 20 JELLY TOPICAL at 07:00

## 2018-10-01 RX ADMIN — PHENYLEPHRINE HYDROCHLORIDE 1 DROP: 25 SOLUTION/ DROPS OPHTHALMIC at 07:00

## 2018-10-01 RX ADMIN — LIDOCAINE HYDROCHLORIDE 1 APPLICATION: 20 JELLY TOPICAL at 06:15

## 2018-10-01 RX ADMIN — TETRACAINE HYDROCHLORIDE 1 DROP: 5 SOLUTION OPHTHALMIC at 06:14

## 2018-10-01 RX ADMIN — MIDAZOLAM HYDROCHLORIDE 1 MG: 1 INJECTION, SOLUTION INTRAMUSCULAR; INTRAVENOUS at 07:13

## 2018-10-01 RX ADMIN — PHENYLEPHRINE HYDROCHLORIDE 1 DROP: 25 SOLUTION/ DROPS OPHTHALMIC at 06:30

## 2018-10-01 RX ADMIN — KETOROLAC TROMETHAMINE 1 DROP: 5 SOLUTION OPHTHALMIC at 06:15

## 2018-10-01 RX ADMIN — KETOROLAC TROMETHAMINE 1 DROP: 5 SOLUTION OPHTHALMIC at 07:00

## 2018-10-01 NOTE — OP NOTE
OPERATIVE REPORT    PATIENT NAME: Keshawn Tim    :  1940  MRN: [id number]  Pt Location: [place] [address] OR ROOM 01    Surgery Date: 10/1/2018    Surgeon(s) and Role:     * Lima Solo MD - Primary    Age-related nuclear cataract of left eye [H25 12]    Post-Op Diagnosis Codes:     * Age-related nuclear cataract of left eye [H25 12]    Procedure(s):  EXTRACTION EXTRACAPSULAR CATARACT PHACO INTRAOCULAR LENS (IOL)    Anesthesia Type:   IV Sedation with Anesthesia    Operative Indications:  Age-related nuclear cataract of left eye [H25 12]  Decreased vision to 20/50 with glare  With problems reading and driving  Pt requested cataract sx the left eye    Procedure and Technique:    Procedure Details     The patient was brought in the OR in stable condition and placed on the operative table  The left eye was prepped and draped in the usual sterile manner  Attention was directed to the left eye where a lid speculum was placed  A 2 4 mm clear corneal incision was made temperally  1/2 cc of 1% MPF Lidocaine was irrigated into the anterior chamber followed by viscoat  The side port incision was placed superiorly  The capsularrhexis was made and the nucleus was hydrodissected with BSS  The nucleus was then removed with the phaco handpiece followed by removal of the cortical material with the I/A handpiece  The capsular bag was then filled with Provisc  The IOL was folded and placed in to the capsular bag and centered well  The remaining Provisc was removed from the eye with the I/A  The wounds were hydrated with BSS and found to be water tight  The lid speculum was removed and 2 drops of Gatifloxicin were placed over the cornea  A protective eye shield was taped over the eye and the patient went to PACU in stable condition  I will see the patient in the office tomorrow and the expected post op period is a few weeks         Complications: None        Disposition: PACU   Condition: Stable    SIGNATURE: Lima Solo MD  DATE: October 1, 2018  TIME: 7:34 AM

## 2018-10-01 NOTE — DISCHARGE INSTRUCTIONS
Dr Renea Chavez Cataract Instructions    Activity:     1  No Driving until instructed   2  Keep shield on until seen tomorrow except when administering drops   3  No heavy lifting   4  No water in eye     Diet:     1  Resume normal diet    Normal Symptoms:     1  Mild Headache   2  Scratchy or picky feeling around eye    Call the office if:     1  You have any questions or concerns   2  If eye pain is not relieved by extra strength tylenol    Office phone number:  [phone]      Next appointment:     1  See Dr Renea Chavez at his office tomorrow as scheduled       10:05 AM   2  Bring blue eye kit with you and eyedrops to the office    A new set of comprehensive instructions will be given and reviewed with you during your office visit tomorrow

## 2018-10-01 NOTE — PERIOPERATIVE NURSING NOTE
0500 blood sugar at home 125  Upon arrival patient complaining of left eye discomfort after putting in drops last evening level 9 pain  Resolved after tetracaine drops

## 2018-10-12 ENCOUNTER — TELEPHONE (OUTPATIENT)
Dept: ENDOCRINOLOGY | Facility: CLINIC | Age: 78
End: 2018-10-12

## 2018-10-12 NOTE — TELEPHONE ENCOUNTER
Patient has a pending appointment on 10/19 and he states he has protein in his urine and he was wondering if a test for that needs to be done prior to his visit? Please call him at [phone]  Thank you

## 2018-10-15 NOTE — TELEPHONE ENCOUNTER
Called pt kenn, asked him to call us to let us know if he want to have them done prior to his appt, if not they can be done at visit

## 2018-10-19 ENCOUNTER — OFFICE VISIT (OUTPATIENT)
Dept: ENDOCRINOLOGY | Facility: CLINIC | Age: 78
End: 2018-10-19
Payer: MEDICARE

## 2018-10-19 VITALS
DIASTOLIC BLOOD PRESSURE: 90 MMHG | BODY MASS INDEX: 22.36 KG/M2 | HEIGHT: 69 IN | HEART RATE: 72 BPM | SYSTOLIC BLOOD PRESSURE: 152 MMHG | WEIGHT: 151 LBS

## 2018-10-19 DIAGNOSIS — I25.10 CORONARY ARTERY DISEASE INVOLVING NATIVE CORONARY ARTERY OF NATIVE HEART WITHOUT ANGINA PECTORIS: ICD-10-CM

## 2018-10-19 DIAGNOSIS — M54.31 SCIATICA OF RIGHT SIDE: ICD-10-CM

## 2018-10-19 DIAGNOSIS — IMO0002 UNCONTROLLED TYPE 2 DIABETES MELLITUS WITH COMPLICATION, WITHOUT LONG-TERM CURRENT USE OF INSULIN: ICD-10-CM

## 2018-10-19 DIAGNOSIS — I10 ESSENTIAL HYPERTENSION: ICD-10-CM

## 2018-10-19 DIAGNOSIS — Z79.4 CONTROLLED TYPE 2 DIABETES MELLITUS WITH DIABETIC POLYNEUROPATHY, WITH LONG-TERM CURRENT USE OF INSULIN (HCC): Primary | ICD-10-CM

## 2018-10-19 DIAGNOSIS — E11.42 CONTROLLED TYPE 2 DIABETES MELLITUS WITH DIABETIC POLYNEUROPATHY, WITH LONG-TERM CURRENT USE OF INSULIN (HCC): Primary | ICD-10-CM

## 2018-10-19 DIAGNOSIS — E78.5 DYSLIPIDEMIA: ICD-10-CM

## 2018-10-19 LAB — SL AMB POCT HEMOGLOBIN AIC: 7

## 2018-10-19 PROCEDURE — 83036 HEMOGLOBIN GLYCOSYLATED A1C: CPT | Performed by: INTERNAL MEDICINE

## 2018-10-19 PROCEDURE — 99214 OFFICE O/P EST MOD 30 MIN: CPT | Performed by: INTERNAL MEDICINE

## 2018-10-19 RX ORDER — RAMIPRIL 5 MG/1
5 CAPSULE ORAL DAILY
Qty: 90 CAPSULE | Refills: 3 | Status: SHIPPED | OUTPATIENT
Start: 2018-10-19 | End: 2019-12-09 | Stop reason: SDUPTHER

## 2018-10-19 NOTE — PROGRESS NOTES
Vamsi Tim 70 y o  male MRN: [id number]    Encounter: [id number]      Assessment/Plan     Assessment: This is a 70y o -year-old male with diabetes with hyperglycemia, hypertension and hyperlipidemia  He also had sciatica and coronary artery disease  Plan:  1  Type 2 diabetes with hyperglycemia-his diabetes under reasonable control  Continue current regimen  2  Hypertension-  the blood pressure is above target  We will need to monitor this over time and make adjustments to his regimen if necessary  3  Hyperlipidemia-continue statin  4  Sciatica-I showed him stretches to improve this  He did get a steroid injection today  5  Coronary artery disease is stable  CC: Diabetes    History of Present Illness     HPI:  79-year-old male with type 2 diabetes for several years who is currently on insulin as well as metformin  She denies any diarrhea and hypoglycemia  He denies any complications of diabetes  He denies any polyuria, polydipsia or nocturia  For the hyperlipidemia, he is on a statin  He denies any myalgia  For the hypertension, he is on ramipril  He denies any cough  Today, he does complain to me about right-sided sciatica  He had a steroid injection earlier today according to him  Review of Systems   Constitutional: Negative for chills and fever  Respiratory: Negative for shortness of breath  Cardiovascular: Negative for chest pain  Gastrointestinal: Negative for constipation, diarrhea, nausea and vomiting  Endocrine: Negative for polydipsia and polyuria  Neurological: Negative for light-headedness and numbness  All other systems reviewed and are negative        Historical Information   Past Medical History:   Diagnosis Date    Arthritis     Cancer [facility])     Cardiac disease     abnormal stress test 2012    Chronic kidney disease     protein urine    CLL (chronic lymphocytic leukemia) ([facility] [address] ) 2014    6 months chemo    Constipation     Coronary artery disease     Diabetes mellitus ([facility] [address] )     type 2    History of chemotherapy     Tx for CLL currently in remission since 2016    Hyperlipidemia     Hypertension     Infectious viral hepatitis     Hep C Hx remission since 2016 post tx    Liver disease     Proteinuria      Past Surgical History:   Procedure Laterality Date    ANGIOPLASTY      one stent    CARDIAC CATHETERIZATION      CARDIAC SURGERY  2016    x1 stent    CATARACT EXTRACTION      CYST REMOVAL      mid back benign    FRACTURE SURGERY Right     Fx Femur age 17, pin in place    [place] REMV CATARACT EXTRACAP,INSERT LENS Right 7/23/2018    Procedure: EXTRACTION EXTRACAPSULAR CATARACT PHACO INTRAOCULAR LENS (IOL); Surgeon: Shawn Colon MD;  Location: [facility] MAIN OR;  Service: Ophthalmology     [facility] CATARACT EXTRACAP,INSERT LENS Left 10/1/2018    Procedure: EXTRACTION EXTRACAPSULAR CATARACT PHACO INTRAOCULAR LENS (IOL);   Surgeon: Shawn Colon MD;  [facility] OR;  Service: Ophthalmology    TONSILLECTOMY       Social History   History   Alcohol Use    Yes     Comment: SOCIALLY     History   Drug Use No     History   Smoking Status    Former Smoker   Smokeless Tobacco    Never Used     Family History:   Family History   Problem Relation Age of Onset    Diabetes unspecified Brother     Diabetes Brother     Diabetes unspecified Maternal Grandfather     Diabetes Maternal Grandfather     Cancer Mother         breast    Alzheimer's disease Mother     Early death Father         age 64 peritonitis from gallbladder    Cancer Sister         small cell carcinoma    No Known Problems Daughter     No Known Problems Daughter        Meds/Allergies   Current Outpatient Prescriptions   Medication Sig Dispense Refill    aspirin 81 MG tablet Take 1 tablet by mouth every morning        atorvastatin (LIPITOR) 40 mg tablet Take 1 tablet by mouth every evening        bacitracin ophthalmic ointment Administer into the left eye daily at bedtime 3 5 g 0    Bromfenac Sodium (BROMSITE) 0 075 % SOLN Administer 1 drop into the left eye 2 (two) times a day  0    docusate sodium (COLACE) 100 mg capsule Take 100 mg by mouth every morning      glucose blood test strip One touch ultra test strips Check BG 2x per day DX E11 9 200 each 3    LANTUS SOLOSTAR 100 units/mL injection pen Inject 10 units under the skin once daily (Patient taking differently: 10 Units every morning Inject 10 units under the skin once daily ) 5 pen 3    metFORMIN (GLUCOPHAGE) 1000 MG tablet TAKE ONE TABLET BY MOUTH TWICE A  tablet 3    NON FORMULARY as needed Fiber Therapy Powder-One heaping tablespoon to 12 oz of water      ONE TOUCH ULTRA TEST test strip TEST 3 TIMES DAILY OR AS DIRECTED  300 each 3    pramipexole (MIRAPEX) 0 25 mg tablet Take 0 25 mg by mouth every morning      ramipril (ALTACE) 5 mg capsule Take 1 capsule (5 mg total) by mouth daily 90 capsule 3     No current facility-administered medications for this visit  Allergies   Allergen Reactions    Other      Seasonal allergies       Objective   Vitals: Blood pressure 152/90, pulse 72, height 5' 9" (1 753 m), weight 68 5 kg (151 lb)  Physical Exam   Constitutional: He is oriented to person, place, and time  He appears well-developed and well-nourished  No distress  HENT:   Head: Normocephalic and atraumatic  Mouth/Throat: Oropharynx is clear and moist and mucous membranes are normal  No oropharyngeal exudate  Eyes: Conjunctivae, EOM and lids are normal  Right eye exhibits no discharge  Left eye exhibits no discharge  No scleral icterus  Neck: Neck supple  No thyromegaly present  Cardiovascular: Normal rate, regular rhythm and normal heart sounds  Exam reveals no gallop and no friction rub  No murmur heard  Pulmonary/Chest: Effort normal and breath sounds normal  No respiratory distress  He has no wheezes  Abdominal: Soft  Bowel sounds are normal  He exhibits no distension   There is no tenderness  Musculoskeletal: Normal range of motion  He exhibits no edema, tenderness or deformity  Lymphadenopathy:        Head (right side): No occipital adenopathy present  Head (left side): No occipital adenopathy present  Right: No supraclavicular adenopathy present  Left: No supraclavicular adenopathy present  Neurological: He is alert and oriented to person, place, and time  No cranial nerve deficit  Coordination normal    Skin: Skin is warm and intact  No rash noted  He is not diaphoretic  No erythema  Psychiatric: He has a normal mood and affect  His behavior is normal    Vitals reviewed  The history was obtained from the review of the chart, patient  Lab Results:   Lab Results   Component Value Date/Time    Hemoglobin A1C 7 5 (H) 06/12/2018 11:37 AM    Hemoglobin A1C 6 1 03/06/2018 11:20 AM    Hemoglobin A1C 5 9 11/18/2017 10:21 AM    BUN 21 06/12/2018 11:37 AM    BUN 23 04/07/2018 08:57 AM    Sodium 140 06/12/2018 11:37 AM    SL AMB POTASSIUM 4 3 04/07/2018 08:57 AM    Potassium 4 4 06/12/2018 11:37 AM    Chloride 103 06/12/2018 11:37 AM    Chloride 103 04/07/2018 08:57 AM    CO2 29 06/12/2018 11:37 AM    CO2 29 04/07/2018 08:57 AM    Creatinine 1 13 06/12/2018 11:37 AM    AST 14 06/12/2018 11:37 AM    ALT 20 06/12/2018 11:37 AM    Albumin 4 2 06/12/2018 11:37 AM    HDL, Direct 58 06/12/2018 11:37 AM    Triglycerides 68 06/12/2018 11:37 AM       Imaging Studies: I have personally reviewed pertinent reports  Portions of the record may have been created with voice recognition software  Occasional wrong word or "sound a like" substitutions may have occurred due to the inherent limitations of voice recognition software  Read the chart carefully and recognize, using context, where substitutions have occurred

## 2018-11-30 ENCOUNTER — TELEPHONE (OUTPATIENT)
Dept: ENDOCRINOLOGY | Facility: CLINIC | Age: 78
End: 2018-11-30

## 2018-11-30 NOTE — TELEPHONE ENCOUNTER
Called pt explained reason for Ramipril and explained how labs improved with use  Pt is also concerned about on/off dry cough he is experiencing for the last 2 months  He is due to call in for a refill and was wondering if he should fill it since he's having this cough

## 2018-11-30 NOTE — TELEPHONE ENCOUNTER
If the cough is bothersome, we can certainly change his medication  If he can tolerate it then he should have it refilled

## 2018-11-30 NOTE — TELEPHONE ENCOUNTER
Called pt advised him if cough bothersome Dr Marcia Vera can change med if he can tolerate he should refill Ramipril   Pt states he will think about it and call back

## 2018-12-06 ENCOUNTER — TRANSCRIBE ORDERS (OUTPATIENT)
Dept: LAB | Facility: CLINIC | Age: 78
End: 2018-12-06

## 2018-12-06 ENCOUNTER — APPOINTMENT (OUTPATIENT)
Dept: LAB | Facility: CLINIC | Age: 78
End: 2018-12-06
Payer: MEDICARE

## 2018-12-06 DIAGNOSIS — E11.9 DIABETES MELLITUS WITHOUT COMPLICATION (HCC): Primary | ICD-10-CM

## 2018-12-06 LAB
CREAT UR-MCNC: 105 MG/DL
MICROALBUMIN UR-MCNC: 58 MG/L (ref 0–20)
MICROALBUMIN/CREAT 24H UR: 55 MG/G CREATININE (ref 0–30)

## 2018-12-06 PROCEDURE — 82043 UR ALBUMIN QUANTITATIVE: CPT | Performed by: FAMILY MEDICINE

## 2018-12-06 PROCEDURE — 82570 ASSAY OF URINE CREATININE: CPT | Performed by: FAMILY MEDICINE

## 2019-01-28 ENCOUNTER — OFFICE VISIT (OUTPATIENT)
Dept: ENDOCRINOLOGY | Facility: CLINIC | Age: 79
End: 2019-01-28
Payer: MEDICARE

## 2019-01-28 ENCOUNTER — APPOINTMENT (OUTPATIENT)
Dept: LAB | Facility: CLINIC | Age: 79
End: 2019-01-28
Payer: MEDICARE

## 2019-01-28 VITALS
DIASTOLIC BLOOD PRESSURE: 60 MMHG | HEIGHT: 69 IN | WEIGHT: 153 LBS | BODY MASS INDEX: 22.66 KG/M2 | HEART RATE: 68 BPM | SYSTOLIC BLOOD PRESSURE: 122 MMHG

## 2019-01-28 DIAGNOSIS — E78.5 DYSLIPIDEMIA: ICD-10-CM

## 2019-01-28 DIAGNOSIS — I10 ESSENTIAL HYPERTENSION: ICD-10-CM

## 2019-01-28 DIAGNOSIS — Z79.4 CONTROLLED TYPE 2 DIABETES MELLITUS WITH DIABETIC POLYNEUROPATHY, WITH LONG-TERM CURRENT USE OF INSULIN (HCC): ICD-10-CM

## 2019-01-28 DIAGNOSIS — E11.42 CONTROLLED TYPE 2 DIABETES MELLITUS WITH DIABETIC POLYNEUROPATHY, WITH LONG-TERM CURRENT USE OF INSULIN (HCC): ICD-10-CM

## 2019-01-28 DIAGNOSIS — Z79.4 INSULIN LONG-TERM USE (HCC): Primary | ICD-10-CM

## 2019-01-28 LAB
ALBUMIN SERPL BCP-MCNC: 4.1 G/DL (ref 3.5–5)
ALP SERPL-CCNC: 50 U/L (ref 46–116)
ALT SERPL W P-5'-P-CCNC: 108 U/L (ref 12–78)
ANION GAP SERPL CALCULATED.3IONS-SCNC: 5 MMOL/L (ref 4–13)
AST SERPL W P-5'-P-CCNC: 51 U/L (ref 5–45)
BILIRUB SERPL-MCNC: 0.68 MG/DL (ref 0.2–1)
BUN SERPL-MCNC: 23 MG/DL (ref 5–25)
CALCIUM SERPL-MCNC: 9.1 MG/DL (ref 8.3–10.1)
CHLORIDE SERPL-SCNC: 104 MMOL/L (ref 100–108)
CO2 SERPL-SCNC: 28 MMOL/L (ref 21–32)
CREAT SERPL-MCNC: 0.96 MG/DL (ref 0.6–1.3)
EST. AVERAGE GLUCOSE BLD GHB EST-MCNC: 169 MG/DL
GFR SERPL CREATININE-BSD FRML MDRD: 76 ML/MIN/1.73SQ M
GLUCOSE P FAST SERPL-MCNC: 155 MG/DL (ref 65–99)
HBA1C MFR BLD: 7.5 % (ref 4.2–6.3)
POTASSIUM SERPL-SCNC: 4.6 MMOL/L (ref 3.5–5.3)
PROT SERPL-MCNC: 7 G/DL (ref 6.4–8.2)
SODIUM SERPL-SCNC: 137 MMOL/L (ref 136–145)

## 2019-01-28 PROCEDURE — 99214 OFFICE O/P EST MOD 30 MIN: CPT | Performed by: PHYSICIAN ASSISTANT

## 2019-01-28 PROCEDURE — 80053 COMPREHEN METABOLIC PANEL: CPT

## 2019-01-28 PROCEDURE — 83036 HEMOGLOBIN GLYCOSYLATED A1C: CPT

## 2019-01-28 PROCEDURE — 36415 COLL VENOUS BLD VENIPUNCTURE: CPT

## 2019-01-28 RX ORDER — CHLORAL HYDRATE 500 MG
1000 CAPSULE ORAL 3 TIMES DAILY
Status: ON HOLD | COMMUNITY
End: 2022-01-01

## 2019-01-28 RX ORDER — PRAVASTATIN SODIUM 40 MG
40 TABLET ORAL DAILY
Status: ON HOLD | COMMUNITY
End: 2022-01-01

## 2019-01-28 RX ORDER — MULTIVIT WITH MINERALS/LUTEIN
1000 TABLET ORAL DAILY
Status: ON HOLD | COMMUNITY
End: 2022-01-01

## 2019-01-28 NOTE — PROGRESS NOTES
Established Patient Progress Note      Chief Complaint   Patient presents with    Diabetes Type 2        History of Present Illness:   Roslyn Kwong is a 70 y o  male with a history of type 2 diabetes with long term use of insulin  Reports complications of microalbuminuria  Denies recent illness or hospitalizations  Denies recent severe hypoglycemic or severe hyperglycemic episodes  Denies any issues with his current regimen  home glucose monitoring: are performed regularly  Has seen dietician- Bony Muñoz, last visit 7/2018  Home blood glucose readings:   Before breakfast: 130s-170s--- rest of day not checking     Current regimen: Lantus 10 units daily in morning, metformin 1000mg twice per day    Hx Trulicity intolerance-- worked well but caused appetite reduction, weight loss, constipation  Tried jardiance but didn't like it    Last Eye Exam: UTD on annual exam, had cataract surgery  Last Foot Exam: today at visit    Has hypertension: Taking ramipril  Has hyperlipidemia: Taking pravastatin    Follows with cardiology for history of CAD       Patient Active Problem List   Diagnosis    Arteriosclerosis of coronary artery    Atypical chest pain    Bruising    CAD (coronary artery disease), native coronary artery    Chronic constipation    Chronic hepatitis B (HCC)    Chronic lymphocytic leukemia ([facility] [address] )    Controlled type 2 diabetes mellitus with diabetic polyneuropathy, with long-term current use of insulin ([facility] [address] )    Dyslipidemia    Essential hypertension    Hepatitis C    S/P coronary artery stent placement      Past Medical History:   Diagnosis Date    Arthritis     Cancer [facility])     Cardiac disease     abnormal stress test 2012    Chronic kidney disease     protein urine    CLL (chronic lymphocytic leukemia) ([address] ) 2014    6 months chemo    Constipation     Coronary artery disease     Diabetes mellitus ([facility] [address] )     type 2    History of chemotherapy     Tx for CLL currently in remission since 2016    Hyperlipidemia     Hypertension     Infectious viral hepatitis     Hep C Hx remission since 2016 post tx    Liver disease     Proteinuria       Past Surgical History:   Procedure Laterality Date    ANGIOPLASTY      one stent    CARDIAC CATHETERIZATION      CARDIAC SURGERY  2016    x1 stent    CATARACT EXTRACTION      CYST REMOVAL      mid back benign    FRACTURE SURGERY Right     Fx Femur age 17, pin in place    [place] REMV CATARACT EXTRACAP,INSERT LENS Right 7/23/2018    Procedure: EXTRACTION EXTRACAPSULAR CATARACT PHACO INTRAOCULAR LENS (IOL); Surgeon: Quan Jeffrey MD;  Location: [facility] MAIN OR;  Service: Ophthalmology     [address] CATARACT EXTRACAP,INSERT LENS Left 10/1/2018    Procedure: EXTRACTION EXTRACAPSULAR CATARACT PHACO INTRAOCULAR LENS (IOL);   Surgeon: Quan Jeffrey MD;  Location: [facility] MAIN OR;  Service: Ophthalmology    TONSILLECTOMY        Family History   Problem Relation Age of Onset    Diabetes unspecified Brother     Diabetes Brother     Diabetes unspecified Maternal Grandfather     Diabetes Maternal Grandfather     Cancer Mother         breast    Alzheimer's disease Mother     Early death Father         age 64 peritonitis from gallbladder    Cancer Sister         small cell carcinoma    No Known Problems Daughter     No Known Problems Daughter      Social History   Substance Use Topics    Smoking status: Former Smoker    Smokeless tobacco: Never Used    Alcohol use Yes      Comment: SOCIALLY     Allergies   Allergen Reactions    Other      Seasonal allergies         Current Outpatient Prescriptions:     Ascorbic Acid (VITAMIN C) 1000 MG tablet, Take 1,000 mg by mouth daily, Disp: , Rfl:     aspirin 81 MG tablet, Take 1 tablet by mouth every morning  , Disp: , Rfl:     bacitracin ophthalmic ointment, Administer into the left eye daily at bedtime, Disp: 3 5 g, Rfl: 0    Coenzyme Q10 (CO Q 10) 100 MG CAPS, Take 4 capsules by mouth daily, Disp: , Rfl:     docusate sodium (COLACE) 100 mg capsule, Take 100 mg by mouth every morning, Disp: , Rfl:     glucose blood test strip, One touch ultra test strips Check BG 2x per day DX E11 9, Disp: 200 each, Rfl: 3    LANTUS SOLOSTAR 100 units/mL injection pen, Inject 10 units under the skin once daily (Patient taking differently: 10 Units every morning Inject 10 units under the skin once daily ), Disp: 5 pen, Rfl: 3    metFORMIN (GLUCOPHAGE) 1000 MG tablet, TAKE ONE TABLET BY MOUTH TWICE A DAY, Disp: 180 tablet, Rfl: 3    NON FORMULARY, as needed Fiber Therapy Powder-One heaping tablespoon to 12 oz of water, Disp: , Rfl:     Omega-3 Fatty Acids (FISH OIL) 1,000 mg, Take 1,000 mg by mouth 3 (three) times a day, Disp: , Rfl:     ONE TOUCH ULTRA TEST test strip, TEST 3 TIMES DAILY OR AS DIRECTED , Disp: 300 each, Rfl: 3    pramipexole (MIRAPEX) 0 25 mg tablet, Take 0 25 mg by mouth every morning, Disp: , Rfl:     pravastatin (PRAVACHOL) 40 mg tablet, Take 40 mg by mouth daily, Disp: , Rfl:     ramipril (ALTACE) 5 mg capsule, Take 1 capsule (5 mg total) by mouth daily, Disp: 90 capsule, Rfl: 3    Review of Systems   Constitutional: Negative for activity change, appetite change and fatigue  HENT: Negative for sore throat, trouble swallowing and voice change  Eyes: Negative for visual disturbance  Respiratory: Negative for choking, chest tightness and shortness of breath  Cardiovascular: Negative for chest pain, palpitations and leg swelling  Gastrointestinal: Negative for abdominal pain, constipation and diarrhea  Endocrine: Negative for cold intolerance, heat intolerance, polydipsia, polyphagia and polyuria  Genitourinary: Negative for frequency  Musculoskeletal: Negative for arthralgias and myalgias  Skin: Negative for rash  Neurological: Negative for dizziness and syncope  Hematological: Negative for adenopathy  Psychiatric/Behavioral: Negative for sleep disturbance     All other systems reviewed and are negative  Physical Exam:  Body mass index is 22 59 kg/m²  /60   Pulse 68   Ht 5' 9" (1 753 m)   Wt 69 4 kg (153 lb)   BMI 22 59 kg/m²    Wt Readings from Last 3 Encounters:   01/28/19 69 4 kg (153 lb)   10/19/18 68 5 kg (151 lb)   10/01/18 65 8 kg (145 lb)       Physical Exam   Constitutional: He is oriented to person, place, and time  He appears well-developed and well-nourished  No distress  HENT:   Head: Normocephalic and atraumatic  Mouth/Throat: Oropharynx is clear and moist    Eyes: Pupils are equal, round, and reactive to light  Conjunctivae and EOM are normal    Neck: Normal range of motion  Neck supple  No thyromegaly present  Cardiovascular: Normal rate, regular rhythm and normal heart sounds  Pulses are no weak pulses  No murmur heard  Pulses:       Dorsalis pedis pulses are 2+ on the right side, and 2+ on the left side  Posterior tibial pulses are 2+ on the right side, and 2+ on the left side  Pulmonary/Chest: Effort normal and breath sounds normal  No respiratory distress  He has no wheezes  He has no rales  Abdominal: Soft  Bowel sounds are normal  He exhibits no distension  There is no tenderness  Musculoskeletal: Normal range of motion  He exhibits no edema  Feet:   Right Foot:   Skin Integrity: Negative for ulcer, skin breakdown, erythema, warmth, callus or dry skin  Left Foot:   Skin Integrity: Negative for ulcer, skin breakdown, erythema, warmth, callus or dry skin  Lymphadenopathy:     He has no cervical adenopathy  Neurological: He is alert and oriented to person, place, and time  Skin: Skin is warm and dry  Psychiatric: He has a normal mood and affect  Vitals reviewed  Patient's shoes and socks removed  Right Foot/Ankle   Right Foot Inspection  Skin Exam: skin normal and skin intact no dry skin, no warmth, no callus, no erythema, no maceration, no abnormal color, no pre-ulcer, no ulcer and no callus Toe Exam: ROM and strength within normal limitsno swelling, no tenderness, erythema and  no right toe deformity  Sensory       Monofilament testing: intact  Vascular  Capillary refills: < 3 seconds  The right DP pulse is 2+  The right PT pulse is 2+  Left Foot/Ankle  Left Foot Inspection  Skin Exam: skin normal and skin intactno dry skin, no warmth, no erythema, no maceration, normal color, no pre-ulcer, no ulcer and no callus                         Toe Exam: ROM and strength within normal limitsno swelling, no tenderness, no erythema and no left toe deformity                   Sensory       Monofilament: intact  Vascular  Capillary refills: < 3 seconds  The left DP pulse is 2+  The left PT pulse is 2+  Assign Risk Category:  No deformity present; No loss of protective sensation; No weak pulses       Risk: 0      Labs:   Lab Results   Component Value Date    HGBA1C 7 0 10/19/2018    HGBA1C 7 5 (H) 06/12/2018    HGBA1C 6 1 03/06/2018     Lab Results   Component Value Date    CREATININE 1 13 06/12/2018    CREATININE 0 98 08/14/2017    CREATININE 1 01 04/22/2017    BUN 21 06/12/2018     08/14/2017    K 4 4 06/12/2018     06/12/2018    CO2 29 06/12/2018     eGFR   Date Value Ref Range Status   06/12/2018 63 ml/min/1 73sq m Final     Lab Results   Component Value Date    CHOL 133 08/14/2017    HDL 58 06/12/2018    TRIG 68 06/12/2018     Lab Results   Component Value Date    ALT 20 06/12/2018    AST 14 06/12/2018    ALKPHOS 58 06/12/2018    BILITOT 0 7 08/14/2017     Lab Results   Component Value Date    WDY5KWKJRJEN 0 850 05/31/2018    ALW5CYRQDBQA 0 85 06/14/2017    YZB0NKQYKDLL 1 531 04/22/2017     Lab Results   Component Value Date    FREET4 1 32 05/31/2018       Impression & Plan:    Problem List Items Addressed This Visit     Controlled type 2 diabetes mellitus with diabetic polyneuropathy, with long-term current use of insulin ([address] )     Well controlled based on last A1C    He reports morning BG readings sometimes high- likely due to snacking  Reduce bedtime snacking and change lantus to bedtime dosing  Check BG 2x per day and send log in two weeks  Relevant Orders    Hemoglobin A1C    Comprehensive metabolic panel    Dyslipidemia     LDL at goal on statin  Essential hypertension     Continue ramipril  Other Visit Diagnoses     Insulin long-term use ([facility] [address] )    -  Primary          Orders Placed This Encounter   Procedures    Hemoglobin A1C     Standing Status:   Future     Standing Expiration Date:   7/28/2019    Comprehensive metabolic panel     This is a patient instruction: Patient fasting for 8 hours or longer recommended  Standing Status:   Future     Standing Expiration Date:   7/28/2019       Patient Instructions   Change Lantus to bedtime dosing  Send BG log in two weeks for review  Discussed with the patient and all questioned fully answered  He will call me if any problems arise  Follow-up appointment in 4 months       Counseled patient on diagnostic results, prognosis, risk and benefit of treatment options, instruction for management, importance of treatment compliance, Risk  factor reduction and impressions    [place] Fraga PA-C

## 2019-01-28 NOTE — ASSESSMENT & PLAN NOTE
Well controlled based on last A1C  He reports morning BG readings sometimes high- likely due to snacking  Reduce bedtime snacking and change lantus to bedtime dosing  Check BG 2x per day and send log in two weeks

## 2019-01-28 NOTE — LETTER
January 28, 2019     MD [address]  [address]  [address] [facility] [address]    Patient: Saurabh Tim   YOB: 1940   Date of Visit: 1/28/2019       Dear Dr Anjel Burciaga: Thank you for referring Landy Recinos to me for evaluation  Below are my notes for this consultation  If you have questions, please do not hesitate to call me  I look forward to following your patient along with you  Sincerely,        Mirna Puga PA-C        CC: No Recipients  Mirna Puga PA-C  1/28/2019  9:51 AM  Sign at close encounter      Established Patient Progress Note      Chief Complaint   Patient presents with    Diabetes Type 2        History of Present Illness:   Rambo Granado is a 70 y o  male with a history of type 2 diabetes with long term use of insulin  Reports complications of microalbuminuria  Denies recent illness or hospitalizations  Denies recent severe hypoglycemic or severe hyperglycemic episodes  Denies any issues with his current regimen  home glucose monitoring: are performed regularly  Has seen dietician- Pao Cruz, last visit 7/2018  Home blood glucose readings:   Before breakfast: 130s-170s--- rest of day not checking     Current regimen: Lantus 10 units daily in morning, metformin 1000mg twice per day    Hx Trulicity intolerance-- worked well but caused appetite reduction, weight loss, constipation  Tried jardiance but didn't like it    Last Eye Exam: UTD on annual exam, had cataract surgery  Last Foot Exam: today at visit    Has hypertension: Taking ramipril  Has hyperlipidemia: Taking pravastatin    Follows with cardiology for history of CAD       Patient Active Problem List   Diagnosis    Arteriosclerosis of coronary artery    Atypical chest pain    Bruising    CAD (coronary artery disease), native coronary artery    Chronic constipation    Chronic hepatitis B (HCC)    Chronic lymphocytic [facility] ([facility] [address] )    Controlled type 2 diabetes mellitus with diabetic polyneuropathy, with long-term current use of insulin (HCC)    Dyslipidemia    Essential hypertension    Hepatitis C    S/P coronary artery stent placement      Past Medical History:   Diagnosis Date    Arthritis     Cancer [facility])     Cardiac disease     abnormal stress test 2012    Chronic kidney disease     protein urine    CLL (chronic lymphocytic leukemia) ([facility] [address] ) 2014    6 months chemo    Constipation     Coronary artery disease     Diabetes mellitus ([facility] [address] )     type 2    History of chemotherapy     Tx for CLL currently in remission since 2016    Hyperlipidemia     Hypertension     Infectious viral hepatitis     Hep C Hx remission since 2016 post tx    Liver disease     Proteinuria       Past Surgical History:   Procedure Laterality Date    ANGIOPLASTY      one stent    CARDIAC CATHETERIZATION      CARDIAC SURGERY  2016    x1 stent    CATARACT EXTRACTION      CYST REMOVAL      mid back benign    FRACTURE SURGERY Right     Fx Femur age 17, pin in place    UT REMV CATARACT EXTRACAP,INSERT LENS Right 7/23/2018    Procedure: EXTRACTION EXTRACAPSULAR CATARACT PHACO INTRAOCULAR LENS (IOL); Surgeon: Digna Mcfarlane MD;  Location: [facility] MAIN OR;  Service: Ophthalmology     [facility] CATARACT EXTRACAP,INSERT LENS Left 10/1/2018    Procedure: EXTRACTION EXTRACAPSULAR CATARACT PHACO INTRAOCULAR LENS (IOL);   Surgeon: Digna Mcfarlane MD;  Location: [facility] MAIN OR;  Service: Ophthalmology    TONSILLECTOMY        Family History   Problem Relation Age of Onset    Diabetes unspecified Brother     Diabetes Brother     Diabetes unspecified Maternal Grandfather     Diabetes Maternal Grandfather     Cancer Mother         breast    Alzheimer's disease Mother     Early death Father         age 64 peritonitis from gallbladder    Cancer Sister         small cell carcinoma    No Known Problems Daughter     No Known Problems Daughter      Social History   Substance Use Topics    Smoking status: Former Smoker    Smokeless tobacco: Never Used    Alcohol use Yes      Comment: SOCIALLY     Allergies   Allergen Reactions    Other      Seasonal allergies         Current Outpatient Prescriptions:     Ascorbic Acid (VITAMIN C) 1000 MG tablet, Take 1,000 mg by mouth daily, Disp: , Rfl:     aspirin 81 MG tablet, Take 1 tablet by mouth every morning  , Disp: , Rfl:     bacitracin ophthalmic ointment, Administer into the left eye daily at bedtime, Disp: 3 5 g, Rfl: 0    Coenzyme Q10 (CO Q 10) 100 MG CAPS, Take 4 capsules by mouth daily, Disp: , Rfl:     docusate sodium (COLACE) 100 mg capsule, Take 100 mg by mouth every morning, Disp: , Rfl:     glucose blood test strip, One touch ultra test strips Check BG 2x per day DX E11 9, Disp: 200 each, Rfl: 3    LANTUS SOLOSTAR 100 units/mL injection pen, Inject 10 units under the skin once daily (Patient taking differently: 10 Units every morning Inject 10 units under the skin once daily ), Disp: 5 pen, Rfl: 3    metFORMIN (GLUCOPHAGE) 1000 MG tablet, TAKE ONE TABLET BY MOUTH TWICE A DAY, Disp: 180 tablet, Rfl: 3    NON FORMULARY, as needed Fiber Therapy Powder-One heaping tablespoon to 12 oz of water, Disp: , Rfl:     Omega-3 Fatty Acids (FISH OIL) 1,000 mg, Take 1,000 mg by mouth 3 (three) times a day, Disp: , Rfl:     ONE TOUCH ULTRA TEST test strip, TEST 3 TIMES DAILY OR AS DIRECTED , Disp: 300 each, Rfl: 3    pramipexole (MIRAPEX) 0 25 mg tablet, Take 0 25 mg by mouth every morning, Disp: , Rfl:     pravastatin (PRAVACHOL) 40 mg tablet, Take 40 mg by mouth daily, Disp: , Rfl:     ramipril (ALTACE) 5 mg capsule, Take 1 capsule (5 mg total) by mouth daily, Disp: 90 capsule, Rfl: 3    Review of Systems   Constitutional: Negative for activity change, appetite change and fatigue  HENT: Negative for sore throat, trouble swallowing and voice change  Eyes: Negative for visual disturbance     Respiratory: Negative for choking, chest tightness and shortness of breath  Cardiovascular: Negative for chest pain, palpitations and leg swelling  Gastrointestinal: Negative for abdominal pain, constipation and diarrhea  Endocrine: Negative for cold intolerance, heat intolerance, polydipsia, polyphagia and polyuria  Genitourinary: Negative for frequency  Musculoskeletal: Negative for arthralgias and myalgias  Skin: Negative for rash  Neurological: Negative for dizziness and syncope  Hematological: Negative for adenopathy  Psychiatric/Behavioral: Negative for sleep disturbance  All other systems reviewed and are negative  Physical Exam:  Body mass index is 22 59 kg/m²  /60   Pulse 68   Ht 5' 9" (1 753 m)   Wt 69 4 kg (153 lb)   BMI 22 59 kg/m²     Wt Readings from Last 3 Encounters:   01/28/19 69 4 kg (153 lb)   10/19/18 68 5 kg (151 lb)   10/01/18 65 8 kg (145 lb)       Physical Exam   Constitutional: He is oriented to person, place, and time  He appears well-developed and well-nourished  No distress  HENT:   Head: Normocephalic and atraumatic  Mouth/Throat: Oropharynx is clear and moist    Eyes: Pupils are equal, round, and reactive to light  Conjunctivae and EOM are normal    Neck: Normal range of motion  Neck supple  No thyromegaly present  Cardiovascular: Normal rate, regular rhythm and normal heart sounds  Pulses are no weak pulses  No murmur heard  Pulses:       Dorsalis pedis pulses are 2+ on the right side, and 2+ on the left side  Posterior tibial pulses are 2+ on the right side, and 2+ on the left side  Pulmonary/Chest: Effort normal and breath sounds normal  No respiratory distress  He has no wheezes  He has no rales  Abdominal: Soft  Bowel sounds are normal  He exhibits no distension  There is no tenderness  Musculoskeletal: Normal range of motion  He exhibits no edema  Feet:   Right Foot:   Skin Integrity: Negative for ulcer, skin breakdown, erythema, warmth, callus or dry skin  Left Foot:   Skin Integrity: Negative for ulcer, skin breakdown, erythema, warmth, callus or dry skin  Lymphadenopathy:     He has no cervical adenopathy  Neurological: He is alert and oriented to person, place, and time  Skin: Skin is warm and dry  Psychiatric: He has a normal mood and affect  Vitals reviewed  Patient's shoes and socks removed  Right Foot/Ankle   Right Foot Inspection  Skin Exam: skin normal and skin intact no dry skin, no warmth, no callus, no erythema, no maceration, no abnormal color, no pre-ulcer, no ulcer and no callus                          Toe Exam: ROM and strength within normal limitsno swelling, no tenderness, erythema and  no right toe deformity  Sensory       Monofilament testing: intact  Vascular  Capillary refills: < 3 seconds  The right DP pulse is 2+  The right PT pulse is 2+  Left Foot/Ankle  Left Foot Inspection  Skin Exam: skin normal and skin intactno dry skin, no warmth, no erythema, no maceration, normal color, no pre-ulcer, no ulcer and no callus                         Toe Exam: ROM and strength within normal limitsno swelling, no tenderness, no erythema and no left toe deformity                   Sensory       Monofilament: intact  Vascular  Capillary refills: < 3 seconds  The left DP pulse is 2+  The left PT pulse is 2+  Assign Risk Category:  No deformity present; No loss of protective sensation;  No weak pulses       Risk: 0      Labs:   Lab Results   Component Value Date    HGBA1C 7 0 10/19/2018    HGBA1C 7 5 (H) 06/12/2018    HGBA1C 6 1 03/06/2018     Lab Results   Component Value Date    CREATININE 1 13 06/12/2018    CREATININE 0 98 08/14/2017    CREATININE 1 01 04/22/2017    BUN 21 06/12/2018     08/14/2017    K 4 4 06/12/2018     06/12/2018    CO2 29 06/12/2018     eGFR   Date Value Ref Range Status   06/12/2018 63 ml/min/1 73sq m Final     Lab Results   Component Value Date    CHOL 133 08/14/2017    HDL 58 06/12/2018    TRIG 68 06/12/2018     Lab Results   Component Value Date    ALT 20 06/12/2018    AST 14 06/12/2018    ALKPHOS 58 06/12/2018    BILITOT 0 7 08/14/2017     Lab Results   Component Value Date    FZR8ORQTJQSP 0 850 05/31/2018    ZTQ3WJSKBDAJ 0 85 06/14/2017    QUS0PXWNCHXY 1 531 04/22/2017     Lab Results   Component Value Date    FREET4 1 32 05/31/2018       Impression & Plan:    Problem List Items Addressed This Visit     Controlled type 2 diabetes mellitus with diabetic polyneuropathy, with long-term current use of insulin ([address] )     Well controlled based on last A1C  He reports morning BG readings sometimes high- likely due to snacking  Reduce bedtime snacking and change lantus to bedtime dosing  Check BG 2x per day and send log in two weeks  Relevant Orders    Hemoglobin A1C    Comprehensive metabolic panel    Dyslipidemia     LDL at goal on statin  Essential hypertension     Continue ramipril  Other Visit Diagnoses     Insulin long-term use ([address] )    -  Primary          Orders Placed This Encounter   Procedures    Hemoglobin A1C     Standing Status:   Future     Standing Expiration Date:   7/28/2019    Comprehensive metabolic panel     This is a patient instruction: Patient fasting for 8 hours or longer recommended  Standing Status:   Future     Standing Expiration Date:   7/28/2019       Patient Instructions   Change Lantus to bedtime dosing  Send BG log in two weeks for review  Discussed with the patient and all questioned fully answered  He will call me if any problems arise  Follow-up appointment in 4 months       Counseled patient on diagnostic results, prognosis, risk and benefit of treatment options, instruction for management, importance of treatment compliance, Risk  factor reduction and impressions    Daniel Iyer PA-C

## 2019-01-30 ENCOUNTER — TELEPHONE (OUTPATIENT)
Dept: ENDOCRINOLOGY | Facility: CLINIC | Age: 79
End: 2019-01-30

## 2019-01-30 NOTE — TELEPHONE ENCOUNTER
----- Message from Chuckie Parra PA-C sent at 1/28/2019  8:40 PM EST -----  A1C higher at 7 5-- Please send log for review may need lantus increase  Liver tests slightly high-- were normal on previous labs   He does have history of Hepatitis B in his chart-- suggest follow up with PCP or GI if he has a gastroenterologist    Please mail him copy of results (he requested today at visit)

## 2019-05-17 ENCOUNTER — TELEPHONE (OUTPATIENT)
Dept: ENDOCRINOLOGY | Facility: CLINIC | Age: 79
End: 2019-05-17

## 2019-05-17 ENCOUNTER — TRANSCRIBE ORDERS (OUTPATIENT)
Dept: ADMINISTRATIVE | Facility: HOSPITAL | Age: 79
End: 2019-05-17

## 2019-05-17 DIAGNOSIS — E11.65 TYPE 2 DIABETES MELLITUS WITH HYPERGLYCEMIA, UNSPECIFIED WHETHER LONG TERM INSULIN USE (HCC): ICD-10-CM

## 2019-05-17 DIAGNOSIS — R79.89 ELEVATED LFTS: Primary | ICD-10-CM

## 2019-05-17 DIAGNOSIS — Z85.6 HISTORY OF LEUKEMIA: ICD-10-CM

## 2019-05-17 DIAGNOSIS — R94.5 NONSPECIFIC ABNORMAL RESULTS OF LIVER FUNCTION STUDY: ICD-10-CM

## 2019-05-17 DIAGNOSIS — Z86.19 HISTORY OF HEPATITIS C: ICD-10-CM

## 2019-05-24 ENCOUNTER — HOSPITAL ENCOUNTER (OUTPATIENT)
Dept: ULTRASOUND IMAGING | Facility: HOSPITAL | Age: 79
Discharge: HOME | End: 2019-05-24
Attending: INTERNAL MEDICINE
Payer: MEDICARE

## 2019-05-24 ENCOUNTER — APPOINTMENT (OUTPATIENT)
Dept: LAB | Facility: CLINIC | Age: 79
End: 2019-05-24
Payer: MEDICARE

## 2019-05-24 DIAGNOSIS — Z85.6 HISTORY OF LEUKEMIA: ICD-10-CM

## 2019-05-24 DIAGNOSIS — Z86.19 HISTORY OF HEPATITIS C: ICD-10-CM

## 2019-05-24 DIAGNOSIS — R94.5 NONSPECIFIC ABNORMAL RESULTS OF LIVER FUNCTION STUDY: ICD-10-CM

## 2019-05-24 DIAGNOSIS — R79.89 ELEVATED LFTS: ICD-10-CM

## 2019-05-24 LAB — AFP-TM SERPL-MCNC: 2.3 NG/ML (ref 0.5–8)

## 2019-05-24 PROCEDURE — 82105 ALPHA-FETOPROTEIN SERUM: CPT

## 2019-05-24 PROCEDURE — 76705 ECHO EXAM OF ABDOMEN: CPT

## 2019-05-24 PROCEDURE — 36415 COLL VENOUS BLD VENIPUNCTURE: CPT

## 2019-06-04 ENCOUNTER — OFFICE VISIT (OUTPATIENT)
Dept: ENDOCRINOLOGY | Facility: CLINIC | Age: 79
End: 2019-06-04
Payer: MEDICARE

## 2019-06-04 VITALS
HEIGHT: 69 IN | BODY MASS INDEX: 22.13 KG/M2 | WEIGHT: 149.4 LBS | HEART RATE: 64 BPM | DIASTOLIC BLOOD PRESSURE: 68 MMHG | SYSTOLIC BLOOD PRESSURE: 100 MMHG

## 2019-06-04 DIAGNOSIS — Z79.4 TYPE 2 DIABETES MELLITUS WITH HYPERGLYCEMIA, WITH LONG-TERM CURRENT USE OF INSULIN (HCC): ICD-10-CM

## 2019-06-04 DIAGNOSIS — I10 ESSENTIAL HYPERTENSION: Primary | ICD-10-CM

## 2019-06-04 DIAGNOSIS — E11.65 TYPE 2 DIABETES MELLITUS WITH HYPERGLYCEMIA, WITH LONG-TERM CURRENT USE OF INSULIN (HCC): ICD-10-CM

## 2019-06-04 DIAGNOSIS — E78.5 HYPERLIPIDEMIA, UNSPECIFIED HYPERLIPIDEMIA TYPE: ICD-10-CM

## 2019-06-04 PROCEDURE — 99214 OFFICE O/P EST MOD 30 MIN: CPT | Performed by: INTERNAL MEDICINE

## 2019-06-04 RX ORDER — INSULIN GLARGINE 100 [IU]/ML
INJECTION, SOLUTION SUBCUTANEOUS
Qty: 5 PEN | Refills: 1 | Status: SHIPPED | OUTPATIENT
Start: 2019-06-04 | End: 2019-09-13 | Stop reason: SDUPTHER

## 2019-06-04 RX ORDER — BLOOD-GLUCOSE METER
KIT MISCELLANEOUS ONCE
Qty: 1 EACH | Refills: 0 | Status: SHIPPED | OUTPATIENT
Start: 2019-06-04 | End: 2019-06-14 | Stop reason: SDUPTHER

## 2019-06-04 RX ORDER — INSULIN GLARGINE 100 [IU]/ML
INJECTION, SOLUTION SUBCUTANEOUS
Qty: 5 PEN | Refills: 3
Start: 2019-06-04 | End: 2019-06-04 | Stop reason: SDUPTHER

## 2019-06-07 LAB
BUN SERPL-MCNC: 26 MG/DL (ref 7–25)
BUN/CREAT SERPL: 25 (CALC) (ref 6–22)
CALCIUM SERPL-MCNC: 9.2 MG/DL (ref 8.6–10.3)
CHLORIDE SERPL-SCNC: 107 MMOL/L (ref 98–110)
CO2 SERPL-SCNC: 27 MMOL/L (ref 20–32)
CREAT SERPL-MCNC: 1.03 MG/DL (ref 0.7–1.18)
GLUCOSE SERPL-MCNC: 151 MG/DL (ref 65–99)
HBA1C MFR BLD: 8.2 % OF TOTAL HGB
POTASSIUM SERPL-SCNC: 4.3 MMOL/L (ref 3.5–5.3)
SL AMB EGFR AFRICAN AMERICAN: 81 ML/MIN/1.73M2
SL AMB EGFR NON AFRICAN AMERICAN: 70 ML/MIN/1.73M2
SODIUM SERPL-SCNC: 142 MMOL/L (ref 135–146)

## 2019-06-13 ENCOUNTER — TELEPHONE (OUTPATIENT)
Dept: ENDOCRINOLOGY | Facility: CLINIC | Age: 79
End: 2019-06-13

## 2019-06-14 ENCOUNTER — TELEPHONE (OUTPATIENT)
Dept: ENDOCRINOLOGY | Facility: CLINIC | Age: 79
End: 2019-06-14

## 2019-06-14 DIAGNOSIS — E11.65 TYPE 2 DIABETES MELLITUS WITH HYPERGLYCEMIA, WITH LONG-TERM CURRENT USE OF INSULIN (HCC): ICD-10-CM

## 2019-06-14 DIAGNOSIS — Z79.4 TYPE 2 DIABETES MELLITUS WITH HYPERGLYCEMIA, WITH LONG-TERM CURRENT USE OF INSULIN (HCC): ICD-10-CM

## 2019-06-14 RX ORDER — BLOOD-GLUCOSE METER
KIT MISCELLANEOUS ONCE
Qty: 1 EACH | Refills: 0 | Status: SHIPPED | OUTPATIENT
Start: 2019-06-14 | End: 2020-06-16

## 2019-06-24 ENCOUNTER — TELEPHONE (OUTPATIENT)
Dept: ENDOCRINOLOGY | Facility: CLINIC | Age: 79
End: 2019-06-24

## 2019-06-26 ENCOUNTER — TELEPHONE (OUTPATIENT)
Dept: ENDOCRINOLOGY | Facility: CLINIC | Age: 79
End: 2019-06-26

## 2019-07-15 ENCOUNTER — TELEPHONE (OUTPATIENT)
Dept: ENDOCRINOLOGY | Facility: CLINIC | Age: 79
End: 2019-07-15

## 2019-07-15 DIAGNOSIS — Z79.4 TYPE 2 DIABETES MELLITUS WITH HYPERGLYCEMIA, WITH LONG-TERM CURRENT USE OF INSULIN (HCC): ICD-10-CM

## 2019-07-15 DIAGNOSIS — E11.65 TYPE 2 DIABETES MELLITUS WITH HYPERGLYCEMIA, WITH LONG-TERM CURRENT USE OF INSULIN (HCC): ICD-10-CM

## 2019-07-15 NOTE — TELEPHONE ENCOUNTER
Patient needs a refill on his One Touch Ultra Test strips sent to the [facility] on [address] in [place]  Patient tests 2x a day and he would like a #90 day supply called in  His number is [phone]  Thanks

## 2019-07-31 DIAGNOSIS — E11.65 TYPE 2 DIABETES MELLITUS WITH HYPERGLYCEMIA, WITH LONG-TERM CURRENT USE OF INSULIN (HCC): ICD-10-CM

## 2019-07-31 DIAGNOSIS — Z79.4 TYPE 2 DIABETES MELLITUS WITH HYPERGLYCEMIA, WITH LONG-TERM CURRENT USE OF INSULIN (HCC): ICD-10-CM

## 2019-07-31 NOTE — TELEPHONE ENCOUNTER
Patient called requesting a refill of test strips  Pharmacy states they never received the script sent over on July 15th  Script sent approval

## 2019-09-09 ENCOUNTER — LAB (OUTPATIENT)
Dept: LAB | Facility: CLINIC | Age: 79
End: 2019-09-09
Payer: MEDICARE

## 2019-09-09 DIAGNOSIS — Z79.4 TYPE 2 DIABETES MELLITUS WITH HYPERGLYCEMIA, WITH LONG-TERM CURRENT USE OF INSULIN (HCC): ICD-10-CM

## 2019-09-09 DIAGNOSIS — E11.65 TYPE 2 DIABETES MELLITUS WITH HYPERGLYCEMIA, WITH LONG-TERM CURRENT USE OF INSULIN (HCC): ICD-10-CM

## 2019-09-09 LAB
ANION GAP SERPL CALCULATED.3IONS-SCNC: 8 MMOL/L (ref 4–13)
BUN SERPL-MCNC: 21 MG/DL (ref 5–25)
CALCIUM SERPL-MCNC: 9.3 MG/DL (ref 8.3–10.1)
CHLORIDE SERPL-SCNC: 105 MMOL/L (ref 100–108)
CO2 SERPL-SCNC: 29 MMOL/L (ref 21–32)
CREAT SERPL-MCNC: 0.92 MG/DL (ref 0.6–1.3)
EST. AVERAGE GLUCOSE BLD GHB EST-MCNC: 146 MG/DL
GFR SERPL CREATININE-BSD FRML MDRD: 81 ML/MIN/1.73SQ M
GLUCOSE P FAST SERPL-MCNC: 149 MG/DL (ref 65–99)
HBA1C MFR BLD: 6.7 % (ref 4.2–6.3)
POTASSIUM SERPL-SCNC: 4.6 MMOL/L (ref 3.5–5.3)
SODIUM SERPL-SCNC: 142 MMOL/L (ref 136–145)

## 2019-09-09 PROCEDURE — 36415 COLL VENOUS BLD VENIPUNCTURE: CPT

## 2019-09-09 PROCEDURE — 80048 BASIC METABOLIC PNL TOTAL CA: CPT

## 2019-09-09 PROCEDURE — 83036 HEMOGLOBIN GLYCOSYLATED A1C: CPT

## 2019-09-12 PROBLEM — E11.65 TYPE 2 DIABETES MELLITUS WITH HYPERGLYCEMIA, WITH LONG-TERM CURRENT USE OF INSULIN (HCC): Status: ACTIVE | Noted: 2017-04-19

## 2019-09-13 ENCOUNTER — OFFICE VISIT (OUTPATIENT)
Dept: ENDOCRINOLOGY | Facility: CLINIC | Age: 79
End: 2019-09-13
Payer: MEDICARE

## 2019-09-13 VITALS
HEIGHT: 69 IN | DIASTOLIC BLOOD PRESSURE: 70 MMHG | HEART RATE: 58 BPM | SYSTOLIC BLOOD PRESSURE: 138 MMHG | BODY MASS INDEX: 21.42 KG/M2 | WEIGHT: 144.6 LBS

## 2019-09-13 DIAGNOSIS — E78.2 MIXED HYPERLIPIDEMIA: ICD-10-CM

## 2019-09-13 DIAGNOSIS — Z79.4 TYPE 2 DIABETES MELLITUS WITH HYPERGLYCEMIA, WITH LONG-TERM CURRENT USE OF INSULIN (HCC): ICD-10-CM

## 2019-09-13 DIAGNOSIS — E11.65 TYPE 2 DIABETES MELLITUS WITH HYPERGLYCEMIA, WITH LONG-TERM CURRENT USE OF INSULIN (HCC): ICD-10-CM

## 2019-09-13 DIAGNOSIS — I10 ESSENTIAL HYPERTENSION: Primary | ICD-10-CM

## 2019-09-13 PROCEDURE — 1124F ACP DISCUSS-NO DSCNMKR DOCD: CPT | Performed by: INTERNAL MEDICINE

## 2019-09-13 PROCEDURE — 99214 OFFICE O/P EST MOD 30 MIN: CPT | Performed by: INTERNAL MEDICINE

## 2019-09-13 RX ORDER — INSULIN GLARGINE 100 [IU]/ML
INJECTION, SOLUTION SUBCUTANEOUS
Qty: 5 PEN | Refills: 1
Start: 2019-09-13 | End: 2020-02-10 | Stop reason: SDUPTHER

## 2019-09-13 RX ORDER — INSULIN GLARGINE 100 [IU]/ML
INJECTION, SOLUTION SUBCUTANEOUS
Qty: 5 PEN | Refills: 1
Start: 2019-09-13 | End: 2019-09-13 | Stop reason: SDUPTHER

## 2019-09-13 NOTE — PROGRESS NOTES
New Patient Progress Note      No chief complaint on file  Referring Provider  [address]  [address], [address]     History of Present Illness:   Melania Ordonez is a 70 y o  male with a history of type 2 diabetes with long term use of insulin since many years   Diabetes course has been stable  Reports complications of CAD, and positive microalbuminuria  Urine microalbumin creatinine ratio is improving  Last microalbumin creatinine ratio is 55  Lab Results   Component Value Date    HGBA1C 6 7 (H) 09/09/2019      Denies recent illness or hospitalizations  Denies any issues with currently insulin regimen  He takes metformin 1000 mg twice a day and Lantus 13 units in the morning  Denies any side effects to metformin    Home blood glucose readings:   Before breakfast:80- 170 mg/dl  A1c improved to 6 7%        Medic alert tag: recommended- yes               Opthamology: sees Ophthalmology regularly, no retinopathy, history of cataract surgery  Podiatry: Up to date   Infuenza vaccine: yes     Has hypertension: followed by PCP; on Altace 5 mg daily, compliant most of the time  Has hyperlipidemia: followed by PCP; on statin, Pravachol 40 mg daily - tolerating well, no myalgias  compliant most of the time  denies any side effects from medications    He denies any history of pancreatitis  Did not finish any blood work before this appointment    On May 2019, cholesterol 185, HDL 75, triglyceride 58, LDL 96  Component      Latest Ref Rng & Units 6/6/2019 9/9/2019   Glucose, Random      65 - 99 mg/dL 151 (H)    BUN      5 - 25 mg/dL 26 (H) 21   Creatinine      0 60 - 1 30 mg/dL 1 03 0 92   eGFR Non       > OR = 60 mL/min/1 73m2 70    eGFR       > OR = 60 mL/min/1 73m2 81    SL AMB BUN/CREATININE RATIO      6 - 22 (calc) 25 (H)    Sodium      136 - 145 mmol/L 142 142   Potassium      3 5 - 5 3 mmol/L 4 3 4 6   Chloride      100 - 108 mmol/L 107 105   CO2      21 - 32 mmol/L 27 29   SL AMB CALCIUM      8 6 - 10 3 mg/dL 9 2    Anion Gap      4 - 13 mmol/L  8   GLUCOSE FASTING      65 - 99 mg/dL  149 (H)   Calcium      8 3 - 10 1 mg/dL  9 3   eGFR      ml/min/1 73sq m  81   Hemoglobin A1C      4 2 - 6 3 % 8 2 (H) 6 7 (H)   EAG      mg/dl  146     Component      Latest Ref Rng & Units 12/6/2018 1/28/2019 5/24/2019   Sodium      136 - 145 mmol/L  137    Potassium      3 5 - 5 3 mmol/L  4 6    Chloride      100 - 108 mmol/L  104    CO2      21 - 32 mmol/L  28    Anion Gap      4 - 13 mmol/L  5    BUN      5 - 25 mg/dL  23    Creatinine      0 60 - 1 30 mg/dL  0 96    GLUCOSE FASTING      65 - 99 mg/dL  155 (H)    Calcium      8 3 - 10 1 mg/dL  9 1    AST      5 - 45 U/L  51 (H)    ALT      12 - 78 U/L  108 (H)    Alkaline Phosphatase      46 - 116 U/L  50    Total Protein      6 4 - 8 2 g/dL  7 0    Albumin      3 5 - 5 0 g/dL  4 1    TOTAL BILIRUBIN      0 20 - 1 00 mg/dL  0 68    eGFR      ml/min/1 73sq m  76    EXT Creatinine Urine      mg/dL 105 0     MICROALBUM ,U,RANDOM      0 0 - 20 0 mg/L 58 0 (H)     MICROALBUMIN/CREATININE RATIO      0 - 30 mg/g creatinine 55 (H)     Hemoglobin A1C      4 2 - 6 3 %  7 5 (H)    EAG      mg/dl  169    AFP-TUMOR MARKER      0 5 - 8 ng/mL   2 3     Patient Active Problem List   Diagnosis    Arteriosclerosis of coronary artery    Atypical chest pain    Bruising    CAD (coronary artery disease), native coronary artery    Chronic constipation    Chronic hepatitis B (HCC)    Chronic lymphocytic leukemia (HCC)    Type 2 diabetes mellitus with hyperglycemia, with long-term current use of insulin (HCC)    Dyslipidemia    Essential hypertension    Hepatitis C    S/P coronary artery stent placement      Past Medical History:   Diagnosis Date    Arthritis     Cancer [facility])     Cardiac disease     abnormal stress test 2012    Chronic kidney disease     protein urine    CLL (chronic lymphocytic leukemia) ([facility] [address] ) 2014    6 months chemo    Constipation     Coronary artery disease     Diabetes mellitus ([facility] [address] )     type 2    History of chemotherapy     Tx for CLL currently in remission since 2016    Hyperlipidemia     Hypertension     Infectious viral hepatitis     Hep C Hx remission since 2016 post tx    Liver disease     Proteinuria       Past Surgical History:   Procedure Laterality Date    ANGIOPLASTY      one stent    CARDIAC CATHETERIZATION      CARDIAC SURGERY  2016    x1 stent    CATARACT EXTRACTION      CYST REMOVAL      mid back benign    FRACTURE SURGERY Right     Fx Femur age 17, pin in place    [place] REMV CATARACT EXTRACAP,INSERT LENS Right 7/23/2018    Procedure: EXTRACTION EXTRACAPSULAR CATARACT PHACO INTRAOCULAR LENS (IOL); Surgeon: Emeka Jay MD;  Location: [facility] MAIN OR;  Service: Ophthalmology     [address] CATARACT EXTRACAP,INSERT LENS Left 10/1/2018    Procedure: EXTRACTION EXTRACAPSULAR CATARACT PHACO INTRAOCULAR LENS (IOL);   Surgeon: Emeka Jay MD;  Location: [facility] MAIN OR;  Service: Ophthalmology    TONSILLECTOMY        Family History   Problem Relation Age of Onset    Diabetes unspecified Brother     Diabetes Brother     Diabetes unspecified Maternal Grandfather     Diabetes Maternal Grandfather     Cancer Mother         breast    Alzheimer's disease Mother     Early death Father         age 64 peritonitis from gallbladder    Cancer Sister         small cell carcinoma    No Known Problems Daughter     No Known Problems Daughter      Social History     Tobacco Use    Smoking status: Former Smoker    Smokeless tobacco: Never Used   Substance Use Topics    Alcohol use: Yes     Comment: SOCIALLY     Allergies   Allergen Reactions    Other      Seasonal allergies         Current Outpatient Medications:     Ascorbic Acid (VITAMIN C) 1000 MG tablet, Take 1,000 mg by mouth daily, Disp: , Rfl:     aspirin 81 MG tablet, Take 1 tablet by mouth every morning  , Disp: , Rfl:     Blood Glucose Monitoring Suppl (ONE TOUCH ULTRA MINI) w/Device KIT, by Does not apply route once for 1 dose, Disp: 1 each, Rfl: 0    Coenzyme Q10 (CO Q 10) 100 MG CAPS, Take 4 capsules by mouth daily, Disp: , Rfl:     glucose blood (ONE TOUCH ULTRA TEST) test strip, Check blood sugar twice a day, Disp: 200 each, Rfl: 1    LANTUS SOLOSTAR 100 units/mL injection pen, Inject 10 units in AM, Disp: 5 pen, Rfl: 1    metFORMIN (GLUCOPHAGE) 1000 MG tablet, Take 1 tablet (1,000 mg total) by mouth 2 (two) times a day, Disp: 180 tablet, Rfl: 1    NON FORMULARY, as needed Fiber Therapy Powder-One heaping tablespoon to 12 oz of water, Disp: , Rfl:     Omega-3 Fatty Acids (FISH OIL) 1,000 mg, Take 1,000 mg by mouth 3 (three) times a day, Disp: , Rfl:     ONETOUCH DELICA LANCETS FINE MISC, Twice a day, Disp: 200 each, Rfl: 1    pramipexole (MIRAPEX) 0 25 mg tablet, Take 0 25 mg by mouth every morning, Disp: , Rfl:     pravastatin (PRAVACHOL) 40 mg tablet, Take 40 mg by mouth daily, Disp: , Rfl:     ramipril (ALTACE) 5 mg capsule, Take 1 capsule (5 mg total) by mouth daily, Disp: 90 capsule, Rfl: 3    bacitracin ophthalmic ointment, Administer into the left eye daily at bedtime, Disp: 3 5 g, Rfl: 0    docusate sodium (COLACE) 100 mg capsule, Take 100 mg by mouth every morning, Disp: , Rfl:   Review of Systems   Constitutional: Negative for activity change, diaphoresis, fatigue, fever and unexpected weight change  HENT: Negative  Eyes: Negative for visual disturbance  Respiratory: Negative for cough, chest tightness and shortness of breath  Cardiovascular: Negative for chest pain, palpitations and leg swelling  Gastrointestinal: Positive for constipation  Negative for abdominal pain, diarrhea, nausea and vomiting  Endocrine: Negative for cold intolerance, heat intolerance, polydipsia, polyphagia and polyuria  Genitourinary: Positive for frequency  Negative for dysuria, enuresis and urgency  Musculoskeletal: Negative for arthralgias and myalgias  Skin: Negative for pallor, rash and wound  Allergic/Immunologic: Negative  Neurological: Negative for dizziness, tremors, weakness and numbness  Hematological: Negative  Psychiatric/Behavioral: Negative  Physical Exam:  Body mass index is 21 35 kg/m²  /70   Pulse 58   Ht 5' 9" (1 753 m)   Wt 65 6 kg (144 lb 9 6 oz)   BMI 21 35 kg/m²    Wt Readings from Last 3 Encounters:   09/13/19 65 6 kg (144 lb 9 6 oz)   06/04/19 67 8 kg (149 lb 6 4 oz)   01/28/19 69 4 kg (153 lb)       Physical Exam   Constitutional: He is oriented to person, place, and time  He appears well-developed and well-nourished  No distress  HENT:   Head: Normocephalic and atraumatic  Eyes: Pupils are equal, round, and reactive to light  EOM are normal    Neck: Normal range of motion  Neck supple  No thyromegaly present  Cardiovascular: Normal rate, regular rhythm and normal heart sounds  Pulmonary/Chest: Effort normal  No respiratory distress  Musculoskeletal: Normal range of motion  He exhibits no edema or deformity  Neurological: He is alert and oriented to person, place, and time  He displays normal reflexes  Skin: Skin is warm and dry  No erythema  Psychiatric: He has a normal mood and affect  Vitals reviewed            Labs:   No components found for: HA1C  No components found for: GLU    Lab Results   Component Value Date    CREATININE 0 92 09/09/2019    CREATININE 1 03 06/06/2019    CREATININE 0 96 01/28/2019    BUN 21 09/09/2019     08/14/2017    K 4 6 09/09/2019     09/09/2019    CO2 29 09/09/2019     eGFR   Date Value Ref Range Status   09/09/2019 81 ml/min/1 73sq m Final     No components found for: [facility]    Lab Results   Component Value Date    CHOL 133 08/14/2017    HDL 58 06/12/2018    TRIG 68 06/12/2018       Lab Results   Component Value Date     (H) 01/28/2019    AST 51 (H) 01/28/2019    ALKPHOS 50 01/28/2019 BILITOT 0 7 08/14/2017       Lab Results   Component Value Date    FREET4 1 32 05/31/2018       Impression:  1  Essential hypertension    2  Type 2 diabetes mellitus with hyperglycemia, with long-term current use of insulin ([facility])    3  Mixed hyperlipidemia           Plan:    Diagnoses and all orders for this visit:    Essential hypertension  BP Readings from Last 3 Encounters:   09/13/19 138/70   06/04/19 100/68   01/28/19 122/60    Blood pressure well controlled  Continue current management  Follow up with PCP    Type 2 diabetes mellitus with hyperglycemia, with long-term current use of insulin ([facility])  A1c improved to 6 7%  Repeat A1c in 4 months  Reduce Lantus to 12 units in the morning  Continue metformin 1000 mg twice a day    I have asked him to check blood sugar twice daily , goal for blood sugar is 80 to 160 mg/dL  Discussed long-term complication of diabetes including, nephropathy, neuropathy, retinopathy      Hyperlipidemia, unspecified hyperlipidemia type  Lab Results   Component Value Date    LDLCALC 44 06/12/2018    Continue statins      There are no diagnoses linked to this encounter  Discussed with the patient and all questioned fully answered  He will call me if any problems arise      Counseled patient on diagnostic results, prognosis, risk and benefit of treatment options, instruction for management, importance of treatment compliance, Risk  factor reduction and impressions      Carmita West MD

## 2019-11-21 DIAGNOSIS — E11.65 TYPE 2 DIABETES MELLITUS WITH HYPERGLYCEMIA, UNSPECIFIED WHETHER LONG TERM INSULIN USE (HCC): ICD-10-CM

## 2019-11-26 DIAGNOSIS — E11.65 TYPE 2 DIABETES MELLITUS WITH HYPERGLYCEMIA, WITH LONG-TERM CURRENT USE OF INSULIN (HCC): ICD-10-CM

## 2019-11-26 DIAGNOSIS — Z79.4 TYPE 2 DIABETES MELLITUS WITH HYPERGLYCEMIA, WITH LONG-TERM CURRENT USE OF INSULIN (HCC): ICD-10-CM

## 2019-12-09 DIAGNOSIS — IMO0002 UNCONTROLLED TYPE 2 DIABETES MELLITUS WITH COMPLICATION, WITHOUT LONG-TERM CURRENT USE OF INSULIN: ICD-10-CM

## 2019-12-09 RX ORDER — RAMIPRIL 5 MG/1
5 CAPSULE ORAL DAILY
Qty: 90 CAPSULE | Refills: 1 | Status: SHIPPED | OUTPATIENT
Start: 2019-12-09 | End: 2019-12-09 | Stop reason: SDUPTHER

## 2019-12-09 RX ORDER — RAMIPRIL 5 MG/1
5 CAPSULE ORAL DAILY
Qty: 90 CAPSULE | Refills: 1 | Status: SHIPPED | OUTPATIENT
Start: 2019-12-09 | End: 2020-06-08 | Stop reason: SDUPTHER

## 2020-01-20 ENCOUNTER — APPOINTMENT (OUTPATIENT)
Dept: LAB | Facility: CLINIC | Age: 80
End: 2020-01-20
Payer: MEDICARE

## 2020-01-20 ENCOUNTER — OFFICE VISIT (OUTPATIENT)
Dept: ENDOCRINOLOGY | Facility: CLINIC | Age: 80
End: 2020-01-20
Payer: MEDICARE

## 2020-01-20 ENCOUNTER — TELEPHONE (OUTPATIENT)
Dept: ENDOCRINOLOGY | Facility: CLINIC | Age: 80
End: 2020-01-20

## 2020-01-20 ENCOUNTER — TRANSCRIBE ORDERS (OUTPATIENT)
Dept: LAB | Facility: CLINIC | Age: 80
End: 2020-01-20

## 2020-01-20 VITALS
DIASTOLIC BLOOD PRESSURE: 80 MMHG | BODY MASS INDEX: 21.92 KG/M2 | SYSTOLIC BLOOD PRESSURE: 138 MMHG | HEIGHT: 69 IN | WEIGHT: 148 LBS

## 2020-01-20 DIAGNOSIS — I10 ESSENTIAL HYPERTENSION: ICD-10-CM

## 2020-01-20 DIAGNOSIS — Z79.4 TYPE 2 DIABETES MELLITUS WITH HYPERGLYCEMIA, WITH LONG-TERM CURRENT USE OF INSULIN (HCC): ICD-10-CM

## 2020-01-20 DIAGNOSIS — E78.5 DYSLIPIDEMIA: ICD-10-CM

## 2020-01-20 DIAGNOSIS — Z79.4 TYPE 2 DIABETES MELLITUS WITH HYPERGLYCEMIA, WITH LONG-TERM CURRENT USE OF INSULIN (HCC): Primary | ICD-10-CM

## 2020-01-20 DIAGNOSIS — E11.65 TYPE 2 DIABETES MELLITUS WITH HYPERGLYCEMIA, WITH LONG-TERM CURRENT USE OF INSULIN (HCC): ICD-10-CM

## 2020-01-20 DIAGNOSIS — E11.65 TYPE 2 DIABETES MELLITUS WITH HYPERGLYCEMIA, WITH LONG-TERM CURRENT USE OF INSULIN (HCC): Primary | ICD-10-CM

## 2020-01-20 LAB
ANION GAP SERPL CALCULATED.3IONS-SCNC: 10 MMOL/L (ref 4–13)
BUN SERPL-MCNC: 25 MG/DL (ref 5–25)
CALCIUM SERPL-MCNC: 9.5 MG/DL (ref 8.3–10.1)
CHLORIDE SERPL-SCNC: 107 MMOL/L (ref 100–108)
CO2 SERPL-SCNC: 28 MMOL/L (ref 21–32)
CREAT SERPL-MCNC: 0.99 MG/DL (ref 0.6–1.3)
CREAT UR-MCNC: 134 MG/DL
EST. AVERAGE GLUCOSE BLD GHB EST-MCNC: 177 MG/DL
GFR SERPL CREATININE-BSD FRML MDRD: 73 ML/MIN/1.73SQ M
GLUCOSE P FAST SERPL-MCNC: 114 MG/DL (ref 65–99)
HBA1C MFR BLD: 7.8 % (ref 4.2–6.3)
MICROALBUMIN UR-MCNC: 151 MG/L (ref 0–20)
MICROALBUMIN/CREAT 24H UR: 113 MG/G CREATININE (ref 0–30)
POTASSIUM SERPL-SCNC: 4.5 MMOL/L (ref 3.5–5.3)
SODIUM SERPL-SCNC: 145 MMOL/L (ref 136–145)

## 2020-01-20 PROCEDURE — 82570 ASSAY OF URINE CREATININE: CPT

## 2020-01-20 PROCEDURE — 36415 COLL VENOUS BLD VENIPUNCTURE: CPT

## 2020-01-20 PROCEDURE — 99214 OFFICE O/P EST MOD 30 MIN: CPT | Performed by: PHYSICIAN ASSISTANT

## 2020-01-20 PROCEDURE — 83036 HEMOGLOBIN GLYCOSYLATED A1C: CPT

## 2020-01-20 PROCEDURE — 80048 BASIC METABOLIC PNL TOTAL CA: CPT

## 2020-01-20 PROCEDURE — 82043 UR ALBUMIN QUANTITATIVE: CPT

## 2020-01-20 RX ORDER — EZETIMIBE 10 MG/1
10 TABLET ORAL DAILY
COMMUNITY

## 2020-01-20 NOTE — PROGRESS NOTES
Patient Progress Note      CC: DM2      Referring Provider  [address]  [address], [address]     History of Present Illness:   Karan Hernandez is a 70 y o  male with a history of type 2 diabetes with long term use of insulin  Patient has a history of hepatitis B, hepatitis C, CLL  Diabetes course has been stable  Complications of DM: CAD  Denies recent illness or hospitalizations  Denies any issues with his current regimen  Home glucose monitoring: are performed regularly    Patient does not have log or meter today  Yesterday BG was 120 mg/dl  If he snacks at night, morning readings can be in 200s mg/dl  Current regimen: metformin 1000 mg BID, Lantus 12 units daily  compliant most of the time, denies any side effects from medications  Injects in: thigh  Rotates sites: Yes  Hypoglycemic episodes: No, rare  H/o of hypoglycemia causing hospitalization or Intervention such as glucagon injection  or ambulance call :  No  Hypoglycemia symptoms: dizziness  Treatment of hypoglycemia:  Discussed treatment    Medic alert tag: recommended: Yes    Diet: 3 meals per day, 2 snacks per day  Timing of meals is predictable  Diabetic diet compliance:  noncompliant some of the time  Tries to eat 45 grams of carbohydrates per meal   Activity: Daily activity is predictable: Yes  Trying to do yoga regularly  Further diabetic ROS: no polyuria or polydipsia, no chest pain, dyspnea, no numbness, tingling or pain in extremities        Ophthamology: per patient, he had eye exam in November 2019  MA requesting note  Podiatry: foot exam UTD, June 2019    Has hypertension: on ACE inhibitor/ARB, compliant most of the time  Has hyperlipidemia: on Zetia - tolerating well, no myalgias  Statin therapy stopped  History of liver disease and cramps    Thyroid disorders: No  History of pancreatitis: No    Patient Active Problem List   Diagnosis    Arteriosclerosis of coronary artery    Atypical chest pain    Bruising    CAD (coronary artery disease), native coronary artery    Chronic constipation    Chronic hepatitis B (HCC)    Chronic lymphocytic leukemia (HCC)    Type 2 diabetes mellitus with hyperglycemia, with long-term current use of insulin (HCC)    Dyslipidemia    Essential hypertension    Hepatitis C    S/P coronary artery stent placement      Past Medical History:   Diagnosis Date    Arthritis     Cancer [facility])     Cardiac disease     abnormal stress test 2012    Chronic kidney disease     protein urine    CLL (chronic lymphocytic leukemia) ([facility] ) 2014    6 months chemo    Constipation     Coronary artery disease     Diabetes mellitus ([facility] )     type 2    History of chemotherapy     Tx for CLL currently in remission since 2016    Hyperlipidemia     Hypertension     Infectious viral hepatitis     Hep C Hx remission since 2016 post tx    Liver disease     Proteinuria       Past Surgical History:   Procedure Laterality Date    ANGIOPLASTY      one stent    CARDIAC CATHETERIZATION      CARDIAC SURGERY  2016    x1 stent    CATARACT EXTRACTION      CYST REMOVAL      mid back benign    FRACTURE SURGERY Right     Fx Femur age 17, pin in place    [facility] XCAPSL CTRC RMVL INSJ IO LENS PROSTH W/O ECP Right 7/23/2018    Procedure: EXTRACTION EXTRACAPSULAR CATARACT PHACO INTRAOCULAR LENS (IOL); Surgeon: Ced Viveros MD;  [facility] MAIN OR;  Service: Ophthalmology    VA XCAPSL CTRC RMVL INSJ IO LENS PROSTH W/O ECP Left 10/1/2018    Procedure: EXTRACTION EXTRACAPSULAR CATARACT PHACO INTRAOCULAR LENS (IOL);   Surgeon: Ced Viveros MD;  Location: [facility] OR;  Service: Ophthalmology    TONSILLECTOMY        Family History   Problem Relation Age of Onset    Diabetes unspecified Brother     Diabetes Brother     Diabetes unspecified Maternal Grandfather     Diabetes Maternal Grandfather     Cancer Mother         breast    Alzheimer's disease Mother     Early death Father         age 64 peritonitis from gallbladder    Cancer Sister         small cell carcinoma    No Known Problems Daughter     No Known Problems Daughter      Social History     Tobacco Use    Smoking status: Former Smoker    Smokeless tobacco: Never Used   Substance Use Topics    Alcohol use: Yes     Comment: SOCIALLY     Allergies   Allergen Reactions    Other      Seasonal allergies         Current Outpatient Medications:     Ascorbic Acid (VITAMIN C) 1000 MG tablet, Take 1,000 mg by mouth daily, Disp: , Rfl:     aspirin 81 MG tablet, Take 1 tablet by mouth every morning  , Disp: , Rfl:     ezetimibe (ZETIA) 10 mg tablet, Take 10 mg by mouth daily, Disp: , Rfl:     glucose blood (ONE TOUCH ULTRA TEST) test strip, Check blood sugar twice a day, Disp: 200 each, Rfl: 1    Insulin Pen Needle 32G X 6 MM MISC, BD Ultra-Fine Beatrice Pen Needle 32 gauge x 5/32", Disp: , Rfl:     LANTUS SOLOSTAR 100 units/mL injection pen, Inject 12  units in AM, Disp: 5 pen, Rfl: 1    metFORMIN (GLUCOPHAGE) 1000 MG tablet, Take 1 tablet (1,000 mg total) by mouth 2 (two) times a day, Disp: 180 tablet, Rfl: 1    NON FORMULARY, as needed Fiber Therapy Powder-One heaping tablespoon to 12 oz of water, Disp: , Rfl:     Omega-3 Fatty Acids (FISH OIL) 1,000 mg, Take 1,000 mg by mouth 3 (three) times a day, Disp: , Rfl:     ONETOUCH DELICA LANCETS FINE MISC, Twice a day, Disp: 200 each, Rfl: 1    ramipril (ALTACE) 5 mg capsule, Take 1 capsule (5 mg total) by mouth daily, Disp: 90 capsule, Rfl: 1    Blood Glucose Monitoring Suppl (ONE TOUCH ULTRA MINI) w/Device KIT, by Does not apply route once for 1 dose, Disp: 1 each, Rfl: 0    Coenzyme Q10 (CO Q 10) 100 MG CAPS, Take 4 capsules by mouth daily, Disp: , Rfl:     pramipexole (MIRAPEX) 0 25 mg tablet, Take 0 25 mg by mouth every morning, Disp: , Rfl:     pravastatin (PRAVACHOL) 40 mg tablet, Take 40 mg by mouth daily, Disp: , Rfl:   Review of Systems   Constitutional: Negative for activity change, appetite change, fatigue and unexpected weight change  HENT: Negative for trouble swallowing  Eyes: Negative for visual disturbance  Respiratory: Negative for shortness of breath  Cardiovascular: Negative for chest pain and palpitations  Gastrointestinal: Negative for constipation and diarrhea  Endocrine: Negative for polydipsia and polyuria  Musculoskeletal: Positive for arthralgias (mild)  Skin: Negative for wound  Neurological: Negative for numbness  Psychiatric/Behavioral: Negative  Physical Exam:  Body mass index is 21 86 kg/m²  /80   Ht 5' 9" (1 753 m)   Wt 67 1 kg (148 lb)   BMI 21 86 kg/m²    Wt Readings from Last 3 Encounters:   01/20/20 67 1 kg (148 lb)   09/13/19 65 6 kg (144 lb 9 6 oz)   06/04/19 67 8 kg (149 lb 6 4 oz)       Physical Exam   Constitutional: He appears well-developed and well-nourished  HENT:   Head: Normocephalic  Eyes: Pupils are equal, round, and reactive to light  EOM are normal  No scleral icterus  Neck: Neck supple  No thyromegaly present  Cardiovascular: Normal rate and regular rhythm  No murmur heard  Pulses:       Radial pulses are 2+ on the right side, and 2+ on the left side  Pulmonary/Chest: Effort normal and breath sounds normal  No respiratory distress  He has no wheezes  Neurological: He is alert  He has normal reflexes  Skin: Skin is warm and dry  Psychiatric: He has a normal mood and affect  Nursing note and vitals reviewed  Patient's shoes and socks were not removed  Labs:   Blood work done today  Results are pending      Component      Latest Ref Rng & Units 6/6/2019 9/9/2019   Glucose, Random      65 - 99 mg/dL 151 (H)    BUN      5 - 25 mg/dL 26 (H) 21   Creatinine      0 60 - 1 30 mg/dL 1 03 0 92   eGFR Non       > OR = 60 mL/min/1 73m2 70    eGFR       > OR = 60 mL/min/1 73m2 81    SL AMB BUN/CREATININE RATIO      6 - 22 (calc) 25 (H) Sodium      136 - 145 mmol/L 142 142   Potassium      3 5 - 5 3 mmol/L 4 3 4 6   Chloride      100 - 108 mmol/L 107 105   CO2      21 - 32 mmol/L 27 29   SL AMB CALCIUM      8 6 - 10 3 mg/dL 9 2    Anion Gap      4 - 13 mmol/L  8   GLUCOSE FASTING      65 - 99 mg/dL  149 (H)   Calcium      8 3 - 10 1 mg/dL  9 3   eGFR      ml/min/1 73sq m  81   Hemoglobin A1C      4 2 - 6 3 % 8 2 (H) 6 7 (H)   EAG      mg/dl  146          Plan:    Diagnoses and all orders for this visit:    Type 2 diabetes mellitus with hyperglycemia, with long-term current use of insulin ([facility])  HGA1C 6 7% (9/2019)  Blood work done this morning, results are pending  Treatment regimen: continue current treatment for now  Will make adjustment if needed after reviewing blood work and log in 1-2 weeks  Discussed intensive insulin regimen does increase risk for hypoglycemia  Discussed risks/complications associated with uncontrolled diabetes  Advised to adhere to diabetic diet, and recommended staying active/exercising routinely as tolerated  Keep carbohydrates consistent to limit blood glucose fluctuations  Advised to call if blood sugars less than 70 mg/dl or over 300 mg/dl  Check blood glucose 2 times a day  Discussed symptoms and treatment of hypoglycemia  Recommended routine follow-up with podiatry and ophthalmology  Send log in 1-2 weeks  Ordered blood work to complete prior to next visit  -     Hemoglobin A1C; Future  -     Basic metabolic panel; Future    Essential hypertension  Blood pressure adequately controlled, continue current treatment  -     Basic metabolic panel; Future    Dyslipidemia  LDL 69, at goal  On Zetia  Managed by cardiology / PCP      Discussed with the patient diagnosis and treatment and all questions fully answered  He will call me if any problems arise      Counseled patient on diagnostic results, prognosis, risk and benefit of treatment options, instruction for management, importance of treatment compliance, risk factor reduction and impressions        Ysabel Anthony PA-C

## 2020-01-20 NOTE — TELEPHONE ENCOUNTER
----- Message from Chalo Beltran MD sent at 1/20/2020 12:54 PM EST -----   Patient had appointment for follow-up today  ----- Message -----  From: Lab, Background User  Sent: 1/20/2020  10:14 AM EST  To: Chalo Beltran MD

## 2020-01-20 NOTE — TELEPHONE ENCOUNTER
Please call patient and inform him that A1C increased to 7 8%  Advise patient to send BG log in 2 weeks (check before breakfast and before dinner) so adjustments can be made to treatment regimen  Make dietary changes - follow diabetic diet       Ysabel Anthony PA-C

## 2020-01-20 NOTE — PATIENT INSTRUCTIONS
Hypoglycemia instructions   Lazara Juliana Glenroy  1/20/2020  [id number]    Low Blood Sugar    Steps to treat low blood sugar  1  Test blood sugar if you have symptoms of low blood sugar:   Low Blood Sugar Symptoms:  o Sweaty  o Dizzy  o Rapid heartbeat  o Shaky  o Bad mood  o Hungry      2  Treat blood sugar less than 70 with 15 grams of fast-acting carbohydrate:   Examples of 15 grams Fast-Acting Carbohydrate:  o 4 oz juice  o 4 oz regular soda  o 3-4 glucose tablets (chew)  o 3-4 hard candies (chew)          3  Wait 15 minutes and test your blood sugar again     4   If blood sugar is less than 100, repeat steps 2-3     5  When your blood sugar is 100 or more, eat a snack if it will be longer than one hour until your next meal  The snack should be 15 grams of carbohydrate and a protein:   Examples of snacks:  o ½ sandwich  o 6 crackers with cheese  o Piece of fruit with cheese or peanut butter  o 6 crackers with peanut butter

## 2020-02-10 DIAGNOSIS — E11.65 TYPE 2 DIABETES MELLITUS WITH HYPERGLYCEMIA, WITH LONG-TERM CURRENT USE OF INSULIN (HCC): ICD-10-CM

## 2020-02-10 DIAGNOSIS — Z79.4 TYPE 2 DIABETES MELLITUS WITH HYPERGLYCEMIA, WITH LONG-TERM CURRENT USE OF INSULIN (HCC): ICD-10-CM

## 2020-02-10 RX ORDER — INSULIN GLARGINE 100 [IU]/ML
INJECTION, SOLUTION SUBCUTANEOUS
Qty: 5 PEN | Refills: 1 | Status: SHIPPED | OUTPATIENT
Start: 2020-02-10 | End: 2020-06-08 | Stop reason: SDUPTHER

## 2020-02-10 RX ORDER — INSULIN GLARGINE 100 [IU]/ML
INJECTION, SOLUTION SUBCUTANEOUS
Qty: 5 PEN | Refills: 1 | Status: SHIPPED | OUTPATIENT
Start: 2020-02-10 | End: 2020-02-10 | Stop reason: SDUPTHER

## 2020-02-10 NOTE — TELEPHONE ENCOUNTER
BG levels vary from 80-mid 200s mg/dl  Variation likely due to inconsistent carb intake  Advise patient to continue current treatment and keep carbohydrate intake consistent to limit fluctuations in blood glucose       Ysabel Anthony PA-C

## 2020-05-26 ENCOUNTER — TELEPHONE (OUTPATIENT)
Dept: ENDOCRINOLOGY | Facility: CLINIC | Age: 80
End: 2020-05-26

## 2020-05-29 DIAGNOSIS — E11.65 TYPE 2 DIABETES MELLITUS WITH HYPERGLYCEMIA, UNSPECIFIED WHETHER LONG TERM INSULIN USE (HCC): ICD-10-CM

## 2020-06-08 DIAGNOSIS — E11.65 TYPE 2 DIABETES MELLITUS WITH HYPERGLYCEMIA, WITH LONG-TERM CURRENT USE OF INSULIN (HCC): ICD-10-CM

## 2020-06-08 DIAGNOSIS — Z79.4 TYPE 2 DIABETES MELLITUS WITH HYPERGLYCEMIA, WITH LONG-TERM CURRENT USE OF INSULIN (HCC): ICD-10-CM

## 2020-06-08 DIAGNOSIS — IMO0002 UNCONTROLLED TYPE 2 DIABETES MELLITUS WITH COMPLICATION, WITHOUT LONG-TERM CURRENT USE OF INSULIN: ICD-10-CM

## 2020-06-08 RX ORDER — INSULIN GLARGINE 100 [IU]/ML
INJECTION, SOLUTION SUBCUTANEOUS
Qty: 5 PEN | Refills: 1 | Status: SHIPPED | OUTPATIENT
Start: 2020-06-08 | End: 2020-06-16 | Stop reason: SDUPTHER

## 2020-06-08 RX ORDER — RAMIPRIL 5 MG/1
5 CAPSULE ORAL DAILY
Qty: 90 CAPSULE | Refills: 1 | Status: SHIPPED | OUTPATIENT
Start: 2020-06-08 | End: 2020-12-02 | Stop reason: SDUPTHER

## 2020-06-13 LAB
BUN SERPL-MCNC: 31 MG/DL (ref 7–25)
BUN/CREAT SERPL: 29 (CALC) (ref 6–22)
CALCIUM SERPL-MCNC: 9.6 MG/DL (ref 8.6–10.3)
CHLORIDE SERPL-SCNC: 105 MMOL/L (ref 98–110)
CO2 SERPL-SCNC: 29 MMOL/L (ref 20–32)
CREAT SERPL-MCNC: 1.06 MG/DL (ref 0.7–1.18)
GLUCOSE SERPL-MCNC: 138 MG/DL (ref 65–99)
HBA1C MFR BLD: 8.5 % OF TOTAL HGB
POTASSIUM SERPL-SCNC: 4.5 MMOL/L (ref 3.5–5.3)
SL AMB EGFR AFRICAN AMERICAN: 78 ML/MIN/1.73M2
SL AMB EGFR NON AFRICAN AMERICAN: 67 ML/MIN/1.73M2
SODIUM SERPL-SCNC: 143 MMOL/L (ref 135–146)

## 2020-06-15 ENCOUNTER — TELEPHONE (OUTPATIENT)
Dept: ENDOCRINOLOGY | Facility: CLINIC | Age: 80
End: 2020-06-15

## 2020-06-16 ENCOUNTER — OFFICE VISIT (OUTPATIENT)
Dept: ENDOCRINOLOGY | Facility: CLINIC | Age: 80
End: 2020-06-16
Payer: MEDICARE

## 2020-06-16 VITALS
WEIGHT: 146 LBS | SYSTOLIC BLOOD PRESSURE: 122 MMHG | TEMPERATURE: 96.7 F | BODY MASS INDEX: 21.56 KG/M2 | DIASTOLIC BLOOD PRESSURE: 66 MMHG | HEART RATE: 68 BPM

## 2020-06-16 DIAGNOSIS — Z13.29 SCREENING FOR THYROID DISORDER: ICD-10-CM

## 2020-06-16 DIAGNOSIS — E11.42 DIABETIC POLYNEUROPATHY ASSOCIATED WITH TYPE 2 DIABETES MELLITUS (HCC): ICD-10-CM

## 2020-06-16 DIAGNOSIS — I25.10 CORONARY ARTERY DISEASE INVOLVING NATIVE CORONARY ARTERY OF NATIVE HEART WITHOUT ANGINA PECTORIS: ICD-10-CM

## 2020-06-16 DIAGNOSIS — I10 ESSENTIAL HYPERTENSION: ICD-10-CM

## 2020-06-16 DIAGNOSIS — E11.65 TYPE 2 DIABETES MELLITUS WITH HYPERGLYCEMIA, WITH LONG-TERM CURRENT USE OF INSULIN (HCC): ICD-10-CM

## 2020-06-16 DIAGNOSIS — Z79.4 TYPE 2 DIABETES MELLITUS WITH HYPERGLYCEMIA, WITH LONG-TERM CURRENT USE OF INSULIN (HCC): ICD-10-CM

## 2020-06-16 DIAGNOSIS — E78.5 DYSLIPIDEMIA: ICD-10-CM

## 2020-06-16 DIAGNOSIS — IMO0002 UNCONTROLLED TYPE 2 DIABETES MELLITUS WITH COMPLICATION, WITHOUT LONG-TERM CURRENT USE OF INSULIN: Primary | ICD-10-CM

## 2020-06-16 PROCEDURE — 99214 OFFICE O/P EST MOD 30 MIN: CPT | Performed by: INTERNAL MEDICINE

## 2020-06-16 RX ORDER — INSULIN GLARGINE 100 [IU]/ML
INJECTION, SOLUTION SUBCUTANEOUS
Qty: 5 PEN | Refills: 1
Start: 2020-06-16 | End: 2020-07-10 | Stop reason: SDUPTHER

## 2020-07-06 DIAGNOSIS — Z79.4 TYPE 2 DIABETES MELLITUS WITH HYPERGLYCEMIA, WITH LONG-TERM CURRENT USE OF INSULIN (HCC): ICD-10-CM

## 2020-07-06 DIAGNOSIS — E11.65 TYPE 2 DIABETES MELLITUS WITH HYPERGLYCEMIA, WITH LONG-TERM CURRENT USE OF INSULIN (HCC): ICD-10-CM

## 2020-07-06 NOTE — LETTER
[facility]     We have been trying to reach you please call our office        Thank you

## 2020-07-06 NOTE — TELEPHONE ENCOUNTER
Blood sugars are in acceptable range   Please inform pt to take metformin 1000 mg twice a day and lantus 15 units once a day     Luis Canchola MD

## 2020-07-10 RX ORDER — INSULIN GLARGINE 100 [IU]/ML
INJECTION, SOLUTION SUBCUTANEOUS
Qty: 5 PEN | Refills: 1
Start: 2020-07-10 | End: 2020-12-15 | Stop reason: HOSPADM

## 2020-07-10 NOTE — TELEPHONE ENCOUNTER
Pt states he has not received a call about logs  Did inform him that we have been trying to reach him

## 2020-07-10 NOTE — TELEPHONE ENCOUNTER
Pt notified  He is concerned had 2 low bs, one that woke him during night and was in 60s and other was a morning low 65   He is nervous increasing insulin from 12 to 15

## 2020-07-10 NOTE — TELEPHONE ENCOUNTER
So he should reduce Lantus to 10 units at bedtime, continue metformin   If he still has low blood sugars in AM, he should reduce to 8 unit     Ernst Villanueva MD

## 2020-07-27 ENCOUNTER — TELEPHONE (OUTPATIENT)
Dept: ENDOCRINOLOGY | Facility: CLINIC | Age: 80
End: 2020-07-27

## 2020-07-27 NOTE — TELEPHONE ENCOUNTER
Pt called states his PCP office has closed and he is need of an rx for pramipexole, are you able to fill for pt?

## 2020-07-27 NOTE — TELEPHONE ENCOUNTER
No I don;t feel comfortable prescribing this medication as it was not prescribed by us   He needs to make appointment with PCP    Quincy Quinteros MD

## 2020-09-16 ENCOUNTER — TELEPHONE (OUTPATIENT)
Dept: ENDOCRINOLOGY | Facility: CLINIC | Age: 80
End: 2020-09-16

## 2020-09-16 NOTE — TELEPHONE ENCOUNTER
Srinath called  Needing diagnosis for  Vitmain b12  The one on the order  Doesn't work with [facility] guidelines    Please call rashi back at [facility] [phone] they are closed between 12-1 and close at 3pm for the day

## 2020-09-17 LAB
BUN SERPL-MCNC: 26 MG/DL (ref 7–25)
BUN/CREAT SERPL: 25 (CALC) (ref 6–22)
CALCIUM SERPL-MCNC: 9.4 MG/DL (ref 8.6–10.3)
CHLORIDE SERPL-SCNC: 105 MMOL/L (ref 98–110)
CO2 SERPL-SCNC: 31 MMOL/L (ref 20–32)
CREAT SERPL-MCNC: 1.02 MG/DL (ref 0.7–1.18)
GLUCOSE SERPL-MCNC: 112 MG/DL (ref 65–99)
POTASSIUM SERPL-SCNC: 4.5 MMOL/L (ref 3.5–5.3)
SL AMB EGFR AFRICAN AMERICAN: 82 ML/MIN/1.73M2
SL AMB EGFR NON AFRICAN AMERICAN: 71 ML/MIN/1.73M2
SODIUM SERPL-SCNC: 142 MMOL/L (ref 135–146)
T4 FREE SERPL-MCNC: 1.2 NG/DL (ref 0.8–1.8)
VIT B12 SERPL-MCNC: 438 PG/ML (ref 200–1100)

## 2020-09-22 ENCOUNTER — OFFICE VISIT (OUTPATIENT)
Dept: ENDOCRINOLOGY | Facility: CLINIC | Age: 80
End: 2020-09-22
Payer: MEDICARE

## 2020-09-22 VITALS
DIASTOLIC BLOOD PRESSURE: 62 MMHG | BODY MASS INDEX: 20.73 KG/M2 | HEART RATE: 82 BPM | HEIGHT: 69 IN | WEIGHT: 140 LBS | SYSTOLIC BLOOD PRESSURE: 118 MMHG | TEMPERATURE: 97 F

## 2020-09-22 DIAGNOSIS — I10 ESSENTIAL HYPERTENSION: ICD-10-CM

## 2020-09-22 DIAGNOSIS — Z79.4 TYPE 2 DIABETES MELLITUS WITH HYPERGLYCEMIA, WITH LONG-TERM CURRENT USE OF INSULIN (HCC): Primary | ICD-10-CM

## 2020-09-22 DIAGNOSIS — E78.5 DYSLIPIDEMIA: ICD-10-CM

## 2020-09-22 DIAGNOSIS — E11.65 TYPE 2 DIABETES MELLITUS WITH HYPERGLYCEMIA, WITH LONG-TERM CURRENT USE OF INSULIN (HCC): Primary | ICD-10-CM

## 2020-09-22 LAB — SL AMB POCT HEMOGLOBIN AIC: 7.1 (ref ?–6.5)

## 2020-09-22 PROCEDURE — 99214 OFFICE O/P EST MOD 30 MIN: CPT | Performed by: PHYSICIAN ASSISTANT

## 2020-09-22 PROCEDURE — 83036 HEMOGLOBIN GLYCOSYLATED A1C: CPT | Performed by: PHYSICIAN ASSISTANT

## 2020-09-22 RX ORDER — PRAMIPEXOLE DIHYDROCHLORIDE 1 MG/1
1.5 TABLET ORAL DAILY
COMMUNITY

## 2020-09-22 RX ORDER — CEFADROXIL 500 MG/1
CAPSULE ORAL
COMMUNITY
End: 2020-12-15 | Stop reason: HOSPADM

## 2020-09-22 RX ORDER — EZETIMIBE 10 MG/1
TABLET ORAL
COMMUNITY
End: 2020-12-15 | Stop reason: HOSPADM

## 2020-09-22 RX ORDER — AMLODIPINE BESYLATE 5 MG/1
TABLET ORAL
COMMUNITY
Start: 2020-07-09 | End: 2020-12-15 | Stop reason: HOSPADM

## 2020-09-22 RX ORDER — RAMIPRIL 5 MG/1
5 CAPSULE ORAL DAILY
Qty: 90 CAPSULE | Refills: 1 | Status: SHIPPED | OUTPATIENT
Start: 2020-09-22 | End: 2020-12-15 | Stop reason: HOSPADM

## 2020-09-22 RX ORDER — RAMIPRIL 5 MG/1
CAPSULE ORAL
COMMUNITY
End: 2020-09-22 | Stop reason: SDUPTHER

## 2020-09-22 RX ORDER — PRAMIPEXOLE DIHYDROCHLORIDE 1 MG/1
TABLET ORAL
COMMUNITY
End: 2020-12-15 | Stop reason: HOSPADM

## 2020-09-22 RX ORDER — PRAMIPEXOLE DIHYDROCHLORIDE 1 MG/1
1 TABLET ORAL DAILY
COMMUNITY
Start: 2020-08-01 | End: 2020-12-15 | Stop reason: HOSPADM

## 2020-09-22 RX ORDER — AMLODIPINE BESYLATE 5 MG/1
5 TABLET ORAL DAILY
COMMUNITY
Start: 2020-07-09 | End: 2022-01-01 | Stop reason: HOSPADM

## 2020-09-22 RX ORDER — INSULIN GLARGINE 100 [IU]/ML
10 INJECTION, SOLUTION SUBCUTANEOUS
COMMUNITY
End: 2021-01-07 | Stop reason: SDUPTHER

## 2020-09-22 RX ORDER — BLOOD-GLUCOSE METER
KIT MISCELLANEOUS
Qty: 1 EACH | Refills: 0 | Status: SHIPPED | OUTPATIENT
Start: 2020-09-22

## 2020-09-22 NOTE — PATIENT INSTRUCTIONS
Hypoglycemia instructions   Antony Abraham Glen[place]  9/22/2020  [id number]    Low Blood Sugar    Steps to treat low blood sugar  1  Test blood sugar if you have symptoms of low blood sugar:   Low Blood Sugar Symptoms:  o Sweaty  o Dizzy  o Rapid heartbeat  o Shaky  o Bad mood  o Hungry      2  Treat blood sugar less than 70 with 15 grams of fast-acting carbohydrate:   Examples of 15 grams Fast-Acting Carbohydrate:  o 4 oz juice  o 4 oz regular soda  o 3-4 glucose tablets (chew)  o 3-4 hard candies (chew)          3  Wait 15 minutes and test your blood sugar again     4   If blood sugar is less than 100, repeat steps 2-3     5  When your blood sugar is 100 or more, eat a snack if it will be longer than one hour until your next meal  The snack should be 15 grams of carbohydrate and a protein:   Examples of snacks:  o ½ sandwich  o 6 crackers with cheese  o Piece of fruit with cheese or peanut butter  o 6 crackers with peanut butter

## 2020-09-22 NOTE — PROGRESS NOTES
Patient Progress Note      CC: DM     Referring Provider  No referring provider defined for this encounter  History of Present Illness:   Akash Matos is a 70 y o  male with a history of type 2 diabetes with long term use of insulin  Patient has a history of hepatitis B, hepatitis C, CLL  Diabetes course has been stable  Complications of DM: CAD  Denies recent illness or hospitalizations  Denies any issues with his current regimen  Home glucose monitoring: are performed regularly     Lo-205 mg/dl (Occsionally skips checking  Readings are variable)     Current regimen: metformin 1000 mg BID, Lantus 10 units QHS  compliant most of the time, denies any side effects from medications  Injects in: thigh  Rotates sites: Yes  Hypoglycemic episodes: No, rare  H/o of hypoglycemia causing hospitalization or Intervention such as glucagon injection or ambulance call : No  Hypoglycemia symptoms: dizziness  Treatment of hypoglycemia: Discussed treatment     Medic alert tag: recommended: Yes     Diet: 3 meals per day, 2-3 snacks per day (mostly in the evenings)  Timing of meals is predictable  Diabetic diet compliance: noncompliant some of the time  Activity: Daily activity is predictable: Yes  Trying to do yoga and stationary bike 4-5 times a week  Ophthamology: eye exam in 2019  Podiatry: foot exam overdue, 2019     Has hypertension: on ACE inhibitor/ARB, compliant most of the time  Has hyperlipidemia: on Zetia - tolerating well, no myalgias  Statin therapy stopped  History of liver disease and cramps    Thyroid disorders: No  History of pancreatitis: No      Patient Active Problem List   Diagnosis    Arteriosclerosis of coronary artery    Atypical chest pain    Bruising    CAD (coronary artery disease), native coronary artery    Chronic constipation    Chronic hepatitis B (HCC)    Chronic lymphocytic leukemia (HCC)    Type 2 diabetes mellitus with hyperglycemia, with long-term current use of insulin ([facility] )    Dyslipidemia    Essential hypertension    Hepatitis C    S/P coronary artery stent placement      Past Medical History:   Diagnosis Date    Arthritis     Cancer [facility])     Cardiac disease     abnormal stress test 2012    Chronic kidney disease     protein urine    CLL (chronic lymphocytic leukemia) ([facility] ) 2014    6 months chemo    Constipation     Coronary artery disease     Diabetes mellitus ([facility] )     type 2    History of chemotherapy     Tx for CLL currently in remission since 2016    Hyperlipidemia     Hypertension     Infectious viral hepatitis     Hep C Hx remission since 2016 post tx    Liver disease     Proteinuria       Past Surgical History:   Procedure Laterality Date    ANGIOPLASTY      one stent    CARDIAC CATHETERIZATION      CARDIAC SURGERY  2016    x1 stent    CATARACT EXTRACTION      CYST REMOVAL      mid back benign    FRACTURE SURGERY Right     Fx Femur age 17, pin in place    NJ XCAPSL CTRC RMVL INSJ IO LENS PROSTH W/O ECP Right 7/23/2018    Procedure: EXTRACTION EXTRACAPSULAR CATARACT PHACO INTRAOCULAR LENS (IOL); Surgeon: Digna Mcfarlane MD;  Location: [facility] MAIN OR;  Service: Ophthalmology    NJ XCAPSL CTRC RMVL INSJ IO LENS PROSTH W/O ECP Left 10/1/2018    Procedure: EXTRACTION EXTRACAPSULAR CATARACT PHACO INTRAOCULAR LENS (IOL);   Surgeon: Digna Mcfarlane MD;  Location: [facility] MAIN OR;  Service: Ophthalmology    TONSILLECTOMY        Family History   Problem Relation Age of Onset    Diabetes unspecified Brother     Diabetes Brother     Diabetes unspecified Maternal Grandfather     Diabetes Maternal Grandfather     Cancer Mother         breast    Alzheimer's disease Mother     Early death Father         age 64 peritonitis from gallbladder    Cancer Sister         small cell carcinoma    No Known Problems Daughter     No Known Problems Daughter      Social History     Tobacco Use    Smoking status: Former Smoker    Smokeless tobacco: Never Used   Substance Use Topics    Alcohol use: Yes     Comment: SOCIALLY     Allergies   Allergen Reactions    Other      Seasonal allergies         Current Outpatient Medications:     aspirin 81 MG tablet, Take 1 tablet by mouth every morning  , Disp: , Rfl:     Coenzyme Q10 (CO Q 10) 100 MG CAPS, Take 4 capsules by mouth daily, Disp: , Rfl:     ezetimibe (ZETIA) 10 mg tablet, Take 10 mg by mouth daily, Disp: , Rfl:     glucose blood (ONE TOUCH ULTRA TEST) test strip, Check blood sugar twice a day, Disp: 200 each, Rfl: 1    insulin glargine (Lantus SoloStar) 100 units/mL injection pen, lantus solostar  100 unit/ml sopn, Disp: , Rfl:     Insulin Pen Needle 32G X 6 MM MISC, BD Ultra-Fine Beatrice Pen Needle 32 gauge x 5/32", Disp: , Rfl:     LANTUS SOLOSTAR 100 units/mL injection pen, Inject 10  units in AM, Disp: 5 pen, Rfl: 1    METFORMIN & DIET MANAGE PROD PO, metformin hydrochloride  1000 mg tabs, Disp: , Rfl:     metFORMIN (GLUCOPHAGE) 1000 MG tablet, Take 1 tablet (1,000 mg total) by mouth 2 (two) times a day, Disp: 180 tablet, Rfl: 1    ONETOUCH DELICA LANCETS FINE MISC, Twice a day, Disp: 200 each, Rfl: 1    pramipexole (MIRAPEX) 1 mg tablet, pramipexole dihydrochloride  1 mg tabs, Disp: , Rfl:     pramipexole (MIRAPEX) 1 mg tablet, Take 1 mg by mouth daily, Disp: , Rfl:     pramipexole (MIRAPEX) 1 mg tablet, Take 1 mg by mouth daily, Disp: , Rfl:     ramipril (ALTACE) 5 mg capsule, Take 1 capsule (5 mg total) by mouth daily, Disp: 90 capsule, Rfl: 1    amLODIPine (NORVASC) 5 mg tablet, Take 5 mg by mouth daily, Disp: , Rfl:     amLODIPine (NORVASC) 5 mg tablet, , Disp: , Rfl:     Ascorbic Acid (VITAMIN C) 1000 MG tablet, Take 1,000 mg by mouth daily, Disp: , Rfl:     B Complex Vitamins (B COMPLEX 1 PO), Take 1 tablet by mouth daily, Disp: , Rfl:     Blood Glucose Monitoring Suppl (ONE TOUCH ULTRA MINI) w/Device KIT, by Does not apply route once for 1 dose, Disp: 1 each, Rfl: 0   Blood Glucose Monitoring Suppl (ONE TOUCH ULTRA MINI) w/Device KIT, Use meter as directed, Disp: 1 each, Rfl: 0    cefadroxil (DURICEF) 500 mg capsule, cefadroxil  500 mg caps, Disp: , Rfl:     ezetimibe (ZETIA) 10 mg tablet, ezetimibe  10 mg tabs, Disp: , Rfl:     ezetimibe (ZETIA) 10 mg tablet, ezetimibe 10 mg tablet, Disp: , Rfl:     NON FORMULARY, as needed Fiber Therapy Powder-One heaping tablespoon to 12 oz of water, Disp: , Rfl:     Omega-3 Fatty Acids (FISH OIL) 1,000 mg, Take 1,000 mg by mouth 3 (three) times a day, Disp: , Rfl:     pramipexole (MIRAPEX) 0 25 mg tablet, Take 0 25 mg by mouth every morning, Disp: , Rfl:     pravastatin (PRAVACHOL) 40 mg tablet, Take 40 mg by mouth daily, Disp: , Rfl:     ramipril (ALTACE) 5 mg capsule, Take 1 capsule (5 mg total) by mouth daily, Disp: 90 capsule, Rfl: 1  Review of Systems   Constitutional: Positive for fatigue (he attributes it to age)  Negative for activity change, appetite change and unexpected weight change  HENT: Negative for trouble swallowing  Eyes: Negative for visual disturbance  Respiratory: Negative for shortness of breath  Cardiovascular: Negative for chest pain and palpitations  Gastrointestinal: Positive for constipation  Negative for diarrhea  Endocrine: Negative for polydipsia and polyuria  Musculoskeletal: Negative  Skin: Negative for wound  Neurological: Positive for numbness (mild)  Psychiatric/Behavioral: Negative  Physical Exam:  Body mass index is 20 67 kg/m²  /62   Pulse 82   Temp (!) 97 °F (36 1 °C)   Ht 5' 9" (1 753 m)   Wt 63 5 kg (140 lb)   BMI 20 67 kg/m²    Wt Readings from Last 3 Encounters:   09/22/20 63 5 kg (140 lb)   06/16/20 66 2 kg (146 lb)   01/20/20 67 1 kg (148 lb)       Physical Exam  Vitals signs and nursing note reviewed  Constitutional:       Appearance: He is well-developed  HENT:      Head: Normocephalic  Eyes:      General: No scleral icterus       Pupils: Pupils are equal, round, and reactive to light  Neck:      Musculoskeletal: Neck supple  Thyroid: No thyromegaly  Cardiovascular:      Rate and Rhythm: Normal rate and regular rhythm  Pulses:           Radial pulses are 2+ on the right side and 2+ on the left side  Heart sounds: No murmur  Pulmonary:      Effort: Pulmonary effort is normal  No respiratory distress  Breath sounds: Normal breath sounds  No wheezing  Skin:     General: Skin is warm and dry  Neurological:      Mental Status: He is alert  Patient's shoes and socks were not removed  Labs:   Component      Latest Ref Rng & Units 1/20/2020 6/12/2020 9/16/2020   Glucose, Random      65 - 99 mg/dL  138 (H) 112 (H)   BUN      7 - 25 mg/dL 25 31 (H) 26 (H)   Creatinine      0 70 - 1 18 mg/dL 0 99 1 06 1 02   eGFR Non       > OR = 60 mL/min/1 73m2  67 71   eGFR       > OR = 60 mL/min/1 73m2  78 82   SL AMB BUN/CREATININE RATIO      6 - 22 (calc)  29 (H) 25 (H)   Sodium      135 - 146 mmol/L 145 143 142   Potassium      3 5 - 5 3 mmol/L 4 5 4 5 4 5   Chloride      98 - 110 mmol/L 107 105 105   CO2      20 - 32 mmol/L 28 29 31   Calcium      8 6 - 10 3 mg/dL 9 5 9 6 9 4   Anion Gap      4 - 13 mmol/L 10     GLUCOSE FASTING      65 - 99 mg/dL 114 (H)     eGFR      ml/min/1 73sq m 73     EXT Creatinine Urine      mg/dL 134 0     MICROALBUM ,U,RANDOM      0 0 - 20 0 mg/L 151 0 (H)     MICROALBUMIN/CREATININE RATIO      0 - 30 mg/g creatinine 113 (H)     Hemoglobin A1C      <5 7 % of total Hgb 7 8 (H) 8 5 (H)    eAG, EST AVG Glucose      mg/dl 177     Free T4      0 8 - 1 8 ng/dL   1 2       Plan:    Diagnoses and all orders for this visit:    Type 2 diabetes mellitus with hyperglycemia, with long-term current use of insulin (HCC)  HGA1C 7 1%  Improved  Treatment regimen: continue current treatment  Variable morning blood sugars are likely due to variation in snack at bedtime    Advised to make dietary changes  Discussed intensive insulin regimen does increase risk for hypoglycemia  Discussed risks/complications associated with uncontrolled diabetes  Advised to adhere to diabetic diet, and recommended staying active/exercising routinely as tolerated  Keep carbohydrates consistent to limit blood glucose fluctuations  Advised to call if blood sugars less than 70 mg/dl or over 300 mg/dl  Check blood glucose 2 times a day  Discussed symptoms and treatment of hypoglycemia  Recommended routine follow-up with podiatry and ophthalmology  Send log in 2 weeks  Ordered blood work to complete prior to next visit  -     Hemoglobin A1C; Future  -     Basic metabolic panel; Future  -     ramipril (ALTACE) 5 mg capsule; Take 1 capsule (5 mg total) by mouth daily  -     metFORMIN (GLUCOPHAGE) 1000 MG tablet; Take 1 tablet (1,000 mg total) by mouth 2 (two) times a day  -     Blood Glucose Monitoring Suppl (ONE TOUCH ULTRA MINI) w/Device KIT; Use meter as directed    Dyslipidemia  LDL 82  Continue Zetia    Essential hypertension  Blood pressure well controlled, continue current treatment  -     Basic metabolic panel; Future  -     ramipril (ALTACE) 5 mg capsule; Take 1 capsule (5 mg total) by mouth daily      Discussed with the patient diagnosis and treatment and all questions fully answered  He will call me if any problems arise  Counseled patient on diagnostic results, prognosis, risk and benefit of treatment options, instruction for management, importance of treatment compliance, risk factor reduction and impressions        Ysabel Anthony PA-C

## 2020-10-30 NOTE — TELEPHONE ENCOUNTER
----- Message from Shawnie Spurling, PA-C sent at 4/16/2018  9:41 AM EDT -----  Basic metabolic panel is normal including kidney function/potassium after starting new medication  [facility]  Cardiology  Progress Note    Patient Name: Jamee Purvis  MRN: [id number]  Admission Date: 10/27/2020  Hospital Length of Stay: 3 days  Code Status: Full Code   Attending Physician: Austin Aguilar MD   Primary Care Physician: Ken Barber MD  Expected Discharge Date:   Principal Problem:Coronary artery disease    Subjective:     Hospital Course:   [place] [facility] [place] [facility] transfer from [facility] on 10/28 for Cardiothoracic Surgery evaluation for CABG after patient underwent elective LHC on 10/27 for exertional angina and was found to have in stent restenosis of the obtuse marginal artery &  of proximal LAD. She was evaluated by Cardiothoracic Surgery who deemed patient was not a candidate for CABG so Interventional Cardiology here at [facility] was consulted. She underwent left heart catheretization on 10/30 which was remarkable for 75% stenosed ost LAD, 100% stenosed mid %, 80% stenosed 2nd diagonal lesion, 80% stenosed mid circumflex, and 80% stenosed 2nd obtuse marginal for which drug eluding stents were deployed OM-2, mid left circumflex, proximal LAD and mid LAD without complication. She is resumed on DAPT at this time with ASA and Brilinta.     Interval History: Patient seen and examined this AM. No acute events overnight. Patient with complaints of lumbar back pain this AM and again passive complaints of chest pain overnight that has since resolved upon my assessment. She's denies severity similar to prior episodes of chest pain and specifically denies nausea, vomiting, SOB, sweats, arm, or jaw pain. She has been afebrile with pulse 70-50 bpms. She has been normo to hypotensive with systolic readings between the 120-90 mmHg. She is maintaining saturations of +90% on room air. UOP remains adequate with ~950 mL in the last 24hrs. Underwent PCI today with 4 stents placed. She tolerated procedure without issue, resuming DAPT.    Review of  Systems   Constitution: Negative for chills, decreased appetite, diaphoresis, fever and night sweats.   HENT: Negative for sore throat.    Eyes: Negative for vision loss in left eye, vision loss in right eye and visual disturbance.   Cardiovascular: Positive for chest pain. Negative for dyspnea on exertion, irregular heartbeat, leg swelling, near-syncope, orthopnea, palpitations and syncope.   Respiratory: Negative for cough, shortness of breath, sleep disturbances due to breathing and wheezing.    Musculoskeletal: Positive for back pain.   Gastrointestinal: Negative for bloating, abdominal pain, constipation, diarrhea, heartburn, melena, nausea and vomiting.   Genitourinary: Negative for dysuria, flank pain and hematuria.   Neurological: Negative for focal weakness, headaches, light-headedness and loss of balance.   Psychiatric/Behavioral: The patient does not have insomnia and is not nervous/anxious.      Objective:     Vital Signs (Most Recent):  Temp: 97.5 °F (36.4 °C) (10/30/20 1541)  Pulse: 79 (10/30/20 1700)  Resp: 16 (10/30/20 1700)  BP: 129/78 (10/30/20 1700)  SpO2: 96 % (10/30/20 1700) Vital Signs (24h Range):  Temp:  [97.5 °F (36.4 °C)-98.2 °F (36.8 °C)] 97.5 °F (36.4 °C)  Pulse:  [51-80] 79  Resp:  [14-25] 16  SpO2:  [90 %-99 %] 96 %  BP: ()/(55-78) 129/78     Weight: 69.4 kg (153 lb)  Body mass index is 26.26 kg/m².     SpO2: 96 %  O2 Device (Oxygen Therapy): nasal cannula      Intake/Output Summary (Last 24 hours) at 10/30/2020 1721  Last data filed at 10/30/2020 0300  Gross per 24 hour   Intake 240 ml   Output 650 ml   Net -410 ml       Lines/Drains/Airways     Peripheral Intravenous Line                 Peripheral IV - Single Lumen 10/27/20 0636 22 G Left Hand 3 days         Peripheral IV - Single Lumen 10/30/20 1305 20 G Anterior;Distal;Left Forearm less than 1 day                Physical Exam   Constitutional: She appears well-developed and well-nourished. No distress.   HENT:   Head:  Normocephalic and atraumatic.   Mouth/Throat: Oropharynx is clear and moist. No oropharyngeal exudate.   Eyes: Conjunctivae and EOM are normal. Right eye exhibits no discharge. Left eye exhibits no discharge. No scleral icterus.   Neck: Normal range of motion. Neck supple. No JVD present. No tracheal deviation present.   Cardiovascular: Normal rate and intact distal pulses. Exam reveals no gallop and no friction rub.   No murmur heard.  Pulmonary/Chest: Effort normal. No respiratory distress. She has no wheezes. She has no rales.   Abdominal: Soft. Bowel sounds are normal. She exhibits no distension. There is no abdominal tenderness.   Musculoskeletal:         General: No edema.   Neurological: She is alert. She exhibits normal muscle tone. Coordination normal.   Skin: Skin is warm and dry. She is not diaphoretic. No erythema.   Psychiatric: She has a normal mood and affect. Her behavior is normal.   Nursing note and vitals reviewed.      Significant Labs:   Recent Lab Results       10/30/20  1610   10/30/20  1107   10/30/20  0729   10/30/20  0512   10/30/20  0511        Anion Gap         10     Baso #       0.03       Basophil %       0.4       BUN         17     Calcium         9.4     Chloride         103     CO2         24     Creatinine         0.7     Differential Method       Automated       eGFR if          >60.0     eGFR if non          >60.0  Comment:  Calculation used to obtain the estimated glomerular filtration  rate (eGFR) is the CKD-EPI equation.        Eos #       0.2       Eosinophil %       3.4       Glucose         145     Gran # (ANC)       3.7       Gran %       54.5       Hematocrit       40.4       Hemoglobin       12.6       Immature Grans (Abs)       0.01  Comment:  Mild elevation in immature granulocytes is non specific and   can be seen in a variety of conditions including stress response,   acute inflammation, trauma and pregnancy. Correlation with other    laboratory and clinical findings is essential.         Immature Granulocytes       0.1       INR               Lymph #       2.2       Lymph %       33.3       Magnesium         1.8     MCH       26.2       MCHC       31.2       MCV       84       Mono #       0.6       Mono %       8.3       MPV       11.8       nRBC       0       Phosphorus         3.4     Platelets       154       POCT Glucose 79 122 135         Potassium         3.9     Preg Test, Ur               Protime               RBC       4.81       RDW       13.3       SARS-CoV2 (COVID-19) Qualitative PCR               Sodium         137     WBC       6.72                        10/29/20  2238   10/29/20  2038   10/29/20  1949   10/29/20  1948        Anion Gap       12     Baso #     0.03       Basophil %     0.4       BUN       21     Calcium       10.1     Chloride       102     CO2       24     Creatinine       0.8     Differential Method     Automated       eGFR if        >60.0     eGFR if non        >60.0  Comment:  Calculation used to obtain the estimated glomerular filtration  rate (eGFR) is the CKD-EPI equation.        Eos #     0.3       Eosinophil %     3.7       Glucose       176     Gran # (ANC)     4.9       Gran %     58.6       Hematocrit     40.7       Hemoglobin     12.9       Immature Grans (Abs)     0.02  Comment:  Mild elevation in immature granulocytes is non specific and   can be seen in a variety of conditions including stress response,   acute inflammation, trauma and pregnancy. Correlation with other   laboratory and clinical findings is essential.         Immature Granulocytes     0.2       INR       0.9  Comment:  Coumadin Therapy:  2.0 - 3.0 for INR for all indicators except mechanical heart valves  and antiphospholipid syndromes which should use 2.5 - 3.5.       Lymph #     2.5       Lymph %     29.4       Magnesium             MCH     26.7       MCHC     31.7       MCV     84       Mono #      0.6       Mono %     7.7       MPV     12.0       nRBC     0       Phosphorus             Platelets     174       POCT Glucose   189         Potassium       4.1     Preg Test, Ur Negative           Protime       10.5     RBC     4.84       RDW     13.4       SARS-CoV2 (COVID-19) Qualitative PCR Not Detected  Comment:  This test utilizes a real-time reverse transcription  polymerase chain reaction procedure to amplify and   detect the SARS-CoV-2 RdRp and N genes.    The analytical sensitivity (limit of detection) of   this assay is 100 copies/mL.   A Detected result is considered positive for COVID-19.  This patient is considered infected with the   SARS-CoV-2 virus and is presumed to be contagious.    A Not Detected result means that SARS-CoV-2 RNA is not  present above the limit of detection. It does not rule  out the possibility of COVID-19 and should not be the  sole basis for treatment decisions.  If COVID-19 is   strongly suspected based on clinical and exposure   history,re-testing should be considered.    This test is only for use under Food and Drug   Administration s Emergency Use Authorization (EUA).   Commercial reagents are provided by [employer].  Performance characteristics of the EUA have been   independently verified by [facility]   Department of Pathology and Laboratory Medicine.               Sodium       138     WBC     8.36             Significant Imaging: I have reviewed all imagining in the last 24 hours.    Assessment and Plan:     * Coronary artery disease  - s/p PCI with with VIJAYA x4 on 10/30  - resume ASA 81 mg daily  - resume Brilinta 90 mg Q12H  - resume Lipitor 80 mg QD  - cardiac diet  - strict I/Os and chart  - daily standing weights  - continuous telemetry     CAD (coronary artery disease)  Please see Coronary artery disease.    Hypertension  - resume lisinopril 5 mg daily and Imdur 30 mg daily    Overweight  - BMI 26.26  - RD consulted for diabetic education  prior to discharge    History of coronary artery stent placement  Please see Coronary artery disease.    Diabetes mellitus, type 2  - hemoglobin A1c 9.0% this admission  - per MAR patient only on glargine 15 U daily as outpatient  - continue with current regimen of detemir 14 U QHS with aspart 2 U TIDWM with LDSSI   - continue to titrate insulin accordingly   - diabetic diet  - accuchecks ACHS  - will need Endocrinology follow up as outpatient, referral placed    History of myocardial infarction  Please see Coronary artery disease.        VTE Risk Mitigation (From admission, onward)         Ordered     heparin infusion 1,000 units/500 ml in 0.9% NaCl (pressure line flush)  Intra-op continuous PRN      10/30/20 1327     IP VTE HIGH RISK PATIENT  Once      10/27/20 2335     Place sequential compression device  Until discontinued      10/27/20 2335                Doni Enrique MD  Cardiology  [facility]

## 2020-11-12 DIAGNOSIS — Z79.4 TYPE 2 DIABETES MELLITUS WITH HYPERGLYCEMIA, WITH LONG-TERM CURRENT USE OF INSULIN (HCC): ICD-10-CM

## 2020-11-12 DIAGNOSIS — E11.65 TYPE 2 DIABETES MELLITUS WITH HYPERGLYCEMIA, WITH LONG-TERM CURRENT USE OF INSULIN (HCC): ICD-10-CM

## 2020-11-22 DIAGNOSIS — E11.65 TYPE 2 DIABETES MELLITUS WITH HYPERGLYCEMIA, WITH LONG-TERM CURRENT USE OF INSULIN (HCC): ICD-10-CM

## 2020-11-22 DIAGNOSIS — Z79.4 TYPE 2 DIABETES MELLITUS WITH HYPERGLYCEMIA, WITH LONG-TERM CURRENT USE OF INSULIN (HCC): ICD-10-CM

## 2020-12-02 DIAGNOSIS — IMO0002 UNCONTROLLED TYPE 2 DIABETES MELLITUS WITH COMPLICATION, WITHOUT LONG-TERM CURRENT USE OF INSULIN: ICD-10-CM

## 2020-12-02 DIAGNOSIS — Z79.4 TYPE 2 DIABETES MELLITUS WITH HYPERGLYCEMIA, WITH LONG-TERM CURRENT USE OF INSULIN (HCC): ICD-10-CM

## 2020-12-02 DIAGNOSIS — E11.65 TYPE 2 DIABETES MELLITUS WITH HYPERGLYCEMIA, WITH LONG-TERM CURRENT USE OF INSULIN (HCC): ICD-10-CM

## 2020-12-02 RX ORDER — RAMIPRIL 5 MG/1
5 CAPSULE ORAL DAILY
Qty: 90 CAPSULE | Refills: 1 | Status: SHIPPED | OUTPATIENT
Start: 2020-12-02 | End: 2020-12-15 | Stop reason: HOSPADM

## 2020-12-03 DIAGNOSIS — IMO0002 UNCONTROLLED TYPE 2 DIABETES MELLITUS WITH COMPLICATION, WITHOUT LONG-TERM CURRENT USE OF INSULIN: ICD-10-CM

## 2020-12-03 RX ORDER — RAMIPRIL 5 MG/1
CAPSULE ORAL
Qty: 90 CAPSULE | Refills: 1 | OUTPATIENT
Start: 2020-12-03

## 2020-12-08 ENCOUNTER — APPOINTMENT (EMERGENCY)
Dept: CT IMAGING | Facility: HOSPITAL | Age: 80
DRG: 872 | End: 2020-12-08
Payer: MEDICARE

## 2020-12-08 ENCOUNTER — APPOINTMENT (EMERGENCY)
Dept: RADIOLOGY | Facility: HOSPITAL | Age: 80
DRG: 872 | End: 2020-12-08
Payer: MEDICARE

## 2020-12-08 ENCOUNTER — HOSPITAL ENCOUNTER (INPATIENT)
Facility: HOSPITAL | Age: 80
LOS: 6 days | Discharge: HOME HEALTH | DRG: 872 | End: 2020-12-15
Admitting: INTERNAL MEDICINE
Payer: MEDICARE

## 2020-12-08 DIAGNOSIS — I10 ESSENTIAL HYPERTENSION: ICD-10-CM

## 2020-12-08 DIAGNOSIS — R78.81 BACTEREMIA: ICD-10-CM

## 2020-12-08 DIAGNOSIS — A41.9 SEVERE SEPSIS (HCC): ICD-10-CM

## 2020-12-08 DIAGNOSIS — R65.20 SEVERE SEPSIS (HCC): ICD-10-CM

## 2020-12-08 DIAGNOSIS — R50.82 POSTOPERATIVE FEVER: ICD-10-CM

## 2020-12-08 DIAGNOSIS — R50.9 FEVER: Primary | ICD-10-CM

## 2020-12-08 PROBLEM — R74.01 TRANSAMINITIS: Status: ACTIVE | Noted: 2020-12-08

## 2020-12-08 PROBLEM — R79.89 ELEVATED SERUM CREATININE: Status: ACTIVE | Noted: 2020-12-08

## 2020-12-08 PROBLEM — D69.6 THROMBOCYTOPENIA (HCC): Status: ACTIVE | Noted: 2020-12-08

## 2020-12-08 PROBLEM — E87.1 HYPONATREMIA: Status: ACTIVE | Noted: 2020-12-08

## 2020-12-08 PROBLEM — E11.9 TYPE 2 DIABETES MELLITUS (HCC): Status: ACTIVE | Noted: 2017-04-19

## 2020-12-08 LAB
ALBUMIN SERPL BCP-MCNC: 4 G/DL (ref 3.4–4.8)
ALP SERPL-CCNC: 58.9 U/L (ref 10–129)
ALT SERPL W P-5'-P-CCNC: 87 U/L (ref 5–63)
ANION GAP SERPL CALCULATED.3IONS-SCNC: 11 MMOL/L (ref 4–13)
APTT PPP: 25 SECONDS (ref 23–31)
AST SERPL W P-5'-P-CCNC: 60 U/L (ref 15–41)
BACTERIA UR QL AUTO: ABNORMAL /HPF
BASOPHILS # BLD AUTO: 0 THOUSANDS/ΜL (ref 0–0.1)
BASOPHILS NFR BLD AUTO: 0 % (ref 0–1)
BILIRUB SERPL-MCNC: 1.03 MG/DL (ref 0.3–1.2)
BILIRUB UR QL STRIP: NEGATIVE
BUN SERPL-MCNC: 28 MG/DL (ref 6–20)
CALCIUM SERPL-MCNC: 9 MG/DL (ref 8.4–10.2)
CHLORIDE SERPL-SCNC: 96 MMOL/L (ref 96–108)
CLARITY UR: CLEAR
CO2 SERPL-SCNC: 25 MMOL/L (ref 22–33)
COLOR UR: YELLOW
CREAT SERPL-MCNC: 1.28 MG/DL (ref 0.5–1.2)
EOSINOPHIL # BLD AUTO: 0.02 THOUSAND/ΜL (ref 0–0.61)
EOSINOPHIL NFR BLD AUTO: 0 % (ref 0–6)
ERYTHROCYTE [DISTWIDTH] IN BLOOD BY AUTOMATED COUNT: 14.7 % (ref 11.6–15.1)
FLUAV RNA RESP QL NAA+PROBE: NEGATIVE
FLUBV RNA RESP QL NAA+PROBE: NEGATIVE
GFR SERPL CREATININE-BSD FRML MDRD: 53 ML/MIN/1.73SQ M
GLUCOSE SERPL-MCNC: 191 MG/DL (ref 65–140)
GLUCOSE SERPL-MCNC: 324 MG/DL (ref 65–140)
GLUCOSE UR STRIP-MCNC: ABNORMAL MG/DL
HCT VFR BLD AUTO: 38 % (ref 36.5–49.3)
HGB BLD-MCNC: 12.9 G/DL (ref 12–17)
HGB UR QL STRIP.AUTO: ABNORMAL
IMM GRANULOCYTES # BLD AUTO: 0.03 THOUSAND/UL (ref 0–0.2)
IMM GRANULOCYTES NFR BLD AUTO: 0 % (ref 0–2)
INR PPP: 1 (ref 0.9–1.1)
KETONES UR STRIP-MCNC: NEGATIVE MG/DL
LACTATE SERPL-SCNC: 1.4 MMOL/L (ref 0–2)
LACTATE SERPL-SCNC: 2.9 MMOL/L (ref 0–2)
LEUKOCYTE ESTERASE UR QL STRIP: NEGATIVE
LYMPHOCYTES # BLD AUTO: 1.15 THOUSANDS/ΜL (ref 0.6–4.47)
LYMPHOCYTES NFR BLD AUTO: 13 % (ref 14–44)
MCH RBC QN AUTO: 30.6 PG (ref 26.8–34.3)
MCHC RBC AUTO-ENTMCNC: 33.9 G/DL (ref 31.4–37.4)
MCV RBC AUTO: 90 FL (ref 82–98)
MONOCYTES # BLD AUTO: 1.39 THOUSAND/ΜL (ref 0.17–1.22)
MONOCYTES NFR BLD AUTO: 15 % (ref 4–12)
NEUTROPHILS # BLD AUTO: 6.43 THOUSANDS/ΜL (ref 1.85–7.62)
NEUTS SEG NFR BLD AUTO: 72 % (ref 43–75)
NITRITE UR QL STRIP: NEGATIVE
NON-SQ EPI CELLS URNS QL MICRO: ABNORMAL /HPF
PH UR STRIP.AUTO: 5 [PH]
PLATELET # BLD AUTO: 103 THOUSANDS/UL (ref 149–390)
PMV BLD AUTO: 12 FL (ref 8.9–12.7)
POTASSIUM SERPL-SCNC: 4.2 MMOL/L (ref 3.5–5)
PROT SERPL-MCNC: 6.4 G/DL (ref 6.4–8.3)
PROT UR STRIP-MCNC: ABNORMAL MG/DL
PROTHROMBIN TIME: 11.3 SECONDS (ref 9.5–12.1)
RBC # BLD AUTO: 4.22 MILLION/UL (ref 3.88–5.62)
RBC #/AREA URNS AUTO: ABNORMAL /HPF
RSV RNA RESP QL NAA+PROBE: NEGATIVE
SARS-COV-2 RNA RESP QL NAA+PROBE: NEGATIVE
SODIUM SERPL-SCNC: 132 MMOL/L (ref 133–145)
SP GR UR STRIP.AUTO: 1.02 (ref 1–1.03)
UROBILINOGEN UR QL STRIP.AUTO: 0.2 E.U./DL
WBC # BLD AUTO: 9.02 THOUSAND/UL (ref 4.31–10.16)
WBC #/AREA URNS AUTO: ABNORMAL /HPF

## 2020-12-08 PROCEDURE — 99285 EMERGENCY DEPT VISIT HI MDM: CPT

## 2020-12-08 PROCEDURE — 87040 BLOOD CULTURE FOR BACTERIA: CPT

## 2020-12-08 PROCEDURE — 71045 X-RAY EXAM CHEST 1 VIEW: CPT

## 2020-12-08 PROCEDURE — 85025 COMPLETE CBC W/AUTO DIFF WBC: CPT

## 2020-12-08 PROCEDURE — G1004 CDSM NDSC: HCPCS

## 2020-12-08 PROCEDURE — 82948 REAGENT STRIP/BLOOD GLUCOSE: CPT

## 2020-12-08 PROCEDURE — 85610 PROTHROMBIN TIME: CPT

## 2020-12-08 PROCEDURE — 80053 COMPREHEN METABOLIC PANEL: CPT

## 2020-12-08 PROCEDURE — 87186 SC STD MICRODIL/AGAR DIL: CPT

## 2020-12-08 PROCEDURE — 74176 CT ABD & PELVIS W/O CONTRAST: CPT

## 2020-12-08 PROCEDURE — 1123F ACP DISCUSS/DSCN MKR DOCD: CPT

## 2020-12-08 PROCEDURE — 96365 THER/PROPH/DIAG IV INF INIT: CPT

## 2020-12-08 PROCEDURE — 36415 COLL VENOUS BLD VENIPUNCTURE: CPT

## 2020-12-08 PROCEDURE — 0241U HB NFCT DS VIR RESP RNA 4 TRGT: CPT

## 2020-12-08 PROCEDURE — 83605 ASSAY OF LACTIC ACID: CPT

## 2020-12-08 PROCEDURE — 81001 URINALYSIS AUTO W/SCOPE: CPT

## 2020-12-08 PROCEDURE — 87077 CULTURE AEROBIC IDENTIFY: CPT

## 2020-12-08 PROCEDURE — 96361 HYDRATE IV INFUSION ADD-ON: CPT

## 2020-12-08 PROCEDURE — 85730 THROMBOPLASTIN TIME PARTIAL: CPT

## 2020-12-08 RX ORDER — SODIUM CHLORIDE 9 MG/ML
600 INJECTION, SOLUTION INTRAVENOUS CONTINUOUS
Status: DISCONTINUED | OUTPATIENT
Start: 2020-12-08 | End: 2020-12-08

## 2020-12-08 RX ORDER — DOCUSATE SODIUM 100 MG/1
100 CAPSULE, LIQUID FILLED ORAL 2 TIMES DAILY
Status: DISCONTINUED | OUTPATIENT
Start: 2020-12-09 | End: 2020-12-15 | Stop reason: HOSPADM

## 2020-12-08 RX ORDER — INSULIN GLARGINE 100 [IU]/ML
5 INJECTION, SOLUTION SUBCUTANEOUS
Status: DISCONTINUED | OUTPATIENT
Start: 2020-12-08 | End: 2020-12-10

## 2020-12-08 RX ORDER — PRAMIPEXOLE DIHYDROCHLORIDE 0.5 MG/1
1 TABLET ORAL DAILY
Status: DISCONTINUED | OUTPATIENT
Start: 2020-12-09 | End: 2020-12-11

## 2020-12-08 RX ORDER — SODIUM CHLORIDE 9 MG/ML
75 INJECTION, SOLUTION INTRAVENOUS CONTINUOUS
Status: DISCONTINUED | OUTPATIENT
Start: 2020-12-08 | End: 2020-12-10

## 2020-12-08 RX ORDER — POLYETHYLENE GLYCOL 3350 17 G/17G
17 POWDER, FOR SOLUTION ORAL DAILY PRN
Status: DISCONTINUED | OUTPATIENT
Start: 2020-12-08 | End: 2020-12-15 | Stop reason: HOSPADM

## 2020-12-08 RX ORDER — ASPIRIN 81 MG/1
81 TABLET ORAL DAILY
Status: DISCONTINUED | OUTPATIENT
Start: 2020-12-09 | End: 2020-12-15 | Stop reason: HOSPADM

## 2020-12-08 RX ORDER — VANCOMYCIN HYDROCHLORIDE 1 G/200ML
15 INJECTION, SOLUTION INTRAVENOUS EVERY 12 HOURS
Status: DISCONTINUED | OUTPATIENT
Start: 2020-12-08 | End: 2020-12-08 | Stop reason: DRUGHIGH

## 2020-12-08 RX ORDER — ACETAMINOPHEN 325 MG/1
650 TABLET ORAL EVERY 6 HOURS PRN
Status: DISCONTINUED | OUTPATIENT
Start: 2020-12-08 | End: 2020-12-15 | Stop reason: HOSPADM

## 2020-12-08 RX ORDER — SODIUM CHLORIDE 9 MG/ML
1500 INJECTION, SOLUTION INTRAVENOUS CONTINUOUS
Status: DISCONTINUED | OUTPATIENT
Start: 2020-12-08 | End: 2020-12-08

## 2020-12-08 RX ORDER — EZETIMIBE 10 MG/1
10 TABLET ORAL DAILY
Status: DISCONTINUED | OUTPATIENT
Start: 2020-12-09 | End: 2020-12-15 | Stop reason: HOSPADM

## 2020-12-08 RX ORDER — ACETAMINOPHEN 325 MG/1
650 TABLET ORAL ONCE
Status: COMPLETED | OUTPATIENT
Start: 2020-12-08 | End: 2020-12-08

## 2020-12-08 RX ADMIN — SODIUM CHLORIDE 3.38 G: 9 INJECTION, SOLUTION INTRAVENOUS at 18:32

## 2020-12-08 RX ADMIN — SODIUM CHLORIDE 600 ML/HR: 0.9 INJECTION, SOLUTION INTRAVENOUS at 20:11

## 2020-12-08 RX ADMIN — ACETAMINOPHEN 650 MG: 325 TABLET, FILM COATED ORAL at 18:18

## 2020-12-08 RX ADMIN — SODIUM CHLORIDE 1500 ML/HR: 0.9 INJECTION, SOLUTION INTRAVENOUS at 18:27

## 2020-12-09 ENCOUNTER — APPOINTMENT (OUTPATIENT)
Dept: VASCULAR ULTRASOUND | Facility: HOSPITAL | Age: 80
DRG: 872 | End: 2020-12-09
Payer: MEDICARE

## 2020-12-09 LAB
ALBUMIN SERPL BCP-MCNC: 3.1 G/DL (ref 3.4–4.8)
ALP SERPL-CCNC: 42.5 U/L (ref 10–129)
ALT SERPL W P-5'-P-CCNC: 67 U/L (ref 5–63)
ANION GAP SERPL CALCULATED.3IONS-SCNC: 5 MMOL/L (ref 4–13)
AST SERPL W P-5'-P-CCNC: 46 U/L (ref 15–41)
BASOPHILS # BLD MANUAL: 0 THOUSAND/UL (ref 0–0.1)
BASOPHILS NFR MAR MANUAL: 0 % (ref 0–1)
BILIRUB SERPL-MCNC: 0.75 MG/DL (ref 0.3–1.2)
BUN SERPL-MCNC: 21 MG/DL (ref 6–20)
CALCIUM ALBUM COR SERPL-MCNC: 9 MG/DL (ref 8.3–10.1)
CALCIUM SERPL-MCNC: 8.3 MG/DL (ref 8.4–10.2)
CHLORIDE SERPL-SCNC: 103 MMOL/L (ref 96–108)
CO2 SERPL-SCNC: 27 MMOL/L (ref 22–33)
CREAT SERPL-MCNC: 0.98 MG/DL (ref 0.5–1.2)
EOSINOPHIL # BLD MANUAL: 0 THOUSAND/UL (ref 0–0.4)
EOSINOPHIL NFR BLD MANUAL: 0 % (ref 0–6)
ERYTHROCYTE [DISTWIDTH] IN BLOOD BY AUTOMATED COUNT: 14.8 % (ref 11.6–15.1)
GFR SERPL CREATININE-BSD FRML MDRD: 74 ML/MIN/1.73SQ M
GLUCOSE SERPL-MCNC: 214 MG/DL (ref 65–140)
GLUCOSE SERPL-MCNC: 258 MG/DL (ref 65–140)
GLUCOSE SERPL-MCNC: 272 MG/DL (ref 65–140)
GLUCOSE SERPL-MCNC: 281 MG/DL (ref 65–140)
GLUCOSE SERPL-MCNC: 312 MG/DL (ref 65–140)
HCT VFR BLD AUTO: 31.4 % (ref 36.5–49.3)
HGB BLD-MCNC: 10.6 G/DL (ref 12–17)
LYMPHOCYTES # BLD AUTO: 1.04 THOUSAND/UL (ref 0.6–4.47)
LYMPHOCYTES # BLD AUTO: 16 % (ref 14–44)
MCH RBC QN AUTO: 30.5 PG (ref 26.8–34.3)
MCHC RBC AUTO-ENTMCNC: 33.8 G/DL (ref 31.4–37.4)
MCV RBC AUTO: 91 FL (ref 82–98)
METAMYELOCYTES NFR BLD MANUAL: 1 % (ref 0–1)
MONOCYTES # BLD AUTO: 0.65 THOUSAND/UL (ref 0–1.22)
MONOCYTES NFR BLD: 10 % (ref 4–12)
NEUTROPHILS # BLD MANUAL: 4.18 THOUSAND/UL (ref 1.85–7.62)
NEUTS BAND NFR BLD MANUAL: 6 % (ref 0–8)
NEUTS SEG NFR BLD AUTO: 58 % (ref 43–75)
PLATELET # BLD AUTO: 92 THOUSANDS/UL (ref 149–390)
PLATELET # BLD AUTO: 93 THOUSANDS/UL (ref 149–390)
PLATELET BLD QL SMEAR: ABNORMAL
PMV BLD AUTO: 11.2 FL (ref 8.9–12.7)
PMV BLD AUTO: 12.4 FL (ref 8.9–12.7)
POIKILOCYTOSIS BLD QL SMEAR: PRESENT
POTASSIUM SERPL-SCNC: 4 MMOL/L (ref 3.5–5)
PROCALCITONIN SERPL-MCNC: 0.28 NG/ML
PROT SERPL-MCNC: 5 G/DL (ref 6.4–8.3)
RBC # BLD AUTO: 3.47 MILLION/UL (ref 3.88–5.62)
RBC MORPH BLD: PRESENT
SODIUM SERPL-SCNC: 135 MMOL/L (ref 133–145)
TOTAL CELLS COUNTED SPEC: 100
TROPONIN I SERPL-MCNC: <0.03 NG/ML (ref 0–0.07)
VARIANT LYMPHS # BLD AUTO: 9 %
WBC # BLD AUTO: 6.53 THOUSAND/UL (ref 4.31–10.16)

## 2020-12-09 PROCEDURE — 84145 PROCALCITONIN (PCT): CPT | Performed by: INTERNAL MEDICINE

## 2020-12-09 PROCEDURE — 82948 REAGENT STRIP/BLOOD GLUCOSE: CPT

## 2020-12-09 PROCEDURE — 84484 ASSAY OF TROPONIN QUANT: CPT | Performed by: INTERNAL MEDICINE

## 2020-12-09 PROCEDURE — 93970 EXTREMITY STUDY: CPT | Performed by: SURGERY

## 2020-12-09 PROCEDURE — 85007 BL SMEAR W/DIFF WBC COUNT: CPT | Performed by: INTERNAL MEDICINE

## 2020-12-09 PROCEDURE — 85027 COMPLETE CBC AUTOMATED: CPT | Performed by: INTERNAL MEDICINE

## 2020-12-09 PROCEDURE — 93970 EXTREMITY STUDY: CPT

## 2020-12-09 PROCEDURE — 80053 COMPREHEN METABOLIC PANEL: CPT | Performed by: INTERNAL MEDICINE

## 2020-12-09 PROCEDURE — 85049 AUTOMATED PLATELET COUNT: CPT | Performed by: INTERNAL MEDICINE

## 2020-12-09 PROCEDURE — 97163 PT EVAL HIGH COMPLEX 45 MIN: CPT

## 2020-12-09 PROCEDURE — 99223 1ST HOSP IP/OBS HIGH 75: CPT | Performed by: INTERNAL MEDICINE

## 2020-12-09 RX ORDER — METRONIDAZOLE 500 MG/1
500 TABLET ORAL EVERY 8 HOURS SCHEDULED
Status: DISCONTINUED | OUTPATIENT
Start: 2020-12-09 | End: 2020-12-10

## 2020-12-09 RX ORDER — CEFEPIME HYDROCHLORIDE 2 G/50ML
2000 INJECTION, SOLUTION INTRAVENOUS EVERY 12 HOURS
Status: DISCONTINUED | OUTPATIENT
Start: 2020-12-09 | End: 2020-12-09

## 2020-12-09 RX ORDER — CEFEPIME HYDROCHLORIDE 1 G/50ML
1000 INJECTION, SOLUTION INTRAVENOUS EVERY 12 HOURS
Status: DISCONTINUED | OUTPATIENT
Start: 2020-12-09 | End: 2020-12-10

## 2020-12-09 RX ADMIN — INSULIN LISPRO 3 UNITS: 100 INJECTION, SOLUTION INTRAVENOUS; SUBCUTANEOUS at 16:41

## 2020-12-09 RX ADMIN — ACETAMINOPHEN 650 MG: 325 TABLET, FILM COATED ORAL at 02:30

## 2020-12-09 RX ADMIN — SODIUM CHLORIDE 75 ML/HR: 0.9 INJECTION, SOLUTION INTRAVENOUS at 21:39

## 2020-12-09 RX ADMIN — METRONIDAZOLE 500 MG: 500 INJECTION, SOLUTION INTRAVENOUS at 09:29

## 2020-12-09 RX ADMIN — CEFEPIME HYDROCHLORIDE 1000 MG: 1 INJECTION, SOLUTION INTRAVENOUS at 10:15

## 2020-12-09 RX ADMIN — ASPIRIN 81 MG: 81 TABLET, COATED ORAL at 08:30

## 2020-12-09 RX ADMIN — METRONIDAZOLE 500 MG: 500 TABLET ORAL at 21:39

## 2020-12-09 RX ADMIN — PIPERACILLIN AND TAZOBACTAM 3.38 G: 3; .375 INJECTION, POWDER, FOR SOLUTION INTRAVENOUS at 07:26

## 2020-12-09 RX ADMIN — INSULIN LISPRO 2 UNITS: 100 INJECTION, SOLUTION INTRAVENOUS; SUBCUTANEOUS at 08:30

## 2020-12-09 RX ADMIN — EZETIMIBE 10 MG: 10 TABLET ORAL at 08:30

## 2020-12-09 RX ADMIN — INSULIN GLARGINE 5 UNITS: 100 INJECTION, SOLUTION SUBCUTANEOUS at 21:39

## 2020-12-09 RX ADMIN — SODIUM CHLORIDE 75 ML/HR: 0.9 INJECTION, SOLUTION INTRAVENOUS at 00:31

## 2020-12-09 RX ADMIN — METRONIDAZOLE 500 MG: 500 INJECTION, SOLUTION INTRAVENOUS at 16:40

## 2020-12-09 RX ADMIN — METRONIDAZOLE 500 MG: 500 TABLET ORAL at 16:48

## 2020-12-09 RX ADMIN — CEFEPIME HYDROCHLORIDE 1000 MG: 1 INJECTION, SOLUTION INTRAVENOUS at 21:39

## 2020-12-09 RX ADMIN — INSULIN LISPRO 4 UNITS: 100 INJECTION, SOLUTION INTRAVENOUS; SUBCUTANEOUS at 12:13

## 2020-12-09 RX ADMIN — PRAMIPEXOLE DIHYDROCHLORIDE 1 MG: 0.5 TABLET ORAL at 08:30

## 2020-12-09 RX ADMIN — INSULIN LISPRO 3 UNITS: 100 INJECTION, SOLUTION INTRAVENOUS; SUBCUTANEOUS at 21:48

## 2020-12-09 RX ADMIN — INSULIN GLARGINE 5 UNITS: 100 INJECTION, SOLUTION SUBCUTANEOUS at 00:31

## 2020-12-09 RX ADMIN — PIPERACILLIN AND TAZOBACTAM 3.38 G: 3; .375 INJECTION, POWDER, FOR SOLUTION INTRAVENOUS at 02:24

## 2020-12-10 PROBLEM — R78.81 BACTEREMIA: Status: ACTIVE | Noted: 2020-12-10

## 2020-12-10 LAB
ANION GAP SERPL CALCULATED.3IONS-SCNC: 8 MMOL/L (ref 4–13)
BUN SERPL-MCNC: 18 MG/DL (ref 6–20)
CALCIUM SERPL-MCNC: 8.6 MG/DL (ref 8.4–10.2)
CHLORIDE SERPL-SCNC: 102 MMOL/L (ref 96–108)
CO2 SERPL-SCNC: 27 MMOL/L (ref 22–33)
CREAT SERPL-MCNC: 0.85 MG/DL (ref 0.5–1.2)
ERYTHROCYTE [DISTWIDTH] IN BLOOD BY AUTOMATED COUNT: 14.6 % (ref 11.6–15.1)
GFR SERPL CREATININE-BSD FRML MDRD: 83 ML/MIN/1.73SQ M
GLUCOSE SERPL-MCNC: 197 MG/DL (ref 65–140)
GLUCOSE SERPL-MCNC: 240 MG/DL (ref 65–140)
GLUCOSE SERPL-MCNC: 282 MG/DL (ref 65–140)
GLUCOSE SERPL-MCNC: 307 MG/DL (ref 65–140)
GLUCOSE SERPL-MCNC: 324 MG/DL (ref 65–140)
HCT VFR BLD AUTO: 34 % (ref 36.5–49.3)
HGB BLD-MCNC: 11.2 G/DL (ref 12–17)
MCH RBC QN AUTO: 29.7 PG (ref 26.8–34.3)
MCHC RBC AUTO-ENTMCNC: 32.9 G/DL (ref 31.4–37.4)
MCV RBC AUTO: 90 FL (ref 82–98)
PLATELET # BLD AUTO: 109 THOUSANDS/UL (ref 149–390)
PMV BLD AUTO: 11.6 FL (ref 8.9–12.7)
POTASSIUM SERPL-SCNC: 4.4 MMOL/L (ref 3.5–5)
PROCALCITONIN SERPL-MCNC: 0.16 NG/ML
RBC # BLD AUTO: 3.77 MILLION/UL (ref 3.88–5.62)
SODIUM SERPL-SCNC: 137 MMOL/L (ref 133–145)
WBC # BLD AUTO: 4.89 THOUSAND/UL (ref 4.31–10.16)

## 2020-12-10 PROCEDURE — 99221 1ST HOSP IP/OBS SF/LOW 40: CPT | Performed by: SURGERY

## 2020-12-10 PROCEDURE — 80048 BASIC METABOLIC PNL TOTAL CA: CPT | Performed by: INTERNAL MEDICINE

## 2020-12-10 PROCEDURE — 84145 PROCALCITONIN (PCT): CPT | Performed by: INTERNAL MEDICINE

## 2020-12-10 PROCEDURE — 82948 REAGENT STRIP/BLOOD GLUCOSE: CPT

## 2020-12-10 PROCEDURE — 85027 COMPLETE CBC AUTOMATED: CPT | Performed by: INTERNAL MEDICINE

## 2020-12-10 PROCEDURE — 99233 SBSQ HOSP IP/OBS HIGH 50: CPT | Performed by: INTERNAL MEDICINE

## 2020-12-10 PROCEDURE — 99222 1ST HOSP IP/OBS MODERATE 55: CPT | Performed by: INTERNAL MEDICINE

## 2020-12-10 RX ORDER — INSULIN GLARGINE 100 [IU]/ML
10 INJECTION, SOLUTION SUBCUTANEOUS
Status: DISCONTINUED | OUTPATIENT
Start: 2020-12-10 | End: 2020-12-15 | Stop reason: HOSPADM

## 2020-12-10 RX ORDER — INSULIN GLARGINE 100 [IU]/ML
8 INJECTION, SOLUTION SUBCUTANEOUS
Status: DISCONTINUED | OUTPATIENT
Start: 2020-12-10 | End: 2020-12-10

## 2020-12-10 RX ORDER — LISINOPRIL 20 MG/1
20 TABLET ORAL DAILY
Status: DISCONTINUED | OUTPATIENT
Start: 2020-12-10 | End: 2020-12-12

## 2020-12-10 RX ADMIN — INSULIN LISPRO 3 UNITS: 100 INJECTION, SOLUTION INTRAVENOUS; SUBCUTANEOUS at 17:12

## 2020-12-10 RX ADMIN — INSULIN LISPRO 3 UNITS: 100 INJECTION, SOLUTION INTRAVENOUS; SUBCUTANEOUS at 22:32

## 2020-12-10 RX ADMIN — PIPERACILLIN AND TAZOBACTAM 3.38 G: 3; .375 INJECTION, POWDER, LYOPHILIZED, FOR SOLUTION INTRAVENOUS at 08:54

## 2020-12-10 RX ADMIN — PIPERACILLIN AND TAZOBACTAM 3.38 G: 3; .375 INJECTION, POWDER, LYOPHILIZED, FOR SOLUTION INTRAVENOUS at 20:03

## 2020-12-10 RX ADMIN — ENOXAPARIN SODIUM 40 MG: 40 INJECTION SUBCUTANEOUS at 08:57

## 2020-12-10 RX ADMIN — INSULIN LISPRO 1 UNITS: 100 INJECTION, SOLUTION INTRAVENOUS; SUBCUTANEOUS at 08:56

## 2020-12-10 RX ADMIN — INSULIN LISPRO 4 UNITS: 100 INJECTION, SOLUTION INTRAVENOUS; SUBCUTANEOUS at 12:42

## 2020-12-10 RX ADMIN — DOCUSATE SODIUM 100 MG: 100 CAPSULE, LIQUID FILLED ORAL at 08:56

## 2020-12-10 RX ADMIN — ASPIRIN 81 MG: 81 TABLET, COATED ORAL at 08:56

## 2020-12-10 RX ADMIN — METRONIDAZOLE 500 MG: 500 TABLET ORAL at 05:31

## 2020-12-10 RX ADMIN — INSULIN GLARGINE 10 UNITS: 100 INJECTION, SOLUTION SUBCUTANEOUS at 22:32

## 2020-12-10 RX ADMIN — VANCOMYCIN HYDROCHLORIDE 750 MG: 750 INJECTION, SOLUTION INTRAVENOUS at 16:16

## 2020-12-10 RX ADMIN — EZETIMIBE 10 MG: 10 TABLET ORAL at 08:56

## 2020-12-10 RX ADMIN — PRAMIPEXOLE DIHYDROCHLORIDE 1 MG: 0.5 TABLET ORAL at 08:56

## 2020-12-10 RX ADMIN — LISINOPRIL 20 MG: 20 TABLET ORAL at 16:16

## 2020-12-10 RX ADMIN — PIPERACILLIN AND TAZOBACTAM 3.38 G: 3; .375 INJECTION, POWDER, LYOPHILIZED, FOR SOLUTION INTRAVENOUS at 14:07

## 2020-12-11 LAB
ANION GAP SERPL CALCULATED.3IONS-SCNC: 6 MMOL/L (ref 4–13)
BACTERIA BLD CULT: ABNORMAL
BACTERIA BLD CULT: ABNORMAL
BUN SERPL-MCNC: 16 MG/DL (ref 6–20)
CALCIUM SERPL-MCNC: 8.6 MG/DL (ref 8.4–10.2)
CHLORIDE SERPL-SCNC: 104 MMOL/L (ref 96–108)
CO2 SERPL-SCNC: 29 MMOL/L (ref 22–33)
CREAT SERPL-MCNC: 0.87 MG/DL (ref 0.5–1.2)
ERYTHROCYTE [DISTWIDTH] IN BLOOD BY AUTOMATED COUNT: 14.6 % (ref 11.6–15.1)
GFR SERPL CREATININE-BSD FRML MDRD: 83 ML/MIN/1.73SQ M
GLUCOSE SERPL-MCNC: 206 MG/DL (ref 65–140)
GLUCOSE SERPL-MCNC: 224 MG/DL (ref 65–140)
GLUCOSE SERPL-MCNC: 239 MG/DL (ref 65–140)
GLUCOSE SERPL-MCNC: 270 MG/DL (ref 65–140)
GLUCOSE SERPL-MCNC: 284 MG/DL (ref 65–140)
GRAM STN SPEC: ABNORMAL
GRAM STN SPEC: ABNORMAL
HCT VFR BLD AUTO: 33.8 % (ref 36.5–49.3)
HGB BLD-MCNC: 11.2 G/DL (ref 12–17)
MCH RBC QN AUTO: 29.9 PG (ref 26.8–34.3)
MCHC RBC AUTO-ENTMCNC: 33.1 G/DL (ref 31.4–37.4)
MCV RBC AUTO: 90 FL (ref 82–98)
PLATELET # BLD AUTO: 132 THOUSANDS/UL (ref 149–390)
PMV BLD AUTO: 11.5 FL (ref 8.9–12.7)
POTASSIUM SERPL-SCNC: 4.1 MMOL/L (ref 3.5–5)
RBC # BLD AUTO: 3.75 MILLION/UL (ref 3.88–5.62)
SODIUM SERPL-SCNC: 139 MMOL/L (ref 133–145)
WBC # BLD AUTO: 4.71 THOUSAND/UL (ref 4.31–10.16)

## 2020-12-11 PROCEDURE — 80048 BASIC METABOLIC PNL TOTAL CA: CPT | Performed by: INTERNAL MEDICINE

## 2020-12-11 PROCEDURE — 85027 COMPLETE CBC AUTOMATED: CPT | Performed by: INTERNAL MEDICINE

## 2020-12-11 PROCEDURE — 99232 SBSQ HOSP IP/OBS MODERATE 35: CPT | Performed by: INTERNAL MEDICINE

## 2020-12-11 PROCEDURE — 99233 SBSQ HOSP IP/OBS HIGH 50: CPT | Performed by: INTERNAL MEDICINE

## 2020-12-11 PROCEDURE — 82948 REAGENT STRIP/BLOOD GLUCOSE: CPT

## 2020-12-11 PROCEDURE — 87040 BLOOD CULTURE FOR BACTERIA: CPT | Performed by: INTERNAL MEDICINE

## 2020-12-11 PROCEDURE — 80170 ASSAY OF GENTAMICIN: CPT | Performed by: INTERNAL MEDICINE

## 2020-12-11 PROCEDURE — 99232 SBSQ HOSP IP/OBS MODERATE 35: CPT | Performed by: SURGERY

## 2020-12-11 RX ORDER — PRAMIPEXOLE DIHYDROCHLORIDE 0.5 MG/1
1 TABLET ORAL
Status: DISCONTINUED | OUTPATIENT
Start: 2020-12-11 | End: 2020-12-15 | Stop reason: HOSPADM

## 2020-12-11 RX ADMIN — INSULIN LISPRO 4 UNITS: 100 INJECTION, SOLUTION INTRAVENOUS; SUBCUTANEOUS at 16:47

## 2020-12-11 RX ADMIN — INSULIN GLARGINE 10 UNITS: 100 INJECTION, SOLUTION SUBCUTANEOUS at 21:27

## 2020-12-11 RX ADMIN — INSULIN LISPRO 3 UNITS: 100 INJECTION, SOLUTION INTRAVENOUS; SUBCUTANEOUS at 09:45

## 2020-12-11 RX ADMIN — EZETIMIBE 10 MG: 10 TABLET ORAL at 09:44

## 2020-12-11 RX ADMIN — PRAMIPEXOLE DIHYDROCHLORIDE 1 MG: 0.5 TABLET ORAL at 21:27

## 2020-12-11 RX ADMIN — INSULIN LISPRO 3 UNITS: 100 INJECTION, SOLUTION INTRAVENOUS; SUBCUTANEOUS at 16:47

## 2020-12-11 RX ADMIN — ENOXAPARIN SODIUM 40 MG: 40 INJECTION SUBCUTANEOUS at 09:44

## 2020-12-11 RX ADMIN — LISINOPRIL 20 MG: 20 TABLET ORAL at 09:43

## 2020-12-11 RX ADMIN — INSULIN LISPRO 3 UNITS: 100 INJECTION, SOLUTION INTRAVENOUS; SUBCUTANEOUS at 13:26

## 2020-12-11 RX ADMIN — DOCUSATE SODIUM 100 MG: 100 CAPSULE, LIQUID FILLED ORAL at 09:43

## 2020-12-11 RX ADMIN — ASPIRIN 81 MG: 81 TABLET, COATED ORAL at 09:44

## 2020-12-11 RX ADMIN — INSULIN LISPRO 2 UNITS: 100 INJECTION, SOLUTION INTRAVENOUS; SUBCUTANEOUS at 21:28

## 2020-12-11 RX ADMIN — AMPICILLIN SODIUM 2000 MG: 2 INJECTION, POWDER, FOR SOLUTION INTRAVENOUS at 17:43

## 2020-12-11 RX ADMIN — PIPERACILLIN AND TAZOBACTAM 3.38 G: 3; .375 INJECTION, POWDER, LYOPHILIZED, FOR SOLUTION INTRAVENOUS at 01:44

## 2020-12-11 RX ADMIN — AMPICILLIN SODIUM 2000 MG: 2 INJECTION, POWDER, FOR SOLUTION INTRAVENOUS at 19:54

## 2020-12-11 RX ADMIN — AMPICILLIN SODIUM 2000 MG: 2 INJECTION, POWDER, FOR SOLUTION INTRAVENOUS at 13:27

## 2020-12-11 RX ADMIN — GENTAMICIN SULFATE 320 MG: 40 INJECTION, SOLUTION INTRAMUSCULAR; INTRAVENOUS at 16:46

## 2020-12-11 RX ADMIN — INSULIN LISPRO 2 UNITS: 100 INJECTION, SOLUTION INTRAVENOUS; SUBCUTANEOUS at 09:44

## 2020-12-11 RX ADMIN — VANCOMYCIN HYDROCHLORIDE 750 MG: 750 INJECTION, SOLUTION INTRAVENOUS at 02:33

## 2020-12-11 RX ADMIN — PIPERACILLIN AND TAZOBACTAM 3.38 G: 3; .375 INJECTION, POWDER, LYOPHILIZED, FOR SOLUTION INTRAVENOUS at 09:43

## 2020-12-11 RX ADMIN — AMPICILLIN SODIUM 2000 MG: 2 INJECTION, POWDER, FOR SOLUTION INTRAVENOUS at 23:09

## 2020-12-12 LAB
ANION GAP SERPL CALCULATED.3IONS-SCNC: 7 MMOL/L (ref 4–13)
BUN SERPL-MCNC: 18 MG/DL (ref 6–20)
CALCIUM SERPL-MCNC: 8.8 MG/DL (ref 8.4–10.2)
CHLORIDE SERPL-SCNC: 107 MMOL/L (ref 96–108)
CO2 SERPL-SCNC: 30 MMOL/L (ref 22–33)
CREAT SERPL-MCNC: 0.9 MG/DL (ref 0.5–1.2)
ERYTHROCYTE [DISTWIDTH] IN BLOOD BY AUTOMATED COUNT: 14.6 % (ref 11.6–15.1)
GENTAMICIN PEAK SERPL-MCNC: 4.9 UG/ML (ref 4–8)
GFR SERPL CREATININE-BSD FRML MDRD: 82 ML/MIN/1.73SQ M
GLUCOSE SERPL-MCNC: 164 MG/DL (ref 65–140)
GLUCOSE SERPL-MCNC: 242 MG/DL (ref 65–140)
GLUCOSE SERPL-MCNC: 256 MG/DL (ref 65–140)
GLUCOSE SERPL-MCNC: 86 MG/DL (ref 65–140)
GLUCOSE SERPL-MCNC: 91 MG/DL (ref 65–140)
HCT VFR BLD AUTO: 36 % (ref 36.5–49.3)
HGB BLD-MCNC: 11.9 G/DL (ref 12–17)
MCH RBC QN AUTO: 29.9 PG (ref 26.8–34.3)
MCHC RBC AUTO-ENTMCNC: 33.1 G/DL (ref 31.4–37.4)
MCV RBC AUTO: 91 FL (ref 82–98)
PLATELET # BLD AUTO: 179 THOUSANDS/UL (ref 149–390)
PMV BLD AUTO: 11.7 FL (ref 8.9–12.7)
POTASSIUM SERPL-SCNC: 3.8 MMOL/L (ref 3.5–5)
RBC # BLD AUTO: 3.98 MILLION/UL (ref 3.88–5.62)
SODIUM SERPL-SCNC: 144 MMOL/L (ref 133–145)
WBC # BLD AUTO: 5.5 THOUSAND/UL (ref 4.31–10.16)

## 2020-12-12 PROCEDURE — 82948 REAGENT STRIP/BLOOD GLUCOSE: CPT

## 2020-12-12 PROCEDURE — 99233 SBSQ HOSP IP/OBS HIGH 50: CPT | Performed by: INTERNAL MEDICINE

## 2020-12-12 PROCEDURE — 80048 BASIC METABOLIC PNL TOTAL CA: CPT | Performed by: INTERNAL MEDICINE

## 2020-12-12 PROCEDURE — 85027 COMPLETE CBC AUTOMATED: CPT | Performed by: INTERNAL MEDICINE

## 2020-12-12 RX ORDER — LANOLIN ALCOHOL/MO/W.PET/CERES
3 CREAM (GRAM) TOPICAL
Status: DISCONTINUED | OUTPATIENT
Start: 2020-12-12 | End: 2020-12-15 | Stop reason: HOSPADM

## 2020-12-12 RX ORDER — LOSARTAN POTASSIUM 50 MG/1
100 TABLET ORAL DAILY
Status: DISCONTINUED | OUTPATIENT
Start: 2020-12-13 | End: 2020-12-15 | Stop reason: HOSPADM

## 2020-12-12 RX ADMIN — INSULIN LISPRO 2 UNITS: 100 INJECTION, SOLUTION INTRAVENOUS; SUBCUTANEOUS at 21:06

## 2020-12-12 RX ADMIN — PRAMIPEXOLE DIHYDROCHLORIDE 1 MG: 0.5 TABLET ORAL at 21:05

## 2020-12-12 RX ADMIN — AMPICILLIN SODIUM 2000 MG: 2 INJECTION, POWDER, FOR SOLUTION INTRAVENOUS at 12:55

## 2020-12-12 RX ADMIN — MELATONIN 3 MG: at 21:05

## 2020-12-12 RX ADMIN — AMPICILLIN SODIUM 2000 MG: 2 INJECTION, POWDER, FOR SOLUTION INTRAVENOUS at 23:09

## 2020-12-12 RX ADMIN — AMPICILLIN SODIUM 2000 MG: 2 INJECTION, POWDER, FOR SOLUTION INTRAVENOUS at 07:02

## 2020-12-12 RX ADMIN — AMPICILLIN SODIUM 2000 MG: 2 INJECTION, POWDER, FOR SOLUTION INTRAVENOUS at 03:17

## 2020-12-12 RX ADMIN — INSULIN LISPRO 3 UNITS: 100 INJECTION, SOLUTION INTRAVENOUS; SUBCUTANEOUS at 15:43

## 2020-12-12 RX ADMIN — ENOXAPARIN SODIUM 40 MG: 40 INJECTION SUBCUTANEOUS at 08:12

## 2020-12-12 RX ADMIN — AMPICILLIN SODIUM 2000 MG: 2 INJECTION, POWDER, FOR SOLUTION INTRAVENOUS at 16:27

## 2020-12-12 RX ADMIN — GENTAMICIN SULFATE 110 MG: 40 INJECTION, SOLUTION INTRAMUSCULAR; INTRAVENOUS at 15:43

## 2020-12-12 RX ADMIN — INSULIN LISPRO 3 UNITS: 100 INJECTION, SOLUTION INTRAVENOUS; SUBCUTANEOUS at 12:55

## 2020-12-12 RX ADMIN — ASPIRIN 81 MG: 81 TABLET, COATED ORAL at 08:12

## 2020-12-12 RX ADMIN — INSULIN LISPRO 3 UNITS: 100 INJECTION, SOLUTION INTRAVENOUS; SUBCUTANEOUS at 08:11

## 2020-12-12 RX ADMIN — GENTAMICIN SULFATE 110 MG: 40 INJECTION, SOLUTION INTRAMUSCULAR; INTRAVENOUS at 23:42

## 2020-12-12 RX ADMIN — LISINOPRIL 20 MG: 20 TABLET ORAL at 08:12

## 2020-12-12 RX ADMIN — INSULIN GLARGINE 10 UNITS: 100 INJECTION, SOLUTION SUBCUTANEOUS at 21:06

## 2020-12-12 RX ADMIN — INSULIN LISPRO 1 UNITS: 100 INJECTION, SOLUTION INTRAVENOUS; SUBCUTANEOUS at 12:55

## 2020-12-12 RX ADMIN — EZETIMIBE 10 MG: 10 TABLET ORAL at 08:13

## 2020-12-12 RX ADMIN — AMPICILLIN SODIUM 2000 MG: 2 INJECTION, POWDER, FOR SOLUTION INTRAVENOUS at 18:47

## 2020-12-13 LAB
GENTAMICIN PEAK SERPL-MCNC: 5.1 UG/ML (ref 4–8)
GENTAMICIN TROUGH SERPL-MCNC: 2.6 UG/ML (ref 0–2)
GLUCOSE SERPL-MCNC: 115 MG/DL (ref 65–140)
GLUCOSE SERPL-MCNC: 202 MG/DL (ref 65–140)
GLUCOSE SERPL-MCNC: 286 MG/DL (ref 65–140)

## 2020-12-13 PROCEDURE — 82948 REAGENT STRIP/BLOOD GLUCOSE: CPT

## 2020-12-13 PROCEDURE — 80170 ASSAY OF GENTAMICIN: CPT | Performed by: INTERNAL MEDICINE

## 2020-12-13 PROCEDURE — 99233 SBSQ HOSP IP/OBS HIGH 50: CPT | Performed by: INTERNAL MEDICINE

## 2020-12-13 PROCEDURE — 36410 VNPNXR 3YR/> PHY/QHP DX/THER: CPT | Performed by: PHYSICIAN ASSISTANT

## 2020-12-13 RX ADMIN — DOCUSATE SODIUM 100 MG: 100 CAPSULE, LIQUID FILLED ORAL at 08:14

## 2020-12-13 RX ADMIN — INSULIN LISPRO 3 UNITS: 100 INJECTION, SOLUTION INTRAVENOUS; SUBCUTANEOUS at 22:05

## 2020-12-13 RX ADMIN — AMPICILLIN SODIUM 2000 MG: 2 INJECTION, POWDER, FOR SOLUTION INTRAVENOUS at 13:48

## 2020-12-13 RX ADMIN — MELATONIN 3 MG: at 22:03

## 2020-12-13 RX ADMIN — EZETIMIBE 10 MG: 10 TABLET ORAL at 08:14

## 2020-12-13 RX ADMIN — INSULIN GLARGINE 10 UNITS: 100 INJECTION, SOLUTION SUBCUTANEOUS at 22:05

## 2020-12-13 RX ADMIN — ASPIRIN 81 MG: 81 TABLET, COATED ORAL at 08:14

## 2020-12-13 RX ADMIN — GENTAMICIN SULFATE 110 MG: 40 INJECTION, SOLUTION INTRAMUSCULAR; INTRAVENOUS at 22:59

## 2020-12-13 RX ADMIN — LOSARTAN POTASSIUM 100 MG: 50 TABLET, FILM COATED ORAL at 13:52

## 2020-12-13 RX ADMIN — AMPICILLIN SODIUM 2000 MG: 2 INJECTION, POWDER, FOR SOLUTION INTRAVENOUS at 02:52

## 2020-12-13 RX ADMIN — INSULIN LISPRO 3 UNITS: 100 INJECTION, SOLUTION INTRAVENOUS; SUBCUTANEOUS at 13:49

## 2020-12-13 RX ADMIN — PRAMIPEXOLE DIHYDROCHLORIDE 1 MG: 0.5 TABLET ORAL at 22:02

## 2020-12-13 RX ADMIN — ENOXAPARIN SODIUM 40 MG: 40 INJECTION SUBCUTANEOUS at 08:14

## 2020-12-13 RX ADMIN — GENTAMICIN SULFATE 110 MG: 40 INJECTION, SOLUTION INTRAMUSCULAR; INTRAVENOUS at 08:10

## 2020-12-13 RX ADMIN — INSULIN LISPRO 2 UNITS: 100 INJECTION, SOLUTION INTRAVENOUS; SUBCUTANEOUS at 13:49

## 2020-12-13 RX ADMIN — INSULIN LISPRO 3 UNITS: 100 INJECTION, SOLUTION INTRAVENOUS; SUBCUTANEOUS at 08:26

## 2020-12-13 RX ADMIN — AMPICILLIN SODIUM 2000 MG: 2 INJECTION, POWDER, FOR SOLUTION INTRAVENOUS at 18:12

## 2020-12-13 RX ADMIN — AMPICILLIN SODIUM 2000 MG: 2 INJECTION, POWDER, FOR SOLUTION INTRAVENOUS at 06:49

## 2020-12-13 RX ADMIN — INSULIN LISPRO 3 UNITS: 100 INJECTION, SOLUTION INTRAVENOUS; SUBCUTANEOUS at 16:54

## 2020-12-13 RX ADMIN — DOCUSATE SODIUM 100 MG: 100 CAPSULE, LIQUID FILLED ORAL at 18:12

## 2020-12-13 RX ADMIN — GENTAMICIN SULFATE 110 MG: 40 INJECTION, SOLUTION INTRAMUSCULAR; INTRAVENOUS at 13:46

## 2020-12-13 RX ADMIN — AMPICILLIN SODIUM 2000 MG: 2 INJECTION, POWDER, FOR SOLUTION INTRAVENOUS at 21:59

## 2020-12-14 PROBLEM — D69.6 THROMBOCYTOPENIA (HCC): Status: RESOLVED | Noted: 2020-12-08 | Resolved: 2020-12-14

## 2020-12-14 LAB
ANION GAP SERPL CALCULATED.3IONS-SCNC: 6 MMOL/L (ref 4–13)
BASOPHILS # BLD AUTO: 0.02 THOUSANDS/ΜL (ref 0–0.1)
BASOPHILS NFR BLD AUTO: 0 % (ref 0–1)
BUN SERPL-MCNC: 18 MG/DL (ref 6–20)
CALCIUM SERPL-MCNC: 9.1 MG/DL (ref 8.4–10.2)
CHLORIDE SERPL-SCNC: 107 MMOL/L (ref 96–108)
CO2 SERPL-SCNC: 31 MMOL/L (ref 22–33)
CREAT SERPL-MCNC: 0.96 MG/DL (ref 0.5–1.2)
EOSINOPHIL # BLD AUTO: 0.23 THOUSAND/ΜL (ref 0–0.61)
EOSINOPHIL NFR BLD AUTO: 3 % (ref 0–6)
ERYTHROCYTE [DISTWIDTH] IN BLOOD BY AUTOMATED COUNT: 14.6 % (ref 11.6–15.1)
GENTAMICIN TROUGH SERPL-MCNC: 2.7 UG/ML (ref 0–2)
GFR SERPL CREATININE-BSD FRML MDRD: 75 ML/MIN/1.73SQ M
GLUCOSE SERPL-MCNC: 229 MG/DL (ref 65–140)
GLUCOSE SERPL-MCNC: 247 MG/DL (ref 65–140)
GLUCOSE SERPL-MCNC: 287 MG/DL (ref 65–140)
GLUCOSE SERPL-MCNC: 85 MG/DL (ref 65–140)
GLUCOSE SERPL-MCNC: 92 MG/DL (ref 65–140)
HCT VFR BLD AUTO: 36 % (ref 36.5–49.3)
HGB BLD-MCNC: 11.8 G/DL (ref 12–17)
IMM GRANULOCYTES # BLD AUTO: 0.03 THOUSAND/UL (ref 0–0.2)
IMM GRANULOCYTES NFR BLD AUTO: 0 % (ref 0–2)
LYMPHOCYTES # BLD AUTO: 2.62 THOUSANDS/ΜL (ref 0.6–4.47)
LYMPHOCYTES NFR BLD AUTO: 38 % (ref 14–44)
MCH RBC QN AUTO: 29.8 PG (ref 26.8–34.3)
MCHC RBC AUTO-ENTMCNC: 32.8 G/DL (ref 31.4–37.4)
MCV RBC AUTO: 91 FL (ref 82–98)
MONOCYTES # BLD AUTO: 0.41 THOUSAND/ΜL (ref 0.17–1.22)
MONOCYTES NFR BLD AUTO: 6 % (ref 4–12)
NEUTROPHILS # BLD AUTO: 3.59 THOUSANDS/ΜL (ref 1.85–7.62)
NEUTS SEG NFR BLD AUTO: 53 % (ref 43–75)
PLATELET # BLD AUTO: 223 THOUSANDS/UL (ref 149–390)
PMV BLD AUTO: 10.6 FL (ref 8.9–12.7)
POTASSIUM SERPL-SCNC: 4.1 MMOL/L (ref 3.5–5)
RBC # BLD AUTO: 3.96 MILLION/UL (ref 3.88–5.62)
SODIUM SERPL-SCNC: 144 MMOL/L (ref 133–145)
WBC # BLD AUTO: 6.9 THOUSAND/UL (ref 4.31–10.16)

## 2020-12-14 PROCEDURE — 80170 ASSAY OF GENTAMICIN: CPT | Performed by: INTERNAL MEDICINE

## 2020-12-14 PROCEDURE — 80048 BASIC METABOLIC PNL TOTAL CA: CPT | Performed by: INTERNAL MEDICINE

## 2020-12-14 PROCEDURE — 99232 SBSQ HOSP IP/OBS MODERATE 35: CPT | Performed by: SURGERY

## 2020-12-14 PROCEDURE — 99233 SBSQ HOSP IP/OBS HIGH 50: CPT | Performed by: INTERNAL MEDICINE

## 2020-12-14 PROCEDURE — 82948 REAGENT STRIP/BLOOD GLUCOSE: CPT

## 2020-12-14 PROCEDURE — 85025 COMPLETE CBC W/AUTO DIFF WBC: CPT | Performed by: INTERNAL MEDICINE

## 2020-12-14 PROCEDURE — 99232 SBSQ HOSP IP/OBS MODERATE 35: CPT | Performed by: INTERNAL MEDICINE

## 2020-12-14 RX ADMIN — INSULIN LISPRO 3 UNITS: 100 INJECTION, SOLUTION INTRAVENOUS; SUBCUTANEOUS at 17:15

## 2020-12-14 RX ADMIN — INSULIN LISPRO 3 UNITS: 100 INJECTION, SOLUTION INTRAVENOUS; SUBCUTANEOUS at 12:09

## 2020-12-14 RX ADMIN — INSULIN GLARGINE 10 UNITS: 100 INJECTION, SOLUTION SUBCUTANEOUS at 21:42

## 2020-12-14 RX ADMIN — INSULIN LISPRO 3 UNITS: 100 INJECTION, SOLUTION INTRAVENOUS; SUBCUTANEOUS at 08:45

## 2020-12-14 RX ADMIN — ASPIRIN 81 MG: 81 TABLET, COATED ORAL at 08:46

## 2020-12-14 RX ADMIN — AMPICILLIN SODIUM 2000 MG: 2 INJECTION, POWDER, FOR SOLUTION INTRAVENOUS at 21:42

## 2020-12-14 RX ADMIN — PRAMIPEXOLE DIHYDROCHLORIDE 1 MG: 0.5 TABLET ORAL at 21:41

## 2020-12-14 RX ADMIN — INSULIN LISPRO 3 UNITS: 100 INJECTION, SOLUTION INTRAVENOUS; SUBCUTANEOUS at 21:41

## 2020-12-14 RX ADMIN — DOCUSATE SODIUM 100 MG: 100 CAPSULE, LIQUID FILLED ORAL at 08:46

## 2020-12-14 RX ADMIN — EZETIMIBE 10 MG: 10 TABLET ORAL at 08:44

## 2020-12-14 RX ADMIN — AMPICILLIN SODIUM 2000 MG: 2 INJECTION, POWDER, FOR SOLUTION INTRAVENOUS at 06:20

## 2020-12-14 RX ADMIN — MELATONIN 3 MG: at 21:41

## 2020-12-14 RX ADMIN — LOSARTAN POTASSIUM 100 MG: 50 TABLET, FILM COATED ORAL at 08:46

## 2020-12-14 RX ADMIN — ENOXAPARIN SODIUM 40 MG: 40 INJECTION SUBCUTANEOUS at 08:44

## 2020-12-14 RX ADMIN — DOCUSATE SODIUM 100 MG: 100 CAPSULE, LIQUID FILLED ORAL at 17:23

## 2020-12-14 RX ADMIN — INSULIN LISPRO 2 UNITS: 100 INJECTION, SOLUTION INTRAVENOUS; SUBCUTANEOUS at 17:15

## 2020-12-14 RX ADMIN — AMPICILLIN SODIUM 2000 MG: 2 INJECTION, POWDER, FOR SOLUTION INTRAVENOUS at 17:15

## 2020-12-14 RX ADMIN — AMPICILLIN SODIUM 2000 MG: 2 INJECTION, POWDER, FOR SOLUTION INTRAVENOUS at 12:08

## 2020-12-14 RX ADMIN — GENTAMICIN SULFATE 110 MG: 40 INJECTION, SOLUTION INTRAMUSCULAR; INTRAVENOUS at 22:26

## 2020-12-14 RX ADMIN — GENTAMICIN SULFATE 110 MG: 40 INJECTION, SOLUTION INTRAMUSCULAR; INTRAVENOUS at 08:55

## 2020-12-14 RX ADMIN — GENTAMICIN SULFATE 110 MG: 40 INJECTION, SOLUTION INTRAMUSCULAR; INTRAVENOUS at 15:07

## 2020-12-14 RX ADMIN — AMPICILLIN SODIUM 2000 MG: 2 INJECTION, POWDER, FOR SOLUTION INTRAVENOUS at 01:16

## 2020-12-15 VITALS
HEART RATE: 66 BPM | DIASTOLIC BLOOD PRESSURE: 75 MMHG | SYSTOLIC BLOOD PRESSURE: 119 MMHG | BODY MASS INDEX: 20.23 KG/M2 | OXYGEN SATURATION: 99 % | HEIGHT: 70 IN | RESPIRATION RATE: 18 BRPM | TEMPERATURE: 98 F | WEIGHT: 141.31 LBS

## 2020-12-15 PROBLEM — E87.1 HYPONATREMIA: Status: RESOLVED | Noted: 2020-12-08 | Resolved: 2020-12-15

## 2020-12-15 PROBLEM — R50.82 POSTOPERATIVE FEVER: Status: RESOLVED | Noted: 2020-12-08 | Resolved: 2020-12-15

## 2020-12-15 PROBLEM — R79.89 ELEVATED SERUM CREATININE: Status: RESOLVED | Noted: 2020-12-08 | Resolved: 2020-12-15

## 2020-12-15 LAB
ALBUMIN SERPL BCP-MCNC: 3.1 G/DL (ref 3.4–4.8)
ALP SERPL-CCNC: 43.4 U/L (ref 10–129)
ALT SERPL W P-5'-P-CCNC: 73 U/L (ref 5–63)
ANION GAP SERPL CALCULATED.3IONS-SCNC: 6 MMOL/L (ref 4–13)
AST SERPL W P-5'-P-CCNC: 46 U/L (ref 15–41)
BASOPHILS # BLD AUTO: 0.02 THOUSANDS/ΜL (ref 0–0.1)
BASOPHILS NFR BLD AUTO: 0 % (ref 0–1)
BILIRUB SERPL-MCNC: 0.55 MG/DL (ref 0.3–1.2)
BUN SERPL-MCNC: 22 MG/DL (ref 6–20)
CALCIUM ALBUM COR SERPL-MCNC: 9.4 MG/DL (ref 8.3–10.1)
CALCIUM SERPL-MCNC: 8.7 MG/DL (ref 8.4–10.2)
CHLORIDE SERPL-SCNC: 109 MMOL/L (ref 96–108)
CO2 SERPL-SCNC: 30 MMOL/L (ref 22–33)
CREAT SERPL-MCNC: 0.99 MG/DL (ref 0.5–1.2)
EOSINOPHIL # BLD AUTO: 0.28 THOUSAND/ΜL (ref 0–0.61)
EOSINOPHIL NFR BLD AUTO: 5 % (ref 0–6)
ERYTHROCYTE [DISTWIDTH] IN BLOOD BY AUTOMATED COUNT: 14.8 % (ref 11.6–15.1)
GFR SERPL CREATININE-BSD FRML MDRD: 73 ML/MIN/1.73SQ M
GLUCOSE SERPL-MCNC: 161 MG/DL (ref 65–140)
GLUCOSE SERPL-MCNC: 273 MG/DL (ref 65–140)
GLUCOSE SERPL-MCNC: 80 MG/DL (ref 65–140)
GLUCOSE SERPL-MCNC: 81 MG/DL (ref 65–140)
HCT VFR BLD AUTO: 32.2 % (ref 36.5–49.3)
HGB BLD-MCNC: 10.6 G/DL (ref 12–17)
IMM GRANULOCYTES # BLD AUTO: 0.05 THOUSAND/UL (ref 0–0.2)
IMM GRANULOCYTES NFR BLD AUTO: 1 % (ref 0–2)
LYMPHOCYTES # BLD AUTO: 1.71 THOUSANDS/ΜL (ref 0.6–4.47)
LYMPHOCYTES NFR BLD AUTO: 28 % (ref 14–44)
MCH RBC QN AUTO: 30.1 PG (ref 26.8–34.3)
MCHC RBC AUTO-ENTMCNC: 32.9 G/DL (ref 31.4–37.4)
MCV RBC AUTO: 92 FL (ref 82–98)
MONOCYTES # BLD AUTO: 0.46 THOUSAND/ΜL (ref 0.17–1.22)
MONOCYTES NFR BLD AUTO: 7 % (ref 4–12)
NEUTROPHILS # BLD AUTO: 3.66 THOUSANDS/ΜL (ref 1.85–7.62)
NEUTS SEG NFR BLD AUTO: 59 % (ref 43–75)
PLATELET # BLD AUTO: 216 THOUSANDS/UL (ref 149–390)
PMV BLD AUTO: 10.4 FL (ref 8.9–12.7)
POTASSIUM SERPL-SCNC: 4 MMOL/L (ref 3.5–5)
PROT SERPL-MCNC: 5 G/DL (ref 6.4–8.3)
RBC # BLD AUTO: 3.52 MILLION/UL (ref 3.88–5.62)
SODIUM SERPL-SCNC: 145 MMOL/L (ref 133–145)
WBC # BLD AUTO: 6.18 THOUSAND/UL (ref 4.31–10.16)

## 2020-12-15 PROCEDURE — 82948 REAGENT STRIP/BLOOD GLUCOSE: CPT

## 2020-12-15 PROCEDURE — 85025 COMPLETE CBC W/AUTO DIFF WBC: CPT | Performed by: INTERNAL MEDICINE

## 2020-12-15 PROCEDURE — 80053 COMPREHEN METABOLIC PANEL: CPT | Performed by: INTERNAL MEDICINE

## 2020-12-15 PROCEDURE — 99239 HOSP IP/OBS DSCHRG MGMT >30: CPT | Performed by: INTERNAL MEDICINE

## 2020-12-15 RX ORDER — AMPICILLIN AND SULBACTAM 100; 50 MG/ML; MG/ML
12 INJECTION, POWDER, FOR SOLUTION INTRAVENOUS EVERY 24 HOURS
Qty: 1 G | Refills: 0 | Status: SHIPPED | OUTPATIENT
Start: 2020-12-15 | End: 2020-12-25

## 2020-12-15 RX ORDER — LOSARTAN POTASSIUM 100 MG/1
100 TABLET ORAL DAILY
Qty: 30 TABLET | Refills: 0 | Status: SHIPPED | OUTPATIENT
Start: 2020-12-15 | End: 2022-01-01 | Stop reason: HOSPADM

## 2020-12-15 RX ADMIN — EZETIMIBE 10 MG: 10 TABLET ORAL at 08:12

## 2020-12-15 RX ADMIN — SODIUM CHLORIDE 3 G: 9 INJECTION, SOLUTION INTRAVENOUS at 17:29

## 2020-12-15 RX ADMIN — INSULIN LISPRO 1 UNITS: 100 INJECTION, SOLUTION INTRAVENOUS; SUBCUTANEOUS at 12:34

## 2020-12-15 RX ADMIN — ENOXAPARIN SODIUM 40 MG: 40 INJECTION SUBCUTANEOUS at 08:12

## 2020-12-15 RX ADMIN — GENTAMICIN SULFATE 110 MG: 40 INJECTION, SOLUTION INTRAMUSCULAR; INTRAVENOUS at 08:12

## 2020-12-15 RX ADMIN — INSULIN LISPRO 3 UNITS: 100 INJECTION, SOLUTION INTRAVENOUS; SUBCUTANEOUS at 18:12

## 2020-12-15 RX ADMIN — ASPIRIN 81 MG: 81 TABLET, COATED ORAL at 08:12

## 2020-12-15 RX ADMIN — AMPICILLIN SODIUM 2000 MG: 2 INJECTION, POWDER, FOR SOLUTION INTRAVENOUS at 10:31

## 2020-12-15 RX ADMIN — DOCUSATE SODIUM 100 MG: 100 CAPSULE, LIQUID FILLED ORAL at 08:12

## 2020-12-15 RX ADMIN — LOSARTAN POTASSIUM 100 MG: 50 TABLET, FILM COATED ORAL at 08:12

## 2020-12-15 RX ADMIN — INSULIN LISPRO 4 UNITS: 100 INJECTION, SOLUTION INTRAVENOUS; SUBCUTANEOUS at 18:12

## 2020-12-15 RX ADMIN — INSULIN LISPRO 3 UNITS: 100 INJECTION, SOLUTION INTRAVENOUS; SUBCUTANEOUS at 12:34

## 2020-12-15 RX ADMIN — GENTAMICIN SULFATE 110 MG: 40 INJECTION, SOLUTION INTRAMUSCULAR; INTRAVENOUS at 15:44

## 2020-12-15 RX ADMIN — AMPICILLIN SODIUM 2000 MG: 2 INJECTION, POWDER, FOR SOLUTION INTRAVENOUS at 05:28

## 2020-12-15 RX ADMIN — AMPICILLIN SODIUM 2000 MG: 2 INJECTION, POWDER, FOR SOLUTION INTRAVENOUS at 02:14

## 2020-12-16 PROBLEM — N17.9 AKI (ACUTE KIDNEY INJURY) (HCC): Status: RESOLVED | Noted: 2020-12-16 | Resolved: 2020-12-16

## 2020-12-16 PROBLEM — R78.81 BACTEREMIA: Status: RESOLVED | Noted: 2020-12-10 | Resolved: 2020-12-16

## 2020-12-16 PROBLEM — R65.20 SEVERE SEPSIS (HCC): Status: RESOLVED | Noted: 2020-12-08 | Resolved: 2020-12-16

## 2020-12-16 PROBLEM — N17.9 AKI (ACUTE KIDNEY INJURY) (HCC): Status: ACTIVE | Noted: 2020-12-16

## 2020-12-16 PROBLEM — A41.9 SEVERE SEPSIS (HCC): Status: RESOLVED | Noted: 2020-12-08 | Resolved: 2020-12-16

## 2020-12-16 LAB
BACTERIA BLD CULT: NORMAL
BACTERIA BLD CULT: NORMAL

## 2020-12-21 ENCOUNTER — TRANSCRIBE ORDERS (OUTPATIENT)
Dept: ADMINISTRATIVE | Facility: HOSPITAL | Age: 80
End: 2020-12-21

## 2020-12-21 ENCOUNTER — LAB (OUTPATIENT)
Dept: LAB | Facility: MEDICAL CENTER | Age: 80
End: 2020-12-21
Payer: MEDICARE

## 2020-12-21 DIAGNOSIS — A41.9 SEPSIS WITH ACUTE RENAL FAILURE, DUE TO UNSPECIFIED ORGANISM, UNSPECIFIED ACUTE RENAL FAILURE TYPE, UNSPECIFIED WHETHER SEPTIC SHOCK PRESENT (HCC): ICD-10-CM

## 2020-12-21 DIAGNOSIS — A41.9 UNSPECIFIED SEPTICEMIA(038.9) (HCC): ICD-10-CM

## 2020-12-21 DIAGNOSIS — R65.20 SEPSIS WITH ACUTE RENAL FAILURE, DUE TO UNSPECIFIED ORGANISM, UNSPECIFIED ACUTE RENAL FAILURE TYPE, UNSPECIFIED WHETHER SEPTIC SHOCK PRESENT (HCC): ICD-10-CM

## 2020-12-21 DIAGNOSIS — N17.9 SEPSIS WITH ACUTE RENAL FAILURE, DUE TO UNSPECIFIED ORGANISM, UNSPECIFIED ACUTE RENAL FAILURE TYPE, UNSPECIFIED WHETHER SEPTIC SHOCK PRESENT (HCC): ICD-10-CM

## 2020-12-21 DIAGNOSIS — A41.9 UNSPECIFIED SEPTICEMIA(038.9) (HCC): Primary | ICD-10-CM

## 2020-12-21 LAB
ALBUMIN SERPL BCP-MCNC: 3.5 G/DL (ref 3.5–5)
ALP SERPL-CCNC: 61 U/L (ref 46–116)
ALT SERPL W P-5'-P-CCNC: 90 U/L (ref 12–78)
ANION GAP SERPL CALCULATED.3IONS-SCNC: 9 MMOL/L (ref 4–13)
AST SERPL W P-5'-P-CCNC: 45 U/L (ref 5–45)
BASOPHILS # BLD AUTO: 0.02 THOUSANDS/ΜL (ref 0–0.1)
BASOPHILS NFR BLD AUTO: 0 % (ref 0–1)
BILIRUB SERPL-MCNC: 0.57 MG/DL (ref 0.2–1)
BUN SERPL-MCNC: 22 MG/DL (ref 5–25)
CALCIUM SERPL-MCNC: 9.8 MG/DL (ref 8.3–10.1)
CHLORIDE SERPL-SCNC: 105 MMOL/L (ref 100–108)
CO2 SERPL-SCNC: 28 MMOL/L (ref 21–32)
CREAT SERPL-MCNC: 1.02 MG/DL (ref 0.6–1.3)
EOSINOPHIL # BLD AUTO: 0.14 THOUSAND/ΜL (ref 0–0.61)
EOSINOPHIL NFR BLD AUTO: 2 % (ref 0–6)
ERYTHROCYTE [DISTWIDTH] IN BLOOD BY AUTOMATED COUNT: 15.5 % (ref 11.6–15.1)
GFR SERPL CREATININE-BSD FRML MDRD: 70 ML/MIN/1.73SQ M
GLUCOSE P FAST SERPL-MCNC: 123 MG/DL (ref 65–99)
HCT VFR BLD AUTO: 35.6 % (ref 36.5–49.3)
HGB BLD-MCNC: 11.4 G/DL (ref 12–17)
IMM GRANULOCYTES # BLD AUTO: 0.03 THOUSAND/UL (ref 0–0.2)
IMM GRANULOCYTES NFR BLD AUTO: 1 % (ref 0–2)
LYMPHOCYTES # BLD AUTO: 1.98 THOUSANDS/ΜL (ref 0.6–4.47)
LYMPHOCYTES NFR BLD AUTO: 33 % (ref 14–44)
MCH RBC QN AUTO: 30.7 PG (ref 26.8–34.3)
MCHC RBC AUTO-ENTMCNC: 32 G/DL (ref 31.4–37.4)
MCV RBC AUTO: 96 FL (ref 82–98)
MONOCYTES # BLD AUTO: 0.53 THOUSAND/ΜL (ref 0.17–1.22)
MONOCYTES NFR BLD AUTO: 9 % (ref 4–12)
NEUTROPHILS # BLD AUTO: 3.35 THOUSANDS/ΜL (ref 1.85–7.62)
NEUTS SEG NFR BLD AUTO: 55 % (ref 43–75)
NRBC BLD AUTO-RTO: 0 /100 WBCS
PLATELET # BLD AUTO: 175 THOUSANDS/UL (ref 149–390)
PMV BLD AUTO: 11.9 FL (ref 8.9–12.7)
POTASSIUM SERPL-SCNC: 4.1 MMOL/L (ref 3.5–5.3)
PROT SERPL-MCNC: 6.6 G/DL (ref 6.4–8.2)
RBC # BLD AUTO: 3.71 MILLION/UL (ref 3.88–5.62)
SODIUM SERPL-SCNC: 142 MMOL/L (ref 136–145)
WBC # BLD AUTO: 6.05 THOUSAND/UL (ref 4.31–10.16)

## 2020-12-21 PROCEDURE — 80053 COMPREHEN METABOLIC PANEL: CPT

## 2020-12-21 PROCEDURE — 36415 COLL VENOUS BLD VENIPUNCTURE: CPT

## 2020-12-21 PROCEDURE — 85025 COMPLETE CBC W/AUTO DIFF WBC: CPT

## 2020-12-22 ENCOUNTER — TELEPHONE (OUTPATIENT)
Dept: INFECTIOUS DISEASES | Facility: CLINIC | Age: 80
End: 2020-12-22

## 2020-12-25 ENCOUNTER — APPOINTMENT (EMERGENCY)
Dept: CT IMAGING | Facility: HOSPITAL | Age: 80
End: 2020-12-25
Payer: MEDICARE

## 2020-12-25 ENCOUNTER — HOSPITAL ENCOUNTER (EMERGENCY)
Facility: HOSPITAL | Age: 80
Discharge: HOME | End: 2020-12-25
Attending: EMERGENCY MEDICINE | Admitting: EMERGENCY MEDICINE
Payer: MEDICARE

## 2020-12-25 VITALS
WEIGHT: 146.61 LBS | SYSTOLIC BLOOD PRESSURE: 164 MMHG | TEMPERATURE: 97.5 F | OXYGEN SATURATION: 99 % | HEART RATE: 75 BPM | BODY MASS INDEX: 21.04 KG/M2 | DIASTOLIC BLOOD PRESSURE: 87 MMHG | RESPIRATION RATE: 16 BRPM

## 2020-12-25 DIAGNOSIS — K59.00 CONSTIPATION: Primary | ICD-10-CM

## 2020-12-25 LAB
ALBUMIN SERPL BCP-MCNC: 3.8 G/DL (ref 3.4–4.8)
ALP SERPL-CCNC: 49.9 U/L (ref 10–129)
ALT SERPL W P-5'-P-CCNC: 65 U/L (ref 5–63)
ANION GAP SERPL CALCULATED.3IONS-SCNC: 7 MMOL/L (ref 4–13)
AST SERPL W P-5'-P-CCNC: 44 U/L (ref 15–41)
BASOPHILS # BLD AUTO: 0.01 THOUSANDS/ΜL (ref 0–0.1)
BASOPHILS NFR BLD AUTO: 0 % (ref 0–1)
BILIRUB SERPL-MCNC: 0.51 MG/DL (ref 0.3–1.2)
BUN SERPL-MCNC: 17 MG/DL (ref 6–20)
CALCIUM SERPL-MCNC: 9.3 MG/DL (ref 8.4–10.2)
CHLORIDE SERPL-SCNC: 104 MMOL/L (ref 96–108)
CO2 SERPL-SCNC: 29 MMOL/L (ref 22–33)
CREAT SERPL-MCNC: 0.99 MG/DL (ref 0.5–1.2)
EOSINOPHIL # BLD AUTO: 0.13 THOUSAND/ΜL (ref 0–0.61)
EOSINOPHIL NFR BLD AUTO: 3 % (ref 0–6)
ERYTHROCYTE [DISTWIDTH] IN BLOOD BY AUTOMATED COUNT: 15.8 % (ref 11.6–15.1)
GFR SERPL CREATININE-BSD FRML MDRD: 73 ML/MIN/1.73SQ M
GLUCOSE SERPL-MCNC: 215 MG/DL (ref 65–140)
HCT VFR BLD AUTO: 32.6 % (ref 36.5–49.3)
HGB BLD-MCNC: 10.5 G/DL (ref 12–17)
IMM GRANULOCYTES # BLD AUTO: 0.01 THOUSAND/UL (ref 0–0.2)
IMM GRANULOCYTES NFR BLD AUTO: 0 % (ref 0–2)
LYMPHOCYTES # BLD AUTO: 1.45 THOUSANDS/ΜL (ref 0.6–4.47)
LYMPHOCYTES NFR BLD AUTO: 36 % (ref 14–44)
MCH RBC QN AUTO: 29.8 PG (ref 26.8–34.3)
MCHC RBC AUTO-ENTMCNC: 32.2 G/DL (ref 31.4–37.4)
MCV RBC AUTO: 93 FL (ref 82–98)
MONOCYTES # BLD AUTO: 0.44 THOUSAND/ΜL (ref 0.17–1.22)
MONOCYTES NFR BLD AUTO: 11 % (ref 4–12)
NEUTROPHILS # BLD AUTO: 1.97 THOUSANDS/ΜL (ref 1.85–7.62)
NEUTS SEG NFR BLD AUTO: 50 % (ref 43–75)
PLATELET # BLD AUTO: 100 THOUSANDS/UL (ref 149–390)
PMV BLD AUTO: 11.7 FL (ref 8.9–12.7)
POTASSIUM SERPL-SCNC: 4.1 MMOL/L (ref 3.5–5)
PROT SERPL-MCNC: 5.8 G/DL (ref 6.4–8.3)
RBC # BLD AUTO: 3.52 MILLION/UL (ref 3.88–5.62)
SODIUM SERPL-SCNC: 140 MMOL/L (ref 133–145)
WBC # BLD AUTO: 4.01 THOUSAND/UL (ref 4.31–10.16)

## 2020-12-25 PROCEDURE — 74176 CT ABD & PELVIS W/O CONTRAST: CPT

## 2020-12-25 PROCEDURE — 96361 HYDRATE IV INFUSION ADD-ON: CPT

## 2020-12-25 PROCEDURE — 96360 HYDRATION IV INFUSION INIT: CPT

## 2020-12-25 PROCEDURE — G1004 CDSM NDSC: HCPCS

## 2020-12-25 PROCEDURE — 36415 COLL VENOUS BLD VENIPUNCTURE: CPT | Performed by: EMERGENCY MEDICINE

## 2020-12-25 PROCEDURE — 85025 COMPLETE CBC W/AUTO DIFF WBC: CPT | Performed by: EMERGENCY MEDICINE

## 2020-12-25 PROCEDURE — 99284 EMERGENCY DEPT VISIT MOD MDM: CPT

## 2020-12-25 PROCEDURE — 80053 COMPREHEN METABOLIC PANEL: CPT | Performed by: EMERGENCY MEDICINE

## 2020-12-25 PROCEDURE — 99284 EMERGENCY DEPT VISIT MOD MDM: CPT | Performed by: EMERGENCY MEDICINE

## 2020-12-25 RX ORDER — POLYETHYLENE GLYCOL 3350 17 G/17G
17 POWDER, FOR SOLUTION ORAL DAILY PRN
Qty: 30 EACH | Refills: 0 | Status: ON HOLD | OUTPATIENT
Start: 2020-12-25 | End: 2022-01-01

## 2020-12-25 RX ORDER — MAGNESIUM CARB/ALUMINUM HYDROX 105-160MG
296 TABLET,CHEWABLE ORAL ONCE
Status: COMPLETED | OUTPATIENT
Start: 2020-12-25 | End: 2020-12-25

## 2020-12-25 RX ADMIN — MAGNESIUM CITRATE 296 ML: 1.75 LIQUID ORAL at 09:39

## 2020-12-25 RX ADMIN — SODIUM CHLORIDE 1000 ML: 0.9 INJECTION, SOLUTION INTRAVENOUS at 07:51

## 2021-01-01 ENCOUNTER — APPOINTMENT (OUTPATIENT)
Dept: LAB | Facility: CLINIC | Age: 81
End: 2021-01-01
Payer: MEDICARE

## 2021-01-01 ENCOUNTER — TELEPHONE (OUTPATIENT)
Dept: ENDOCRINOLOGY | Facility: CLINIC | Age: 81
End: 2021-01-01

## 2021-01-01 ENCOUNTER — TELEPHONE (OUTPATIENT)
Dept: DERMATOLOGY | Facility: CLINIC | Age: 81
End: 2021-01-01

## 2021-01-01 DIAGNOSIS — Z79.4 TYPE 2 DIABETES MELLITUS WITH HYPERGLYCEMIA, WITH LONG-TERM CURRENT USE OF INSULIN (HCC): ICD-10-CM

## 2021-01-01 DIAGNOSIS — E11.65 TYPE 2 DIABETES MELLITUS WITH HYPERGLYCEMIA, WITH LONG-TERM CURRENT USE OF INSULIN (HCC): ICD-10-CM

## 2021-01-01 DIAGNOSIS — I25.10 DISEASE OF CARDIOVASCULAR SYSTEM: ICD-10-CM

## 2021-01-01 DIAGNOSIS — E11.42 DIABETIC SENSORIMOTOR NEUROPATHY (HCC): ICD-10-CM

## 2021-01-01 LAB
ALBUMIN SERPL BCP-MCNC: 3.6 G/DL (ref 3.5–5)
ALP SERPL-CCNC: 69 U/L (ref 46–116)
ALT SERPL W P-5'-P-CCNC: 133 U/L (ref 12–78)
ANION GAP SERPL CALCULATED.3IONS-SCNC: 7 MMOL/L (ref 4–13)
AST SERPL W P-5'-P-CCNC: 66 U/L (ref 5–45)
BILIRUB SERPL-MCNC: 0.65 MG/DL (ref 0.2–1)
BUN SERPL-MCNC: 25 MG/DL (ref 5–25)
CALCIUM SERPL-MCNC: 9.1 MG/DL (ref 8.3–10.1)
CHLORIDE SERPL-SCNC: 104 MMOL/L (ref 100–108)
CHOLEST SERPL-MCNC: 157 MG/DL
CO2 SERPL-SCNC: 29 MMOL/L (ref 21–32)
CREAT SERPL-MCNC: 0.92 MG/DL (ref 0.6–1.3)
GFR SERPL CREATININE-BSD FRML MDRD: 79 ML/MIN/1.73SQ M
GLUCOSE P FAST SERPL-MCNC: 155 MG/DL (ref 65–99)
HDLC SERPL-MCNC: 74 MG/DL
LDLC SERPL CALC-MCNC: 73 MG/DL (ref 0–100)
NONHDLC SERPL-MCNC: 83 MG/DL
POTASSIUM SERPL-SCNC: 4.1 MMOL/L (ref 3.5–5.3)
PROT SERPL-MCNC: 6.8 G/DL (ref 6.4–8.2)
SODIUM SERPL-SCNC: 140 MMOL/L (ref 136–145)
TRIGL SERPL-MCNC: 51 MG/DL

## 2021-01-01 PROCEDURE — 80061 LIPID PANEL: CPT

## 2021-01-01 PROCEDURE — 80053 COMPREHEN METABOLIC PANEL: CPT

## 2021-01-01 PROCEDURE — 36415 COLL VENOUS BLD VENIPUNCTURE: CPT

## 2021-01-01 RX ORDER — RAMIPRIL 5 MG/1
CAPSULE ORAL
Qty: 90 CAPSULE | Refills: 1 | OUTPATIENT
Start: 2021-01-01

## 2021-01-01 RX ORDER — INSULIN GLARGINE 100 [IU]/ML
INJECTION, SOLUTION SUBCUTANEOUS
Qty: 5 PEN
Start: 2021-01-01 | End: 2021-01-01 | Stop reason: SDUPTHER

## 2021-01-01 RX ORDER — INSULIN GLARGINE 100 [IU]/ML
INJECTION, SOLUTION SUBCUTANEOUS
Qty: 15 ML | Refills: 1 | Status: ON HOLD | OUTPATIENT
Start: 2021-01-01 | End: 2022-01-01 | Stop reason: SDUPTHER

## 2021-01-06 ENCOUNTER — TELEPHONE (OUTPATIENT)
Dept: ENDOCRINOLOGY | Facility: CLINIC | Age: 81
End: 2021-01-06

## 2021-01-06 NOTE — TELEPHONE ENCOUNTER
Did covid screen questions on pt  He had been in the hospital for a blood bacteria  He was tested in the hospital and it was negative  Ok to come into the office tomorrow

## 2021-01-07 ENCOUNTER — OFFICE VISIT (OUTPATIENT)
Dept: ENDOCRINOLOGY | Facility: CLINIC | Age: 81
End: 2021-01-07
Payer: MEDICARE

## 2021-01-07 VITALS
WEIGHT: 150 LBS | TEMPERATURE: 98 F | HEART RATE: 72 BPM | BODY MASS INDEX: 21.52 KG/M2 | SYSTOLIC BLOOD PRESSURE: 122 MMHG | DIASTOLIC BLOOD PRESSURE: 70 MMHG

## 2021-01-07 DIAGNOSIS — E11.65 TYPE 2 DIABETES MELLITUS WITH HYPERGLYCEMIA, WITH LONG-TERM CURRENT USE OF INSULIN (HCC): Primary | ICD-10-CM

## 2021-01-07 DIAGNOSIS — Z79.4 TYPE 2 DIABETES MELLITUS WITH HYPERGLYCEMIA, WITH LONG-TERM CURRENT USE OF INSULIN (HCC): Primary | ICD-10-CM

## 2021-01-07 DIAGNOSIS — I10 ESSENTIAL HYPERTENSION: ICD-10-CM

## 2021-01-07 DIAGNOSIS — I25.10 CORONARY ARTERY DISEASE INVOLVING NATIVE CORONARY ARTERY OF NATIVE HEART WITHOUT ANGINA PECTORIS: ICD-10-CM

## 2021-01-07 DIAGNOSIS — E78.2 MIXED HYPERLIPIDEMIA: ICD-10-CM

## 2021-01-07 DIAGNOSIS — E11.42 DIABETIC POLYNEUROPATHY ASSOCIATED WITH TYPE 2 DIABETES MELLITUS (HCC): ICD-10-CM

## 2021-01-07 PROCEDURE — 99214 OFFICE O/P EST MOD 30 MIN: CPT | Performed by: INTERNAL MEDICINE

## 2021-01-07 RX ORDER — INSULIN GLARGINE 100 [IU]/ML
INJECTION, SOLUTION SUBCUTANEOUS
Qty: 5 PEN
Start: 2021-01-07 | End: 2021-01-01 | Stop reason: SDUPTHER

## 2021-01-07 NOTE — PROGRESS NOTES
Concha Tim 68 y o  male MRN: [id number]    Encounter: [id number]      Assessment/Plan     Assessment: This is a 68y o -year-old male with diabetes with hyperglycemia  Plan:    Diagnoses and all orders for this visit:    Type 2 diabetes mellitus with hyperglycemia, with long-term current use of insulin ([facility] )  Lab Results   Component Value Date    HGBA1C 7 1 (A) 09/22/2020   Last A1c is 7 1%, recently blood sugars are in 200-220 range, his checking currently sporadically  Increase Lantus to 12 units at bedtime  Continue metformin  Patient did not bring log today or glucometer with him, discussed to check blood sugar twice daily before breakfast and before dinner, and send log in 1 week, to review and make more adjustment in his regimen  Discussed the goal for blood sugar is 80 to 160 mg/dL  Discussed to call if blood sugar persistently more than 300 or less than 70  Discussed importance of follow-up with Ophthalmology and podiatry    -     insulin glargine (Lantus SoloStar) 100 units/mL injection pen; Inject 12 units at bedtime  -     Basic metabolic panel; Future  -     Hemoglobin A1C; Future  -     Microalbumin / creatinine urine ratio; Future  -     Ambulatory referral to Podiatry; Future    Essential hypertension  Blood pressure well controlled  Continue current management    Diabetic polyneuropathy associated with type 2 diabetes mellitus ([facility] )  Discussed importance of follow-up with Podiatry    Coronary artery disease involving native coronary artery of native heart without angina pectoris  Follow with Cardiology    Mixed hyperlipidemia  Lab Results   Component Value Date    LDLCALC 44 06/12/2018   LDL is at goal  Continue statins  Lifestyle modification reinforced including diet and exercise routine as tolerated  -     Lipid panel Lab Collect Lab Collect;  Future        CC: Diabetes    History of Present Illness     HPI:    Concha Tim is 77-year-old gentleman with medical history of type 2 diabetes, uncontrolled, microalbuminuria, neuropathy, hyperlipidemia, coronary artery disease status post stent placement is here for follow-up     Diabetes course has been un-stable, stated blood sugars have been running high in the last 1 month  Patient was admitted recently in December for bacteremia, after hernia surgery, was treated with IV antibiotics  He is checking blood sugars once daily,   Fasting blood sugars - 200 range     Medications- Metfomin 1000 mg twice a day, lantus 10 units at bedtime  He denies episodes of severe hyperglycemia or hypoglycemia  For hypertension he takes Norvasc 5 mg daily, losartan 100 mg daily, his blood pressure is well controlled  For hyperlipidemia his currently taking Zetia 10 mg daily, pravastatin 40 mg daily(he could not tolerate high potency statins previously) currently managed by Cardiology  He also has history of coronary artery disease and status post stent placement, followed by Cardiology  Lab Results   Component Value Date    HGBA1C 7 1 (A) 09/22/2020       Lab Results   Component Value Date    HGBA1C 7 1 (A) 09/22/2020       Results for Mark Scott (MRN [id number]) as of 1/7/2021 11:31   Ref  Range 6/12/2018 11:37   Cholesterol Latest Ref Range: 50 - 200 mg/dL 116   Triglycerides Latest Ref Range: <=150 mg/dL 68   HDL Latest Ref Range: 40 - 60 mg/dL 58   LDL Calculated Latest Ref Range: 0 - 100 mg/dL 44     Results for MERE NICK (MRN [id number]) as of 1/7/2021 11:28   Ref   Range 12/25/2020 07:51   Sodium Latest Ref Range: 133 - 145 mmol/L 140   Potassium Latest Ref Range: 3 5 - 5 0 mmol/L 4 1   Chloride Latest Ref Range: 96 - 108 mmol/L 104   CO2 Latest Ref Range: 22 - 33 mmol/L 29   Anion Gap Latest Ref Range: 4 - 13 mmol/L 7   BUN Latest Ref Range: 6 - 20 mg/dL 17   Creatinine Latest Ref Range: 0 50 - 1 20 mg/dL 0 99   Glucose, Random Latest Ref Range: 65 - 140 mg/dL 215 (H)   Calcium Latest Ref Range: 8 4 - 10 2 mg/dL 9  3   AST Latest Ref Range: 15 - 41 U/L 44 (H)   ALT Latest Ref Range: 5 - 63 U/L 65 (H)   Alkaline Phosphatase Latest Ref Range: 10 - 129 U/L 49 9   Total Protein Latest Ref Range: 6 4 - 8 3 g/dL 5 8 (L)   Albumin Latest Ref Range: 3 4 - 4 8 g/dL 3 8   TOTAL BILIRUBIN Latest Ref Range: 0 30 - 1 20 mg/dL 0 51   eGFR Latest Units: ml/min/1 73sq m 73   WBC Latest Ref Range: 4 31 - 10 16 Thousand/uL 4 01 (L)   Red Blood Cell Count Latest Ref Range: 3 88 - 5 62 Million/uL 3 52 (L)   Hemoglobin Latest Ref Range: 12 0 - 17 0 g/dL 10 5 (L)   HCT Latest Ref Range: 36 5 - 49 3 % 32 6 (L)   MCV Latest Ref Range: 82 - 98 fL 93   MCH Latest Ref Range: 26 8 - 34 3 pg 29 8   MCHC Latest Ref Range: 31 4 - 37 4 g/dL 32 2   RDW Latest Ref Range: 11 6 - 15 1 % 15 8 (H)   Platelet Count Latest Ref Range: 149 - 390 Thousands/uL 100 (L)   MPV Latest Ref Range: 8 9 - 12 7 fL 11 7   Neutrophils % Latest Ref Range: 43 - 75 % 50   Immat GRANS % Latest Ref Range: 0 - 2 % 0   Lymphocytes Relative Latest Ref Range: 14 - 44 % 36   Monocytes Relative Latest Ref Range: 4 - 12 % 11   Eosinophils Latest Ref Range: 0 - 6 % 3   Basophils Relative Latest Ref Range: 0 - 1 % 0   Immature Grans Absolute Latest Ref Range: 0 00 - 0 20 Thousand/uL 0 01   Absolute Neutrophils Latest Ref Range: 1 85 - 7 62 Thousands/µL 1 97   Lymphocytes Absolute Latest Ref Range: 0 60 - 4 47 Thousands/µL 1 45   Absolute Monocytes Latest Ref Range: 0 17 - 1 22 Thousand/µL 0 44   Absolute Eosinophils Latest Ref Range: 0 00 - 0 61 Thousand/µL 0 13   Basophils Absolute Latest Ref Range: 0 00 - 0 10 Thousands/µL 0 01     Review of Systems   Constitutional: Positive for fatigue  Negative for activity change, diaphoresis, fever and unexpected weight change  HENT: Negative  Eyes: Negative for visual disturbance  Respiratory: Negative for cough, chest tightness and shortness of breath  Cardiovascular: Negative for chest pain, palpitations and leg swelling  Gastrointestinal: Negative for abdominal pain, blood in stool, constipation, diarrhea, nausea and vomiting  Endocrine: Negative for cold intolerance, heat intolerance, polydipsia, polyphagia and polyuria  Genitourinary: Negative for dysuria, enuresis, frequency and urgency  Musculoskeletal: Negative for arthralgias and myalgias  Skin: Negative for pallor, rash and wound  Allergic/Immunologic: Negative  Neurological: Negative for dizziness, tremors, weakness and numbness  Hematological: Negative  Psychiatric/Behavioral: Negative  Historical Information   Past Medical History:   Diagnosis Date    Arthritis     Cancer [facility])     Cardiac disease     abnormal stress test 2012    Chronic kidney disease     protein urine    [facility]ch[facility]ocytic [facility] ) 2014    6 months chemo    Constipation     Coronary artery disease     Diabetes mellitus ([facility] )     type 2    History of chemotherapy     Tx for CLL currently in remission since 2016    Hyperlipidemia     Hypertension     Infectious viral hepatitis     Hep C Hx remission since 2016 post tx    Liver disease     Proteinuria      Past Surgical History:   Procedure Laterality Date    ANGIOPLASTY      one stent    CARDIAC CATHETERIZATION      CARDIAC SURGERY  2016    x1 stent    CATARACT EXTRACTION      CYST REMOVAL      mid back benign    FRACTURE SURGERY Right     Fx Femur age 17, pin in place    NE XCAPSL CTRC RMVL INSJ IO LENS PROSTH W/O ECP Right 7/23/2018    Procedure: EXTRACTION EXTRACAPSULAR CATARACT PHACO INTRAOCULAR LENS (IOL); Surgeon: Nguyen Merino MD;  Location: [facility] MAIN OR;  Service: Ophthalmology    NE XCAPSL CTRC RMVL INSJ IO LENS PROSTH W/O ECP Left 10/1/2018    Procedure: EXTRACTION EXTRACAPSULAR CATARACT PHACO INTRAOCULAR LENS (IOL);   Surgeon: Nguyen Merino MD;  Location: [facility] MAIN OR;  Service: Ophthalmology    TONSILLECTOMY       Social History   Social History     Substance and Sexual Activity   Alcohol Use Yes    Frequency: 2-4 times a month    Drinks per session: 3 or 4    Comment: SOCIALLY     Social History     Substance and Sexual Activity   Drug Use No     Social History     Tobacco Use   Smoking Status Former Smoker   Smokeless Tobacco Never Used   Tobacco Comment    quit 20 years ago     Family History:   Family History   Problem Relation Age of Onset    Diabetes unspecified Brother     Diabetes Brother     Diabetes unspecified Maternal Grandfather     Diabetes Maternal Grandfather     Cancer Mother         breast    Alzheimer's disease Mother     Early death Father         age 64 peritonitis from gallbladder    Cancer Sister         small cell carcinoma    No Known Problems Daughter     No Known Problems Daughter        Meds/Allergies   Current Outpatient Medications   Medication Sig Dispense Refill    aspirin 81 MG tablet Take 1 tablet by mouth every morning        Blood Glucose Monitoring Suppl (ONE TOUCH ULTRA MINI) w/Device KIT Use meter as directed 1 each 0    Coenzyme Q10 (CO Q 10) 100 MG CAPS Take 4 capsules by mouth daily      ezetimibe (ZETIA) 10 mg tablet Take 10 mg by mouth daily      glucose blood (ONE TOUCH ULTRA TEST) test strip Check blood sugar twice a day 200 each 1    insulin glargine (Lantus SoloStar) 100 units/mL injection pen Inject 12 units at bedtime 5 pen     Insulin Pen Needle 32G X 6 MM MISC BD Ultra-Fine Beatrice Pen Needle 32 gauge x 5/32"      losartan (COZAAR) 100 MG tablet Take 1 tablet (100 mg total) by mouth daily 30 tablet 0    metFORMIN (GLUCOPHAGE) 1000 MG tablet Take 1 tablet (1,000 mg total) by mouth 2 (two) times a day 180 tablet 1    NON FORMULARY as needed Fiber Therapy Powder-One heaping tablespoon to 12 oz of water      ONETOUCH DELICA LANCETS FINE MISC Twice a day 200 each 1    pramipexole (MIRAPEX) 1 mg tablet Take 1 5 mg by mouth daily       pravastatin (PRAVACHOL) 40 mg tablet Take 40 mg by mouth daily      amLODIPine (NORVASC) 5 mg tablet Take 5 mg by mouth daily      Ascorbic Acid (VITAMIN C) 1000 MG tablet Take 1,000 mg by mouth daily      B Complex Vitamins (B COMPLEX 1 PO) Take 1 tablet by mouth daily      Blood Glucose Monitoring Suppl (ONE TOUCH ULTRA MINI) w/Device KIT by Does not apply route once for 1 dose 1 each 0    Omega-3 Fatty Acids (FISH OIL) 1,000 mg Take 1,000 mg by mouth 3 (three) times a day      polyethylene glycol (MIRALAX) 17 g packet Take 17 g by mouth daily as needed (constipation) (Patient not taking: Reported on 1/7/2021) 30 each 0    pramipexole (MIRAPEX) 0 25 mg tablet Take 0 25 mg by mouth every morning       No current facility-administered medications for this visit  Allergies   Allergen Reactions    Other      Seasonal allergies       Objective   Vitals: Blood pressure 122/70, pulse 72, temperature 98 °F (36 7 °C), weight 68 kg (150 lb)  Physical Exam  Vitals signs reviewed  Constitutional:       General: He is not in acute distress  Appearance: He is well-developed  HENT:      Head: Normocephalic and atraumatic  Eyes:      Pupils: Pupils are equal, round, and reactive to light  Neck:      Musculoskeletal: Normal range of motion and neck supple  Thyroid: No thyromegaly  Cardiovascular:      Rate and Rhythm: Normal rate and regular rhythm  Pulses: Normal pulses  Heart sounds: Normal heart sounds  Pulmonary:      Effort: Pulmonary effort is normal  No respiratory distress  Breath sounds: Normal breath sounds  Musculoskeletal: Normal range of motion  General: No swelling or deformity  Skin:     General: Skin is warm and dry  Findings: No erythema  Neurological:      General: No focal deficit present  Mental Status: He is alert and oriented to person, place, and time     Psychiatric:         Mood and Affect: Mood normal          Behavior: Behavior normal          The history was obtained from the review of the chart, patient  Lab Results:   Lab Results   Component Value Date/Time    Hemoglobin A1C 7 1 (A) 09/22/2020 03:34 PM    Hemoglobin A1C 8 5 (H) 06/12/2020 07:20 AM    Hemoglobin A1C 7 8 (H) 01/20/2020 08:52 AM    WBC 4 01 (L) 12/25/2020 07:51 AM    WBC 6 05 12/21/2020 10:53 AM    WBC 6 18 12/15/2020 05:28 AM    Hemoglobin 10 5 (L) 12/25/2020 07:51 AM    Hemoglobin 11 4 (L) 12/21/2020 10:53 AM    Hemoglobin 10 6 (L) 12/15/2020 05:28 AM    Hematocrit 32 6 (L) 12/25/2020 07:51 AM    Hematocrit 35 6 (L) 12/21/2020 10:53 AM    Hematocrit 32 2 (L) 12/15/2020 05:28 AM    MCV 93 12/25/2020 07:51 AM    MCV 96 12/21/2020 10:53 AM    MCV 92 12/15/2020 05:28 AM    Platelets 284 (L) 04/85/2676 07:51 AM    Platelets 987 16/78/3717 10:53 AM    Platelets 612 07/06/4338 05:28 AM    BUN 17 12/25/2020 07:51 AM    BUN 22 12/21/2020 10:53 AM    BUN 22 (H) 12/15/2020 05:30 AM    BUN 26 (H) 09/16/2020 07:47 AM    BUN 31 (H) 06/12/2020 07:20 AM    Potassium 4 1 12/25/2020 07:51 AM    Potassium 4 1 12/21/2020 10:53 AM    Potassium 4 0 12/15/2020 05:30 AM    Potassium 4 5 09/16/2020 07:47 AM    Potassium 4 5 06/12/2020 07:20 AM    Chloride 104 12/25/2020 07:51 AM    Chloride 105 12/21/2020 10:53 AM    Chloride 109 (H) 12/15/2020 05:30 AM    Chloride 105 09/16/2020 07:47 AM    Chloride 105 06/12/2020 07:20 AM    CO2 29 12/25/2020 07:51 AM    CO2 28 12/21/2020 10:53 AM    CO2 30 12/15/2020 05:30 AM    CO2 31 09/16/2020 07:47 AM    CO2 29 06/12/2020 07:20 AM    Creatinine 0 99 12/25/2020 07:51 AM    Creatinine 1 02 12/21/2020 10:53 AM    Creatinine 0 99 12/15/2020 05:30 AM    AST 44 (H) 12/25/2020 07:51 AM    AST 45 12/21/2020 10:53 AM    AST 46 (H) 12/15/2020 05:30 AM    ALT 65 (H) 12/25/2020 07:51 AM    ALT 90 (H) 12/21/2020 10:53 AM    ALT 73 (H) 12/15/2020 05:30 AM    Albumin 3 8 12/25/2020 07:51 AM    Albumin 3 5 12/21/2020 10:53 AM    Albumin 3 1 (L) 12/15/2020 05:30 AM           Imaging Studies: I have personally reviewed pertinent reports  Portions of the record may have been created with voice recognition software  Occasional wrong word or "sound a like" substitutions may have occurred due to the inherent limitations of voice recognition software  Read the chart carefully and recognize, using context, where substitutions have occurred

## 2021-02-12 DIAGNOSIS — Z23 ENCOUNTER FOR IMMUNIZATION: ICD-10-CM

## 2021-02-23 NOTE — TELEPHONE ENCOUNTER
Patient is in the supine position.   The left arm was positioned using the following devices: arm board and blanket.  The right arm was positioned using the following devices: arm board and blanket.   meds-  Metformin 1000mg BID  Lantus 12u hs    Fasting blood sugars in 150-200 range   Please inform pt to increase lantus to 14 units at bedtime   Continue metformin     Chacho Miguel MD

## 2021-09-02 NOTE — TELEPHONE ENCOUNTER
Pt left vm without reason for call     I called back but no answer and left vm with our call back number

## 2022-01-01 ENCOUNTER — HOSPITAL ENCOUNTER (INPATIENT)
Facility: HOSPITAL | Age: 82
LOS: 19 days | DRG: 177 | End: 2022-02-14
Attending: EMERGENCY MEDICINE | Admitting: INTERNAL MEDICINE
Payer: MEDICARE

## 2022-01-01 ENCOUNTER — APPOINTMENT (INPATIENT)
Dept: NON INVASIVE DIAGNOSTICS | Facility: HOSPITAL | Age: 82
DRG: 177 | End: 2022-01-01
Payer: MEDICARE

## 2022-01-01 ENCOUNTER — HOSPITAL ENCOUNTER (INPATIENT)
Facility: HOSPITAL | Age: 82
LOS: 5 days | Discharge: HOME HEALTH | DRG: 177 | End: 2022-01-24
Attending: EMERGENCY MEDICINE | Admitting: INTERNAL MEDICINE
Payer: MEDICARE

## 2022-01-01 ENCOUNTER — HOSPITAL ENCOUNTER (OUTPATIENT)
Dept: ULTRASOUND IMAGING | Facility: HOSPITAL | Age: 82
Discharge: HOME | End: 2022-01-07
Attending: INTERNAL MEDICINE
Payer: MEDICARE

## 2022-01-01 ENCOUNTER — APPOINTMENT (INPATIENT)
Dept: RADIOLOGY | Facility: HOSPITAL | Age: 82
DRG: 177 | End: 2022-01-01
Payer: MEDICARE

## 2022-01-01 ENCOUNTER — HOSPITAL ENCOUNTER (EMERGENCY)
Facility: HOSPITAL | Age: 82
Discharge: HOME | End: 2022-01-14
Attending: EMERGENCY MEDICINE
Payer: MEDICARE

## 2022-01-01 ENCOUNTER — APPOINTMENT (EMERGENCY)
Dept: CT IMAGING | Facility: HOSPITAL | Age: 82
DRG: 177 | End: 2022-01-01
Payer: MEDICARE

## 2022-01-01 ENCOUNTER — APPOINTMENT (EMERGENCY)
Dept: RADIOLOGY | Facility: HOSPITAL | Age: 82
DRG: 177 | End: 2022-01-01
Payer: MEDICARE

## 2022-01-01 ENCOUNTER — APPOINTMENT (INPATIENT)
Dept: CT IMAGING | Facility: HOSPITAL | Age: 82
DRG: 177 | End: 2022-01-01
Payer: MEDICARE

## 2022-01-01 VITALS
HEART RATE: 125 BPM | SYSTOLIC BLOOD PRESSURE: 95 MMHG | OXYGEN SATURATION: 83 % | HEIGHT: 70 IN | WEIGHT: 121.03 LBS | DIASTOLIC BLOOD PRESSURE: 52 MMHG | RESPIRATION RATE: 10 BRPM | BODY MASS INDEX: 17.33 KG/M2 | TEMPERATURE: 100.2 F

## 2022-01-01 VITALS
DIASTOLIC BLOOD PRESSURE: 92 MMHG | TEMPERATURE: 97.9 F | WEIGHT: 140 LBS | RESPIRATION RATE: 18 BRPM | OXYGEN SATURATION: 94 % | HEIGHT: 70 IN | SYSTOLIC BLOOD PRESSURE: 168 MMHG | HEART RATE: 81 BPM | BODY MASS INDEX: 20.04 KG/M2

## 2022-01-01 VITALS
WEIGHT: 141.09 LBS | OXYGEN SATURATION: 87 % | BODY MASS INDEX: 20.2 KG/M2 | RESPIRATION RATE: 18 BRPM | SYSTOLIC BLOOD PRESSURE: 132 MMHG | TEMPERATURE: 98.1 F | DIASTOLIC BLOOD PRESSURE: 76 MMHG | HEART RATE: 68 BPM | HEIGHT: 70 IN

## 2022-01-01 DIAGNOSIS — U07.1 PNEUMONIA DUE TO COVID-19 VIRUS: Primary | ICD-10-CM

## 2022-01-01 DIAGNOSIS — E11.65 HYPERGLYCEMIA DUE TO TYPE 2 DIABETES MELLITUS (HCC): ICD-10-CM

## 2022-01-01 DIAGNOSIS — I26.99 PULMONARY EMBOLI (HCC): ICD-10-CM

## 2022-01-01 DIAGNOSIS — E86.0 DEHYDRATION: ICD-10-CM

## 2022-01-01 DIAGNOSIS — J12.82 PNEUMONIA DUE TO COVID-19 VIRUS: Primary | ICD-10-CM

## 2022-01-01 DIAGNOSIS — E11.65 TYPE 2 DIABETES MELLITUS WITH HYPERGLYCEMIA, WITH LONG-TERM CURRENT USE OF INSULIN (HCC): ICD-10-CM

## 2022-01-01 DIAGNOSIS — R09.02 HYPOXIA: ICD-10-CM

## 2022-01-01 DIAGNOSIS — S09.90XA INJURY OF HEAD, INITIAL ENCOUNTER: ICD-10-CM

## 2022-01-01 DIAGNOSIS — I26.99 ACUTE PULMONARY EMBOLISM WITHOUT ACUTE COR PULMONALE, UNSPECIFIED PULMONARY EMBOLISM TYPE (HCC): ICD-10-CM

## 2022-01-01 DIAGNOSIS — R09.02 HYPOXIA: Primary | ICD-10-CM

## 2022-01-01 DIAGNOSIS — B34.9 VIRAL ILLNESS: ICD-10-CM

## 2022-01-01 DIAGNOSIS — B34.9 ACUTE VIRAL SYNDROME: Primary | ICD-10-CM

## 2022-01-01 DIAGNOSIS — U07.1 COVID: ICD-10-CM

## 2022-01-01 DIAGNOSIS — Z79.4 TYPE 2 DIABETES MELLITUS WITH HYPERGLYCEMIA, WITH LONG-TERM CURRENT USE OF INSULIN (HCC): ICD-10-CM

## 2022-01-01 DIAGNOSIS — K76.0 FATTY METAMORPHOSIS OF LIVER: ICD-10-CM

## 2022-01-01 LAB
2HR DELTA HS TROPONIN: -2 NG/L
2HR DELTA HS TROPONIN: -2 NG/L
4HR DELTA HS TROPONIN: -3 NG/L
4HR DELTA HS TROPONIN: 0 NG/L
ALBUMIN SERPL BCP-MCNC: 2.1 G/DL (ref 3.5–5)
ALBUMIN SERPL BCP-MCNC: 2.2 G/DL (ref 3.5–5)
ALBUMIN SERPL BCP-MCNC: 2.4 G/DL (ref 3.5–5)
ALBUMIN SERPL BCP-MCNC: 2.6 G/DL (ref 3.5–5)
ALBUMIN SERPL BCP-MCNC: 2.7 G/DL (ref 3.5–5)
ALBUMIN SERPL BCP-MCNC: 2.7 G/DL (ref 3.5–5)
ALBUMIN SERPL BCP-MCNC: 2.8 G/DL (ref 3.5–5)
ALBUMIN SERPL BCP-MCNC: 2.9 G/DL (ref 3.5–5)
ALBUMIN SERPL BCP-MCNC: 3.3 G/DL (ref 3.5–5)
ALBUMIN SERPL BCP-MCNC: 3.5 G/DL (ref 3.5–5)
ALP SERPL-CCNC: 100 U/L (ref 46–116)
ALP SERPL-CCNC: 101 U/L (ref 46–116)
ALP SERPL-CCNC: 148 U/L (ref 46–116)
ALP SERPL-CCNC: 152 U/L (ref 46–116)
ALP SERPL-CCNC: 153 U/L (ref 46–116)
ALP SERPL-CCNC: 172 U/L (ref 46–116)
ALP SERPL-CCNC: 175 U/L (ref 46–116)
ALP SERPL-CCNC: 196 U/L (ref 46–116)
ALP SERPL-CCNC: 215 U/L (ref 46–116)
ALP SERPL-CCNC: 237 U/L (ref 46–116)
ALP SERPL-CCNC: 242 U/L (ref 46–116)
ALP SERPL-CCNC: 270 U/L (ref 46–116)
ALP SERPL-CCNC: 272 U/L (ref 46–116)
ALP SERPL-CCNC: 272 U/L (ref 46–116)
ALP SERPL-CCNC: 276 U/L (ref 46–116)
ALP SERPL-CCNC: 281 U/L (ref 46–116)
ALP SERPL-CCNC: 77 U/L (ref 46–116)
ALP SERPL-CCNC: 82 U/L (ref 46–116)
ALP SERPL-CCNC: 87 U/L (ref 46–116)
ALT SERPL W P-5'-P-CCNC: 234 U/L (ref 12–78)
ALT SERPL W P-5'-P-CCNC: 253 U/L (ref 12–78)
ALT SERPL W P-5'-P-CCNC: 299 U/L (ref 12–78)
ALT SERPL W P-5'-P-CCNC: 359 U/L (ref 12–78)
ALT SERPL W P-5'-P-CCNC: 372 U/L (ref 12–78)
ALT SERPL W P-5'-P-CCNC: 392 U/L (ref 12–78)
ALT SERPL W P-5'-P-CCNC: 421 U/L (ref 12–78)
ALT SERPL W P-5'-P-CCNC: 438 U/L (ref 12–78)
ALT SERPL W P-5'-P-CCNC: 454 U/L (ref 12–78)
ALT SERPL W P-5'-P-CCNC: 473 U/L (ref 12–78)
ALT SERPL W P-5'-P-CCNC: 487 U/L (ref 12–78)
ALT SERPL W P-5'-P-CCNC: 489 U/L (ref 12–78)
ALT SERPL W P-5'-P-CCNC: 490 U/L (ref 12–78)
ALT SERPL W P-5'-P-CCNC: 492 U/L (ref 12–78)
ALT SERPL W P-5'-P-CCNC: 509 U/L (ref 12–78)
ALT SERPL W P-5'-P-CCNC: 526 U/L (ref 12–78)
ALT SERPL W P-5'-P-CCNC: 562 U/L (ref 12–78)
ALT SERPL W P-5'-P-CCNC: 576 U/L (ref 12–78)
ALT SERPL W P-5'-P-CCNC: 593 U/L (ref 12–78)
ANION GAP SERPL CALCULATED.3IONS-SCNC: 11 MMOL/L (ref 4–13)
ANION GAP SERPL CALCULATED.3IONS-SCNC: 11 MMOL/L (ref 4–13)
ANION GAP SERPL CALCULATED.3IONS-SCNC: 12 MMOL/L (ref 4–13)
ANION GAP SERPL CALCULATED.3IONS-SCNC: 3 MMOL/L (ref 4–13)
ANION GAP SERPL CALCULATED.3IONS-SCNC: 4 MMOL/L (ref 4–13)
ANION GAP SERPL CALCULATED.3IONS-SCNC: 4 MMOL/L (ref 4–13)
ANION GAP SERPL CALCULATED.3IONS-SCNC: 5 MMOL/L (ref 4–13)
ANION GAP SERPL CALCULATED.3IONS-SCNC: 6 MMOL/L (ref 4–13)
ANION GAP SERPL CALCULATED.3IONS-SCNC: 7 MMOL/L (ref 4–13)
ANION GAP SERPL CALCULATED.3IONS-SCNC: 8 MMOL/L (ref 4–13)
ANION GAP SERPL CALCULATED.3IONS-SCNC: 8 MMOL/L (ref 4–13)
ANION GAP SERPL CALCULATED.3IONS-SCNC: 9 MMOL/L (ref 4–13)
ANION GAP SERPL CALCULATED.3IONS-SCNC: 9 MMOL/L (ref 4–13)
ANISOCYTOSIS BLD QL SMEAR: PRESENT
AORTIC ROOT: 3.8 CM
APICAL FOUR CHAMBER EJECTION FRACTION: 87 %
APTT PPP: 116 SECONDS (ref 23–37)
APTT PPP: 119 SECONDS (ref 23–37)
APTT PPP: 124 SECONDS (ref 23–37)
APTT PPP: 128 SECONDS (ref 23–37)
APTT PPP: 30 SECONDS (ref 23–37)
APTT PPP: 35 SECONDS (ref 23–37)
APTT PPP: 36 SECONDS (ref 23–37)
APTT PPP: 38 SECONDS (ref 23–37)
APTT PPP: 53 SECONDS (ref 23–37)
APTT PPP: 67 SECONDS (ref 23–37)
APTT PPP: 67 SECONDS (ref 23–37)
APTT PPP: 68 SECONDS (ref 23–37)
APTT PPP: 74 SECONDS (ref 23–37)
APTT PPP: 74 SECONDS (ref 23–37)
APTT PPP: 76 SECONDS (ref 23–37)
APTT PPP: 90 SECONDS (ref 23–37)
APTT PPP: >210 SECONDS (ref 23–37)
APTT PPP: >210 SECONDS (ref 23–37)
ASCENDING AORTA: 3.4 CM (ref 1.91–2.86)
AST SERPL W P-5'-P-CCNC: 109 U/L (ref 5–45)
AST SERPL W P-5'-P-CCNC: 124 U/L (ref 5–45)
AST SERPL W P-5'-P-CCNC: 124 U/L (ref 5–45)
AST SERPL W P-5'-P-CCNC: 132 U/L (ref 5–45)
AST SERPL W P-5'-P-CCNC: 149 U/L (ref 5–45)
AST SERPL W P-5'-P-CCNC: 153 U/L (ref 5–45)
AST SERPL W P-5'-P-CCNC: 154 U/L (ref 5–45)
AST SERPL W P-5'-P-CCNC: 166 U/L (ref 5–45)
AST SERPL W P-5'-P-CCNC: 168 U/L (ref 5–45)
AST SERPL W P-5'-P-CCNC: 170 U/L (ref 5–45)
AST SERPL W P-5'-P-CCNC: 191 U/L (ref 5–45)
AST SERPL W P-5'-P-CCNC: 191 U/L (ref 5–45)
AST SERPL W P-5'-P-CCNC: 193 U/L (ref 5–45)
AST SERPL W P-5'-P-CCNC: 199 U/L (ref 5–45)
AST SERPL W P-5'-P-CCNC: 203 U/L (ref 5–45)
AST SERPL W P-5'-P-CCNC: 206 U/L (ref 5–45)
AST SERPL W P-5'-P-CCNC: 218 U/L (ref 5–45)
AST SERPL W P-5'-P-CCNC: 221 U/L (ref 5–45)
AST SERPL W P-5'-P-CCNC: 225 U/L (ref 5–45)
ATRIAL RATE: 71 BPM
ATRIAL RATE: 71 BPM
ATRIAL RATE: 81 BPM
ATRIAL RATE: 82 BPM
AV LVOT PEAK GRADIENT: 3 MMHG
AV PEAK GRADIENT: 13 MMHG
BACTERIA BLD CULT: NORMAL
BACTERIA BLD CULT: NORMAL
BASOPHILS # BLD AUTO: 0.01 THOUSANDS/ΜL (ref 0–0.1)
BASOPHILS # BLD AUTO: 0.02 THOUSANDS/ΜL (ref 0–0.1)
BASOPHILS # BLD AUTO: 0.07 THOUSANDS/ΜL (ref 0–0.1)
BASOPHILS # BLD AUTO: 0.08 THOUSANDS/ΜL (ref 0–0.1)
BASOPHILS # BLD AUTO: 0.09 THOUSANDS/ΜL (ref 0–0.1)
BASOPHILS # BLD MANUAL: 0 THOUSAND/UL (ref 0–0.1)
BASOPHILS NFR BLD AUTO: 0 % (ref 0–1)
BASOPHILS NFR MAR MANUAL: 0 % (ref 0–1)
BETA-HYDROXYBUTYRATE: 0.2 MMOL/L
BILIRUB DIRECT SERPL-MCNC: 0.18 MG/DL (ref 0–0.2)
BILIRUB SERPL-MCNC: 0.55 MG/DL (ref 0.2–1)
BILIRUB SERPL-MCNC: 0.66 MG/DL (ref 0.2–1)
BILIRUB SERPL-MCNC: 0.66 MG/DL (ref 0.2–1)
BILIRUB SERPL-MCNC: 0.72 MG/DL (ref 0.2–1)
BILIRUB SERPL-MCNC: 0.76 MG/DL (ref 0.2–1)
BILIRUB SERPL-MCNC: 0.8 MG/DL (ref 0.2–1)
BILIRUB SERPL-MCNC: 0.84 MG/DL (ref 0.2–1)
BILIRUB SERPL-MCNC: 0.9 MG/DL (ref 0.2–1)
BILIRUB SERPL-MCNC: 0.91 MG/DL (ref 0.2–1)
BILIRUB SERPL-MCNC: 0.92 MG/DL (ref 0.2–1)
BILIRUB SERPL-MCNC: 0.94 MG/DL (ref 0.2–1)
BILIRUB SERPL-MCNC: 0.95 MG/DL (ref 0.2–1)
BILIRUB SERPL-MCNC: 0.97 MG/DL (ref 0.2–1)
BILIRUB SERPL-MCNC: 1.01 MG/DL (ref 0.2–1)
BILIRUB SERPL-MCNC: 1.04 MG/DL (ref 0.2–1)
BILIRUB SERPL-MCNC: 1.18 MG/DL (ref 0.2–1)
BILIRUB SERPL-MCNC: 1.19 MG/DL (ref 0.2–1)
BUN SERPL-MCNC: 26 MG/DL (ref 5–25)
BUN SERPL-MCNC: 26 MG/DL (ref 5–25)
BUN SERPL-MCNC: 30 MG/DL (ref 5–25)
BUN SERPL-MCNC: 31 MG/DL (ref 5–25)
BUN SERPL-MCNC: 33 MG/DL (ref 5–25)
BUN SERPL-MCNC: 35 MG/DL (ref 5–25)
BUN SERPL-MCNC: 35 MG/DL (ref 5–25)
BUN SERPL-MCNC: 36 MG/DL (ref 5–25)
BUN SERPL-MCNC: 37 MG/DL (ref 5–25)
BUN SERPL-MCNC: 39 MG/DL (ref 5–25)
BUN SERPL-MCNC: 41 MG/DL (ref 5–25)
BUN SERPL-MCNC: 42 MG/DL (ref 5–25)
BUN SERPL-MCNC: 42 MG/DL (ref 5–25)
BUN SERPL-MCNC: 45 MG/DL (ref 5–25)
BUN SERPL-MCNC: 49 MG/DL (ref 5–25)
CALCIUM ALBUM COR SERPL-MCNC: 10 MG/DL (ref 8.3–10.1)
CALCIUM ALBUM COR SERPL-MCNC: 10.1 MG/DL (ref 8.3–10.1)
CALCIUM ALBUM COR SERPL-MCNC: 10.5 MG/DL (ref 8.3–10.1)
CALCIUM ALBUM COR SERPL-MCNC: 9.3 MG/DL (ref 8.3–10.1)
CALCIUM ALBUM COR SERPL-MCNC: 9.3 MG/DL (ref 8.3–10.1)
CALCIUM ALBUM COR SERPL-MCNC: 9.5 MG/DL (ref 8.3–10.1)
CALCIUM ALBUM COR SERPL-MCNC: 9.5 MG/DL (ref 8.3–10.1)
CALCIUM ALBUM COR SERPL-MCNC: 9.6 MG/DL (ref 8.3–10.1)
CALCIUM ALBUM COR SERPL-MCNC: 9.7 MG/DL (ref 8.3–10.1)
CALCIUM ALBUM COR SERPL-MCNC: 9.8 MG/DL (ref 8.3–10.1)
CALCIUM ALBUM COR SERPL-MCNC: 9.8 MG/DL (ref 8.3–10.1)
CALCIUM ALBUM COR SERPL-MCNC: 9.9 MG/DL (ref 8.3–10.1)
CALCIUM SERPL-MCNC: 7.8 MG/DL (ref 8.3–10.1)
CALCIUM SERPL-MCNC: 7.8 MG/DL (ref 8.3–10.1)
CALCIUM SERPL-MCNC: 8.1 MG/DL (ref 8.3–10.1)
CALCIUM SERPL-MCNC: 8.1 MG/DL (ref 8.3–10.1)
CALCIUM SERPL-MCNC: 8.2 MG/DL (ref 8.3–10.1)
CALCIUM SERPL-MCNC: 8.3 MG/DL (ref 8.3–10.1)
CALCIUM SERPL-MCNC: 8.4 MG/DL (ref 8.3–10.1)
CALCIUM SERPL-MCNC: 8.4 MG/DL (ref 8.3–10.1)
CALCIUM SERPL-MCNC: 8.7 MG/DL (ref 8.3–10.1)
CALCIUM SERPL-MCNC: 8.7 MG/DL (ref 8.3–10.1)
CALCIUM SERPL-MCNC: 8.8 MG/DL (ref 8.3–10.1)
CALCIUM SERPL-MCNC: 8.9 MG/DL (ref 8.3–10.1)
CALCIUM SERPL-MCNC: 9 MG/DL (ref 8.3–10.1)
CALCIUM SERPL-MCNC: 9 MG/DL (ref 8.3–10.1)
CALCIUM SERPL-MCNC: 9.1 MG/DL (ref 8.3–10.1)
CALCIUM SERPL-MCNC: 9.2 MG/DL (ref 8.3–10.1)
CALCIUM SERPL-MCNC: 9.3 MG/DL (ref 8.3–10.1)
CALCIUM SERPL-MCNC: 9.5 MG/DL (ref 8.3–10.1)
CALCIUM SERPL-MCNC: 9.5 MG/DL (ref 8.3–10.1)
CALCIUM SERPL-MCNC: 9.8 MG/DL (ref 8.3–10.1)
CARDIAC TROPONIN I PNL SERPL HS: 10 NG/L
CARDIAC TROPONIN I PNL SERPL HS: 16 NG/L
CARDIAC TROPONIN I PNL SERPL HS: 18 NG/L
CARDIAC TROPONIN I PNL SERPL HS: 18 NG/L
CARDIAC TROPONIN I PNL SERPL HS: 7 NG/L
CARDIAC TROPONIN I PNL SERPL HS: 8 NG/L
CHLORIDE SERPL-SCNC: 101 MMOL/L (ref 100–108)
CHLORIDE SERPL-SCNC: 101 MMOL/L (ref 100–108)
CHLORIDE SERPL-SCNC: 102 MMOL/L (ref 100–108)
CHLORIDE SERPL-SCNC: 103 MMOL/L (ref 100–108)
CHLORIDE SERPL-SCNC: 104 MMOL/L (ref 100–108)
CHLORIDE SERPL-SCNC: 105 MMOL/L (ref 100–108)
CHLORIDE SERPL-SCNC: 105 MMOL/L (ref 100–108)
CHLORIDE SERPL-SCNC: 106 MMOL/L (ref 100–108)
CHLORIDE SERPL-SCNC: 107 MMOL/L (ref 100–108)
CHLORIDE SERPL-SCNC: 98 MMOL/L (ref 100–108)
CK SERPL-CCNC: 90 U/L (ref 39–308)
CO2 SERPL-SCNC: 24 MMOL/L (ref 21–32)
CO2 SERPL-SCNC: 25 MMOL/L (ref 21–32)
CO2 SERPL-SCNC: 26 MMOL/L (ref 21–32)
CO2 SERPL-SCNC: 27 MMOL/L (ref 21–32)
CO2 SERPL-SCNC: 28 MMOL/L (ref 21–32)
CO2 SERPL-SCNC: 28 MMOL/L (ref 21–32)
CO2 SERPL-SCNC: 29 MMOL/L (ref 21–32)
CO2 SERPL-SCNC: 30 MMOL/L (ref 21–32)
CO2 SERPL-SCNC: 32 MMOL/L (ref 21–32)
CREAT SERPL-MCNC: 0.71 MG/DL (ref 0.6–1.3)
CREAT SERPL-MCNC: 0.71 MG/DL (ref 0.6–1.3)
CREAT SERPL-MCNC: 0.73 MG/DL (ref 0.6–1.3)
CREAT SERPL-MCNC: 0.73 MG/DL (ref 0.6–1.3)
CREAT SERPL-MCNC: 0.75 MG/DL (ref 0.6–1.3)
CREAT SERPL-MCNC: 0.76 MG/DL (ref 0.6–1.3)
CREAT SERPL-MCNC: 0.77 MG/DL (ref 0.6–1.3)
CREAT SERPL-MCNC: 0.78 MG/DL (ref 0.6–1.3)
CREAT SERPL-MCNC: 0.8 MG/DL (ref 0.6–1.3)
CREAT SERPL-MCNC: 0.83 MG/DL (ref 0.6–1.3)
CREAT SERPL-MCNC: 0.84 MG/DL (ref 0.6–1.3)
CREAT SERPL-MCNC: 0.91 MG/DL (ref 0.6–1.3)
CREAT SERPL-MCNC: 0.95 MG/DL (ref 0.6–1.3)
CREAT SERPL-MCNC: 0.96 MG/DL (ref 0.6–1.3)
CREAT SERPL-MCNC: 1.01 MG/DL (ref 0.6–1.3)
CREAT SERPL-MCNC: 1.03 MG/DL (ref 0.6–1.3)
CREAT SERPL-MCNC: 1.05 MG/DL (ref 0.6–1.3)
CREAT SERPL-MCNC: 1.2 MG/DL (ref 0.6–1.3)
CREAT SERPL-MCNC: 1.21 MG/DL (ref 0.6–1.3)
CREAT SERPL-MCNC: 1.5 MG/DL (ref 0.6–1.3)
CRP SERPL HS-MCNC: 50.41 MG/L
CRP SERPL QL: 10 MG/L
CRP SERPL QL: 10.8 MG/L
CRP SERPL QL: 12.3 MG/L
CRP SERPL QL: 20 MG/L
CRP SERPL QL: 21.8 MG/L
CRP SERPL QL: 23.8 MG/L
CRP SERPL QL: 31 MG/L
CRP SERPL QL: 36.9 MG/L
CRP SERPL QL: 42.7 MG/L
CRP SERPL QL: 46.4 MG/L
CRP SERPL QL: 74.6 MG/L
CRP SERPL QL: 9.7 MG/L
D DIMER PPP FEU-MCNC: 0.88 UG/ML FEU
D DIMER PPP FEU-MCNC: 1.28 UG/ML FEU
D DIMER PPP FEU-MCNC: 3.31 UG/ML FEU
D DIMER PPP FEU-MCNC: >20 UG/ML FEU
DME PARACHUTE DELIVERY DATE ACTUAL: NORMAL
DME PARACHUTE DELIVERY DATE EXPECTED: NORMAL
DME PARACHUTE DELIVERY DATE REQUESTED: NORMAL
DME PARACHUTE DELIVERY NOTE: NORMAL
DME PARACHUTE ITEM DESCRIPTION: NORMAL
DME PARACHUTE ORDER STATUS: NORMAL
DME PARACHUTE SUPPLIER NAME: NORMAL
DME PARACHUTE SUPPLIER PHONE: NORMAL
E WAVE DECELERATION TIME: 368 MS
EOSINOPHIL # BLD AUTO: 0 THOUSAND/ΜL (ref 0–0.61)
EOSINOPHIL # BLD AUTO: 0 THOUSAND/ΜL (ref 0–0.61)
EOSINOPHIL # BLD AUTO: 0.02 THOUSAND/ΜL (ref 0–0.61)
EOSINOPHIL # BLD AUTO: 0.04 THOUSAND/ΜL (ref 0–0.61)
EOSINOPHIL # BLD AUTO: 0.04 THOUSAND/ΜL (ref 0–0.61)
EOSINOPHIL # BLD MANUAL: 0 THOUSAND/UL (ref 0–0.4)
EOSINOPHIL # BLD MANUAL: 0.28 THOUSAND/UL (ref 0–0.4)
EOSINOPHIL NFR BLD AUTO: 0 % (ref 0–6)
EOSINOPHIL NFR BLD MANUAL: 0 % (ref 0–6)
EOSINOPHIL NFR BLD MANUAL: 1 % (ref 0–6)
ERYTHROCYTE [DISTWIDTH] IN BLOOD BY AUTOMATED COUNT: 14.6 % (ref 11.6–15.1)
ERYTHROCYTE [DISTWIDTH] IN BLOOD BY AUTOMATED COUNT: 14.7 % (ref 11.6–15.1)
ERYTHROCYTE [DISTWIDTH] IN BLOOD BY AUTOMATED COUNT: 15 % (ref 11.6–15.1)
ERYTHROCYTE [DISTWIDTH] IN BLOOD BY AUTOMATED COUNT: 15.7 % (ref 11.6–15.1)
ERYTHROCYTE [DISTWIDTH] IN BLOOD BY AUTOMATED COUNT: 15.9 % (ref 11.6–15.1)
ERYTHROCYTE [DISTWIDTH] IN BLOOD BY AUTOMATED COUNT: 15.9 % (ref 11.6–15.1)
ERYTHROCYTE [DISTWIDTH] IN BLOOD BY AUTOMATED COUNT: 16 % (ref 11.6–15.1)
ERYTHROCYTE [DISTWIDTH] IN BLOOD BY AUTOMATED COUNT: 16.1 % (ref 11.6–15.1)
ERYTHROCYTE [DISTWIDTH] IN BLOOD BY AUTOMATED COUNT: 16.4 % (ref 11.6–15.1)
ERYTHROCYTE [DISTWIDTH] IN BLOOD BY AUTOMATED COUNT: 16.4 % (ref 11.6–15.1)
ERYTHROCYTE [DISTWIDTH] IN BLOOD BY AUTOMATED COUNT: 16.5 % (ref 11.6–15.1)
ERYTHROCYTE [DISTWIDTH] IN BLOOD BY AUTOMATED COUNT: 16.6 % (ref 11.6–15.1)
ERYTHROCYTE [DISTWIDTH] IN BLOOD BY AUTOMATED COUNT: 16.7 % (ref 11.6–15.1)
ERYTHROCYTE [DISTWIDTH] IN BLOOD BY AUTOMATED COUNT: 16.7 % (ref 11.6–15.1)
EST. AVERAGE GLUCOSE BLD GHB EST-MCNC: 243 MG/DL
FRACTIONAL SHORTENING: 35 % (ref 28–44)
GFR SERPL CREATININE-BSD FRML MDRD: 43 ML/MIN/1.73SQ M
GFR SERPL CREATININE-BSD FRML MDRD: 56 ML/MIN/1.73SQ M
GFR SERPL CREATININE-BSD FRML MDRD: 57 ML/MIN/1.73SQ M
GFR SERPL CREATININE-BSD FRML MDRD: 67 ML/MIN/1.73SQ M
GFR SERPL CREATININE-BSD FRML MDRD: 68 ML/MIN/1.73SQ M
GFR SERPL CREATININE-BSD FRML MDRD: 70 ML/MIN/1.73SQ M
GFR SERPL CREATININE-BSD FRML MDRD: 74 ML/MIN/1.73SQ M
GFR SERPL CREATININE-BSD FRML MDRD: 75 ML/MIN/1.73SQ M
GFR SERPL CREATININE-BSD FRML MDRD: 79 ML/MIN/1.73SQ M
GFR SERPL CREATININE-BSD FRML MDRD: 83 ML/MIN/1.73SQ M
GFR SERPL CREATININE-BSD FRML MDRD: 83 ML/MIN/1.73SQ M
GFR SERPL CREATININE-BSD FRML MDRD: 84 ML/MIN/1.73SQ M
GFR SERPL CREATININE-BSD FRML MDRD: 85 ML/MIN/1.73SQ M
GFR SERPL CREATININE-BSD FRML MDRD: 86 ML/MIN/1.73SQ M
GFR SERPL CREATININE-BSD FRML MDRD: 86 ML/MIN/1.73SQ M
GFR SERPL CREATININE-BSD FRML MDRD: 87 ML/MIN/1.73SQ M
GFR SERPL CREATININE-BSD FRML MDRD: 88 ML/MIN/1.73SQ M
GFR SERPL CREATININE-BSD FRML MDRD: 88 ML/MIN/1.73SQ M
GFR SERPL CREATININE-BSD FRML MDRD: 89 ML/MIN/1.73SQ M
GFR SERPL CREATININE-BSD FRML MDRD: 89 ML/MIN/1.73SQ M
GLUCOSE SERPL-MCNC: 106 MG/DL (ref 65–140)
GLUCOSE SERPL-MCNC: 106 MG/DL (ref 65–140)
GLUCOSE SERPL-MCNC: 108 MG/DL (ref 65–140)
GLUCOSE SERPL-MCNC: 110 MG/DL (ref 65–140)
GLUCOSE SERPL-MCNC: 114 MG/DL (ref 65–140)
GLUCOSE SERPL-MCNC: 118 MG/DL (ref 65–140)
GLUCOSE SERPL-MCNC: 119 MG/DL (ref 65–140)
GLUCOSE SERPL-MCNC: 123 MG/DL (ref 65–140)
GLUCOSE SERPL-MCNC: 125 MG/DL (ref 65–140)
GLUCOSE SERPL-MCNC: 128 MG/DL (ref 65–140)
GLUCOSE SERPL-MCNC: 129 MG/DL (ref 65–140)
GLUCOSE SERPL-MCNC: 129 MG/DL (ref 65–140)
GLUCOSE SERPL-MCNC: 139 MG/DL (ref 65–140)
GLUCOSE SERPL-MCNC: 139 MG/DL (ref 65–140)
GLUCOSE SERPL-MCNC: 140 MG/DL (ref 65–140)
GLUCOSE SERPL-MCNC: 148 MG/DL (ref 65–140)
GLUCOSE SERPL-MCNC: 155 MG/DL (ref 65–140)
GLUCOSE SERPL-MCNC: 156 MG/DL (ref 65–140)
GLUCOSE SERPL-MCNC: 159 MG/DL (ref 65–140)
GLUCOSE SERPL-MCNC: 163 MG/DL (ref 65–140)
GLUCOSE SERPL-MCNC: 164 MG/DL (ref 65–140)
GLUCOSE SERPL-MCNC: 164 MG/DL (ref 65–140)
GLUCOSE SERPL-MCNC: 167 MG/DL (ref 65–140)
GLUCOSE SERPL-MCNC: 168 MG/DL (ref 65–140)
GLUCOSE SERPL-MCNC: 169 MG/DL (ref 65–140)
GLUCOSE SERPL-MCNC: 170 MG/DL (ref 65–140)
GLUCOSE SERPL-MCNC: 174 MG/DL (ref 65–140)
GLUCOSE SERPL-MCNC: 177 MG/DL (ref 65–140)
GLUCOSE SERPL-MCNC: 183 MG/DL (ref 65–140)
GLUCOSE SERPL-MCNC: 183 MG/DL (ref 65–140)
GLUCOSE SERPL-MCNC: 189 MG/DL (ref 65–140)
GLUCOSE SERPL-MCNC: 198 MG/DL (ref 65–140)
GLUCOSE SERPL-MCNC: 200 MG/DL (ref 65–140)
GLUCOSE SERPL-MCNC: 201 MG/DL (ref 65–140)
GLUCOSE SERPL-MCNC: 201 MG/DL (ref 65–140)
GLUCOSE SERPL-MCNC: 205 MG/DL (ref 65–140)
GLUCOSE SERPL-MCNC: 206 MG/DL (ref 65–140)
GLUCOSE SERPL-MCNC: 212 MG/DL (ref 65–140)
GLUCOSE SERPL-MCNC: 213 MG/DL (ref 65–140)
GLUCOSE SERPL-MCNC: 217 MG/DL (ref 65–140)
GLUCOSE SERPL-MCNC: 217 MG/DL (ref 65–140)
GLUCOSE SERPL-MCNC: 223 MG/DL (ref 65–140)
GLUCOSE SERPL-MCNC: 224 MG/DL (ref 65–140)
GLUCOSE SERPL-MCNC: 229 MG/DL (ref 65–140)
GLUCOSE SERPL-MCNC: 230 MG/DL (ref 65–140)
GLUCOSE SERPL-MCNC: 235 MG/DL (ref 65–140)
GLUCOSE SERPL-MCNC: 236 MG/DL (ref 65–140)
GLUCOSE SERPL-MCNC: 240 MG/DL (ref 65–140)
GLUCOSE SERPL-MCNC: 250 MG/DL (ref 65–140)
GLUCOSE SERPL-MCNC: 253 MG/DL (ref 65–140)
GLUCOSE SERPL-MCNC: 255 MG/DL (ref 65–140)
GLUCOSE SERPL-MCNC: 257 MG/DL (ref 65–140)
GLUCOSE SERPL-MCNC: 259 MG/DL (ref 65–140)
GLUCOSE SERPL-MCNC: 260 MG/DL (ref 65–140)
GLUCOSE SERPL-MCNC: 264 MG/DL (ref 65–140)
GLUCOSE SERPL-MCNC: 266 MG/DL (ref 65–140)
GLUCOSE SERPL-MCNC: 267 MG/DL (ref 65–140)
GLUCOSE SERPL-MCNC: 268 MG/DL (ref 65–140)
GLUCOSE SERPL-MCNC: 272 MG/DL (ref 65–140)
GLUCOSE SERPL-MCNC: 275 MG/DL (ref 65–140)
GLUCOSE SERPL-MCNC: 288 MG/DL (ref 65–140)
GLUCOSE SERPL-MCNC: 288 MG/DL (ref 65–140)
GLUCOSE SERPL-MCNC: 292 MG/DL (ref 65–140)
GLUCOSE SERPL-MCNC: 292 MG/DL (ref 65–140)
GLUCOSE SERPL-MCNC: 298 MG/DL (ref 65–140)
GLUCOSE SERPL-MCNC: 298 MG/DL (ref 65–140)
GLUCOSE SERPL-MCNC: 299 MG/DL (ref 65–140)
GLUCOSE SERPL-MCNC: 303 MG/DL (ref 65–140)
GLUCOSE SERPL-MCNC: 303 MG/DL (ref 65–140)
GLUCOSE SERPL-MCNC: 305 MG/DL (ref 65–140)
GLUCOSE SERPL-MCNC: 306 MG/DL (ref 65–140)
GLUCOSE SERPL-MCNC: 311 MG/DL (ref 65–140)
GLUCOSE SERPL-MCNC: 313 MG/DL (ref 65–140)
GLUCOSE SERPL-MCNC: 323 MG/DL (ref 65–140)
GLUCOSE SERPL-MCNC: 329 MG/DL (ref 65–140)
GLUCOSE SERPL-MCNC: 335 MG/DL (ref 65–140)
GLUCOSE SERPL-MCNC: 355 MG/DL (ref 65–140)
GLUCOSE SERPL-MCNC: 358 MG/DL (ref 65–140)
GLUCOSE SERPL-MCNC: 358 MG/DL (ref 65–140)
GLUCOSE SERPL-MCNC: 36 MG/DL (ref 65–140)
GLUCOSE SERPL-MCNC: 36 MG/DL (ref 65–140)
GLUCOSE SERPL-MCNC: 360 MG/DL (ref 65–140)
GLUCOSE SERPL-MCNC: 361 MG/DL (ref 65–140)
GLUCOSE SERPL-MCNC: 362 MG/DL (ref 65–140)
GLUCOSE SERPL-MCNC: 368 MG/DL (ref 65–140)
GLUCOSE SERPL-MCNC: 369 MG/DL (ref 65–140)
GLUCOSE SERPL-MCNC: 369 MG/DL (ref 65–140)
GLUCOSE SERPL-MCNC: 37 MG/DL (ref 65–140)
GLUCOSE SERPL-MCNC: 371 MG/DL (ref 65–140)
GLUCOSE SERPL-MCNC: 371 MG/DL (ref 65–140)
GLUCOSE SERPL-MCNC: 372 MG/DL (ref 65–140)
GLUCOSE SERPL-MCNC: 373 MG/DL (ref 65–140)
GLUCOSE SERPL-MCNC: 38 MG/DL (ref 65–140)
GLUCOSE SERPL-MCNC: 386 MG/DL (ref 65–140)
GLUCOSE SERPL-MCNC: 390 MG/DL (ref 65–140)
GLUCOSE SERPL-MCNC: 394 MG/DL (ref 65–140)
GLUCOSE SERPL-MCNC: 420 MG/DL (ref 65–140)
GLUCOSE SERPL-MCNC: 424 MG/DL (ref 65–140)
GLUCOSE SERPL-MCNC: 430 MG/DL (ref 65–140)
GLUCOSE SERPL-MCNC: 438 MG/DL (ref 65–140)
GLUCOSE SERPL-MCNC: 448 MG/DL (ref 65–140)
GLUCOSE SERPL-MCNC: 454 MG/DL (ref 65–140)
GLUCOSE SERPL-MCNC: 460 MG/DL (ref 65–140)
GLUCOSE SERPL-MCNC: 485 MG/DL (ref 65–140)
GLUCOSE SERPL-MCNC: 491 MG/DL (ref 65–140)
GLUCOSE SERPL-MCNC: 498 MG/DL (ref 65–140)
GLUCOSE SERPL-MCNC: 50 MG/DL (ref 65–140)
GLUCOSE SERPL-MCNC: 51 MG/DL (ref 65–140)
GLUCOSE SERPL-MCNC: 57 MG/DL (ref 65–140)
GLUCOSE SERPL-MCNC: 59 MG/DL (ref 65–140)
GLUCOSE SERPL-MCNC: 61 MG/DL (ref 65–140)
GLUCOSE SERPL-MCNC: 62 MG/DL (ref 65–140)
GLUCOSE SERPL-MCNC: 63 MG/DL (ref 65–140)
GLUCOSE SERPL-MCNC: 64 MG/DL (ref 65–140)
GLUCOSE SERPL-MCNC: 64 MG/DL (ref 65–140)
GLUCOSE SERPL-MCNC: 69 MG/DL (ref 65–140)
GLUCOSE SERPL-MCNC: 69 MG/DL (ref 65–140)
GLUCOSE SERPL-MCNC: 70 MG/DL (ref 65–140)
GLUCOSE SERPL-MCNC: 77 MG/DL (ref 65–140)
GLUCOSE SERPL-MCNC: 80 MG/DL (ref 65–140)
GLUCOSE SERPL-MCNC: 81 MG/DL (ref 65–140)
GLUCOSE SERPL-MCNC: 82 MG/DL (ref 65–140)
GLUCOSE SERPL-MCNC: 87 MG/DL (ref 65–140)
GLUCOSE SERPL-MCNC: 95 MG/DL (ref 65–140)
GLUCOSE SERPL-MCNC: 95 MG/DL (ref 65–140)
GLUCOSE SERPL-MCNC: 99 MG/DL (ref 65–140)
GLUCOSE SERPL-MCNC: >500 MG/DL (ref 65–140)
HBA1C MFR BLD: 10.1 %
HBV CORE AB SER QL: REACTIVE
HBV CORE IGM SER QL: ABNORMAL
HBV SURFACE AG SER QL: ABNORMAL
HCT VFR BLD AUTO: 36.6 % (ref 36.5–49.3)
HCT VFR BLD AUTO: 36.9 % (ref 36.5–49.3)
HCT VFR BLD AUTO: 37.3 % (ref 36.5–49.3)
HCT VFR BLD AUTO: 37.4 % (ref 36.5–49.3)
HCT VFR BLD AUTO: 38.1 % (ref 36.5–49.3)
HCT VFR BLD AUTO: 39 % (ref 36.5–49.3)
HCT VFR BLD AUTO: 39.1 % (ref 36.5–49.3)
HCT VFR BLD AUTO: 40.4 % (ref 36.5–49.3)
HCT VFR BLD AUTO: 40.7 % (ref 36.5–49.3)
HCT VFR BLD AUTO: 41.3 % (ref 36.5–49.3)
HCT VFR BLD AUTO: 42.9 % (ref 36.5–49.3)
HCT VFR BLD AUTO: 43.1 % (ref 36.5–49.3)
HCT VFR BLD AUTO: 43.1 % (ref 36.5–49.3)
HCT VFR BLD AUTO: 43.2 % (ref 36.5–49.3)
HCT VFR BLD AUTO: 43.3 % (ref 36.5–49.3)
HCT VFR BLD AUTO: 43.6 % (ref 36.5–49.3)
HCV AB SER QL: ABNORMAL
HCV RNA SERPL NAA+PROBE-ACNC: NORMAL IU/ML
HGB BLD-MCNC: 11.4 G/DL (ref 12–17)
HGB BLD-MCNC: 11.7 G/DL (ref 12–17)
HGB BLD-MCNC: 11.9 G/DL (ref 12–17)
HGB BLD-MCNC: 12.1 G/DL (ref 12–17)
HGB BLD-MCNC: 12.2 G/DL (ref 12–17)
HGB BLD-MCNC: 12.7 G/DL (ref 12–17)
HGB BLD-MCNC: 12.7 G/DL (ref 12–17)
HGB BLD-MCNC: 12.9 G/DL (ref 12–17)
HGB BLD-MCNC: 13.2 G/DL (ref 12–17)
HGB BLD-MCNC: 13.9 G/DL (ref 12–17)
HGB BLD-MCNC: 14.2 G/DL (ref 12–17)
HGB BLD-MCNC: 14.4 G/DL (ref 12–17)
HGB BLD-MCNC: 14.5 G/DL (ref 12–17)
HOLD SPECIMEN: NORMAL
HYPERCHROMIA BLD QL SMEAR: PRESENT
IMM GRANULOCYTES # BLD AUTO: 0.09 THOUSAND/UL (ref 0–0.2)
IMM GRANULOCYTES # BLD AUTO: 0.11 THOUSAND/UL (ref 0–0.2)
IMM GRANULOCYTES # BLD AUTO: 0.38 THOUSAND/UL (ref 0–0.2)
IMM GRANULOCYTES # BLD AUTO: 0.41 THOUSAND/UL (ref 0–0.2)
IMM GRANULOCYTES # BLD AUTO: >0.5 THOUSAND/UL (ref 0–0.2)
IMM GRANULOCYTES NFR BLD AUTO: 1 % (ref 0–2)
IMM GRANULOCYTES NFR BLD AUTO: 1 % (ref 0–2)
IMM GRANULOCYTES NFR BLD AUTO: 2 % (ref 0–2)
INR PPP: 1.17 (ref 0.84–1.19)
INR PPP: 1.4 (ref 0.84–1.19)
INTERVENTRICULAR SEPTUM IN DIASTOLE (PARASTERNAL SHORT AXIS VIEW): 1 CM (ref 0.51–0.95)
LACTATE SERPL-SCNC: 2 MMOL/L (ref 0.5–2)
LACTATE SERPL-SCNC: 2.3 MMOL/L (ref 0.5–2)
LEFT ATRIUM AREA SYSTOLE SINGLE PLANE A4C: 18.5 CM2
LEFT ATRIUM SIZE: 3 CM
LEFT INTERNAL DIMENSION IN SYSTOLE: 2.6 CM (ref 2.1–4)
LEFT VENTRICULAR INTERNAL DIMENSION IN DIASTOLE: 4 CM (ref 4.01–5.97)
LEFT VENTRICULAR POSTERIOR WALL IN END DIASTOLE: 1 CM (ref 0.5–0.94)
LEFT VENTRICULAR STROKE VOLUME: 47 ML
LYMPHOCYTES # BLD AUTO: 0.65 THOUSAND/UL (ref 0.6–4.47)
LYMPHOCYTES # BLD AUTO: 0.67 THOUSAND/UL (ref 0.6–4.47)
LYMPHOCYTES # BLD AUTO: 0.92 THOUSANDS/ΜL (ref 0.6–4.47)
LYMPHOCYTES # BLD AUTO: 1.02 THOUSANDS/ΜL (ref 0.6–4.47)
LYMPHOCYTES # BLD AUTO: 1.04 THOUSANDS/ΜL (ref 0.6–4.47)
LYMPHOCYTES # BLD AUTO: 1.14 THOUSAND/UL (ref 0.6–4.47)
LYMPHOCYTES # BLD AUTO: 1.48 THOUSANDS/ΜL (ref 0.6–4.47)
LYMPHOCYTES # BLD AUTO: 1.94 THOUSAND/UL (ref 0.6–4.47)
LYMPHOCYTES # BLD AUTO: 2.39 THOUSANDS/ΜL (ref 0.6–4.47)
LYMPHOCYTES # BLD AUTO: 3 % (ref 14–44)
LYMPHOCYTES # BLD AUTO: 4 % (ref 14–44)
LYMPHOCYTES # BLD AUTO: 5 % (ref 14–44)
LYMPHOCYTES # BLD AUTO: 7 % (ref 14–44)
LYMPHOCYTES NFR BLD AUTO: 11 % (ref 14–44)
LYMPHOCYTES NFR BLD AUTO: 4 % (ref 14–44)
LYMPHOCYTES NFR BLD AUTO: 5 % (ref 14–44)
LYMPHOCYTES NFR BLD AUTO: 6 % (ref 14–44)
LYMPHOCYTES NFR BLD AUTO: 8 % (ref 14–44)
MAGNESIUM SERPL-MCNC: 2 MG/DL (ref 1.6–2.6)
MAGNESIUM SERPL-MCNC: 2.4 MG/DL (ref 1.6–2.6)
MCH RBC QN AUTO: 28.3 PG (ref 26.8–34.3)
MCH RBC QN AUTO: 28.3 PG (ref 26.8–34.3)
MCH RBC QN AUTO: 28.4 PG (ref 26.8–34.3)
MCH RBC QN AUTO: 28.4 PG (ref 26.8–34.3)
MCH RBC QN AUTO: 28.6 PG (ref 26.8–34.3)
MCH RBC QN AUTO: 28.7 PG (ref 26.8–34.3)
MCH RBC QN AUTO: 28.7 PG (ref 26.8–34.3)
MCH RBC QN AUTO: 28.9 PG (ref 26.8–34.3)
MCH RBC QN AUTO: 28.9 PG (ref 26.8–34.3)
MCH RBC QN AUTO: 29.1 PG (ref 26.8–34.3)
MCH RBC QN AUTO: 29.1 PG (ref 26.8–34.3)
MCH RBC QN AUTO: 29.2 PG (ref 26.8–34.3)
MCH RBC QN AUTO: 29.2 PG (ref 26.8–34.3)
MCH RBC QN AUTO: 29.4 PG (ref 26.8–34.3)
MCH RBC QN AUTO: 29.6 PG (ref 26.8–34.3)
MCH RBC QN AUTO: 29.7 PG (ref 26.8–34.3)
MCHC RBC AUTO-ENTMCNC: 30.5 G/DL (ref 31.4–37.4)
MCHC RBC AUTO-ENTMCNC: 30.9 G/DL (ref 31.4–37.4)
MCHC RBC AUTO-ENTMCNC: 31.7 G/DL (ref 31.4–37.4)
MCHC RBC AUTO-ENTMCNC: 32 G/DL (ref 31.4–37.4)
MCHC RBC AUTO-ENTMCNC: 32.4 G/DL (ref 31.4–37.4)
MCHC RBC AUTO-ENTMCNC: 32.5 G/DL (ref 31.4–37.4)
MCHC RBC AUTO-ENTMCNC: 32.6 G/DL (ref 31.4–37.4)
MCHC RBC AUTO-ENTMCNC: 32.7 G/DL (ref 31.4–37.4)
MCHC RBC AUTO-ENTMCNC: 32.8 G/DL (ref 31.4–37.4)
MCHC RBC AUTO-ENTMCNC: 32.9 G/DL (ref 31.4–37.4)
MCHC RBC AUTO-ENTMCNC: 32.9 G/DL (ref 31.4–37.4)
MCHC RBC AUTO-ENTMCNC: 33.1 G/DL (ref 31.4–37.4)
MCHC RBC AUTO-ENTMCNC: 33.3 G/DL (ref 31.4–37.4)
MCHC RBC AUTO-ENTMCNC: 33.3 G/DL (ref 31.4–37.4)
MCHC RBC AUTO-ENTMCNC: 33.4 G/DL (ref 31.4–37.4)
MCHC RBC AUTO-ENTMCNC: 33.7 G/DL (ref 31.4–37.4)
MCV RBC AUTO: 86 FL (ref 82–98)
MCV RBC AUTO: 87 FL (ref 82–98)
MCV RBC AUTO: 88 FL (ref 82–98)
MCV RBC AUTO: 89 FL (ref 82–98)
MCV RBC AUTO: 90 FL (ref 82–98)
MCV RBC AUTO: 91 FL (ref 82–98)
MCV RBC AUTO: 93 FL (ref 82–98)
MCV RBC AUTO: 94 FL (ref 82–98)
MONOCYTES # BLD AUTO: 0 THOUSAND/UL (ref 0–1.22)
MONOCYTES # BLD AUTO: 0.34 THOUSAND/UL (ref 0–1.22)
MONOCYTES # BLD AUTO: 0.43 THOUSAND/UL (ref 0–1.22)
MONOCYTES # BLD AUTO: 1.03 THOUSAND/ΜL (ref 0.17–1.22)
MONOCYTES # BLD AUTO: 1.13 THOUSAND/ΜL (ref 0.17–1.22)
MONOCYTES # BLD AUTO: 1.25 THOUSAND/ΜL (ref 0.17–1.22)
MONOCYTES # BLD AUTO: 1.38 THOUSAND/ΜL (ref 0.17–1.22)
MONOCYTES # BLD AUTO: 1.39 THOUSAND/UL (ref 0–1.22)
MONOCYTES # BLD AUTO: 2.47 THOUSAND/ΜL (ref 0.17–1.22)
MONOCYTES NFR BLD AUTO: 5 % (ref 4–12)
MONOCYTES NFR BLD AUTO: 6 % (ref 4–12)
MONOCYTES NFR BLD AUTO: 7 % (ref 4–12)
MONOCYTES NFR BLD AUTO: 8 % (ref 4–12)
MONOCYTES NFR BLD AUTO: 8 % (ref 4–12)
MONOCYTES NFR BLD: 0 % (ref 4–12)
MONOCYTES NFR BLD: 2 % (ref 4–12)
MONOCYTES NFR BLD: 2 % (ref 4–12)
MONOCYTES NFR BLD: 5 % (ref 4–12)
MV PEAK A VEL: 0.85 M/S
MV PEAK E VEL: 69 CM/S
MV STENOSIS PRESSURE HALF TIME: 0 MS
NEUTROPHILS # BLD AUTO: 11.02 THOUSANDS/ΜL (ref 1.85–7.62)
NEUTROPHILS # BLD AUTO: 13.06 THOUSANDS/ΜL (ref 1.85–7.62)
NEUTROPHILS # BLD AUTO: 17.49 THOUSANDS/ΜL (ref 1.85–7.62)
NEUTROPHILS # BLD AUTO: 23.36 THOUSANDS/ΜL (ref 1.85–7.62)
NEUTROPHILS # BLD AUTO: 24.95 THOUSANDS/ΜL (ref 1.85–7.62)
NEUTROPHILS # BLD MANUAL: 15.8 THOUSAND/UL (ref 1.85–7.62)
NEUTROPHILS # BLD MANUAL: 20.62 THOUSAND/UL (ref 1.85–7.62)
NEUTROPHILS # BLD MANUAL: 21.71 THOUSAND/UL (ref 1.85–7.62)
NEUTROPHILS # BLD MANUAL: 24.17 THOUSAND/UL (ref 1.85–7.62)
NEUTS BAND NFR BLD MANUAL: 1 % (ref 0–8)
NEUTS BAND NFR BLD MANUAL: 2 % (ref 0–8)
NEUTS BAND NFR BLD MANUAL: 7 % (ref 0–8)
NEUTS SEG NFR BLD AUTO: 81 % (ref 43–75)
NEUTS SEG NFR BLD AUTO: 83 % (ref 43–75)
NEUTS SEG NFR BLD AUTO: 85 % (ref 43–75)
NEUTS SEG NFR BLD AUTO: 86 % (ref 43–75)
NEUTS SEG NFR BLD AUTO: 86 % (ref 43–75)
NEUTS SEG NFR BLD AUTO: 88 % (ref 43–75)
NEUTS SEG NFR BLD AUTO: 89 % (ref 43–75)
NEUTS SEG NFR BLD AUTO: 92 % (ref 43–75)
NEUTS SEG NFR BLD AUTO: 95 % (ref 43–75)
NRBC BLD AUTO-RTO: 0 /100 WBCS
NT-PROBNP SERPL-MCNC: 1007 PG/ML
NT-PROBNP SERPL-MCNC: 714 PG/ML
P AXIS: 67 DEGREES
P AXIS: 70 DEGREES
P AXIS: 72 DEGREES
P AXIS: 83 DEGREES
PA SYSTOLIC PRESSURE: 30 MMHG
PHOSPHATE SERPL-MCNC: 3.5 MG/DL (ref 2.3–4.1)
PLATELET # BLD AUTO: 101 THOUSANDS/UL (ref 149–390)
PLATELET # BLD AUTO: 115 THOUSANDS/UL (ref 149–390)
PLATELET # BLD AUTO: 116 THOUSANDS/UL (ref 149–390)
PLATELET # BLD AUTO: 165 THOUSANDS/UL (ref 149–390)
PLATELET # BLD AUTO: 206 THOUSANDS/UL (ref 149–390)
PLATELET # BLD AUTO: 224 THOUSANDS/UL (ref 149–390)
PLATELET # BLD AUTO: 240 THOUSANDS/UL (ref 149–390)
PLATELET # BLD AUTO: 256 THOUSANDS/UL (ref 149–390)
PLATELET # BLD AUTO: 256 THOUSANDS/UL (ref 149–390)
PLATELET # BLD AUTO: 258 THOUSANDS/UL (ref 149–390)
PLATELET # BLD AUTO: 267 THOUSANDS/UL (ref 149–390)
PLATELET # BLD AUTO: 279 THOUSANDS/UL (ref 149–390)
PLATELET # BLD AUTO: 281 THOUSANDS/UL (ref 149–390)
PLATELET # BLD AUTO: 297 THOUSANDS/UL (ref 149–390)
PLATELET # BLD AUTO: 344 THOUSANDS/UL (ref 149–390)
PLATELET # BLD AUTO: 356 THOUSANDS/UL (ref 149–390)
PLATELET BLD QL SMEAR: ABNORMAL
PLATELET BLD QL SMEAR: ADEQUATE
PMV BLD AUTO: 11.3 FL (ref 8.9–12.7)
PMV BLD AUTO: 11.7 FL (ref 8.9–12.7)
PMV BLD AUTO: 11.7 FL (ref 8.9–12.7)
PMV BLD AUTO: 11.8 FL (ref 8.9–12.7)
PMV BLD AUTO: 11.9 FL (ref 8.9–12.7)
PMV BLD AUTO: 12.2 FL (ref 8.9–12.7)
PMV BLD AUTO: 12.2 FL (ref 8.9–12.7)
PMV BLD AUTO: 12.4 FL (ref 8.9–12.7)
PMV BLD AUTO: 12.4 FL (ref 8.9–12.7)
PMV BLD AUTO: 12.9 FL (ref 8.9–12.7)
PMV BLD AUTO: 13.1 FL (ref 8.9–12.7)
PMV BLD AUTO: 13.2 FL (ref 8.9–12.7)
PMV BLD AUTO: 13.3 FL (ref 8.9–12.7)
PMV BLD AUTO: 13.6 FL (ref 8.9–12.7)
POTASSIUM SERPL-SCNC: 4 MMOL/L (ref 3.5–5.3)
POTASSIUM SERPL-SCNC: 4.1 MMOL/L (ref 3.5–5.3)
POTASSIUM SERPL-SCNC: 4.2 MMOL/L (ref 3.5–5.3)
POTASSIUM SERPL-SCNC: 4.2 MMOL/L (ref 3.5–5.3)
POTASSIUM SERPL-SCNC: 4.3 MMOL/L (ref 3.5–5.3)
POTASSIUM SERPL-SCNC: 4.5 MMOL/L (ref 3.5–5.3)
POTASSIUM SERPL-SCNC: 4.6 MMOL/L (ref 3.5–5.3)
POTASSIUM SERPL-SCNC: 4.7 MMOL/L (ref 3.5–5.3)
POTASSIUM SERPL-SCNC: 4.8 MMOL/L (ref 3.5–5.3)
POTASSIUM SERPL-SCNC: 4.9 MMOL/L (ref 3.5–5.3)
POTASSIUM SERPL-SCNC: 4.9 MMOL/L (ref 3.5–5.3)
POTASSIUM SERPL-SCNC: 5.1 MMOL/L (ref 3.5–5.3)
POTASSIUM SERPL-SCNC: 5.1 MMOL/L (ref 3.5–5.3)
POTASSIUM SERPL-SCNC: 5.3 MMOL/L (ref 3.5–5.3)
POTASSIUM SERPL-SCNC: 5.4 MMOL/L (ref 3.5–5.3)
PR INTERVAL: 126 MS
PR INTERVAL: 130 MS
PR INTERVAL: 134 MS
PR INTERVAL: 134 MS
PROCALCITONIN SERPL-MCNC: 0.08 NG/ML
PROCALCITONIN SERPL-MCNC: 0.09 NG/ML
PROCALCITONIN SERPL-MCNC: 0.11 NG/ML
PROCALCITONIN SERPL-MCNC: 0.12 NG/ML
PROCALCITONIN SERPL-MCNC: 0.14 NG/ML
PROCALCITONIN SERPL-MCNC: 0.15 NG/ML
PROCALCITONIN SERPL-MCNC: 0.16 NG/ML
PROCALCITONIN SERPL-MCNC: 0.27 NG/ML
PROT SERPL-MCNC: 4.6 G/DL (ref 6.4–8.2)
PROT SERPL-MCNC: 4.6 G/DL (ref 6.4–8.2)
PROT SERPL-MCNC: 4.9 G/DL (ref 6.4–8.2)
PROT SERPL-MCNC: 4.9 G/DL (ref 6.4–8.2)
PROT SERPL-MCNC: 5 G/DL (ref 6.4–8.2)
PROT SERPL-MCNC: 5 G/DL (ref 6.4–8.2)
PROT SERPL-MCNC: 5.1 G/DL (ref 6.4–8.2)
PROT SERPL-MCNC: 5.1 G/DL (ref 6.4–8.2)
PROT SERPL-MCNC: 5.3 G/DL (ref 6.4–8.2)
PROT SERPL-MCNC: 5.5 G/DL (ref 6.4–8.2)
PROT SERPL-MCNC: 5.5 G/DL (ref 6.4–8.2)
PROT SERPL-MCNC: 5.6 G/DL (ref 6.4–8.2)
PROT SERPL-MCNC: 5.6 G/DL (ref 6.4–8.2)
PROT SERPL-MCNC: 5.7 G/DL (ref 6.4–8.2)
PROT SERPL-MCNC: 5.7 G/DL (ref 6.4–8.2)
PROT SERPL-MCNC: 6 G/DL (ref 6.4–8.2)
PROT SERPL-MCNC: 6.1 G/DL (ref 6.4–8.2)
PROT SERPL-MCNC: 7 G/DL (ref 6.4–8.2)
PROT SERPL-MCNC: 7.6 G/DL (ref 6.4–8.2)
PROTHROMBIN TIME: 14.9 SECONDS (ref 11.6–14.5)
PROTHROMBIN TIME: 17.1 SECONDS (ref 11.6–14.5)
PV PEAK GRADIENT: 2 MMHG
QRS AXIS: 15 DEGREES
QRS AXIS: 18 DEGREES
QRS AXIS: 20 DEGREES
QRS AXIS: 47 DEGREES
QRSD INTERVAL: 88 MS
QRSD INTERVAL: 90 MS
QRSD INTERVAL: 92 MS
QRSD INTERVAL: 96 MS
QT INTERVAL: 384 MS
QT INTERVAL: 384 MS
QT INTERVAL: 404 MS
QT INTERVAL: 408 MS
QTC INTERVAL: 438 MS
QTC INTERVAL: 439 MS
QTC INTERVAL: 443 MS
QTC INTERVAL: 446 MS
RBC # BLD AUTO: 3.95 MILLION/UL (ref 3.88–5.62)
RBC # BLD AUTO: 4.02 MILLION/UL (ref 3.88–5.62)
RBC # BLD AUTO: 4.15 MILLION/UL (ref 3.88–5.62)
RBC # BLD AUTO: 4.15 MILLION/UL (ref 3.88–5.62)
RBC # BLD AUTO: 4.18 MILLION/UL (ref 3.88–5.62)
RBC # BLD AUTO: 4.28 MILLION/UL (ref 3.88–5.62)
RBC # BLD AUTO: 4.44 MILLION/UL (ref 3.88–5.62)
RBC # BLD AUTO: 4.56 MILLION/UL (ref 3.88–5.62)
RBC # BLD AUTO: 4.64 MILLION/UL (ref 3.88–5.62)
RBC # BLD AUTO: 4.8 MILLION/UL (ref 3.88–5.62)
RBC # BLD AUTO: 4.81 MILLION/UL (ref 3.88–5.62)
RBC # BLD AUTO: 4.83 MILLION/UL (ref 3.88–5.62)
RBC # BLD AUTO: 4.95 MILLION/UL (ref 3.88–5.62)
RBC # BLD AUTO: 4.95 MILLION/UL (ref 3.88–5.62)
RBC # BLD AUTO: 5.02 MILLION/UL (ref 3.88–5.62)
RBC # BLD AUTO: 5.1 MILLION/UL (ref 3.88–5.62)
RBC MORPH BLD: NORMAL
RBC MORPH BLD: PRESENT
RIGHT ATRIUM AREA SYSTOLE A4C: 15.6 CM2
RIGHT VENTRICLE ID DIMENSION: 4.4 CM
SARS-COV-2 RNA RESP QL NAA+PROBE: POSITIVE
SL CV LV EF: 70
SL CV PED ECHO LEFT VENTRICLE DIASTOLIC VOLUME (MOD BIPLANE) 2D: 72 ML
SL CV PED ECHO LEFT VENTRICLE SYSTOLIC VOLUME (MOD BIPLANE) 2D: 25 ML
SODIUM SERPL-SCNC: 135 MMOL/L (ref 136–145)
SODIUM SERPL-SCNC: 135 MMOL/L (ref 136–145)
SODIUM SERPL-SCNC: 136 MMOL/L (ref 136–145)
SODIUM SERPL-SCNC: 137 MMOL/L (ref 136–145)
SODIUM SERPL-SCNC: 138 MMOL/L (ref 136–145)
SODIUM SERPL-SCNC: 139 MMOL/L (ref 136–145)
SODIUM SERPL-SCNC: 140 MMOL/L (ref 136–145)
SODIUM SERPL-SCNC: 141 MMOL/L (ref 136–145)
SODIUM SERPL-SCNC: 142 MMOL/L (ref 136–145)
SODIUM SERPL-SCNC: 144 MMOL/L (ref 136–145)
T WAVE AXIS: 38 DEGREES
T WAVE AXIS: 40 DEGREES
T WAVE AXIS: 54 DEGREES
T WAVE AXIS: 83 DEGREES
TEST INFORMATION: NORMAL
TR MAX PG: 27 MMHG
TRICUSPID VALVE PEAK REGURGITATION VELOCITY: 2.59 M/S
VENTRICULAR RATE: 71 BPM
VENTRICULAR RATE: 71 BPM
VENTRICULAR RATE: 78 BPM
VENTRICULAR RATE: 81 BPM
WBC # BLD AUTO: 13.17 THOUSAND/UL (ref 4.31–10.16)
WBC # BLD AUTO: 13.6 THOUSAND/UL (ref 4.31–10.16)
WBC # BLD AUTO: 15 THOUSAND/UL (ref 4.31–10.16)
WBC # BLD AUTO: 15.26 THOUSAND/UL (ref 4.31–10.16)
WBC # BLD AUTO: 16.81 THOUSAND/UL (ref 4.31–10.16)
WBC # BLD AUTO: 19.64 THOUSAND/UL (ref 4.31–10.16)
WBC # BLD AUTO: 21.71 THOUSAND/UL (ref 4.31–10.16)
WBC # BLD AUTO: 21.76 THOUSAND/UL (ref 4.31–10.16)
WBC # BLD AUTO: 22.85 THOUSAND/UL (ref 4.31–10.16)
WBC # BLD AUTO: 23.28 THOUSAND/UL (ref 4.31–10.16)
WBC # BLD AUTO: 23.51 THOUSAND/UL (ref 4.31–10.16)
WBC # BLD AUTO: 24.01 THOUSAND/UL (ref 4.31–10.16)
WBC # BLD AUTO: 26.13 THOUSAND/UL (ref 4.31–10.16)
WBC # BLD AUTO: 27.78 THOUSAND/UL (ref 4.31–10.16)
WBC # BLD AUTO: 28.95 THOUSAND/UL (ref 4.31–10.16)
WBC # BLD AUTO: 29.56 THOUSAND/UL (ref 4.31–10.16)
Z-SCORE OF ASCENDING AORTA: 4.25
Z-SCORE OF INTERVENTRICULAR SEPTUM IN END DIASTOLE: 2.4
Z-SCORE OF LEFT VENTRICULAR DIMENSION IN END SYSTOLE: -2.01
Z-SCORE OF LEFT VENTRICULAR POSTERIOR WALL IN END DIASTOLE: 2.51

## 2022-01-01 PROCEDURE — 99232 SBSQ HOSP IP/OBS MODERATE 35: CPT | Performed by: INTERNAL MEDICINE

## 2022-01-01 PROCEDURE — 99284 EMERGENCY DEPT VISIT MOD MDM: CPT | Performed by: EMERGENCY MEDICINE

## 2022-01-01 PROCEDURE — 94003 VENT MGMT INPAT SUBQ DAY: CPT

## 2022-01-01 PROCEDURE — 85730 THROMBOPLASTIN TIME PARTIAL: CPT | Performed by: INTERNAL MEDICINE

## 2022-01-01 PROCEDURE — 83735 ASSAY OF MAGNESIUM: CPT

## 2022-01-01 PROCEDURE — 80053 COMPREHEN METABOLIC PANEL: CPT | Performed by: INTERNAL MEDICINE

## 2022-01-01 PROCEDURE — 82948 REAGENT STRIP/BLOOD GLUCOSE: CPT

## 2022-01-01 PROCEDURE — 83880 ASSAY OF NATRIURETIC PEPTIDE: CPT | Performed by: EMERGENCY MEDICINE

## 2022-01-01 PROCEDURE — 99283 EMERGENCY DEPT VISIT LOW MDM: CPT

## 2022-01-01 PROCEDURE — 97530 THERAPEUTIC ACTIVITIES: CPT

## 2022-01-01 PROCEDURE — 93321 DOPPLER ECHO F-UP/LMTD STD: CPT

## 2022-01-01 PROCEDURE — 93005 ELECTROCARDIOGRAM TRACING: CPT | Performed by: PHYSICIAN ASSISTANT

## 2022-01-01 PROCEDURE — 86140 C-REACTIVE PROTEIN: CPT

## 2022-01-01 PROCEDURE — U0005 INFEC AGEN DETEC AMPLI PROBE: HCPCS

## 2022-01-01 PROCEDURE — 94760 N-INVAS EAR/PLS OXIMETRY 1: CPT

## 2022-01-01 PROCEDURE — 86705 HEP B CORE ANTIBODY IGM: CPT | Performed by: INTERNAL MEDICINE

## 2022-01-01 PROCEDURE — 36415 COLL VENOUS BLD VENIPUNCTURE: CPT

## 2022-01-01 PROCEDURE — G1004 CDSM NDSC: HCPCS

## 2022-01-01 PROCEDURE — 85025 COMPLETE CBC W/AUTO DIFF WBC: CPT | Performed by: EMERGENCY MEDICINE

## 2022-01-01 PROCEDURE — 99223 1ST HOSP IP/OBS HIGH 75: CPT | Performed by: INTERNAL MEDICINE

## 2022-01-01 PROCEDURE — 99232 SBSQ HOSP IP/OBS MODERATE 35: CPT | Performed by: PHYSICIAN ASSISTANT

## 2022-01-01 PROCEDURE — 96374 THER/PROPH/DIAG INJ IV PUSH: CPT

## 2022-01-01 PROCEDURE — 85379 FIBRIN DEGRADATION QUANT: CPT

## 2022-01-01 PROCEDURE — 87040 BLOOD CULTURE FOR BACTERIA: CPT

## 2022-01-01 PROCEDURE — 99239 HOSP IP/OBS DSCHRG MGMT >30: CPT | Performed by: HOSPITALIST

## 2022-01-01 PROCEDURE — 84145 PROCALCITONIN (PCT): CPT

## 2022-01-01 PROCEDURE — 83036 HEMOGLOBIN GLYCOSYLATED A1C: CPT | Performed by: INTERNAL MEDICINE

## 2022-01-01 PROCEDURE — 80053 COMPREHEN METABOLIC PANEL: CPT | Performed by: PHYSICIAN ASSISTANT

## 2022-01-01 PROCEDURE — 85027 COMPLETE CBC AUTOMATED: CPT | Performed by: INTERNAL MEDICINE

## 2022-01-01 PROCEDURE — 94762 N-INVAS EAR/PLS OXIMTRY CONT: CPT

## 2022-01-01 PROCEDURE — 71045 X-RAY EXAM CHEST 1 VIEW: CPT

## 2022-01-01 PROCEDURE — 84484 ASSAY OF TROPONIN QUANT: CPT

## 2022-01-01 PROCEDURE — 97163 PT EVAL HIGH COMPLEX 45 MIN: CPT

## 2022-01-01 PROCEDURE — 99291 CRITICAL CARE FIRST HOUR: CPT | Performed by: INTERNAL MEDICINE

## 2022-01-01 PROCEDURE — 84145 PROCALCITONIN (PCT): CPT | Performed by: INTERNAL MEDICINE

## 2022-01-01 PROCEDURE — 86140 C-REACTIVE PROTEIN: CPT | Performed by: PHYSICIAN ASSISTANT

## 2022-01-01 PROCEDURE — 86803 HEPATITIS C AB TEST: CPT | Performed by: INTERNAL MEDICINE

## 2022-01-01 PROCEDURE — 99233 SBSQ HOSP IP/OBS HIGH 50: CPT | Performed by: INTERNAL MEDICINE

## 2022-01-01 PROCEDURE — 87340 HEPATITIS B SURFACE AG IA: CPT | Performed by: INTERNAL MEDICINE

## 2022-01-01 PROCEDURE — 85025 COMPLETE CBC W/AUTO DIFF WBC: CPT | Performed by: INTERNAL MEDICINE

## 2022-01-01 PROCEDURE — 85730 THROMBOPLASTIN TIME PARTIAL: CPT

## 2022-01-01 PROCEDURE — 85610 PROTHROMBIN TIME: CPT | Performed by: INTERNAL MEDICINE

## 2022-01-01 PROCEDURE — 85007 BL SMEAR W/DIFF WBC COUNT: CPT

## 2022-01-01 PROCEDURE — 85027 COMPLETE CBC AUTOMATED: CPT | Performed by: PHYSICIAN ASSISTANT

## 2022-01-01 PROCEDURE — 94660 CPAP INITIATION&MGMT: CPT

## 2022-01-01 PROCEDURE — 1123F ACP DISCUSS/DSCN MKR DOCD: CPT | Performed by: EMERGENCY MEDICINE

## 2022-01-01 PROCEDURE — 86140 C-REACTIVE PROTEIN: CPT | Performed by: INTERNAL MEDICINE

## 2022-01-01 PROCEDURE — 80048 BASIC METABOLIC PNL TOTAL CA: CPT | Performed by: INTERNAL MEDICINE

## 2022-01-01 PROCEDURE — 93308 TTE F-UP OR LMTD: CPT | Performed by: INTERNAL MEDICINE

## 2022-01-01 PROCEDURE — 96365 THER/PROPH/DIAG IV INF INIT: CPT

## 2022-01-01 PROCEDURE — 93010 ELECTROCARDIOGRAM REPORT: CPT | Performed by: INTERNAL MEDICINE

## 2022-01-01 PROCEDURE — 90715 TDAP VACCINE 7 YRS/> IM: CPT

## 2022-01-01 PROCEDURE — 99285 EMERGENCY DEPT VISIT HI MDM: CPT

## 2022-01-01 PROCEDURE — 85025 COMPLETE CBC W/AUTO DIFF WBC: CPT | Performed by: PHYSICIAN ASSISTANT

## 2022-01-01 PROCEDURE — 87522 HEPATITIS C REVRS TRNSCRPJ: CPT | Performed by: INTERNAL MEDICINE

## 2022-01-01 PROCEDURE — 99232 SBSQ HOSP IP/OBS MODERATE 35: CPT | Performed by: HOSPITALIST

## 2022-01-01 PROCEDURE — 84484 ASSAY OF TROPONIN QUANT: CPT | Performed by: EMERGENCY MEDICINE

## 2022-01-01 PROCEDURE — 80053 COMPREHEN METABOLIC PANEL: CPT

## 2022-01-01 PROCEDURE — 93005 ELECTROCARDIOGRAM TRACING: CPT

## 2022-01-01 PROCEDURE — 80076 HEPATIC FUNCTION PANEL: CPT | Performed by: PHYSICIAN ASSISTANT

## 2022-01-01 PROCEDURE — 85025 COMPLETE CBC W/AUTO DIFF WBC: CPT

## 2022-01-01 PROCEDURE — 83735 ASSAY OF MAGNESIUM: CPT | Performed by: INTERNAL MEDICINE

## 2022-01-01 PROCEDURE — 86141 C-REACTIVE PROTEIN HS: CPT | Performed by: EMERGENCY MEDICINE

## 2022-01-01 PROCEDURE — 71275 CT ANGIOGRAPHY CHEST: CPT

## 2022-01-01 PROCEDURE — 93325 DOPPLER ECHO COLOR FLOW MAPG: CPT

## 2022-01-01 PROCEDURE — XW033E5 INTRODUCTION OF REMDESIVIR ANTI-INFECTIVE INTO PERIPHERAL VEIN, PERCUTANEOUS APPROACH, NEW TECHNOLOGY GROUP 5: ICD-10-PCS | Performed by: HOSPITALIST

## 2022-01-01 PROCEDURE — XW0DXM6 INTRODUCTION OF BARICITINIB INTO MOUTH AND PHARYNX, EXTERNAL APPROACH, NEW TECHNOLOGY GROUP 6: ICD-10-PCS | Performed by: INTERNAL MEDICINE

## 2022-01-01 PROCEDURE — U0003 INFECTIOUS AGENT DETECTION BY NUCLEIC ACID (DNA OR RNA); SEVERE ACUTE RESPIRATORY SYNDROME CORONAVIRUS 2 (SARS-COV-2) (CORONAVIRUS DISEASE [COVID-19]), AMPLIFIED PROBE TECHNIQUE, MAKING USE OF HIGH THROUGHPUT TECHNOLOGIES AS DESCRIBED BY CMS-2020-01-R: HCPCS

## 2022-01-01 PROCEDURE — 97535 SELF CARE MNGMENT TRAINING: CPT

## 2022-01-01 PROCEDURE — 99285 EMERGENCY DEPT VISIT HI MDM: CPT | Performed by: EMERGENCY MEDICINE

## 2022-01-01 PROCEDURE — 80053 COMPREHEN METABOLIC PANEL: CPT | Performed by: EMERGENCY MEDICINE

## 2022-01-01 PROCEDURE — 85027 COMPLETE CBC AUTOMATED: CPT

## 2022-01-01 PROCEDURE — 85007 BL SMEAR W/DIFF WBC COUNT: CPT | Performed by: INTERNAL MEDICINE

## 2022-01-01 PROCEDURE — 85007 BL SMEAR W/DIFF WBC COUNT: CPT | Performed by: PHYSICIAN ASSISTANT

## 2022-01-01 PROCEDURE — 70450 CT HEAD/BRAIN W/O DYE: CPT

## 2022-01-01 PROCEDURE — 83880 ASSAY OF NATRIURETIC PEPTIDE: CPT

## 2022-01-01 PROCEDURE — 85379 FIBRIN DEGRADATION QUANT: CPT | Performed by: PHYSICIAN ASSISTANT

## 2022-01-01 PROCEDURE — 93321 DOPPLER ECHO F-UP/LMTD STD: CPT | Performed by: INTERNAL MEDICINE

## 2022-01-01 PROCEDURE — 76705 ECHO EXAM OF ABDOMEN: CPT

## 2022-01-01 PROCEDURE — 80048 BASIC METABOLIC PNL TOTAL CA: CPT | Performed by: PHYSICIAN ASSISTANT

## 2022-01-01 PROCEDURE — 82010 KETONE BODYS QUAN: CPT | Performed by: NURSE PRACTITIONER

## 2022-01-01 PROCEDURE — 97110 THERAPEUTIC EXERCISES: CPT

## 2022-01-01 PROCEDURE — 84100 ASSAY OF PHOSPHORUS: CPT | Performed by: INTERNAL MEDICINE

## 2022-01-01 PROCEDURE — 94761 N-INVAS EAR/PLS OXIMETRY MLT: CPT

## 2022-01-01 PROCEDURE — NC001 PR NO CHARGE: Performed by: PHYSICIAN ASSISTANT

## 2022-01-01 PROCEDURE — 93308 TTE F-UP OR LMTD: CPT

## 2022-01-01 PROCEDURE — 80048 BASIC METABOLIC PNL TOTAL CA: CPT | Performed by: NURSE PRACTITIONER

## 2022-01-01 PROCEDURE — 93325 DOPPLER ECHO COLOR FLOW MAPG: CPT | Performed by: INTERNAL MEDICINE

## 2022-01-01 PROCEDURE — 84145 PROCALCITONIN (PCT): CPT | Performed by: PHYSICIAN ASSISTANT

## 2022-01-01 PROCEDURE — 83605 ASSAY OF LACTIC ACID: CPT

## 2022-01-01 PROCEDURE — 36415 COLL VENOUS BLD VENIPUNCTURE: CPT | Performed by: INTERNAL MEDICINE

## 2022-01-01 PROCEDURE — 86704 HEP B CORE ANTIBODY TOTAL: CPT | Performed by: INTERNAL MEDICINE

## 2022-01-01 PROCEDURE — 82550 ASSAY OF CK (CPK): CPT | Performed by: EMERGENCY MEDICINE

## 2022-01-01 PROCEDURE — NC001 PR NO CHARGE: Performed by: NURSE PRACTITIONER

## 2022-01-01 PROCEDURE — 96375 TX/PRO/DX INJ NEW DRUG ADDON: CPT

## 2022-01-01 PROCEDURE — 86140 C-REACTIVE PROTEIN: CPT | Performed by: NURSE PRACTITIONER

## 2022-01-01 PROCEDURE — 80048 BASIC METABOLIC PNL TOTAL CA: CPT

## 2022-01-01 PROCEDURE — 94002 VENT MGMT INPAT INIT DAY: CPT

## 2022-01-01 PROCEDURE — 85379 FIBRIN DEGRADATION QUANT: CPT | Performed by: EMERGENCY MEDICINE

## 2022-01-01 PROCEDURE — 97167 OT EVAL HIGH COMPLEX 60 MIN: CPT

## 2022-01-01 PROCEDURE — XW033E5 INTRODUCTION OF REMDESIVIR ANTI-INFECTIVE INTO PERIPHERAL VEIN, PERCUTANEOUS APPROACH, NEW TECHNOLOGY GROUP 5: ICD-10-PCS | Performed by: INTERNAL MEDICINE

## 2022-01-01 RX ORDER — ACETAMINOPHEN 325 MG/1
650 TABLET ORAL EVERY 6 HOURS PRN
Status: DISCONTINUED | OUTPATIENT
Start: 2022-01-01 | End: 2022-02-14 | Stop reason: HOSPADM

## 2022-01-01 RX ORDER — EZETIMIBE 10 MG/1
10 TABLET ORAL DAILY
Status: DISCONTINUED | OUTPATIENT
Start: 2022-01-01 | End: 2022-01-01

## 2022-01-01 RX ORDER — LANOLIN ALCOHOL/MO/W.PET/CERES
3 CREAM (GRAM) TOPICAL
Status: DISCONTINUED | OUTPATIENT
Start: 2022-01-01 | End: 2022-01-01

## 2022-01-01 RX ORDER — BENZONATATE 100 MG/1
100 CAPSULE ORAL 3 TIMES DAILY PRN
Status: DISCONTINUED | OUTPATIENT
Start: 2022-01-01 | End: 2022-01-01

## 2022-01-01 RX ORDER — BENZONATATE 100 MG/1
200 CAPSULE ORAL 3 TIMES DAILY PRN
Status: DISCONTINUED | OUTPATIENT
Start: 2022-01-01 | End: 2022-01-01

## 2022-01-01 RX ORDER — DEXAMETHASONE SODIUM PHOSPHATE 4 MG/ML
0.1 INJECTION, SOLUTION INTRA-ARTICULAR; INTRALESIONAL; INTRAMUSCULAR; INTRAVENOUS; SOFT TISSUE EVERY 12 HOURS SCHEDULED
Status: DISCONTINUED | OUTPATIENT
Start: 2022-01-01 | End: 2022-01-01

## 2022-01-01 RX ORDER — PRAVASTATIN SODIUM 40 MG
40 TABLET ORAL
Status: DISCONTINUED | OUTPATIENT
Start: 2022-01-01 | End: 2022-01-01

## 2022-01-01 RX ORDER — DOXYCYCLINE HYCLATE 100 MG/1
100 CAPSULE ORAL EVERY 12 HOURS
Status: DISCONTINUED | OUTPATIENT
Start: 2022-01-01 | End: 2022-01-01

## 2022-01-01 RX ORDER — DOXYCYCLINE HYCLATE 100 MG/1
100 CAPSULE ORAL EVERY 12 HOURS
Status: COMPLETED | OUTPATIENT
Start: 2022-01-01 | End: 2022-01-01

## 2022-01-01 RX ORDER — ALBUTEROL SULFATE 90 UG/1
2 AEROSOL, METERED RESPIRATORY (INHALATION) EVERY 4 HOURS PRN
Qty: 8 G | Refills: 0 | Status: SHIPPED | OUTPATIENT
Start: 2022-01-01

## 2022-01-01 RX ORDER — ALBUTEROL SULFATE 90 UG/1
2 AEROSOL, METERED RESPIRATORY (INHALATION) EVERY 4 HOURS PRN
Status: DISCONTINUED | OUTPATIENT
Start: 2022-01-01 | End: 2022-01-01

## 2022-01-01 RX ORDER — DEXAMETHASONE SODIUM PHOSPHATE 4 MG/ML
0.1 INJECTION, SOLUTION INTRA-ARTICULAR; INTRALESIONAL; INTRAMUSCULAR; INTRAVENOUS; SOFT TISSUE EVERY 12 HOURS
Status: COMPLETED | OUTPATIENT
Start: 2022-01-01 | End: 2022-01-01

## 2022-01-01 RX ORDER — PRAMIPEXOLE DIHYDROCHLORIDE 0.5 MG/1
1.5 TABLET ORAL
Status: DISCONTINUED | OUTPATIENT
Start: 2022-01-01 | End: 2022-01-01 | Stop reason: HOSPADM

## 2022-01-01 RX ORDER — ECHINACEA PURPUREA EXTRACT 125 MG
1 TABLET ORAL
Status: DISCONTINUED | OUTPATIENT
Start: 2022-01-01 | End: 2022-01-01 | Stop reason: HOSPADM

## 2022-01-01 RX ORDER — DEXAMETHASONE 2 MG/1
TABLET ORAL
Qty: 9 TABLET | Refills: 0 | Status: SHIPPED | OUTPATIENT
Start: 2022-01-01 | End: 2022-01-01

## 2022-01-01 RX ORDER — DEXAMETHASONE SODIUM PHOSPHATE 4 MG/ML
6 INJECTION, SOLUTION INTRA-ARTICULAR; INTRALESIONAL; INTRAMUSCULAR; INTRAVENOUS; SOFT TISSUE DAILY
Status: DISCONTINUED | OUTPATIENT
Start: 2022-01-01 | End: 2022-01-01

## 2022-01-01 RX ORDER — FENTANYL CITRATE-0.9 % NACL/PF 10 MCG/ML
50 PLASTIC BAG, INJECTION (ML) INTRAVENOUS CONTINUOUS
Status: DISCONTINUED | OUTPATIENT
Start: 2022-01-01 | End: 2022-02-14 | Stop reason: HOSPADM

## 2022-01-01 RX ORDER — EZETIMIBE 10 MG/1
10 TABLET ORAL
Status: DISCONTINUED | OUTPATIENT
Start: 2022-01-01 | End: 2022-01-01 | Stop reason: HOSPADM

## 2022-01-01 RX ORDER — CALCIUM CARBONATE 200(500)MG
500 TABLET,CHEWABLE ORAL DAILY PRN
Status: DISCONTINUED | OUTPATIENT
Start: 2022-01-01 | End: 2022-02-14 | Stop reason: HOSPADM

## 2022-01-01 RX ORDER — SODIUM CHLORIDE 9 MG/ML
100 INJECTION, SOLUTION INTRAVENOUS CONTINUOUS
Status: DISCONTINUED | OUTPATIENT
Start: 2022-01-01 | End: 2022-01-01

## 2022-01-01 RX ORDER — GUAIFENESIN 600 MG
600 TABLET, EXTENDED RELEASE 12 HR ORAL EVERY 12 HOURS SCHEDULED
Status: DISCONTINUED | OUTPATIENT
Start: 2022-01-01 | End: 2022-01-01 | Stop reason: HOSPADM

## 2022-01-01 RX ORDER — ALBUTEROL SULFATE 90 UG/1
2 AEROSOL, METERED RESPIRATORY (INHALATION) EVERY 4 HOURS PRN
Status: DISCONTINUED | OUTPATIENT
Start: 2022-01-01 | End: 2022-01-01 | Stop reason: HOSPADM

## 2022-01-01 RX ORDER — GLYCOPYRROLATE 0.2 MG/ML
0.1 INJECTION INTRAMUSCULAR; INTRAVENOUS EVERY 4 HOURS PRN
Status: DISCONTINUED | OUTPATIENT
Start: 2022-01-01 | End: 2022-02-14 | Stop reason: HOSPADM

## 2022-01-01 RX ORDER — INSULIN GLARGINE 100 [IU]/ML
25 INJECTION, SOLUTION SUBCUTANEOUS
Status: DISCONTINUED | OUTPATIENT
Start: 2022-01-01 | End: 2022-01-01 | Stop reason: HOSPADM

## 2022-01-01 RX ORDER — DEXTROSE MONOHYDRATE 25 G/50ML
25 INJECTION, SOLUTION INTRAVENOUS ONCE
Status: COMPLETED | OUTPATIENT
Start: 2022-01-01 | End: 2022-01-01

## 2022-01-01 RX ORDER — DEXMEDETOMIDINE 100 UG/ML
.1-.7 INJECTION, SOLUTION, CONCENTRATE INTRAVENOUS
Status: DISCONTINUED | OUTPATIENT
Start: 2022-01-01 | End: 2022-01-01

## 2022-01-01 RX ORDER — BENZONATATE 100 MG/1
100 CAPSULE ORAL 3 TIMES DAILY PRN
Qty: 20 CAPSULE | Refills: 0 | Status: SHIPPED | OUTPATIENT
Start: 2022-01-01

## 2022-01-01 RX ORDER — LORAZEPAM 2 MG/ML
1 INJECTION INTRAMUSCULAR ONCE
Status: COMPLETED | OUTPATIENT
Start: 2022-01-01 | End: 2022-01-01

## 2022-01-01 RX ORDER — DEXAMETHASONE SODIUM PHOSPHATE 4 MG/ML
6 INJECTION, SOLUTION INTRA-ARTICULAR; INTRALESIONAL; INTRAMUSCULAR; INTRAVENOUS; SOFT TISSUE ONCE
Status: COMPLETED | OUTPATIENT
Start: 2022-01-01 | End: 2022-01-01

## 2022-01-01 RX ORDER — LOSARTAN POTASSIUM 50 MG/1
100 TABLET ORAL DAILY
Status: DISCONTINUED | OUTPATIENT
Start: 2022-01-01 | End: 2022-01-01

## 2022-01-01 RX ORDER — ECHINACEA PURPUREA EXTRACT 125 MG
1 TABLET ORAL
Status: DISCONTINUED | OUTPATIENT
Start: 2022-01-01 | End: 2022-02-14 | Stop reason: HOSPADM

## 2022-01-01 RX ORDER — SODIUM CHLORIDE 9 MG/ML
75 INJECTION, SOLUTION INTRAVENOUS CONTINUOUS
Status: DISCONTINUED | OUTPATIENT
Start: 2022-01-01 | End: 2022-01-01

## 2022-01-01 RX ORDER — AMLODIPINE BESYLATE 5 MG/1
5 TABLET ORAL DAILY
Status: DISCONTINUED | OUTPATIENT
Start: 2022-01-01 | End: 2022-01-01

## 2022-01-01 RX ORDER — FAMOTIDINE 20 MG/1
20 TABLET, FILM COATED ORAL DAILY
Status: DISCONTINUED | OUTPATIENT
Start: 2022-01-01 | End: 2022-01-01 | Stop reason: HOSPADM

## 2022-01-01 RX ORDER — LORATADINE 10 MG/1
10 TABLET ORAL DAILY
Status: DISCONTINUED | OUTPATIENT
Start: 2022-01-01 | End: 2022-01-01 | Stop reason: HOSPADM

## 2022-01-01 RX ORDER — DEXAMETHASONE SODIUM PHOSPHATE 4 MG/ML
6 INJECTION, SOLUTION INTRA-ARTICULAR; INTRALESIONAL; INTRAMUSCULAR; INTRAVENOUS; SOFT TISSUE EVERY 24 HOURS
Status: DISCONTINUED | OUTPATIENT
Start: 2022-01-01 | End: 2022-01-01

## 2022-01-01 RX ORDER — BENZONATATE 100 MG/1
100 CAPSULE ORAL 3 TIMES DAILY PRN
Status: DISCONTINUED | OUTPATIENT
Start: 2022-01-01 | End: 2022-01-01 | Stop reason: HOSPADM

## 2022-01-01 RX ORDER — RAMIPRIL 5 MG/1
5 CAPSULE ORAL DAILY
COMMUNITY

## 2022-01-01 RX ORDER — INSULIN GLARGINE 100 [IU]/ML
15 INJECTION, SOLUTION SUBCUTANEOUS
Status: DISCONTINUED | OUTPATIENT
Start: 2022-01-01 | End: 2022-01-01

## 2022-01-01 RX ORDER — ASPIRIN 81 MG/1
81 TABLET, CHEWABLE ORAL DAILY
Status: DISCONTINUED | OUTPATIENT
Start: 2022-01-01 | End: 2022-01-01 | Stop reason: HOSPADM

## 2022-01-01 RX ORDER — INSULIN GLARGINE 100 [IU]/ML
10 INJECTION, SOLUTION SUBCUTANEOUS
Status: DISCONTINUED | OUTPATIENT
Start: 2022-01-01 | End: 2022-01-01

## 2022-01-01 RX ORDER — INSULIN GLARGINE 100 [IU]/ML
30 INJECTION, SOLUTION SUBCUTANEOUS
Status: DISCONTINUED | OUTPATIENT
Start: 2022-01-01 | End: 2022-01-01

## 2022-01-01 RX ORDER — POLYETHYLENE GLYCOL 3350 17 G/17G
17 POWDER, FOR SOLUTION ORAL DAILY PRN
Status: DISCONTINUED | OUTPATIENT
Start: 2022-01-01 | End: 2022-01-01 | Stop reason: HOSPADM

## 2022-01-01 RX ORDER — HYDROXYZINE HYDROCHLORIDE 25 MG/1
25 TABLET, FILM COATED ORAL 2 TIMES DAILY
Status: DISCONTINUED | OUTPATIENT
Start: 2022-01-01 | End: 2022-01-01

## 2022-01-01 RX ORDER — LANOLIN ALCOHOL/MO/W.PET/CERES
6 CREAM (GRAM) TOPICAL
Status: DISCONTINUED | OUTPATIENT
Start: 2022-01-01 | End: 2022-01-01 | Stop reason: HOSPADM

## 2022-01-01 RX ORDER — DEXTROSE MONOHYDRATE 25 G/50ML
INJECTION, SOLUTION INTRAVENOUS
Status: DISPENSED
Start: 2022-01-01 | End: 2022-01-01

## 2022-01-01 RX ORDER — INSULIN GLARGINE 100 [IU]/ML
5 INJECTION, SOLUTION SUBCUTANEOUS
Status: DISCONTINUED | OUTPATIENT
Start: 2022-01-01 | End: 2022-01-01

## 2022-01-01 RX ORDER — HEPARIN SODIUM 1000 [USP'U]/ML
2400 INJECTION, SOLUTION INTRAVENOUS; SUBCUTANEOUS
Status: DISCONTINUED | OUTPATIENT
Start: 2022-01-01 | End: 2022-01-01

## 2022-01-01 RX ORDER — PRAMIPEXOLE DIHYDROCHLORIDE 0.25 MG/1
0.25 TABLET ORAL
Status: DISCONTINUED | OUTPATIENT
Start: 2022-01-01 | End: 2022-01-01

## 2022-01-01 RX ORDER — PRAMIPEXOLE DIHYDROCHLORIDE 0.5 MG/1
1.5 TABLET ORAL DAILY
Status: DISCONTINUED | OUTPATIENT
Start: 2022-01-01 | End: 2022-01-01

## 2022-01-01 RX ORDER — LISINOPRIL 20 MG/1
20 TABLET ORAL DAILY
Status: DISCONTINUED | OUTPATIENT
Start: 2022-01-01 | End: 2022-01-01

## 2022-01-01 RX ORDER — DEXAMETHASONE SODIUM PHOSPHATE 4 MG/ML
0.1 INJECTION, SOLUTION INTRA-ARTICULAR; INTRALESIONAL; INTRAMUSCULAR; INTRAVENOUS; SOFT TISSUE EVERY 12 HOURS
Status: DISCONTINUED | OUTPATIENT
Start: 2022-01-01 | End: 2022-01-01

## 2022-01-01 RX ORDER — LOSARTAN POTASSIUM 25 MG/1
12.5 TABLET ORAL DAILY
Status: DISCONTINUED | OUTPATIENT
Start: 2022-01-01 | End: 2022-01-01

## 2022-01-01 RX ORDER — INSULIN GLARGINE 100 [IU]/ML
12 INJECTION, SOLUTION SUBCUTANEOUS
Status: DISCONTINUED | OUTPATIENT
Start: 2022-01-01 | End: 2022-01-01

## 2022-01-01 RX ORDER — DEXAMETHASONE SODIUM PHOSPHATE 4 MG/ML
6 INJECTION, SOLUTION INTRA-ARTICULAR; INTRALESIONAL; INTRAMUSCULAR; INTRAVENOUS; SOFT TISSUE EVERY 24 HOURS
Status: DISCONTINUED | OUTPATIENT
Start: 2022-01-01 | End: 2022-01-01 | Stop reason: HOSPADM

## 2022-01-01 RX ORDER — DEXAMETHASONE SODIUM PHOSPHATE 4 MG/ML
4 INJECTION, SOLUTION INTRA-ARTICULAR; INTRALESIONAL; INTRAMUSCULAR; INTRAVENOUS; SOFT TISSUE DAILY
Status: DISCONTINUED | OUTPATIENT
Start: 2022-01-01 | End: 2022-01-01

## 2022-01-01 RX ORDER — ATROPINE SULFATE 1 MG/ML
INJECTION, SOLUTION INTRAMUSCULAR; INTRAVENOUS; SUBCUTANEOUS
Status: COMPLETED
Start: 2022-01-01 | End: 2022-01-01

## 2022-01-01 RX ORDER — INSULIN GLARGINE 100 [IU]/ML
22 INJECTION, SOLUTION SUBCUTANEOUS
Status: DISCONTINUED | OUTPATIENT
Start: 2022-01-01 | End: 2022-01-01

## 2022-01-01 RX ORDER — OXYMETAZOLINE HYDROCHLORIDE 0.05 G/100ML
2 SPRAY NASAL EVERY 12 HOURS SCHEDULED
Status: DISCONTINUED | OUTPATIENT
Start: 2022-01-01 | End: 2022-01-01

## 2022-01-01 RX ORDER — INSULIN GLARGINE 100 [IU]/ML
8 INJECTION, SOLUTION SUBCUTANEOUS
Status: DISCONTINUED | OUTPATIENT
Start: 2022-01-01 | End: 2022-01-01

## 2022-01-01 RX ORDER — HYDROXYZINE HYDROCHLORIDE 25 MG/1
25 TABLET, FILM COATED ORAL EVERY 6 HOURS PRN
Status: DISCONTINUED | OUTPATIENT
Start: 2022-01-01 | End: 2022-01-01

## 2022-01-01 RX ORDER — GINSENG 100 MG
1 CAPSULE ORAL ONCE
Status: COMPLETED | OUTPATIENT
Start: 2022-01-01 | End: 2022-01-01

## 2022-01-01 RX ORDER — HEPARIN SODIUM 1000 [USP'U]/ML
4800 INJECTION, SOLUTION INTRAVENOUS; SUBCUTANEOUS
Status: DISCONTINUED | OUTPATIENT
Start: 2022-01-01 | End: 2022-01-01

## 2022-01-01 RX ORDER — DEXTROSE AND SODIUM CHLORIDE 5; .9 G/100ML; G/100ML
50 INJECTION, SOLUTION INTRAVENOUS CONTINUOUS
Status: DISCONTINUED | OUTPATIENT
Start: 2022-01-01 | End: 2022-01-01

## 2022-01-01 RX ORDER — ALPRAZOLAM 0.25 MG/1
0.25 TABLET ORAL
Status: DISCONTINUED | OUTPATIENT
Start: 2022-01-01 | End: 2022-01-01

## 2022-01-01 RX ORDER — HEPARIN SODIUM 1000 [USP'U]/ML
4800 INJECTION, SOLUTION INTRAVENOUS; SUBCUTANEOUS ONCE
Status: COMPLETED | OUTPATIENT
Start: 2022-01-01 | End: 2022-01-01

## 2022-01-01 RX ORDER — OLANZAPINE 2.5 MG/1
5 TABLET ORAL DAILY PRN
Status: DISCONTINUED | OUTPATIENT
Start: 2022-01-01 | End: 2022-02-14 | Stop reason: HOSPADM

## 2022-01-01 RX ORDER — INSULIN GLARGINE 100 [IU]/ML
20 INJECTION, SOLUTION SUBCUTANEOUS
Qty: 15 ML | Refills: 0
Start: 2022-01-01

## 2022-01-01 RX ORDER — LANOLIN ALCOHOL/MO/W.PET/CERES
6 CREAM (GRAM) TOPICAL
Status: DISCONTINUED | OUTPATIENT
Start: 2022-01-01 | End: 2022-01-01

## 2022-01-01 RX ORDER — PRAMIPEXOLE DIHYDROCHLORIDE 0.25 MG/1
0.25 TABLET ORAL EVERY MORNING
Status: DISCONTINUED | OUTPATIENT
Start: 2022-01-01 | End: 2022-01-01

## 2022-01-01 RX ORDER — LORAZEPAM 2 MG/ML
1 INJECTION INTRAMUSCULAR
Status: DISCONTINUED | OUTPATIENT
Start: 2022-01-01 | End: 2022-02-14 | Stop reason: HOSPADM

## 2022-01-01 RX ORDER — LOSARTAN POTASSIUM 25 MG/1
25 TABLET ORAL DAILY
Status: DISCONTINUED | OUTPATIENT
Start: 2022-01-01 | End: 2022-01-01

## 2022-01-01 RX ORDER — LORAZEPAM 2 MG/ML
1 INJECTION INTRAMUSCULAR EVERY 4 HOURS PRN
Status: DISCONTINUED | OUTPATIENT
Start: 2022-01-01 | End: 2022-01-01

## 2022-01-01 RX ORDER — ASPIRIN 81 MG/1
81 TABLET ORAL DAILY
Status: DISCONTINUED | OUTPATIENT
Start: 2022-01-01 | End: 2022-01-01

## 2022-01-01 RX ORDER — INSULIN GLARGINE 100 [IU]/ML
20 INJECTION, SOLUTION SUBCUTANEOUS
Status: DISCONTINUED | OUTPATIENT
Start: 2022-01-01 | End: 2022-01-01

## 2022-01-01 RX ORDER — PRAVASTATIN SODIUM 40 MG
40 TABLET ORAL DAILY
Status: DISCONTINUED | OUTPATIENT
Start: 2022-01-01 | End: 2022-01-01

## 2022-01-01 RX ORDER — EPINEPHRINE 0.1 MG/ML
SYRINGE (ML) INJECTION
Status: COMPLETED
Start: 2022-01-01 | End: 2022-01-01

## 2022-01-01 RX ADMIN — HEPARIN SODIUM 16 UNITS/KG/HR: 10000 INJECTION, SOLUTION INTRAVENOUS; SUBCUTANEOUS at 18:04

## 2022-01-01 RX ADMIN — SODIUM CHLORIDE 100 ML/HR: 9 INJECTION, SOLUTION INTRAVENOUS at 14:09

## 2022-01-01 RX ADMIN — DOXYCYCLINE 100 MG: 100 CAPSULE ORAL at 22:11

## 2022-01-01 RX ADMIN — Medication 50 MCG/HR: at 18:17

## 2022-01-01 RX ADMIN — LORAZEPAM 1 MG: 2 INJECTION INTRAMUSCULAR; INTRAVENOUS at 11:51

## 2022-01-01 RX ADMIN — BACITRACIN ZINC 1 SMALL APPLICATION: 500 OINTMENT TOPICAL at 12:20

## 2022-01-01 RX ADMIN — DOXYCYCLINE 100 MG: 100 CAPSULE ORAL at 10:06

## 2022-01-01 RX ADMIN — DEXAMETHASONE SODIUM PHOSPHATE 6.4 MG: 4 INJECTION INTRA-ARTICULAR; INTRALESIONAL; INTRAMUSCULAR; INTRAVENOUS; SOFT TISSUE at 20:32

## 2022-01-01 RX ADMIN — Medication 3 MG: at 00:49

## 2022-01-01 RX ADMIN — APIXABAN 10 MG: 5 TABLET, FILM COATED ORAL at 08:13

## 2022-01-01 RX ADMIN — INSULIN LISPRO 5 UNITS: 100 INJECTION, SOLUTION INTRAVENOUS; SUBCUTANEOUS at 10:14

## 2022-01-01 RX ADMIN — EZETIMIBE 10 MG: 10 TABLET ORAL at 08:58

## 2022-01-01 RX ADMIN — LOSARTAN POTASSIUM 25 MG: 25 TABLET, FILM COATED ORAL at 08:23

## 2022-01-01 RX ADMIN — EZETIMIBE 10 MG: 10 TABLET ORAL at 10:35

## 2022-01-01 RX ADMIN — FAMOTIDINE 20 MG: 20 TABLET, FILM COATED ORAL at 09:22

## 2022-01-01 RX ADMIN — INSULIN GLARGINE 15 UNITS: 100 INJECTION, SOLUTION SUBCUTANEOUS at 23:11

## 2022-01-01 RX ADMIN — SODIUM CHLORIDE 100 ML/HR: 9 INJECTION, SOLUTION INTRAVENOUS at 09:52

## 2022-01-01 RX ADMIN — INSULIN LISPRO 3 UNITS: 100 INJECTION, SOLUTION INTRAVENOUS; SUBCUTANEOUS at 17:22

## 2022-01-01 RX ADMIN — INSULIN LISPRO 3 UNITS: 100 INJECTION, SOLUTION INTRAVENOUS; SUBCUTANEOUS at 22:27

## 2022-01-01 RX ADMIN — DEXAMETHASONE SODIUM PHOSPHATE 6.4 MG: 4 INJECTION INTRA-ARTICULAR; INTRALESIONAL; INTRAMUSCULAR; INTRAVENOUS; SOFT TISSUE at 12:34

## 2022-01-01 RX ADMIN — LORATADINE 10 MG: 10 TABLET ORAL at 12:11

## 2022-01-01 RX ADMIN — HEPARIN SODIUM 18 UNITS/KG/HR: 10000 INJECTION, SOLUTION INTRAVENOUS; SUBCUTANEOUS at 14:49

## 2022-01-01 RX ADMIN — ENOXAPARIN SODIUM 40 MG: 40 INJECTION SUBCUTANEOUS at 13:43

## 2022-01-01 RX ADMIN — SODIUM CHLORIDE 1000 ML: 0.9 INJECTION, SOLUTION INTRAVENOUS at 09:40

## 2022-01-01 RX ADMIN — LISINOPRIL 20 MG: 20 TABLET ORAL at 08:24

## 2022-01-01 RX ADMIN — APIXABAN 10 MG: 5 TABLET, FILM COATED ORAL at 08:36

## 2022-01-01 RX ADMIN — Medication 6 MG: at 20:38

## 2022-01-01 RX ADMIN — PRAMIPEXOLE DIHYDROCHLORIDE 1.5 MG: 0.5 TABLET ORAL at 09:16

## 2022-01-01 RX ADMIN — INSULIN LISPRO 4 UNITS: 100 INJECTION, SOLUTION INTRAVENOUS; SUBCUTANEOUS at 00:58

## 2022-01-01 RX ADMIN — INSULIN LISPRO 1 UNITS: 100 INJECTION, SOLUTION INTRAVENOUS; SUBCUTANEOUS at 10:31

## 2022-01-01 RX ADMIN — REMDESIVIR 100 MG: 100 INJECTION, POWDER, LYOPHILIZED, FOR SOLUTION INTRAVENOUS at 11:52

## 2022-01-01 RX ADMIN — SODIUM CHLORIDE 100 ML/HR: 9 INJECTION, SOLUTION INTRAVENOUS at 11:20

## 2022-01-01 RX ADMIN — PRAMIPEXOLE DIHYDROCHLORIDE 0.25 MG: 0.25 TABLET ORAL at 13:45

## 2022-01-01 RX ADMIN — PRAMIPEXOLE DIHYDROCHLORIDE 1.5 MG: 0.5 TABLET ORAL at 08:18

## 2022-01-01 RX ADMIN — BENZONATATE 200 MG: 100 CAPSULE ORAL at 21:33

## 2022-01-01 RX ADMIN — GUAIFENESIN 600 MG: 600 TABLET, EXTENDED RELEASE ORAL at 09:22

## 2022-01-01 RX ADMIN — GUAIFENESIN 600 MG: 600 TABLET, EXTENDED RELEASE ORAL at 21:29

## 2022-01-01 RX ADMIN — IOHEXOL 85 ML: 350 INJECTION, SOLUTION INTRAVENOUS at 10:58

## 2022-01-01 RX ADMIN — INSULIN LISPRO 5 UNITS: 100 INJECTION, SOLUTION INTRAVENOUS; SUBCUTANEOUS at 17:52

## 2022-01-01 RX ADMIN — EZETIMIBE 10 MG: 10 TABLET ORAL at 08:15

## 2022-01-01 RX ADMIN — INSULIN LISPRO 3 UNITS: 100 INJECTION, SOLUTION INTRAVENOUS; SUBCUTANEOUS at 21:57

## 2022-01-01 RX ADMIN — DEXAMETHASONE SODIUM PHOSPHATE 6.4 MG: 4 INJECTION, SOLUTION INTRAMUSCULAR; INTRAVENOUS at 08:10

## 2022-01-01 RX ADMIN — BENZONATATE 100 MG: 100 CAPSULE ORAL at 09:30

## 2022-01-01 RX ADMIN — DOXYCYCLINE 100 MG: 100 CAPSULE ORAL at 10:11

## 2022-01-01 RX ADMIN — PRAVASTATIN SODIUM 40 MG: 40 TABLET ORAL at 15:45

## 2022-01-01 RX ADMIN — BARICITINIB 4 MG: 2 TABLET, FILM COATED ORAL at 09:21

## 2022-01-01 RX ADMIN — ASPIRIN 81 MG: 81 TABLET, COATED ORAL at 08:36

## 2022-01-01 RX ADMIN — LOSARTAN POTASSIUM 100 MG: 50 TABLET, FILM COATED ORAL at 13:44

## 2022-01-01 RX ADMIN — PRAMIPEXOLE DIHYDROCHLORIDE 0.25 MG: 0.25 TABLET ORAL at 22:47

## 2022-01-01 RX ADMIN — Medication 3 MG: at 21:33

## 2022-01-01 RX ADMIN — HEPARIN SODIUM 2400 UNITS: 1000 INJECTION INTRAVENOUS; SUBCUTANEOUS at 13:41

## 2022-01-01 RX ADMIN — INSULIN LISPRO 4 UNITS: 100 INJECTION, SOLUTION INTRAVENOUS; SUBCUTANEOUS at 15:42

## 2022-01-01 RX ADMIN — ASPIRIN 81 MG: 81 TABLET, COATED ORAL at 10:11

## 2022-01-01 RX ADMIN — TETANUS TOXOID, REDUCED DIPHTHERIA TOXOID AND ACELLULAR PERTUSSIS VACCINE, ADSORBED 0.5 ML: 5; 2.5; 8; 8; 2.5 SUSPENSION INTRAMUSCULAR at 12:33

## 2022-01-01 RX ADMIN — DEXAMETHASONE SODIUM PHOSPHATE 6 MG: 4 INJECTION INTRA-ARTICULAR; INTRALESIONAL; INTRAMUSCULAR; INTRAVENOUS; SOFT TISSUE at 10:12

## 2022-01-01 RX ADMIN — INSULIN GLARGINE 25 UNITS: 100 INJECTION, SOLUTION SUBCUTANEOUS at 21:28

## 2022-01-01 RX ADMIN — PRAMIPEXOLE DIHYDROCHLORIDE 1.5 MG: 0.5 TABLET ORAL at 08:31

## 2022-01-01 RX ADMIN — GUAIFENESIN 600 MG: 600 TABLET, EXTENDED RELEASE ORAL at 21:42

## 2022-01-01 RX ADMIN — SODIUM CHLORIDE 500 ML: 0.9 INJECTION, SOLUTION INTRAVENOUS at 18:40

## 2022-01-01 RX ADMIN — INSULIN LISPRO 4 UNITS: 100 INJECTION, SOLUTION INTRAVENOUS; SUBCUTANEOUS at 19:21

## 2022-01-01 RX ADMIN — ASPIRIN 81 MG: 81 TABLET, COATED ORAL at 08:54

## 2022-01-01 RX ADMIN — ASPIRIN 81 MG CHEWABLE TABLET 81 MG: 81 TABLET CHEWABLE at 13:44

## 2022-01-01 RX ADMIN — INSULIN LISPRO 1 UNITS: 100 INJECTION, SOLUTION INTRAVENOUS; SUBCUTANEOUS at 17:21

## 2022-01-01 RX ADMIN — ENOXAPARIN SODIUM 60 MG: 60 INJECTION SUBCUTANEOUS at 10:35

## 2022-01-01 RX ADMIN — PRAMIPEXOLE DIHYDROCHLORIDE 1.5 MG: 0.5 TABLET ORAL at 08:14

## 2022-01-01 RX ADMIN — INSULIN GLARGINE 5 UNITS: 100 INJECTION, SOLUTION SUBCUTANEOUS at 21:33

## 2022-01-01 RX ADMIN — INSULIN LISPRO 1 UNITS: 100 INJECTION, SOLUTION INTRAVENOUS; SUBCUTANEOUS at 21:21

## 2022-01-01 RX ADMIN — LORATADINE 10 MG: 10 TABLET ORAL at 09:22

## 2022-01-01 RX ADMIN — Medication 6 MG: at 21:37

## 2022-01-01 RX ADMIN — EZETIMIBE 10 MG: 10 TABLET ORAL at 21:50

## 2022-01-01 RX ADMIN — INSULIN GLARGINE 30 UNITS: 100 INJECTION, SOLUTION SUBCUTANEOUS at 22:27

## 2022-01-01 RX ADMIN — HEPARIN SODIUM 16 UNITS/KG/HR: 10000 INJECTION, SOLUTION INTRAVENOUS; SUBCUTANEOUS at 20:21

## 2022-01-01 RX ADMIN — PRAMIPEXOLE DIHYDROCHLORIDE 1.5 MG: 0.5 TABLET ORAL at 10:17

## 2022-01-01 RX ADMIN — BARICITINIB 4 MG: 2 TABLET, FILM COATED ORAL at 11:40

## 2022-01-01 RX ADMIN — ASPIRIN 81 MG: 81 TABLET, COATED ORAL at 09:16

## 2022-01-01 RX ADMIN — INSULIN LISPRO 3 UNITS: 100 INJECTION, SOLUTION INTRAVENOUS; SUBCUTANEOUS at 11:41

## 2022-01-01 RX ADMIN — REMDESIVIR 200 MG: 100 INJECTION, POWDER, LYOPHILIZED, FOR SOLUTION INTRAVENOUS at 13:10

## 2022-01-01 RX ADMIN — BARICITINIB 4 MG: 2 TABLET, FILM COATED ORAL at 09:48

## 2022-01-01 RX ADMIN — ASPIRIN 81 MG: 81 TABLET, COATED ORAL at 09:10

## 2022-01-01 RX ADMIN — DEXAMETHASONE SODIUM PHOSPHATE 6.4 MG: 4 INJECTION, SOLUTION INTRAMUSCULAR; INTRAVENOUS at 08:31

## 2022-01-01 RX ADMIN — HEPARIN SODIUM 16 UNITS/KG/HR: 10000 INJECTION, SOLUTION INTRAVENOUS; SUBCUTANEOUS at 00:23

## 2022-01-01 RX ADMIN — DEXAMETHASONE SODIUM PHOSPHATE 6.4 MG: 4 INJECTION, SOLUTION INTRAMUSCULAR; INTRAVENOUS at 21:09

## 2022-01-01 RX ADMIN — HEPARIN SODIUM 4800 UNITS: 1000 INJECTION INTRAVENOUS; SUBCUTANEOUS at 23:23

## 2022-01-01 RX ADMIN — ASPIRIN 81 MG: 81 TABLET, COATED ORAL at 10:35

## 2022-01-01 RX ADMIN — DEXAMETHASONE SODIUM PHOSPHATE 6.4 MG: 4 INJECTION INTRA-ARTICULAR; INTRALESIONAL; INTRAMUSCULAR; INTRAVENOUS; SOFT TISSUE at 21:22

## 2022-01-01 RX ADMIN — SALINE NASAL SPRAY 1 SPRAY: 1.5 SOLUTION NASAL at 17:14

## 2022-01-01 RX ADMIN — INSULIN LISPRO 1 UNITS: 100 INJECTION, SOLUTION INTRAVENOUS; SUBCUTANEOUS at 22:46

## 2022-01-01 RX ADMIN — SODIUM CHLORIDE 100 ML/HR: 9 INJECTION, SOLUTION INTRAVENOUS at 10:37

## 2022-01-01 RX ADMIN — DOXYCYCLINE 100 MG: 100 CAPSULE ORAL at 22:43

## 2022-01-01 RX ADMIN — ENOXAPARIN SODIUM 30 MG: 30 INJECTION SUBCUTANEOUS at 21:39

## 2022-01-01 RX ADMIN — EZETIMIBE 10 MG: 10 TABLET ORAL at 08:35

## 2022-01-01 RX ADMIN — CEFTRIAXONE SODIUM 1000 MG: 10 INJECTION, POWDER, FOR SOLUTION INTRAVENOUS at 11:01

## 2022-01-01 RX ADMIN — EZETIMIBE 10 MG: 10 TABLET ORAL at 08:36

## 2022-01-01 RX ADMIN — EZETIMIBE 10 MG: 10 TABLET ORAL at 09:10

## 2022-01-01 RX ADMIN — BARICITINIB 4 MG: 2 TABLET, FILM COATED ORAL at 12:35

## 2022-01-01 RX ADMIN — INSULIN LISPRO 2 UNITS: 100 INJECTION, SOLUTION INTRAVENOUS; SUBCUTANEOUS at 17:20

## 2022-01-01 RX ADMIN — BENZONATATE 100 MG: 100 CAPSULE ORAL at 21:16

## 2022-01-01 RX ADMIN — HEPARIN SODIUM 15 UNITS/KG/HR: 10000 INJECTION, SOLUTION INTRAVENOUS; SUBCUTANEOUS at 14:08

## 2022-01-01 RX ADMIN — LORAZEPAM 1 MG: 2 INJECTION INTRAMUSCULAR; INTRAVENOUS at 14:57

## 2022-01-01 RX ADMIN — INSULIN GLARGINE 10 UNITS: 100 INJECTION, SOLUTION SUBCUTANEOUS at 21:11

## 2022-01-01 RX ADMIN — LORAZEPAM 1 MG: 2 INJECTION INTRAMUSCULAR; INTRAVENOUS at 14:06

## 2022-01-01 RX ADMIN — LOSARTAN POTASSIUM 25 MG: 25 TABLET, FILM COATED ORAL at 10:35

## 2022-01-01 RX ADMIN — DEXAMETHASONE SODIUM PHOSPHATE 6 MG: 4 INJECTION, SOLUTION INTRAMUSCULAR; INTRAVENOUS at 08:35

## 2022-01-01 RX ADMIN — SODIUM CHLORIDE 100 ML/HR: 0.9 INJECTION, SOLUTION INTRAVENOUS at 13:53

## 2022-01-01 RX ADMIN — LOSARTAN POTASSIUM 25 MG: 25 TABLET, FILM COATED ORAL at 08:35

## 2022-01-01 RX ADMIN — INSULIN LISPRO 8 UNITS: 100 INJECTION, SOLUTION INTRAVENOUS; SUBCUTANEOUS at 13:06

## 2022-01-01 RX ADMIN — ENOXAPARIN SODIUM 30 MG: 30 INJECTION SUBCUTANEOUS at 21:28

## 2022-01-01 RX ADMIN — DOXYCYCLINE 100 MG: 100 CAPSULE ORAL at 21:16

## 2022-01-01 RX ADMIN — INSULIN LISPRO 1 UNITS: 100 INJECTION, SOLUTION INTRAVENOUS; SUBCUTANEOUS at 12:14

## 2022-01-01 RX ADMIN — REMDESIVIR 100 MG: 100 INJECTION, POWDER, LYOPHILIZED, FOR SOLUTION INTRAVENOUS at 10:39

## 2022-01-01 RX ADMIN — PRAMIPEXOLE DIHYDROCHLORIDE 1.5 MG: 0.5 TABLET ORAL at 08:54

## 2022-01-01 RX ADMIN — Medication 3 MG: at 21:49

## 2022-01-01 RX ADMIN — INSULIN LISPRO 2 UNITS: 100 INJECTION, SOLUTION INTRAVENOUS; SUBCUTANEOUS at 16:44

## 2022-01-01 RX ADMIN — HYDROXYZINE HYDROCHLORIDE 25 MG: 25 TABLET ORAL at 09:10

## 2022-01-01 RX ADMIN — APIXABAN 10 MG: 5 TABLET, FILM COATED ORAL at 17:15

## 2022-01-01 RX ADMIN — ENOXAPARIN SODIUM 30 MG: 30 INJECTION SUBCUTANEOUS at 21:50

## 2022-01-01 RX ADMIN — ASPIRIN 81 MG: 81 TABLET, COATED ORAL at 08:10

## 2022-01-01 RX ADMIN — INSULIN GLARGINE 15 UNITS: 100 INJECTION, SOLUTION SUBCUTANEOUS at 21:17

## 2022-01-01 RX ADMIN — INSULIN GLARGINE 12 UNITS: 100 INJECTION, SOLUTION SUBCUTANEOUS at 21:07

## 2022-01-01 RX ADMIN — CALCIUM CARBONATE (ANTACID) CHEW TAB 500 MG 500 MG: 500 CHEW TAB at 21:57

## 2022-01-01 RX ADMIN — EZETIMIBE 10 MG: 10 TABLET ORAL at 08:16

## 2022-01-01 RX ADMIN — DEXAMETHASONE SODIUM PHOSPHATE 6 MG: 4 INJECTION, SOLUTION INTRAMUSCULAR; INTRAVENOUS at 10:36

## 2022-01-01 RX ADMIN — INSULIN LISPRO 3 UNITS: 100 INJECTION, SOLUTION INTRAVENOUS; SUBCUTANEOUS at 21:23

## 2022-01-01 RX ADMIN — OXYMETAZOLINE HYDROCHLORIDE 2 SPRAY: 0.05 SPRAY NASAL at 17:13

## 2022-01-01 RX ADMIN — ENOXAPARIN SODIUM 30 MG: 30 INJECTION SUBCUTANEOUS at 08:54

## 2022-01-01 RX ADMIN — INSULIN LISPRO 2 UNITS: 100 INJECTION, SOLUTION INTRAVENOUS; SUBCUTANEOUS at 13:04

## 2022-01-01 RX ADMIN — INSULIN LISPRO 1 UNITS: 100 INJECTION, SOLUTION INTRAVENOUS; SUBCUTANEOUS at 13:28

## 2022-01-01 RX ADMIN — INSULIN LISPRO 4 UNITS: 100 INJECTION, SOLUTION INTRAVENOUS; SUBCUTANEOUS at 13:15

## 2022-01-01 RX ADMIN — INSULIN LISPRO 2 UNITS: 100 INJECTION, SOLUTION INTRAVENOUS; SUBCUTANEOUS at 08:36

## 2022-01-01 RX ADMIN — INSULIN LISPRO 2 UNITS: 100 INJECTION, SOLUTION INTRAVENOUS; SUBCUTANEOUS at 08:10

## 2022-01-01 RX ADMIN — DEXAMETHASONE SODIUM PHOSPHATE 6.4 MG: 4 INJECTION INTRA-ARTICULAR; INTRALESIONAL; INTRAMUSCULAR; INTRAVENOUS; SOFT TISSUE at 08:35

## 2022-01-01 RX ADMIN — ASPIRIN 81 MG: 81 TABLET, COATED ORAL at 08:31

## 2022-01-01 RX ADMIN — INSULIN LISPRO 3 UNITS: 100 INJECTION, SOLUTION INTRAVENOUS; SUBCUTANEOUS at 17:15

## 2022-01-01 RX ADMIN — DEXAMETHASONE SODIUM PHOSPHATE 6.4 MG: 4 INJECTION INTRA-ARTICULAR; INTRALESIONAL; INTRAMUSCULAR; INTRAVENOUS; SOFT TISSUE at 09:14

## 2022-01-01 RX ADMIN — ENOXAPARIN SODIUM 60 MG: 60 INJECTION SUBCUTANEOUS at 09:10

## 2022-01-01 RX ADMIN — LOSARTAN POTASSIUM 25 MG: 25 TABLET, FILM COATED ORAL at 08:13

## 2022-01-01 RX ADMIN — GUAIFENESIN 600 MG: 600 TABLET, EXTENDED RELEASE ORAL at 12:11

## 2022-01-01 RX ADMIN — BARICITINIB 4 MG: 2 TABLET, FILM COATED ORAL at 14:30

## 2022-01-01 RX ADMIN — LORAZEPAM 1 MG: 2 INJECTION INTRAMUSCULAR; INTRAVENOUS at 17:51

## 2022-01-01 RX ADMIN — INSULIN LISPRO 3 UNITS: 100 INJECTION, SOLUTION INTRAVENOUS; SUBCUTANEOUS at 16:51

## 2022-01-01 RX ADMIN — DEXAMETHASONE SODIUM PHOSPHATE 4 MG: 4 INJECTION INTRA-ARTICULAR; INTRALESIONAL; INTRAMUSCULAR; INTRAVENOUS; SOFT TISSUE at 08:22

## 2022-01-01 RX ADMIN — DEXAMETHASONE SODIUM PHOSPHATE 6 MG: 4 INJECTION INTRA-ARTICULAR; INTRALESIONAL; INTRAMUSCULAR; INTRAVENOUS; SOFT TISSUE at 10:35

## 2022-01-01 RX ADMIN — OXYMETAZOLINE HYDROCHLORIDE 2 SPRAY: 0.05 SPRAY NASAL at 08:36

## 2022-01-01 RX ADMIN — ENOXAPARIN SODIUM 30 MG: 30 INJECTION SUBCUTANEOUS at 09:22

## 2022-01-01 RX ADMIN — HEPARIN SODIUM 16 UNITS/KG/HR: 10000 INJECTION, SOLUTION INTRAVENOUS; SUBCUTANEOUS at 14:45

## 2022-01-01 RX ADMIN — EZETIMIBE 10 MG: 10 TABLET ORAL at 08:19

## 2022-01-01 RX ADMIN — EZETIMIBE 10 MG: 10 TABLET ORAL at 10:11

## 2022-01-01 RX ADMIN — EZETIMIBE 10 MG: 10 TABLET ORAL at 08:23

## 2022-01-01 RX ADMIN — APIXABAN 10 MG: 5 TABLET, FILM COATED ORAL at 17:50

## 2022-01-01 RX ADMIN — INSULIN LISPRO 1 UNITS: 100 INJECTION, SOLUTION INTRAVENOUS; SUBCUTANEOUS at 09:16

## 2022-01-01 RX ADMIN — Medication 3 MG: at 21:11

## 2022-01-01 RX ADMIN — APIXABAN 10 MG: 5 TABLET, FILM COATED ORAL at 08:19

## 2022-01-01 RX ADMIN — INSULIN GLARGINE 15 UNITS: 100 INJECTION, SOLUTION SUBCUTANEOUS at 22:05

## 2022-01-01 RX ADMIN — PRAMIPEXOLE DIHYDROCHLORIDE 1.5 MG: 0.5 TABLET ORAL at 08:15

## 2022-01-01 RX ADMIN — EZETIMIBE 10 MG: 10 TABLET ORAL at 10:17

## 2022-01-01 RX ADMIN — INSULIN LISPRO 2 UNITS: 100 INJECTION, SOLUTION INTRAVENOUS; SUBCUTANEOUS at 17:28

## 2022-01-01 RX ADMIN — BARICITINIB 4 MG: 2 TABLET, FILM COATED ORAL at 10:10

## 2022-01-01 RX ADMIN — INSULIN LISPRO 5 UNITS: 100 INJECTION, SOLUTION INTRAVENOUS; SUBCUTANEOUS at 17:06

## 2022-01-01 RX ADMIN — Medication 3 MG: at 21:24

## 2022-01-01 RX ADMIN — INSULIN LISPRO 10 UNITS: 100 INJECTION, SOLUTION INTRAVENOUS; SUBCUTANEOUS at 16:39

## 2022-01-01 RX ADMIN — BARICITINIB 4 MG: 2 TABLET, FILM COATED ORAL at 08:24

## 2022-01-01 RX ADMIN — BARICITINIB 4 MG: 2 TABLET, FILM COATED ORAL at 10:48

## 2022-01-01 RX ADMIN — Medication 3 MG: at 23:07

## 2022-01-01 RX ADMIN — INSULIN LISPRO 4 UNITS: 100 INJECTION, SOLUTION INTRAVENOUS; SUBCUTANEOUS at 11:57

## 2022-01-01 RX ADMIN — BARICITINIB 4 MG: 2 TABLET, FILM COATED ORAL at 11:34

## 2022-01-01 RX ADMIN — LOSARTAN POTASSIUM 100 MG: 50 TABLET, FILM COATED ORAL at 12:11

## 2022-01-01 RX ADMIN — INSULIN GLARGINE 15 UNITS: 100 INJECTION, SOLUTION SUBCUTANEOUS at 22:43

## 2022-01-01 RX ADMIN — INSULIN LISPRO 5 UNITS: 100 INJECTION, SOLUTION INTRAVENOUS; SUBCUTANEOUS at 13:53

## 2022-01-01 RX ADMIN — INSULIN LISPRO 2 UNITS: 100 INJECTION, SOLUTION INTRAVENOUS; SUBCUTANEOUS at 12:16

## 2022-01-01 RX ADMIN — CALCIUM CARBONATE (ANTACID) CHEW TAB 500 MG 500 MG: 500 CHEW TAB at 17:04

## 2022-01-01 RX ADMIN — GUAIFENESIN 600 MG: 600 TABLET, EXTENDED RELEASE ORAL at 08:54

## 2022-01-01 RX ADMIN — Medication 3 MG: at 21:56

## 2022-01-01 RX ADMIN — Medication 3 MG: at 21:22

## 2022-01-01 RX ADMIN — CEFTRIAXONE SODIUM 1000 MG: 10 INJECTION, POWDER, FOR SOLUTION INTRAVENOUS at 10:37

## 2022-01-01 RX ADMIN — BARICITINIB 4 MG: 2 TABLET, FILM COATED ORAL at 10:08

## 2022-01-01 RX ADMIN — INSULIN LISPRO 5 UNITS: 100 INJECTION, SOLUTION INTRAVENOUS; SUBCUTANEOUS at 15:56

## 2022-01-01 RX ADMIN — LORAZEPAM 1 MG: 2 INJECTION INTRAMUSCULAR; INTRAVENOUS at 18:18

## 2022-01-01 RX ADMIN — PRAMIPEXOLE DIHYDROCHLORIDE 1.5 MG: 0.5 TABLET ORAL at 08:35

## 2022-01-01 RX ADMIN — ALPRAZOLAM 0.25 MG: 0.25 TABLET ORAL at 21:22

## 2022-01-01 RX ADMIN — INSULIN LISPRO 1 UNITS: 100 INJECTION, SOLUTION INTRAVENOUS; SUBCUTANEOUS at 17:04

## 2022-01-01 RX ADMIN — HYDROXYZINE HYDROCHLORIDE 25 MG: 25 TABLET ORAL at 10:37

## 2022-01-01 RX ADMIN — LORAZEPAM 1 MG: 2 INJECTION INTRAMUSCULAR; INTRAVENOUS at 20:04

## 2022-01-01 RX ADMIN — HEPARIN SODIUM 4800 UNITS: 1000 INJECTION INTRAVENOUS; SUBCUTANEOUS at 18:20

## 2022-01-01 RX ADMIN — HYDROXYZINE HYDROCHLORIDE 25 MG: 25 TABLET ORAL at 17:46

## 2022-01-01 RX ADMIN — BARICITINIB 4 MG: 2 TABLET, FILM COATED ORAL at 15:44

## 2022-01-01 RX ADMIN — INSULIN LISPRO 3 UNITS: 100 INJECTION, SOLUTION INTRAVENOUS; SUBCUTANEOUS at 21:07

## 2022-01-01 RX ADMIN — INSULIN LISPRO 2 UNITS: 100 INJECTION, SOLUTION INTRAVENOUS; SUBCUTANEOUS at 11:40

## 2022-01-01 RX ADMIN — EZETIMIBE 10 MG: 10 TABLET ORAL at 21:37

## 2022-01-01 RX ADMIN — DEXAMETHASONE SODIUM PHOSPHATE 6.4 MG: 4 INJECTION, SOLUTION INTRAMUSCULAR; INTRAVENOUS at 21:17

## 2022-01-01 RX ADMIN — HYDROXYZINE HYDROCHLORIDE 25 MG: 25 TABLET ORAL at 17:54

## 2022-01-01 RX ADMIN — INSULIN LISPRO 1 UNITS: 100 INJECTION, SOLUTION INTRAVENOUS; SUBCUTANEOUS at 18:02

## 2022-01-01 RX ADMIN — INSULIN LISPRO 3 UNITS: 100 INJECTION, SOLUTION INTRAVENOUS; SUBCUTANEOUS at 21:49

## 2022-01-01 RX ADMIN — CEFTRIAXONE SODIUM 1000 MG: 10 INJECTION, POWDER, FOR SOLUTION INTRAVENOUS at 11:38

## 2022-01-01 RX ADMIN — INSULIN LISPRO 5 UNITS: 100 INJECTION, SOLUTION INTRAVENOUS; SUBCUTANEOUS at 20:45

## 2022-01-01 RX ADMIN — PRAMIPEXOLE DIHYDROCHLORIDE 1.5 MG: 0.5 TABLET ORAL at 10:35

## 2022-01-01 RX ADMIN — PRAMIPEXOLE DIHYDROCHLORIDE 1.5 MG: 0.5 TABLET ORAL at 10:11

## 2022-01-01 RX ADMIN — APIXABAN 10 MG: 5 TABLET, FILM COATED ORAL at 08:14

## 2022-01-01 RX ADMIN — LOSARTAN POTASSIUM 100 MG: 50 TABLET, FILM COATED ORAL at 08:46

## 2022-01-01 RX ADMIN — ASPIRIN 81 MG: 81 TABLET, COATED ORAL at 08:18

## 2022-01-01 RX ADMIN — DEXAMETHASONE SODIUM PHOSPHATE 6.4 MG: 4 INJECTION INTRA-ARTICULAR; INTRALESIONAL; INTRAMUSCULAR; INTRAVENOUS; SOFT TISSUE at 08:54

## 2022-01-01 RX ADMIN — SODIUM CHLORIDE 75 ML/HR: 0.9 INJECTION, SOLUTION INTRAVENOUS at 20:15

## 2022-01-01 RX ADMIN — INSULIN LISPRO 6 UNITS: 100 INJECTION, SOLUTION INTRAVENOUS; SUBCUTANEOUS at 17:54

## 2022-01-01 RX ADMIN — DEXAMETHASONE SODIUM PHOSPHATE 6 MG: 4 INJECTION INTRA-ARTICULAR; INTRALESIONAL; INTRAMUSCULAR; INTRAVENOUS; SOFT TISSUE at 08:54

## 2022-01-01 RX ADMIN — DOXYCYCLINE 100 MG: 100 CAPSULE ORAL at 09:48

## 2022-01-01 RX ADMIN — PRAMIPEXOLE DIHYDROCHLORIDE 1.5 MG: 0.5 TABLET ORAL at 08:23

## 2022-01-01 RX ADMIN — ATROPINE SULFATE 1 MG: 1 INJECTION, SOLUTION INTRAMUSCULAR; INTRAVENOUS; SUBCUTANEOUS at 02:17

## 2022-01-01 RX ADMIN — DEXAMETHASONE SODIUM PHOSPHATE 6 MG: 4 INJECTION INTRA-ARTICULAR; INTRALESIONAL; INTRAMUSCULAR; INTRAVENOUS; SOFT TISSUE at 08:14

## 2022-01-01 RX ADMIN — INSULIN LISPRO 1 UNITS: 100 INJECTION, SOLUTION INTRAVENOUS; SUBCUTANEOUS at 09:10

## 2022-01-01 RX ADMIN — LORAZEPAM 1 MG: 2 INJECTION INTRAMUSCULAR; INTRAVENOUS at 23:05

## 2022-01-01 RX ADMIN — ENOXAPARIN SODIUM 30 MG: 30 INJECTION SUBCUTANEOUS at 10:30

## 2022-01-01 RX ADMIN — REMDESIVIR 200 MG: 100 INJECTION, POWDER, LYOPHILIZED, FOR SOLUTION INTRAVENOUS at 11:13

## 2022-01-01 RX ADMIN — LISINOPRIL 20 MG: 20 TABLET ORAL at 08:35

## 2022-01-01 RX ADMIN — INSULIN LISPRO 4 UNITS: 100 INJECTION, SOLUTION INTRAVENOUS; SUBCUTANEOUS at 12:36

## 2022-01-01 RX ADMIN — DEXAMETHASONE SODIUM PHOSPHATE 6.4 MG: 4 INJECTION, SOLUTION INTRAMUSCULAR; INTRAVENOUS at 10:11

## 2022-01-01 RX ADMIN — BARICITINIB 4 MG: 2 TABLET, FILM COATED ORAL at 10:17

## 2022-01-01 RX ADMIN — INSULIN LISPRO 5 UNITS: 100 INJECTION, SOLUTION INTRAVENOUS; SUBCUTANEOUS at 22:48

## 2022-01-01 RX ADMIN — DEXAMETHASONE SODIUM PHOSPHATE 4 MG: 4 INJECTION INTRA-ARTICULAR; INTRALESIONAL; INTRAMUSCULAR; INTRAVENOUS; SOFT TISSUE at 09:11

## 2022-01-01 RX ADMIN — INSULIN LISPRO 3 UNITS: 100 INJECTION, SOLUTION INTRAVENOUS; SUBCUTANEOUS at 10:14

## 2022-01-01 RX ADMIN — DEXAMETHASONE SODIUM PHOSPHATE 6 MG: 4 INJECTION, SOLUTION INTRAMUSCULAR; INTRAVENOUS at 08:24

## 2022-01-01 RX ADMIN — INSULIN LISPRO 2 UNITS: 100 INJECTION, SOLUTION INTRAVENOUS; SUBCUTANEOUS at 17:13

## 2022-01-01 RX ADMIN — BARICITINIB 4 MG: 2 TABLET, FILM COATED ORAL at 13:42

## 2022-01-01 RX ADMIN — LORAZEPAM 1 MG: 2 INJECTION INTRAMUSCULAR; INTRAVENOUS at 15:57

## 2022-01-01 RX ADMIN — INSULIN LISPRO 3 UNITS: 100 INJECTION, SOLUTION INTRAVENOUS; SUBCUTANEOUS at 11:57

## 2022-01-01 RX ADMIN — PRAMIPEXOLE DIHYDROCHLORIDE 1.5 MG: 0.5 TABLET ORAL at 08:57

## 2022-01-01 RX ADMIN — EZETIMIBE 10 MG: 10 TABLET ORAL at 08:24

## 2022-01-01 RX ADMIN — SODIUM CHLORIDE 100 ML/HR: 0.9 INJECTION, SOLUTION INTRAVENOUS at 20:02

## 2022-01-01 RX ADMIN — AMLODIPINE BESYLATE 5 MG: 5 TABLET ORAL at 10:30

## 2022-01-01 RX ADMIN — Medication 50 MCG/HR: at 20:07

## 2022-01-01 RX ADMIN — Medication 3 MG: at 21:57

## 2022-01-01 RX ADMIN — INSULIN LISPRO 3 UNITS: 100 INJECTION, SOLUTION INTRAVENOUS; SUBCUTANEOUS at 21:27

## 2022-01-01 RX ADMIN — EZETIMIBE 10 MG: 10 TABLET ORAL at 08:54

## 2022-01-01 RX ADMIN — LORAZEPAM 1 MG: 2 INJECTION INTRAMUSCULAR; INTRAVENOUS at 18:17

## 2022-01-01 RX ADMIN — Medication 6 MG: at 21:29

## 2022-01-01 RX ADMIN — HYDROXYZINE HYDROCHLORIDE 25 MG: 25 TABLET ORAL at 08:54

## 2022-01-01 RX ADMIN — OXYMETAZOLINE HYDROCHLORIDE 2 SPRAY: 0.05 SPRAY NASAL at 20:25

## 2022-01-01 RX ADMIN — MORPHINE SULFATE 2 MG: 2 INJECTION, SOLUTION INTRAMUSCULAR; INTRAVENOUS at 22:43

## 2022-01-01 RX ADMIN — INSULIN LISPRO 2 UNITS: 100 INJECTION, SOLUTION INTRAVENOUS; SUBCUTANEOUS at 21:11

## 2022-01-01 RX ADMIN — DOXYCYCLINE 100 MG: 100 CAPSULE ORAL at 10:48

## 2022-01-01 RX ADMIN — INSULIN GLARGINE 25 UNITS: 100 INJECTION, SOLUTION SUBCUTANEOUS at 21:37

## 2022-01-01 RX ADMIN — ALPRAZOLAM 0.25 MG: 0.25 TABLET ORAL at 21:33

## 2022-01-01 RX ADMIN — ALPRAZOLAM 0.25 MG: 0.25 TABLET ORAL at 21:11

## 2022-01-01 RX ADMIN — LORAZEPAM 1 MG: 2 INJECTION INTRAMUSCULAR; INTRAVENOUS at 09:34

## 2022-01-01 RX ADMIN — Medication 3 MG: at 21:07

## 2022-01-01 RX ADMIN — EZETIMIBE 10 MG: 10 TABLET ORAL at 09:16

## 2022-01-01 RX ADMIN — INSULIN GLARGINE 22 UNITS: 100 INJECTION, SOLUTION SUBCUTANEOUS at 21:50

## 2022-01-01 RX ADMIN — MORPHINE SULFATE 2 MG: 2 INJECTION, SOLUTION INTRAMUSCULAR; INTRAVENOUS at 17:54

## 2022-01-01 RX ADMIN — DEXAMETHASONE SODIUM PHOSPHATE 6 MG: 4 INJECTION INTRA-ARTICULAR; INTRALESIONAL; INTRAMUSCULAR; INTRAVENOUS; SOFT TISSUE at 10:30

## 2022-01-01 RX ADMIN — DEXAMETHASONE SODIUM PHOSPHATE 6 MG: 4 INJECTION INTRA-ARTICULAR; INTRALESIONAL; INTRAMUSCULAR; INTRAVENOUS; SOFT TISSUE at 08:19

## 2022-01-01 RX ADMIN — LOSARTAN POTASSIUM 25 MG: 25 TABLET, FILM COATED ORAL at 09:10

## 2022-01-01 RX ADMIN — EZETIMIBE 10 MG: 10 TABLET ORAL at 21:39

## 2022-01-01 RX ADMIN — INSULIN LISPRO 5 UNITS: 100 INJECTION, SOLUTION INTRAVENOUS; SUBCUTANEOUS at 15:57

## 2022-01-01 RX ADMIN — CEFTRIAXONE SODIUM 1000 MG: 10 INJECTION, POWDER, FOR SOLUTION INTRAVENOUS at 09:50

## 2022-01-01 RX ADMIN — Medication 3 MG: at 22:04

## 2022-01-01 RX ADMIN — Medication 6 MG: at 21:39

## 2022-01-01 RX ADMIN — INSULIN GLARGINE 20 UNITS: 100 INJECTION, SOLUTION SUBCUTANEOUS at 22:13

## 2022-01-01 RX ADMIN — INSULIN GLARGINE 12 UNITS: 100 INJECTION, SOLUTION SUBCUTANEOUS at 21:58

## 2022-01-01 RX ADMIN — PRAMIPEXOLE DIHYDROCHLORIDE 1.5 MG: 0.5 TABLET ORAL at 09:10

## 2022-01-01 RX ADMIN — LORAZEPAM 1 MG: 2 INJECTION INTRAMUSCULAR; INTRAVENOUS at 09:02

## 2022-01-01 RX ADMIN — INSULIN LISPRO 3 UNITS: 100 INJECTION, SOLUTION INTRAVENOUS; SUBCUTANEOUS at 13:41

## 2022-01-01 RX ADMIN — INSULIN LISPRO 1 UNITS: 100 INJECTION, SOLUTION INTRAVENOUS; SUBCUTANEOUS at 11:53

## 2022-01-01 RX ADMIN — DEXAMETHASONE SODIUM PHOSPHATE 6 MG: 4 INJECTION INTRA-ARTICULAR; INTRALESIONAL; INTRAMUSCULAR; INTRAVENOUS; SOFT TISSUE at 08:46

## 2022-01-01 RX ADMIN — ASPIRIN 81 MG CHEWABLE TABLET 81 MG: 81 TABLET CHEWABLE at 10:30

## 2022-01-01 RX ADMIN — REMDESIVIR 100 MG: 100 INJECTION, POWDER, LYOPHILIZED, FOR SOLUTION INTRAVENOUS at 13:27

## 2022-01-01 RX ADMIN — SODIUM CHLORIDE 0.2 MCG/KG/HR: 9 INJECTION, SOLUTION INTRAVENOUS at 22:20

## 2022-01-01 RX ADMIN — PRAMIPEXOLE DIHYDROCHLORIDE 1.5 MG: 0.5 TABLET ORAL at 08:24

## 2022-01-01 RX ADMIN — ENOXAPARIN SODIUM 30 MG: 30 INJECTION SUBCUTANEOUS at 21:37

## 2022-01-01 RX ADMIN — INSULIN LISPRO 3 UNITS: 100 INJECTION, SOLUTION INTRAVENOUS; SUBCUTANEOUS at 17:04

## 2022-01-01 RX ADMIN — ASPIRIN 81 MG: 81 TABLET, COATED ORAL at 08:24

## 2022-01-01 RX ADMIN — ASPIRIN 81 MG CHEWABLE TABLET 81 MG: 81 TABLET CHEWABLE at 08:54

## 2022-01-01 RX ADMIN — INSULIN LISPRO 5 UNITS: 100 INJECTION, SOLUTION INTRAVENOUS; SUBCUTANEOUS at 13:14

## 2022-01-01 RX ADMIN — ENOXAPARIN SODIUM 60 MG: 60 INJECTION SUBCUTANEOUS at 08:22

## 2022-01-01 RX ADMIN — LISINOPRIL 20 MG: 20 TABLET ORAL at 08:31

## 2022-01-01 RX ADMIN — DOXYCYCLINE 100 MG: 100 CAPSULE ORAL at 10:17

## 2022-01-01 RX ADMIN — LORAZEPAM 1 MG: 2 INJECTION INTRAMUSCULAR; INTRAVENOUS at 12:03

## 2022-01-01 RX ADMIN — BARICITINIB 4 MG: 2 TABLET, FILM COATED ORAL at 11:13

## 2022-01-01 RX ADMIN — DOXYCYCLINE 100 MG: 100 CAPSULE ORAL at 22:27

## 2022-01-01 RX ADMIN — ALPRAZOLAM 0.25 MG: 0.25 TABLET ORAL at 04:51

## 2022-01-01 RX ADMIN — ENOXAPARIN SODIUM 60 MG: 60 INJECTION SUBCUTANEOUS at 21:56

## 2022-01-01 RX ADMIN — Medication 3 MG: at 22:46

## 2022-01-01 RX ADMIN — BARICITINIB 4 MG: 2 TABLET, FILM COATED ORAL at 10:06

## 2022-01-01 RX ADMIN — Medication 50 MCG/HR: at 12:36

## 2022-01-01 RX ADMIN — Medication 3 MG: at 21:17

## 2022-01-01 RX ADMIN — INSULIN LISPRO 1 UNITS: 100 INJECTION, SOLUTION INTRAVENOUS; SUBCUTANEOUS at 13:45

## 2022-01-01 RX ADMIN — INSULIN GLARGINE 8 UNITS: 100 INJECTION, SOLUTION SUBCUTANEOUS at 21:22

## 2022-01-01 RX ADMIN — HYDROXYZINE HYDROCHLORIDE 25 MG: 25 TABLET ORAL at 08:23

## 2022-01-01 RX ADMIN — ASPIRIN 81 MG: 81 TABLET, COATED ORAL at 08:13

## 2022-01-01 RX ADMIN — INSULIN GLARGINE 22 UNITS: 100 INJECTION, SOLUTION SUBCUTANEOUS at 22:47

## 2022-01-01 RX ADMIN — PRAMIPEXOLE DIHYDROCHLORIDE 1.5 MG: 0.5 TABLET ORAL at 14:10

## 2022-01-01 RX ADMIN — INSULIN LISPRO 2 UNITS: 100 INJECTION, SOLUTION INTRAVENOUS; SUBCUTANEOUS at 12:35

## 2022-01-01 RX ADMIN — PRAMIPEXOLE DIHYDROCHLORIDE 1.5 MG: 0.5 TABLET ORAL at 21:29

## 2022-01-01 RX ADMIN — IOHEXOL 85 ML: 350 INJECTION, SOLUTION INTRAVENOUS at 16:57

## 2022-01-01 RX ADMIN — FAMOTIDINE 20 MG: 20 TABLET, FILM COATED ORAL at 12:11

## 2022-01-01 RX ADMIN — APIXABAN 10 MG: 5 TABLET, FILM COATED ORAL at 08:57

## 2022-01-01 RX ADMIN — INSULIN LISPRO 5 UNITS: 100 INJECTION, SOLUTION INTRAVENOUS; SUBCUTANEOUS at 22:12

## 2022-01-01 RX ADMIN — INSULIN GLARGINE 30 UNITS: 100 INJECTION, SOLUTION SUBCUTANEOUS at 21:10

## 2022-01-01 RX ADMIN — INSULIN LISPRO 5 UNITS: 100 INJECTION, SOLUTION INTRAVENOUS; SUBCUTANEOUS at 16:19

## 2022-01-01 RX ADMIN — APIXABAN 10 MG: 5 TABLET, FILM COATED ORAL at 17:04

## 2022-01-01 RX ADMIN — MORPHINE SULFATE 2 MG: 2 INJECTION, SOLUTION INTRAMUSCULAR; INTRAVENOUS at 00:34

## 2022-01-01 RX ADMIN — ASPIRIN 81 MG CHEWABLE TABLET 81 MG: 81 TABLET CHEWABLE at 08:47

## 2022-01-01 RX ADMIN — ENOXAPARIN SODIUM 60 MG: 60 INJECTION SUBCUTANEOUS at 22:46

## 2022-01-01 RX ADMIN — DEXAMETHASONE SODIUM PHOSPHATE 6.4 MG: 4 INJECTION, SOLUTION INTRAMUSCULAR; INTRAVENOUS at 21:59

## 2022-01-01 RX ADMIN — INSULIN LISPRO 4 UNITS: 100 INJECTION, SOLUTION INTRAVENOUS; SUBCUTANEOUS at 12:08

## 2022-01-01 RX ADMIN — HEPARIN SODIUM 4800 UNITS: 1000 INJECTION INTRAVENOUS; SUBCUTANEOUS at 12:17

## 2022-01-01 RX ADMIN — ASPIRIN 81 MG: 81 TABLET, COATED ORAL at 08:19

## 2022-01-01 RX ADMIN — INSULIN LISPRO 1 UNITS: 100 INJECTION, SOLUTION INTRAVENOUS; SUBCUTANEOUS at 08:56

## 2022-01-01 RX ADMIN — EZETIMIBE 10 MG: 10 TABLET ORAL at 08:31

## 2022-01-01 RX ADMIN — DEXAMETHASONE SODIUM PHOSPHATE 6 MG: 4 INJECTION INTRA-ARTICULAR; INTRALESIONAL; INTRAMUSCULAR; INTRAVENOUS; SOFT TISSUE at 09:22

## 2022-01-01 RX ADMIN — DEXAMETHASONE SODIUM PHOSPHATE 6.4 MG: 4 INJECTION, SOLUTION INTRAMUSCULAR; INTRAVENOUS at 20:54

## 2022-01-01 RX ADMIN — EPINEPHRINE 1 MG: 0.1 INJECTION INTRACARDIAC; INTRAVENOUS at 02:17

## 2022-01-01 RX ADMIN — DEXTROSE MONOHYDRATE 25 ML: 25 INJECTION, SOLUTION INTRAVENOUS at 07:15

## 2022-01-01 RX ADMIN — DEXAMETHASONE SODIUM PHOSPHATE 6.4 MG: 4 INJECTION INTRA-ARTICULAR; INTRALESIONAL; INTRAMUSCULAR; INTRAVENOUS; SOFT TISSUE at 21:57

## 2022-01-01 RX ADMIN — LORATADINE 10 MG: 10 TABLET ORAL at 08:54

## 2022-01-01 RX ADMIN — EZETIMIBE 10 MG: 10 TABLET ORAL at 21:28

## 2022-01-01 RX ADMIN — ASPIRIN 81 MG CHEWABLE TABLET 81 MG: 81 TABLET CHEWABLE at 09:22

## 2022-01-01 RX ADMIN — SALINE NASAL SPRAY 1 SPRAY: 1.5 SOLUTION NASAL at 08:52

## 2022-01-01 RX ADMIN — APIXABAN 10 MG: 5 TABLET, FILM COATED ORAL at 17:21

## 2022-01-01 RX ADMIN — LISINOPRIL 20 MG: 20 TABLET ORAL at 10:10

## 2022-01-01 RX ADMIN — DEXAMETHASONE SODIUM PHOSPHATE 6 MG: 4 INJECTION INTRA-ARTICULAR; INTRALESIONAL; INTRAMUSCULAR; INTRAVENOUS; SOFT TISSUE at 08:29

## 2022-01-01 RX ADMIN — INSULIN LISPRO 1 UNITS: 100 INJECTION, SOLUTION INTRAVENOUS; SUBCUTANEOUS at 21:34

## 2022-01-01 RX ADMIN — INSULIN LISPRO 8 UNITS: 100 INJECTION, SOLUTION INTRAVENOUS; SUBCUTANEOUS at 08:55

## 2022-01-01 RX ADMIN — ASPIRIN 81 MG: 81 TABLET, COATED ORAL at 08:35

## 2022-01-01 RX ADMIN — OXYMETAZOLINE HYDROCHLORIDE 2 SPRAY: 0.05 SPRAY NASAL at 08:37

## 2022-01-01 RX ADMIN — PRAMIPEXOLE DIHYDROCHLORIDE 0.25 MG: 0.25 TABLET ORAL at 21:37

## 2022-01-01 RX ADMIN — INSULIN LISPRO 6 UNITS: 100 INJECTION, SOLUTION INTRAVENOUS; SUBCUTANEOUS at 17:48

## 2022-01-01 RX ADMIN — ASPIRIN 81 MG: 81 TABLET, COATED ORAL at 08:57

## 2022-01-01 RX ADMIN — INSULIN LISPRO 2 UNITS: 100 INJECTION, SOLUTION INTRAVENOUS; SUBCUTANEOUS at 11:31

## 2022-01-01 RX ADMIN — FAMOTIDINE 20 MG: 20 TABLET, FILM COATED ORAL at 08:54

## 2022-01-01 RX ADMIN — INSULIN LISPRO 4 UNITS: 100 INJECTION, SOLUTION INTRAVENOUS; SUBCUTANEOUS at 11:34

## 2022-01-01 RX ADMIN — PRAMIPEXOLE DIHYDROCHLORIDE 0.25 MG: 0.25 TABLET ORAL at 08:47

## 2022-01-01 RX ADMIN — INSULIN LISPRO 4 UNITS: 100 INJECTION, SOLUTION INTRAVENOUS; SUBCUTANEOUS at 17:00

## 2022-01-01 RX ADMIN — ENOXAPARIN SODIUM 30 MG: 30 INJECTION SUBCUTANEOUS at 08:47

## 2022-01-01 RX ADMIN — EZETIMIBE 10 MG: 10 TABLET ORAL at 14:10

## 2022-01-01 RX ADMIN — INSULIN LISPRO 4 UNITS: 100 INJECTION, SOLUTION INTRAVENOUS; SUBCUTANEOUS at 12:46

## 2022-01-01 RX ADMIN — DEXAMETHASONE SODIUM PHOSPHATE 6 MG: 4 INJECTION, SOLUTION INTRAMUSCULAR; INTRAVENOUS at 09:43

## 2022-01-01 RX ADMIN — HEPARIN SODIUM 18 UNITS/KG/HR: 10000 INJECTION, SOLUTION INTRAVENOUS; SUBCUTANEOUS at 12:13

## 2022-01-01 RX ADMIN — DEXAMETHASONE SODIUM PHOSPHATE 6.4 MG: 4 INJECTION INTRA-ARTICULAR; INTRALESIONAL; INTRAMUSCULAR; INTRAVENOUS; SOFT TISSUE at 08:57

## 2022-01-01 RX ADMIN — LISINOPRIL 20 MG: 20 TABLET ORAL at 08:10

## 2022-01-01 RX ADMIN — CEFTRIAXONE SODIUM 1000 MG: 10 INJECTION, POWDER, FOR SOLUTION INTRAVENOUS at 10:23

## 2022-01-01 RX ADMIN — AMLODIPINE BESYLATE 5 MG: 5 TABLET ORAL at 13:44

## 2022-01-01 RX ADMIN — ENOXAPARIN SODIUM 60 MG: 60 INJECTION SUBCUTANEOUS at 21:49

## 2022-01-01 RX ADMIN — ASPIRIN 81 MG: 81 TABLET, COATED ORAL at 14:09

## 2022-01-01 RX ADMIN — APIXABAN 10 MG: 5 TABLET, FILM COATED ORAL at 17:01

## 2022-01-01 RX ADMIN — APIXABAN 10 MG: 5 TABLET, FILM COATED ORAL at 11:33

## 2022-01-01 RX ADMIN — DOXYCYCLINE 100 MG: 100 CAPSULE ORAL at 22:04

## 2022-01-01 RX ADMIN — AMLODIPINE BESYLATE 5 MG: 5 TABLET ORAL at 08:47

## 2022-01-01 RX ADMIN — BARICITINIB 4 MG: 2 TABLET, FILM COATED ORAL at 18:09

## 2022-01-01 RX ADMIN — MORPHINE SULFATE 2 MG: 2 INJECTION, SOLUTION INTRAMUSCULAR; INTRAVENOUS at 16:54

## 2022-01-01 RX ADMIN — INSULIN LISPRO 2 UNITS: 100 INJECTION, SOLUTION INTRAVENOUS; SUBCUTANEOUS at 21:36

## 2022-01-01 RX ADMIN — PRAVASTATIN SODIUM 40 MG: 40 TABLET ORAL at 16:45

## 2022-01-01 RX ADMIN — ENOXAPARIN SODIUM 30 MG: 30 INJECTION SUBCUTANEOUS at 10:13

## 2022-01-01 RX ADMIN — ASPIRIN 81 MG: 81 TABLET, COATED ORAL at 08:23

## 2022-01-01 RX ADMIN — DEXAMETHASONE SODIUM PHOSPHATE 6.4 MG: 4 INJECTION, SOLUTION INTRAMUSCULAR; INTRAVENOUS at 22:27

## 2022-01-01 RX ADMIN — PRAMIPEXOLE DIHYDROCHLORIDE 0.25 MG: 0.25 TABLET ORAL at 10:13

## 2022-01-01 RX ADMIN — BARICITINIB 4 MG: 2 TABLET, FILM COATED ORAL at 11:30

## 2022-01-01 RX ADMIN — ASPIRIN 81 MG CHEWABLE TABLET 81 MG: 81 TABLET CHEWABLE at 10:12

## 2022-01-01 RX ADMIN — PRAVASTATIN SODIUM 40 MG: 40 TABLET ORAL at 17:13

## 2022-01-14 NOTE — ED ATTENDING ATTESTATION
1/14/2022  Harsha SCANLON DO, saw and evaluated the patient  I have discussed the patient with the resident/non-physician practitioner and agree with the resident's/non-physician practitioner's findings, Plan of Care, and MDM as documented in the resident's/non-physician practitioner's note, except where noted  All available labs and Radiology studies were reviewed  I was present for key portions of any procedure(s) performed by the resident/non-physician practitioner and I was immediately available to provide assistance  At this point I agree with the current assessment done in the Emergency Department  I have conducted an independent evaluation of this patient a history and physical is as follows:        80-year-old male presents with nasal congestion, general fatigue, cough, says he has actually been feeling better over the past couple of days was concerned about COVID , says the cough is actually chronic as he is on ACE-inhibitor and has been told his chronic cough related to this feels it slightly worse  , dry, nonproductive, no fevers no chills, says he is concerned about COVID wants to be tested  Nontoxic in appearance no modifying or alleviating factors  Has not tried any over-the-counter remedies including cough drops  Symptoms have been for about 3 days  No shortness of breath no chest pain    Review of Systems   Constitutional: Negative for activity change, chills, diaphoresis and fever  HENT:  Positive for congestion, and sore throat  Eyes: Negative for pain and visual disturbance  Respiratory:  Positive for cough, negative for chest tightness, shortness of breath, wheezing and stridor  Cardiovascular: Negative for chest pain and palpitations  Gastrointestinal: Negative for abdominal distention, abdominal pain, constipation, diarrhea, nausea and vomiting  Genitourinary: Negative for dysuria and frequency  Musculoskeletal: Negative for neck pain and neck stiffness     Skin: Negative for rash  Neurological: Negative for dizziness, speech difficulty, light-headedness, numbness and headaches  Physical Exam  Vitals reviewed  Constitutional:       General: He is not in acute distress  Appearance: He is well-developed  He is not diaphoretic  Comments: Nontoxic speaking full clear sentences without any difficulty  HENT:      Head: Normocephalic and atraumatic  Right Ear: External ear normal       Left Ear: External ear normal       Nose: Nose normal    Eyes:      General:         Right eye: No discharge  Left eye: No discharge  Pupils: Pupils are equal, round, and reactive to light  Neck:      Trachea: No tracheal deviation  Cardiovascular:      Rate and Rhythm: Normal rate and regular rhythm  Heart sounds: Normal heart sounds  No murmur heard  Pulmonary:      Effort: Pulmonary effort is normal  No respiratory distress  Breath sounds: Normal breath sounds  No stridor  Abdominal:      General: There is no distension  Palpations: Abdomen is soft  Tenderness: There is no abdominal tenderness  There is no guarding or rebound  Musculoskeletal:         General: Normal range of motion  Cervical back: Normal range of motion and neck supple  Skin:     General: Skin is warm and dry  Coloration: Skin is not pale  Findings: No erythema  Neurological:      General: No focal deficit present  Mental Status: He is alert and oriented to person, place, and time                  ED Course         Critical Care Time  Procedures        MDM  Number of Diagnoses or Management Options  Acute viral syndrome: new, needed workup  Viral illness: new, needed workup  Diagnosis management comments: Nontoxic appearing, speaking full sentences, dry cough, patient concerned about COVID, will check for COVID, patient does not want any further workup at this time patient agrees to strict return parameters       Amount and/or Complexity of Data Reviewed  Clinical lab tests: ordered  Review and summarize past medical records: yes            Time reflects when diagnosis was documented in both MDM as applicable and the Disposition within this note     Time User Action Codes Description Comment    1/14/2022 10:50 AM Mariana Ty Add [B34 9] Viral illness     1/14/2022 12:17 PM Kenn LONGO Add [B34 9] Acute viral syndrome     1/14/2022 12:17 PM Rocio Mcadams Modify [B34 9] Viral illness     1/14/2022 12:17 PM Rocio Mcadams Modify [B34 9] Acute viral syndrome       ED Disposition     ED Disposition Condition Date/Time Comment    Discharge Stable Fri Jan 14, 2022 10:50 AM Robert Tim discharge to home/self care              Follow-up Information     Follow up With Specialties Details Why Contact Info Additional Information    [facility] Emergency Medicine  If symptoms worsen including any chest pain,shortness of breath, or any other concerning symptoms [address] Emergency Department, [address], [place], [place], [address]

## 2022-01-14 NOTE — ED PROVIDER NOTES
History  Chief Complaint   Patient presents with    Generalized Body Aches     Pt states he had body aches, runny nose and cough  denies CP or SOB  unsure of sick contacts  68-year-old male presents with 3 days of fatigue, generalized body aches, cough, congestion  Patient states he usually has a dry cough secondary to ramipril use but states in the past few days his cough has become louder and more productive  Patient denies fevers, chest pain, shortness of breath, difficulty breathing, abdominal pain  Patient states congestion is improving  Patient states his wife is home with similar symptoms  Patient is vaccinated for COVID  Patient states he came to the emergency department for possible medications to treat his cough  Generalized Body Aches  Associated symptoms: congestion, cough, fatigue and myalgias    Associated symptoms: no abdominal pain, no chest pain, no ear pain, no fever, no rash, no shortness of breath, no sore throat and no vomiting        Prior to Admission Medications   Prescriptions Last Dose Informant Patient Reported? Taking?    Ascorbic Acid (VITAMIN C) 1000 MG tablet  Self Yes No   Sig: Take 1,000 mg by mouth daily   B Complex Vitamins (B COMPLEX 1 PO)   Yes No   Sig: Take 1 tablet by mouth daily   Blood Glucose Monitoring Suppl (ONE TOUCH ULTRA MINI) w/Device KIT   No No   Sig: Use meter as directed   Coenzyme Q10 (CO Q 10) 100 MG CAPS  Self Yes No   Sig: Take 4 capsules by mouth daily   Insulin Pen Needle 32G X 6 MM MISC   Yes No   Sig: BD Ultra-Fine Beatrice Pen Needle 32 gauge x 5/32"   NON FORMULARY  Self Yes No   Sig: as needed Fiber Therapy Powder-One heaping tablespoon to 12 oz of water   ONETOUCH DELICA LANCETS FINE MISC   No No   Sig: Twice a day   Omega-3 Fatty Acids (FISH OIL) 1,000 mg  Self Yes No   Sig: Take 1,000 mg by mouth 3 (three) times a day   amLODIPine (NORVASC) 5 mg tablet   Yes No   Sig: Take 5 mg by mouth daily   aspirin 81 MG tablet  Self Yes No   Sig: Take 1 tablet by mouth every morning     ezetimibe (ZETIA) 10 mg tablet   Yes No   Sig: Take 10 mg by mouth daily   glucose blood (ONE TOUCH ULTRA TEST) test strip   No No   Sig: Check blood sugar twice a day   insulin glargine (Lantus SoloStar) 100 units/mL injection pen   No No   Sig: Inject 14 units at bedtime   losartan (COZAAR) 100 MG tablet   No No   Sig: Take 1 tablet (100 mg total) by mouth daily   metFORMIN (GLUCOPHAGE) 1000 MG tablet   No No   Sig: TAKE 1 TABLET BY MOUTH TWICE A DAY   polyethylene glycol (MIRALAX) 17 g packet   No No   Sig: Take 17 g by mouth daily as needed (constipation)   Patient not taking: Reported on 1/7/2021   pramipexole (MIRAPEX) 0 25 mg tablet  Self Yes No   Sig: Take 0 25 mg by mouth every morning   pramipexole (MIRAPEX) 1 mg tablet   Yes No   Sig: Take 1 5 mg by mouth daily    pravastatin (PRAVACHOL) 40 mg tablet  Self Yes No   Sig: Take 40 mg by mouth daily      Facility-Administered Medications: None       Past Medical History:   Diagnosis Date    Arthritis     [place] ([place] [address] )     Cardiac disease     abnormal stress test 2012    Chronic kidney disease     protein urine    CLL (chronic lymphocytic leukemia) ([place] [address] ) 2014    6 months chemo    Constipation     Coronary artery disease     Diabetes mellitus ([place] [address] )     type 2    History of chemotherapy     Tx for CLL currently in remission since 2016    Hyperlipidemia     Hypertension     Infectious viral hepatitis     Hep C Hx remission since 2016 post tx    Liver disease     Proteinuria        Past Surgical History:   Procedure Laterality Date    ANGIOPLASTY      one stent    CARDIAC CATHETERIZATION      CARDIAC SURGERY  2016    x1 stent    CATARACT EXTRACTION      CYST REMOVAL      mid back benign    FRACTURE SURGERY Right     Fx Femur age 17, pin in place    [place] XCAPSL CTRC RMVL INSJ IO LENS PROSTH W/O ECP Right 7/23/2018    Procedure: EXTRACTION EXTRACAPSULAR CATARACT PHACO INTRAOCULAR LENS (IOL);   Surgeon: Dang Pineda Fay Castaneda MD;  [place] [facility];  Service: Ophthalmology    [place] XCAPSL CTRC RMVL INSJ IO LENS PROSTH W/O ECP Left 10/1/2018    Procedure: EXTRACTION EXTRACAPSULAR CATARACT PHACO INTRAOCULAR LENS (IOL); Surgeon: Luzmaria Willson MD;  Location: [facility] MAIN OR;  Service: Ophthalmology    TONSILLECTOMY         Family History   Problem Relation Age of Onset    Diabetes unspecified Brother     Diabetes Brother     Diabetes unspecified Maternal Grandfather     Diabetes Maternal Grandfather     Cancer Mother         breast    Alzheimer's disease Mother    [place] Early death Father         age 64 peritonitis from gallbladder    Cancer Sister         small cell carcinoma    No Known Problems Daughter     No Known Problems Daughter      I have reviewed and agree with the history as documented  E-Cigarette/Vaping    E-Cigarette Use Never User      E-Cigarette/Vaping Substances     Social History     Tobacco Use    Smoking status: Former Smoker    Smokeless tobacco: Never Used    Tobacco comment: quit 20 years ago   Vaping Use    Vaping Use: Never used   Substance Use Topics    Alcohol use: Yes     Comment: SOCIALLY    Drug use: No        Review of Systems   Constitutional: Positive for fatigue  Negative for chills and fever  HENT: Positive for congestion  Negative for ear pain and sore throat  Eyes: Negative for pain and visual disturbance  Respiratory: Positive for cough  Negative for shortness of breath  Cardiovascular: Negative for chest pain and palpitations  Gastrointestinal: Negative for abdominal pain and vomiting  Genitourinary: Negative for dysuria and hematuria  Musculoskeletal: Positive for myalgias  Negative for arthralgias and back pain  Skin: Negative for color change and rash  Neurological: Negative for seizures and syncope  All other systems reviewed and are negative        Physical Exam  ED Triage Vitals [01/14/22 0948]   Temperature Pulse Respirations Blood Pressure SpO2   97 9 °F (36 6 °C) 81 18 168/92 94 %      Temp Source Heart Rate Source Patient Position - Orthostatic VS BP Location FiO2 (%)   Oral Monitor Lying Left arm --      Pain Score       --             Orthostatic Vital Signs  Vitals:    01/14/22 0948   BP: 168/92   Pulse: 81   Patient Position - Orthostatic VS: Lying       Physical Exam  Vitals and nursing note reviewed  Constitutional:       General: He is not in acute distress  Appearance: Normal appearance  He is well-developed  He is not ill-appearing, toxic-appearing or diaphoretic  HENT:      Head: Normocephalic and atraumatic  Nose: Nose normal       Mouth/Throat:      Mouth: Mucous membranes are moist    Eyes:      Conjunctiva/sclera: Conjunctivae normal    Cardiovascular:      Rate and Rhythm: Normal rate and regular rhythm  Heart sounds: No murmur heard  Pulmonary:      Effort: Pulmonary effort is normal  No respiratory distress  Breath sounds: Normal breath sounds  Abdominal:      Palpations: Abdomen is soft  Tenderness: There is no abdominal tenderness  Musculoskeletal:         General: No swelling  Normal range of motion  Cervical back: Neck supple  Right lower leg: No edema  Left lower leg: No edema  Skin:     General: Skin is warm and dry  Neurological:      General: No focal deficit present  Mental Status: He is alert  ED Medications  Medications - No data to display    Diagnostic Studies  Results Reviewed     Procedure Component Value Units Date/Time    COVID only - 48 hour TAT [[id number]] Collected: 01/14/22 1054    Lab Status:  In process Specimen: Nares from Nose Updated: 01/14/22 1058                 No orders to display         Procedures  Procedures      ED Course                                       MDM  Number of Diagnoses or Management Options  Viral illness  Diagnosis management comments: 81-year-old male presents with 3 days of fatigue, generalized body aches, cough, congestion  Patient states he usually has a dry cough secondary to ramipril use but states in the past few days his cough has become louder and more productive  Patient denies fevers, chest pain, shortness of breath, difficulty breathing, abdominal pain  Patient states his wife is home with similar symptoms  Patient is vaccinated for COVID  Physical exam is completely unremarkable  Patient is in no acute distress and lungs are clear  Discussed with patient that likely COVID or another virus and treatment with supportive care is best   Discussed risk and benefits of cough medicine and patient agreed that he would try over the counter cough drops  Will COVID swab here and follow up with results  Reviewed return precautions  Risk of Complications, Morbidity, and/or Mortality  Presenting problems: low  Diagnostic procedures: low  Management options: low    Patient Progress  Patient progress: stable      Disposition  Final diagnoses:   Viral illness     Time reflects when diagnosis was documented in both MDM as applicable and the Disposition within this note     Time User Action Codes Description Comment    1/14/2022 10:50 AM Riana Daniels Add [B34 9] Viral illness       ED Disposition     ED Disposition Condition Date/Time Comment    Discharge Stable Fri Jan 14, 2022 10:50 AM John Tim discharge to home/self care              Follow-up Information     Follow up With Specialties Details Why Contact Info Additional Information    [facility] Emergency Medicine  If symptoms worsen including any chest pain,shortness of breath, or any other concerning symptoms [address] Emergency Department, [address] [place] [address], [id number]          Discharge Medication List as of 1/14/2022 10:51 AM      CONTINUE these medications which have NOT CHANGED    Details   amLODIPine (NORVASC) 5 mg tablet Take 5 mg by mouth daily, Starting Thu 7/9/2020, Until Fri 7/9/2021, Historical Med      Ascorbic Acid (VITAMIN C) 1000 MG tablet Take 1,000 mg by mouth daily, Historical Med      aspirin 81 MG tablet Take 1 tablet by mouth every morning  , Historical Med      B Complex Vitamins (B COMPLEX 1 PO) Take 1 tablet by mouth daily, Historical Med      Blood Glucose Monitoring Suppl (ONE TOUCH ULTRA MINI) w/Device KIT Use meter as directed, Normal      Coenzyme Q10 (CO Q 10) 100 MG CAPS Take 4 capsules by mouth daily, Historical Med      ezetimibe (ZETIA) 10 mg tablet Take 10 mg by mouth daily, Historical Med      glucose blood (ONE TOUCH ULTRA TEST) test strip Check blood sugar twice a day, Normal      insulin glargine (Lantus SoloStar) 100 units/mL injection pen Inject 14 units at bedtime, Normal      Insulin Pen Needle 32G X 6 MM MISC BD Ultra-Fine Beatrice Pen Needle 32 gauge x 5/32", Historical Med      losartan (COZAAR) 100 MG tablet Take 1 tablet (100 mg total) by mouth daily, Starting Tue 12/15/2020, Until Thu 1/14/2021, Normal      metFORMIN (GLUCOPHAGE) 1000 MG tablet TAKE 1 TABLET BY MOUTH TWICE A DAY, Normal      NON FORMULARY as needed Fiber Therapy Powder-One heaping tablespoon to 12 oz of water, Historical Med      Omega-3 Fatty Acids (FISH OIL) 1,000 mg Take 1,000 mg by mouth 3 (three) times a day, Historical Med      ONETOUCH DELICA LANCETS FINE MISC Twice a day, Normal      polyethylene glycol (MIRALAX) 17 g packet Take 17 g by mouth daily as needed (constipation), Starting Fri 12/25/2020, Normal      !! pramipexole (MIRAPEX) 0 25 mg tablet Take 0 25 mg by mouth every morning, Historical Med      !! pramipexole (MIRAPEX) 1 mg tablet Take 1 5 mg by mouth daily , Historical Med      pravastatin (PRAVACHOL) 40 mg tablet Take 40 mg by mouth daily, Historical Med       !! - Potential duplicate medications found  Please discuss with provider  No discharge procedures on file      PDMP Review     None           ED Provider  Attending physically available and evaluated Cheryl Ruvalcabaev SCANLON managed the patient along with the ED Attending      Electronically Signed by         Rafael Brown MD  01/14/22 0179

## 2022-01-19 PROBLEM — R73.9 HYPERGLYCEMIA: Status: ACTIVE | Noted: 2017-04-19

## 2022-01-19 PROBLEM — J12.82 PNEUMONIA DUE TO COVID-19 VIRUS: Status: ACTIVE | Noted: 2022-01-01

## 2022-01-19 PROBLEM — J96.01 ACUTE RESPIRATORY FAILURE WITH HYPOXIA (HCC): Status: ACTIVE | Noted: 2022-01-01

## 2022-01-19 PROBLEM — U07.1 PNEUMONIA DUE TO COVID-19 VIRUS: Status: ACTIVE | Noted: 2022-01-01

## 2022-01-19 NOTE — ASSESSMENT & PLAN NOTE
Transaminitis [id number]  Normal liver synthetic function  NL appearing liver on ultrasound  History of hep C? Check viral load, other viral hepatitis panel    Avoid statin, ezetimibe, remdesivir at this time  Repeat LFT tomorrow

## 2022-01-19 NOTE — ASSESSMENT & PLAN NOTE
Lab Results   Component Value Date    HGBA1C 7 5 (H) 10/14/2021       Recent Labs     01/19/22  0950   POCGLU >500*       Blood Sugar Average: Last 72 hrs:   No evidence of DKA or HHS  On home insulin-Lantus 14 units q h s , metformin  DC metformin-continue Lantus same dose  5 units t i d  With meals, sliding scale insulin    Blood sugar expected to be elevated in the setting of steroid use

## 2022-01-19 NOTE — H&P
[facility]  H&P- Gaynell Brittle A Defrancesco 1940, 80 y o  male MRN: [id number]  Unit/Bed#: ED 25 Encounter: [id number]  Primary Care Provider: Lucy Callahan MD   Date and time admitted to hospital: 1/19/2022  9:06 AM    Acute respiratory failure with hypoxia [facility])  Assessment & Plan  Due to COVID pneumonia  Management as below  Wean oxygen as able  Currently on 2-3 L oxygen NC    Transaminitis  Assessment & Plan  Transaminitis [id number]  Normal liver synthetic function  NL appearing liver on ultrasound  History of hep C? Check viral load, other viral hepatitis panel  Avoid statin, ezetimibe, remdesivir at this time  Repeat LFT tomorrow    S/P coronary artery stent placement  Assessment & Plan  On aspirin only at home    Hypertension  Assessment & Plan  Continue home blood pressure medications    Dyslipidemia  Assessment & Plan  On statin    Hyperglycemia  Assessment & Plan  Lab Results   Component Value Date    HGBA1C 7 5 (H) 10/14/2021       Recent Labs     01/19/22  0950   POCGLU >500*       Blood Sugar Average: Last 72 hrs:   No evidence of DKA or HHS  On home insulin-Lantus 14 units q h s , metformin  DC metformin-continue Lantus same dose  5 units t i d  With meals, sliding scale insulin  Blood sugar expected to be elevated in the setting of steroid use    * Pneumonia due to COVID-19 virus  Assessment & Plan  Admit on mild pathway  decadron  statin  AC  Hold on remdesivir due to transaminitis  Bronchodilators  Ambulate patient  VTE Prophylaxis: Enoxaparin (Lovenox)  Code Status: Level 1 - Full Code  Anticipated Length of Stay:  Patient will be admitted on an Inpatient basis with an anticipated length of stay of  > than 2 midnights  Justification for Hospital Stay: Pneumonia due to COVID-19 virus  Total Time for Visit, including Counseling / Coordination of Care: 60 mins   Greater than 50% of this total time spent on direct patient counseling and coordination of care     Chief Complaint:     Chief Complaint   Patient presents with    Shortness of Breath     covid + 1/14  c/o low spo2 at home; 75%  86% at rest during triage  History of Present Illness:    79-year-old male patient past medical history of diabetes, hypertension, hyperlipidemia, presented with a chief complaint of exertion shortness breath, tested positive for COVID-19 on 01/14  Patient reports nonproductive cough patient measure his oxygen at home was in the 80s  His symptoms has been getting worse for the last couple days  Denies any fever, chills, nausea, vomiting, diarrhea, abdominal pain, dizziness, lightheadedness, if peripheral tingling numbness sensation, motor weakness , denies any chest pain, urinary symptoms  Review of Systems:  As above        Past Medical and Surgical History:   Past Medical History:   Diagnosis Date    Arthritis     Cancer [facility])     Cardiac disease     abnormal stress test 2012    Chronic kidney disease     protein urine    CLL [facility]chronic lymphocytic leukemia) ([facility] ) 2014    6 months chemo    Constipation     Coronary artery disease     Diabetes mellitus ([facility] )     type 2    History of chemotherapy     Tx for CLL currently in remission since 2016    Hyperlipidemia     Hypertension     Infectious viral hepatitis     Hep C Hx remission since 2016 post tx    Liver disease     Proteinuria      Past Surgical History:   Procedure Laterality Date    ANGIOPLASTY      one stent    CARDIAC CATHETERIZATION      CARDIAC SURGERY  2016    x1 stent    CATARACT EXTRACTION      CYST REMOVAL      mid back benign    FRACTURE SURGERY Right     Fx Femur age 17, pin in place    [place] XCAPSL CTRC RMVL INSJ IO LENS PROSTH W/O ECP Right 7/23/2018    Procedure: EXTRACTION EXTRACAPSULAR CATARACT PHACO INTRAOCULAR LENS (IOL);   Surgeon: Patricia Luther MD;  [facility] MA[facility] OR;  Service: Ophthalmology    [place] XCAPSL CTRC RMVL INSJ IO LENS PROSTH W/O ECP Left 10/1/2018 Procedure: EXTRACTION EXTRACAPSULAR CATARACT PHACO INTRAOCULAR LENS (IOL); Surgeon: Consuella Brunner, MD;  [facility]: [facility] MAIN OR;  Service: Ophthalmology    TONSILLECTOMY       Meds/Allergies: Allergies: Allergies   Allergen Reactions    Other      Seasonal allergies     Prior to Admission Medications   Prescriptions Last Dose Informant Patient Reported? Taking?    Ascorbic Acid (VITAMIN C) 1000 MG tablet  Self Yes No   Sig: Take 1,000 mg by mouth daily   B Complex Vitamins (B COMPLEX 1 PO)   Yes No   Sig: Take 1 tablet by mouth daily   Blood Glucose Monitoring Suppl (ONE TOUCH ULTRA MINI) w/Device KIT   No No   Sig: Use meter as directed   Coenzyme Q10 (CO Q 10) 100 MG CAPS  Self Yes No   Sig: Take 4 capsules by mouth daily   Insulin Pen Needle 32G X 6 MM MISC   Yes No   Sig: BD Ultra-Fine Beatrice Pen Needle 32 gauge x 5/32"   NON FORMULARY  Self Yes No   Sig: as needed Fiber Therapy Powder-One heaping tablespoon to 12 oz of water   ONETOUCH DELICA LANCETS FINE MISC   No No   Sig: Twice a day   Omega-3 Fatty Acids (FISH OIL) 1,000 mg  Self Yes No   Sig: Take 1,000 mg by mouth 3 (three) times a day   amLODIPine (NORVASC) 5 mg tablet   Yes No   Sig: Take 5 mg by mouth daily   aspirin 81 MG tablet  Self Yes No   Sig: Take 1 tablet by mouth every morning     ezetimibe (ZETIA) 10 mg tablet   Yes No   Sig: Take 10 mg by mouth daily   glucose blood (ONE TOUCH ULTRA TEST) test strip   No No   Sig: Check blood sugar twice a day   insulin glargine (Lantus SoloStar) 100 units/mL injection pen   No No   Sig: Inject 14 units at bedtime   losartan (COZAAR) 100 MG tablet   No No   Sig: Take 1 tablet (100 mg total) by mouth daily   metFORMIN (GLUCOPHAGE) 1000 MG tablet   No No   Sig: TAKE 1 TABLET BY MOUTH TWICE A DAY   polyethylene glycol (MIRALAX) 17 g packet   No No   Sig: Take 17 g by mouth daily as needed (constipation)   Patient not taking: Reported on 1/7/2021   pramipexole (MIRAPEX) 0 25 mg tablet  Self Yes No   Sig: Take 0 25 mg by mouth every morning   pramipexole (MIRAPEX) 1 mg tablet   Yes No   Sig: Take 1 5 mg by mouth daily    pravastatin (PRAVACHOL) 40 mg tablet  Self Yes No   Sig: Take 40 mg by mouth daily      Facility-Administered Medications: None     Social History:     Social History     Socioeconomic History    Marital status: /Civil Union     Spouse name: Not on file    Number of children: Not on file    Years of education: Not on file    Highest education level: Not on file   Occupational History    Not on file   Tobacco Use    Smoking status: Former Smoker    Smokeless tobacco: Never Used    Tobacco comment: quit 20 years ago   Vaping Use    Vaping Use: Never used   Substance and Sexual Activity    Alcohol use: Yes     Comment: SOCIALLY    Drug use: No    Sexual activity: Not on file   Other Topics Concern    Not on file   Social History Narrative    Not on file     Social Determinants of Health     Financial Resource Strain: Not on file   Food Insecurity: Not on file   Transportation Needs: Not on file   Physical Activity: Not on file   Stress: Not on file   Social Connections: Not on file   Intimate Partner Violence: Not on file   Housing Stability: Not on file   Lives at home, independent    Family History:  Family History   Problem Relation Age of Onset    Diabetes unspecified Brother     Diabetes Brother     Diabetes unspecified Maternal Grandfather     Diabetes Maternal Grandfather     Cancer Mother         breast    Alzheimer's disease Mother     Early death Father         age 64 peritonitis from gallbladder    Cancer Sister         small cell carcinoma    No Known Problems Daughter     No Known Problems Daughter      Physical Exam:   Vitals:   Blood Pressure: 90/57 (01/19/22 0735)  Pulse: 74 (01/19/22 0735)  Temperature: 97 7 °F (36 5 °C) (01/19/22 0739)  Respirations: 18 (01/19/22 0735)  SpO2: 99 % (01/19/22 1035)    General appearance: alert, appears stated age and cooperative  Constitutional- looks a little weak  HEENT - atraumatic and normocephalic  Neck- supple  Skin - no fresh rash  Extremities no fresh focal deformities  Cardiovascular- S1-S2 heard  Respiratory- bilateral air entry present, decreased breathing sound bilateral  Skin - no fresh rash  Abdomen - normal bowel sounds present, no rebound tenderness  CNS- No fresh focal deficits  Psych- no acute psychosis     Lab Results: I have personally reviewed pertinent reports  Results from last 7 days   Lab Units 01/19/22  0745   WBC Thousand/uL 13 17*   HEMOGLOBIN g/dL 14 4   HEMATOCRIT % 43 1   PLATELETS Thousands/uL 297   NEUTROS PCT % 83*   LYMPHS PCT % 8*   MONOS PCT % 8   EOS PCT % 0     Results from last 7 days   Lab Units 01/19/22  0745   SODIUM mmol/L 135*   POTASSIUM mmol/L 4 8   CHLORIDE mmol/L 98*   CO2 mmol/L 26   ANION GAP mmol/L 11   BUN mg/dL 36*   CREATININE mg/dL 1 21   CALCIUM mg/dL 9 5   ALBUMIN g/dL 3 3*   TOTAL BILIRUBIN mg/dL 1 01*   ALK PHOS U/L 87   ALT U/L 234*   AST U/L 124*   EGFR ml/min/1 73sq m 56   GLUCOSE RANDOM mg/dL 438*                 Results from last 7 days   Lab Units 01/19/22  0745   D-DIMER QUANTITATIVE ug/ml FEU 0 88*                        Imaging: I have personally reviewed pertinent reports  XR chest 1 view portable    Result Date: 1/19/2022  Impression: Bilateral groundglass presumed Covid 19 pneumonia infiltrates in this Covid positive patient  Findings concur with the preliminary report by the referring clinician already in PACS and/or our electronic record EPIC  Workstation performed: [id number]       EKG, Pathology, and Other Studies Reviewed on Admission:   EKG  Result Date: 01/19/22  Personally reviewed strips with impression of:  Normal sinus rhythm    Epic Records Reviewed: Yes    Car Florentino, MD [address] [facility]    ** Please Note: This note has been constructed using a voice recognition system   **

## 2022-01-19 NOTE — ED PROVIDER NOTES
History  Chief Complaint   Patient presents with    Shortness of Breath     covid + 1/14  c/o low spo2 at home; 75%  86% at rest during triage  79-year-old male presents to the emergency department with progressive dyspnea setting COVID infection  He was seen in the emergency department on January 14th after experiencing 3 days of symptoms at which time he tested positive  He does have a pulse oximeter at home and notes that his oxygen saturations were upper 80s  He notes that he generally been starting to feel improved and now over the last couple of days having had more dyspnea  Oxygen saturation was 75% on room air at home today  He does not wear any supplemental oxygen at baseline  He did have transient epigastric discomfort earlier today although this has resolved  No other chest pain  He does have a chronic dry cough which he attributes to being on ramipril  He does not specifically notice a change in this  He has not had any productive cough nor hemoptysis  With he reports loss of his taste since undergoing chemotherapy for CLL 4 to 5 years ago  He has been taking in food and beverages though does not believe that he has been keeping up with what is necessary  He notes that he always feels dry  No problems with urination  Stools have been slightly looser than usual without blood presence  He does have some very mild bilateral lower leg cramping although this pre seated COVID infection  He has not appreciated any swelling  Past medical history significant for hypertension, type 2 diabetes (for which he takes oral medication and insulin), CAD, CLL, hepatitis-C (in remission since treatment in 2016) and dyslipidemia  He denies having had any history of pulmonary problems  To his knowledge is his 1st infection with COVID  He has not received vaccines for COVID  Prior to Admission Medications   Prescriptions Last Dose Informant Patient Reported? Taking?    Ascorbic Acid (VITAMIN C) 1000 MG tablet  Self Yes No   Sig: Take 1,000 mg by mouth daily   B Complex Vitamins (B COMPLEX 1 PO)   Yes No   Sig: Take 1 tablet by mouth daily   Blood Glucose Monitoring Suppl (ONE TOUCH ULTRA MINI) w/Device KIT   No No   Sig: Use meter as directed   Coenzyme Q10 (CO Q 10) 100 MG CAPS  Self Yes No   Sig: Take 4 capsules by mouth daily   Insulin Pen Needle 32G X 6 MM MISC   Yes No   Sig: BD Ultra-Fine Beatrice Pen Needle 32 gauge x 5/32"   NON FORMULARY  Self Yes No   Sig: as needed Fiber Therapy Powder-One heaping tablespoon to 12 oz of water   ONETOUCH DELICA LANCETS FINE MISC   No No   Sig: Twice a day   Omega-3 Fatty Acids (FISH OIL) 1,000 mg  Self Yes No   Sig: Take 1,000 mg by mouth 3 (three) times a day   amLODIPine (NORVASC) 5 mg tablet   Yes No   Sig: Take 5 mg by mouth daily   aspirin 81 MG tablet  Self Yes No   Sig: Take 1 tablet by mouth every morning     ezetimibe (ZETIA) 10 mg tablet   Yes No   Sig: Take 10 mg by mouth daily   glucose blood (ONE TOUCH ULTRA TEST) test strip   No No   Sig: Check blood sugar twice a day   insulin glargine (Lantus SoloStar) 100 units/mL injection pen   No No   Sig: Inject 14 units at bedtime   losartan (COZAAR) 100 MG tablet   No No   Sig: Take 1 tablet (100 mg total) by mouth daily   metFORMIN (GLUCOPHAGE) 1000 MG tablet   No No   Sig: TAKE 1 TABLET BY MOUTH TWICE A DAY   polyethylene glycol (MIRALAX) 17 g packet   No No   Sig: Take 17 g by mouth daily as needed (constipation)   Patient not taking: Reported on 1/7/2021   pramipexole (MIRAPEX) 0 25 mg tablet  Self Yes No   Sig: Take 0 25 mg by mouth every morning   pramipexole (MIRAPEX) 1 mg tablet   Yes No   Sig: Take 1 5 mg by mouth daily    pravastatin (PRAVACHOL) 40 mg tablet  Self Yes No   Sig: Take 40 mg by mouth daily      Facility-Administered Medications: None       Past Medical History:   Diagnosis Date    Arthritis     Cancer [facility])     Cardiac disease     abnormal stress test 2012    Chronic kidney disease     protein urine    CLL [facility]chronic lymphocytic leukemia) ([facility] [address] ) 2014    6 months chemo    Constipation     Coronary artery disease     Diabetes mellitus ([facility] [address] )     type 2    History of chemotherapy     Tx for CLL currently in remission since 2016    Hyperlipidemia     Hypertension     Infectious viral hepatitis     Hep C Hx remission since 2016 post tx    Liver disease     Proteinuria        Past Surgical History:   Procedure Laterality Date    ANGIOPLASTY      one stent    CARDIAC CATHETERIZATION      CARDIAC SURGERY  2016    x1 stent    CATARACT EXTRACTION      CYST REMOVAL      mid back benign    FRACTURE SURGERY Right     Fx Femur age 17, pin in place    SC XCAPSL CTRC RMVL INSJ IO LENS PROSTH W/O ECP Right 7/23/2018    Procedure: EXTRACTION EXTRACAPSULAR CATARACT PHACO INTRAOCULAR LENS (IOL); Surgeon: Prema Stafford MD;  Location: [facility] MAIN OR;  Service: Ophthalmology    SC XCAPSL CTRC RMVL INSJ IO LENS PROSTH W/O ECP Left 10/1/2018    Procedure: EXTRACTION EXTRACAPSULAR CATARACT PHACO INTRAOCULAR LENS (IOL); Surgeon: Prema Stafford MD;  Location: [facility] MAIN OR;  Service: Ophthalmology    TONSILLECTOMY         Family History   Problem Relation Age of Onset    Diabetes unspecified Brother     Diabetes Brother     Diabetes unspecified Maternal Grandfather     Diabetes Maternal Grandfather     Cancer Mother         breast    Alzheimer's disease Mother    Catracho Dasilva Early death Father         age 64 peritonitis from gallbladder    Cancer Sister         small cell carcinoma    No Known Problems Daughter     No Known Problems Daughter      I have reviewed and agree with the history as documented      E-Cigarette/Vaping    E-Cigarette Use Never User      E-Cigarette/Vaping Substances     Social History     Tobacco Use    Smoking status: Former Smoker    Smokeless tobacco: Never Used    Tobacco comment: quit 20 years ago   Vaping Use    Vaping Use: Never used   Substance Use Topics    Alcohol use: Yes     Comment: SOCIALLY    Drug use: No       Review of Systems   All other systems reviewed and are negative  Physical Exam  Physical Exam  Vitals and nursing note reviewed  Constitutional:       Appearance: He is well-developed  HENT:      Head: Normocephalic  Mouth/Throat:      Comments: Mucous membranes very dry  Eyes:      Extraocular Movements: Extraocular movements intact  Cardiovascular:      Rate and Rhythm: Normal rate and regular rhythm  Pulmonary:      Effort: Pulmonary effort is normal       Breath sounds: Decreased breath sounds and rhonchi (Bilateral mid and lower lung fields) present  Chest:      Chest wall: No tenderness  Abdominal:      Palpations: Abdomen is soft  Tenderness: There is no abdominal tenderness  There is no guarding  Musculoskeletal:      Cervical back: Normal range of motion  Right lower leg: Tenderness present  No edema  Left lower leg: Tenderness present  No edema  Comments: Plus one PT pulses  Mild bilateral calf tenderness   Skin:     General: Skin is warm and dry  Neurological:      General: No focal deficit present  Mental Status: He is alert and oriented to person, place, and time     Psychiatric:         Mood and Affect: Mood normal          Vital Signs  ED Triage Vitals   Temperature Pulse Respirations Blood Pressure SpO2   01/19/22 0739 01/19/22 0735 01/19/22 0735 01/19/22 0735 01/19/22 0735   97 7 °F (36 5 °C) 74 18 90/57 (!) 87 %      Temp src Heart Rate Source Patient Position - Orthostatic VS BP Location FiO2 (%)   -- 01/19/22 0735 01/19/22 0735 01/19/22 0735 --    Monitor Sitting Right arm       Pain Score       --                  Vitals:    01/19/22 0735   BP: 90/57   Pulse: 74   Patient Position - Orthostatic VS: Sitting         Visual Acuity      ED Medications  Medications   sodium chloride 0 9 % bolus 1,000 mL (1,000 mL Intravenous New Bag 1/19/22 4240)   dexamethasone (DECADRON) injection 6 mg (6 mg Intravenous Given 1/19/22 0943)       Diagnostic Studies  Results Reviewed     Procedure Component Value Units Date/Time    D-Dimer [[id number]]  (Abnormal) Collected: 01/19/22 0745    Lab Status: Final result Specimen: Blood from Arm, Left Updated: 01/19/22 0956     D-Dimer, Quant 0 88 ug/ml FEU     Fingerstick Glucose (POCT) [[id number]]  (Abnormal) Collected: 01/19/22 0950    Lab Status: Final result Updated: 01/19/22 0951     POC Glucose >500 mg/dl     CK Total with Reflex CKMB [[id number]]     Lab Status: No result Specimen: Blood     HS Troponin 0hr (reflex protocol) [[id number]]     Lab Status: No result Specimen: Blood     NT-BNP PRO [[id number]]     Lab Status: No result Specimen: Blood     High sensitivity CRP [[id number]]     Lab Status: No result Specimen: Blood     [place] draw [[id number]] Collected: 01/19/22 0745    Lab Status: Final result Specimen: Blood from Arm, Left Updated: 01/19/22 4125    Narrative: The following orders were created for panel order [place] draw  Procedure                               Abnormality         Status                     ---------                               -----------         ------                     Edger Caller Top on FYSA[[id number]]                           Final result               Gold top on [facility][[id number]]                                 Final result               Green / Black tube on PMGF[[id number]]                       Final result                 Please view results for these tests on the individual orders      Comprehensive metabolic panel [[id number]]  (Abnormal) Collected: 01/19/22 0745    Lab Status: Final result Specimen: Blood from Arm, Left Updated: 01/19/22 8164     Sodium 135 mmol/L      Potassium 4 8 mmol/L      Chloride 98 mmol/L      CO2 26 mmol/L      ANION GAP 11 mmol/L      BUN 36 mg/dL      Creatinine 1 21 mg/dL      Glucose 438 mg/dL      Calcium 9 5 mg/dL      Corrected Calcium 10 1 mg/dL       U/L       U/L Alkaline Phosphatase 87 U/L      Total Protein 7 0 g/dL      Albumin 3 3 g/dL      Total Bilirubin 1 01 mg/dL      eGFR 56 ml/min/1 73sq m     Narrative:      [place] guidelines for Chronic Kidney Disease (CKD):     Stage 1 with normal or high GFR (GFR > 90 mL/min/1 73 square meters)    Stage 2 Mild CKD (GFR = 60-89 mL/min/1 73 square meters)    Stage 3A Moderate CKD (GFR = 45-59 mL/min/1 73 square meters)    Stage 3B Moderate CKD (GFR = 30-44 mL/min/1 73 square meters)    Stage 4 Severe CKD (GFR = 15-29 mL/min/1 73 square meters)    Stage 5 End Stage CKD (GFR <15 mL/min/1 73 square meters)  Note: GFR calculation is accurate only with a steady state creatinine    CBC and differential [[id number]]  (Abnormal) Collected: 01/19/22 0745    Lab Status: Final result Specimen: Blood from Arm, Left Updated: 01/19/22 0801     WBC 13 17 Thousand/uL      RBC 4 95 Million/uL      Hemoglobin 14 4 g/dL      Hematocrit 43 1 %      MCV 87 fL      MCH 29 1 pg      MCHC 33 4 g/dL      RDW 14 6 %      MPV 11 8 fL      Platelets 530 Thousands/uL      nRBC 0 /100 WBCs      Neutrophils Relative 83 %      Immat GRANS % 1 %      Lymphocytes Relative 8 %      Monocytes Relative 8 %      Eosinophils Relative 0 %      Basophils Relative 0 %      Neutrophils Absolute 11 02 Thousands/µL      Immature Grans Absolute 0 09 Thousand/uL      Lymphocytes Absolute 1 02 Thousands/µL      Monocytes Absolute 1 03 Thousand/µL      Eosinophils Absolute 0 00 Thousand/µL      Basophils Absolute 0 01 Thousands/µL                  XR chest 1 view portable   ED Interpretation by Amanuel Madison MD (01/19 2548)   Diffuse ground-glass opacity (in setting of known COVID infection)                 Procedures  ECG 12 Lead Documentation Only    Date/Time: 1/19/2022 10:06 AM  Performed by: Amanuel Madison MD  Authorized by: Amanuel Madison MD     ECG reviewed by me, the ED Provider: yes    Patient location:  ED  Previous ECG:     Previous ECG:  Compared to current    Comparison ECG info:  April 19, 2018    Similarity:  No change  Rate:     ECG rate:  78    ECG rate assessment: normal    Rhythm:     Rhythm: sinus rhythm    Ectopy:     Ectopy: none    QRS:     QRS axis:  Normal    QRS intervals:  Normal  Conduction:     Conduction: normal    ST segments:     ST segments:  Normal  T waves:     T waves: normal               ED Course  ED Course as of 01/19/22 1009   Wed Jan 19, 2022   1007 Due to requirement of supplemental O2 for hypoxia patient will be admitted with COVID pneumonia  Dexamethasone ordered  1 L of saline ordered  He is hyperglycemic  Blood glucose to be rechecked following this  I did attempt to locate wife home patient thought was in the waiting area  I was unable to find Jenniffer Thomas but did leave a message at phone number patient verified was correct informing her that he would be admitted  MDM    Disposition  Final diagnoses:   Pneumonia due to COVID-19 virus   Hypoxia   Dehydration   Hyperglycemia due to type 2 diabetes mellitus ([facility] [address] )     Time reflects when diagnosis was documented in both MDM as applicable and the Disposition within this note     Time User Action Codes Description Comment    1/19/2022  9:44 AM Vergia Bunting A Add [U07 1,  J12 82] Pneumonia due to COVID-19 virus     1/19/2022  9:44 AM Vergia Bunting A Add [R09 02] Hypoxia     1/19/2022  9:44 AM Vergia Bunting A Add [E86 0] Dehydration     1/19/2022 10:08 AM Vergia Bunting A Add [E11 65] Hyperglycemia due to type 2 diabetes mellitus [facility])       ED Disposition     ED Disposition Condition Date/Time Comment    Admit Stable Wed Jan 19, 2022  9:44 AM Case was discussed with Dr Rosaura Johnson and the patient's admission status was agreed to be Admission Status:  Inpatient status to the service of Dr Rosaura Johnson           Follow-up Information    None Patient's Medications   Discharge Prescriptions    No medications on file       No discharge procedures on file      PDMP Review     None          ED Provider  Electronically Signed by           Martha Taveras MD  01/19/22 2404

## 2022-01-20 PROBLEM — J96.01 ACUTE HYPOXEMIC RESPIRATORY FAILURE DUE TO COVID-19 (HCC): Status: ACTIVE | Noted: 2022-01-01

## 2022-01-20 NOTE — ASSESSMENT & PLAN NOTE
· On statin which was held on admission secondary to transaminitis however transaminitis appears to be acute on chronic and therefore will resume Zetia and pravastatin

## 2022-01-20 NOTE — ASSESSMENT & PLAN NOTE
Lab Results   Component Value Date    HGBA1C 7 5 (H) 10/14/2021       Recent Labs     01/19/22  1619 01/19/22  2243 01/20/22  0005 01/20/22  0937   POCGLU 454* 485* 424* 303*       Blood Sugar Average: Last 72 hrs:  (P) 405 4   · No evidence of DKA or HHS  · On home insulin-Lantus 14 units q h s , metformin    · Hold metformin  · Increase lantus QHS given steroid induced hyperglycemia and increase mealtime coverage  · Increase to 22 units QHS lantus and 8 units TID with meals  · Continue SSI ACHS

## 2022-01-20 NOTE — ASSESSMENT & PLAN NOTE
· Appears to be acute on chronic - possible secondary to COVID 19  · Ultrasound performed as an outpatient recently with normal appearing liver  · Normal liver synthetic function    · Hepatitis panel pending  · Given LFTs not greater than 10 times the upper limit of normal will initiate remdesivir  · Can continue statin    Lab Results   Component Value Date     (H) 01/19/2022    AST 66 (H) 12/08/2021    AST 44 (H) 12/25/2020     (H) 01/19/2022     (H) 12/08/2021    ALT 65 (H) 12/25/2020

## 2022-01-20 NOTE — PROGRESS NOTES
[facility]  Progress Note Bautista LOPEZ [place] 1940, 80 y o  male MRN: [id number]  Unit/Bed#: W -01 Encounter: [id number]  Primary Care Provider: Jefe Solorio MD   Date and time admitted to hospital: 1/19/2022  9:06 AM    * Acute hypoxemic respiratory failure due to COVID-19 [facility])  Assessment & Plan  · Positive test 1/14/2022  · Admitted 01/19/22  · Vaccinated x2 but no booster  2nd dose of Moderna in April 2021  · CXR: Bilateral groundglass presumed Covid 19 pneumonia infiltrates in this Covid positive patient  · Noted to be on 3-4 L on admission and increased to 13 L overnight  · Currently on 10 L with oxygen saturations 91%  · Wean oxygen as able  · Initially withheld her remdesivir secondary to transaminitis however given not greater than 10 times upper limit of normal will initiate this today  · Continue IV dexamethasone, day #2  · Increase lovenox to 30 mg q12 hrs  · If patient remains on greater than 4 L at 1330 today will add barcitinib  · If no improvement in O2 needs will consult pulm  · No volume overload noted on exam  · Check CRP repeat in AM - on admit high sensitivity was 50 4    Inflammatory Markers (last 3): Lab Results   Component Value Date    DDIMER 0 88 (H) 01/19/2022       Cardiac Markers:  Lab Results   Component Value Date    NTBNP 1,007 (H) 01/19/2022    CKTOTAL 90 01/19/2022         Transaminitis  Assessment & Plan  · Appears to be acute on chronic - possible secondary to COVID 19  · Ultrasound performed as an outpatient recently with normal appearing liver  · Normal liver synthetic function    · Hepatitis panel pending  · Given LFTs not greater than 10 times the upper limit of normal will initiate remdesivir  · Can continue statin    Lab Results   Component Value Date     (H) 01/19/2022    AST 66 (H) 12/08/2021    AST 44 (H) 12/25/2020     (H) 01/19/2022     (H) 12/08/2021    ALT 65 (H) 12/25/2020         Type 2 diabetes mellitus with hyperglycemia, with long-term current use of insulin [facility])  Assessment & Plan  Lab Results   Component Value Date    HGBA1C 7 5 (H) 10/14/2021       Recent Labs     01/19/22  1619 01/19/22  2243 01/20/22  0005 01/20/22  0937   POCGLU 454* 485* 424* 303*       Blood Sugar Average: Last 72 hrs:  (P) 405 4   · No evidence of DKA or HHS  · On home insulin-Lantus 14 units q h s , metformin  · Hold metformin  · Increase lantus QHS given steroid induced hyperglycemia and increase mealtime coverage  · Increase to 22 units QHS lantus and 8 units TID with meals  · Continue SSI ACHS    Dyslipidemia  Assessment & Plan  · On statin which was held on admission secondary to transaminitis however transaminitis appears to be acute on chronic and therefore will resume Zetia and pravastatin    Hypertension  Assessment & Plan  · Continue amlodipine and Norvasc  · Blood pressure 90s to 100s  Will initiate hold parameters for less than 130    S/P coronary artery stent placement  Assessment & Plan  · Continue aspirin, statin and Zetia  · Continue ARB      VTE Pharmacologic Prophylaxis: VTE Score: 6 High Risk (Score >/= 5) - Pharmacological DVT Prophylaxis Ordered: enoxaparin (Lovenox)  Sequential Compression Devices Ordered  Patient Centered Rounds: I performed bedside rounds with nursing staff today  Discussions with Specialists or Other Care Team Provider: nursing    Education and Discussions with Family / Patient: Updated  (wife) via phone  1023    Time Spent for Care: 30 minutes  More than 50% of total time spent on counseling and coordination of care as described above  Current Length of Stay: 1 day(s)  Current Patient Status: Inpatient   Certification Statement: The patient will continue to require additional inpatient hospital stay due to COVID 19 resp failure on 10L NC  Discharge Plan: Anticipate discharge in 48-72 hrs to home      Code Status: Level 1 - Full Code    Subjective:   Notes that his oxygen was falling out of his nose overnight and he thinks that's why the oxygen dropped  No nausea or vomiting  No chest pain   No cough    Objective:     Vitals:   Temp (24hrs), Av 8 °F (36 6 °C), Min:97 4 °F (36 3 °C), Max:98 1 °F (36 7 °C)    Temp:  [97 4 °F (36 3 °C)-98 1 °F (36 7 °C)] 98 1 °F (36 7 °C)  HR:  [71-78] 78  Resp:  [16-18] 18  BP: ()/(52-74) 96/55  SpO2:  [92 %-99 %] 93 %  Body mass index is 20 24 kg/m²  Input and Output Summary (last 24 hours): Intake/Output Summary (Last 24 hours) at 2022 1026  Last data filed at 2022  Gross per 24 hour   Intake 2215 ml   Output 725 ml   Net 1490 ml       Physical Exam:   Physical Exam  Vitals and nursing note reviewed  Constitutional:       General: He is not in acute distress  Appearance: Normal appearance  He is ill-appearing  He is not diaphoretic  HENT:      Head: Normocephalic and atraumatic  Mouth/Throat:      Mouth: Mucous membranes are dry  Cardiovascular:      Rate and Rhythm: Normal rate and regular rhythm  Pulmonary:      Breath sounds: No stridor  No wheezing, rhonchi or rales  Comments: 10 L midflow  Abdominal:      General: Bowel sounds are normal       Palpations: Abdomen is soft  There is no mass  Tenderness: There is no abdominal tenderness  There is no guarding  Musculoskeletal:      Right lower leg: No edema  Left lower leg: No edema  Skin:     General: Skin is warm and dry  Neurological:      Mental Status: He is alert     Psychiatric:         Mood and Affect: Mood normal          Behavior: Behavior normal         Additional Data:    Labs:  Results from last 7 days   Lab Units 22  0745   WBC Thousand/uL 13 17*   HEMOGLOBIN g/dL 14 4   HEMATOCRIT % 43 1   PLATELETS Thousands/uL 297   NEUTROS PCT % 83*   LYMPHS PCT % 8*   MONOS PCT % 8   EOS PCT % 0     Results from last 7 days   Lab Units 22  0745   SODIUM mmol/L 135*   POTASSIUM mmol/L 4 8   CHLORIDE mmol/L 98*   CO2 mmol/L 26   BUN mg/dL 36*   CREATININE mg/dL 1 21   ANION GAP mmol/L 11   CALCIUM mg/dL 9 5   ALBUMIN g/dL 3 3*   TOTAL BILIRUBIN mg/dL 1 01*   ALK PHOS U/L 87   ALT U/L 234*   AST U/L 124*   GLUCOSE RANDOM mg/dL 438*         Results from last 7 days   Lab Units 01/20/22  0937 01/20/22  0005 01/19/22  2243 01/19/22  1619 01/19/22  1232 01/19/22  0950   POC GLUCOSE mg/dl 303* 424* 485* 454* 361* >500*       Lines/Drains:  Invasive Devices  Report    Peripheral Intravenous Line            Peripheral IV 01/19/22 Right Antecubital 1 day              Imaging: Reviewed radiology reports from this admission including: chest xray    Recent Cultures (last 7 days):     Last 24 Hours Medication List:   Current Facility-Administered Medications   Medication Dose Route Frequency Provider Last Rate    albuterol  2 puff Inhalation Q4H PRN Forbes Babinski, MD      [START ON 1/21/2022] amLODIPine  5 mg Oral Daily Rachel Rose PA-C      aspirin  81 mg Oral Daily Fredy Caldwell MD      dexamethasone  6 mg Intravenous Q24H Fredy Caldwell MD      enoxaparin  30 mg Subcutaneous Q12H [address] Rachel Rose PA-C      ezetimibe  10 mg Oral HS Rachel Rose PA-C      insulin glargine  22 Units Subcutaneous HS Rachel Rose PA-C      insulin lispro  1-5 Units Subcutaneous TID AC YVETTE Michaud      insulin lispro  1-5 Units Subcutaneous HS YVETTE Ferrer      insulin lispro  8 Units Subcutaneous TID With Meals Rachel Rose PA-C      [START ON 1/21/2022] losartan  100 mg Oral Daily Rachel Rose PA-C      polyethylene glycol  17 g Oral Daily PRN Forbes Babinski, MD      pramipexole  0 25 mg Oral QAM Fredy Caldwell MD      pravastatin  40 mg Oral Daily With CSX Leisa Rose PA-C      remdesivir  200 mg Intravenous Q24H Rachel Rose PA-C      Followed by   Jeison Pearson ON 1/21/2022] remdesivir  100 mg Intravenous Q24H Adiel Hart PA-C          Today, Patient Was Seen By: Adiel Hart PA-C    **Please Note: This note may have been constructed using a voice recognition system  **

## 2022-01-20 NOTE — ASSESSMENT & PLAN NOTE
· Continue amlodipine and Norvasc  · Blood pressure 90s to 100s    Will initiate hold parameters for less than 130

## 2022-01-20 NOTE — PLAN OF CARE
Problem: PAIN - ADULT  Goal: Verbalizes/displays adequate comfort level or baseline comfort level  Description: Interventions:  - Encourage patient to monitor pain and request assistance  - Assess pain using appropriate pain scale  - Administer analgesics based on type and severity of pain and evaluate response  - Implement non-pharmacological measures as appropriate and evaluate response  - Consider cultural and social influences on pain and pain management  - Notify physician/advanced practitioner if interventions unsuccessful or patient reports new pain  Outcome: Progressing     Problem: INFECTION - ADULT  Goal: Absence or prevention of progression during hospitalization  Description: INTERVENTIONS:  - Assess and monitor for signs and symptoms of infection  - Monitor lab/diagnostic results  - Monitor all insertion sites, i e  indwelling lines, tubes, and drains  - Monitor endotracheal if appropriate and nasal secretions for changes in amount and color  - [place] appropriate cooling/warming therapies per order  - Administer medications as ordered  - Instruct and encourage patient and family to use good hand hygiene technique  - Identify and instruct in appropriate isolation precautions for identified infection/condition  Outcome: Progressing  Goal: Absence of fever/infection during neutropenic period  Description: INTERVENTIONS:  - Monitor WBC    Outcome: Progressing     Problem: SAFETY ADULT  Goal: Patient will remain free of falls  Description: INTERVENTIONS:  - Educate patient/family on patient safety including physical limitations  - Instruct patient to call for assistance with activity   - Consult OT/PT to assist with strengthening/mobility   - Keep Call bell within reach  - Keep bed low and locked with side rails adjusted as appropriate  - Keep care items and personal belongings within reach  - Initiate and maintain comfort rounds  - Make Fall Risk Sign visible to staff  - Apply yellow socks and bracelet for high fall risk patients  - Consider moving patient to room near nurses station  Outcome: Progressing  Goal: Maintain or return to baseline ADL function  Description: INTERVENTIONS:  -  Assess patient's ability to carry out ADLs; assess patient's baseline for ADL function and identify physical deficits which impact ability to perform ADLs (bathing, care of mouth/teeth, toileting, grooming, dressing, etc )  - Assess/evaluate cause of self-care deficits   - Assess range of motion  - Assess patient's mobility; develop plan if impaired  - Assess patient's need for assistive devices and provide as appropriate  - Encourage maximum independence but intervene and supervise when necessary  - Involve family in performance of ADLs  - Assess for home care needs following discharge   - Consider OT consult to assist with ADL evaluation and planning for discharge  - Provide patient education as appropriate  Outcome: Progressing  Goal: Maintains/Returns to pre admission functional level  Description: INTERVENTIONS:  - Perform BMAT or MOVE assessment daily    - Set and communicate daily mobility goal to care team and patient/family/caregiver     - Collaborate with rehabilitation services on mobility goals if consulted  - Out of bed for toileting  - Record patient progress and toleration of activity level   Outcome: Progressing     Problem: DISCHARGE PLANNING  Goal: Discharge to home or other facility with appropriate resources  Description: INTERVENTIONS:  - Identify barriers to discharge w/patient and caregiver  - Arrange for needed discharge resources and transportation as appropriate  - Identify discharge learning needs (meds, wound care, etc )  - Arrange for interpretive services to assist at discharge as needed  - Refer to Case Management Department for coordinating discharge planning if the patient needs post-hospital services based on physician/advanced practitioner order or complex needs related to functional status, cognitive ability, or social support system  Outcome: Progressing     Problem: Knowledge Deficit  Goal: Patient/family/caregiver demonstrates understanding of disease process, treatment plan, medications, and discharge instructions  Description: Complete learning assessment and assess knowledge base  Interventions:  - Provide teaching at level of understanding  - Provide teaching via preferred learning methods  Outcome: Progressing     Problem: Nutrition/Hydration-ADULT  Goal: Nutrient/Hydration intake appropriate for improving, restoring or maintaining nutritional needs  Description: Monitor and assess patient's nutrition/hydration status for malnutrition  Collaborate with interdisciplinary team and initiate plan and interventions as ordered  Monitor patient's weight and dietary intake as ordered or per policy  Utilize nutrition screening tool and intervene as necessary  Determine patient's food preferences and provide high-protein, high-caloric foods as appropriate       INTERVENTIONS:  - Monitor oral intake, urinary output, labs, and treatment plans  - Assess nutrition and hydration status and recommend course of action  - Evaluate amount of meals eaten  - Assist patient with eating if necessary   - Allow adequate time for meals  - Recommend/ encourage appropriate diets, oral nutritional supplements, and vitamin/mineral supplements  - Order, calculate, and assess calorie counts as needed  - Recommend, monitor, and adjust tube feedings and TPN/PPN based on assessed needs  - Assess need for intravenous fluids  - Provide specific nutrition/hydration education as appropriate  - Include patient/family/caregiver in decisions related to nutrition  Outcome: Progressing     Problem: RESPIRATORY - ADULT  Goal: Achieves optimal ventilation and oxygenation  Description: INTERVENTIONS:  - Assess for changes in respiratory status  - Assess for changes in mentation and behavior  - Position to facilitate oxygenation and minimize respiratory effort  - Oxygen administered by appropriate delivery if ordered  - Initiate smoking cessation education as indicated  - Encourage broncho-pulmonary hygiene including cough, deep breathe, Incentive Spirometry  - Assess the need for suctioning and aspirate as needed  - Assess and instruct to report SOB or any respiratory difficulty  - Respiratory Therapy support as indicated  Outcome: Progressing

## 2022-01-20 NOTE — ASSESSMENT & PLAN NOTE
· Positive test 1/14/2022  · Admitted 01/19/22  · Vaccinated x2 but no booster  2nd dose of Moderna in April 2021  · CXR: Bilateral groundglass presumed Covid 19 pneumonia infiltrates in this Covid positive patient  · Noted to be on 3-4 L on admission and increased to 13 L overnight  · Currently on 10 L with oxygen saturations 91%  · Wean oxygen as able  · Initially withheld her remdesivir secondary to transaminitis however given not greater than 10 times upper limit of normal will initiate this today  · Continue IV dexamethasone, day #2  · Increase lovenox to 30 mg q12 hrs  · If patient remains on greater than 4 L at 1330 today will add barcitinib  · If no improvement in O2 needs will consult pulm  · No volume overload noted on exam  · Check CRP repeat in AM - on admit high sensitivity was 50 4    Inflammatory Markers (last 3):   Lab Results   Component Value Date    DDIMER 0 88 (H) 01/19/2022       Cardiac Markers:  Lab Results   Component Value Date    NTBNP 1,007 (H) 01/19/2022    CKTOTAL 90 01/19/2022

## 2022-01-21 NOTE — ASSESSMENT & PLAN NOTE
· Appears to be acute on chronic - possible secondary to COVID 19  · Known history per records of Hep C and Hep C but unclear when/if treated  · Ultrasound performed as an outpatient recently with normal appearing liver  · Normal liver synthetic function    · Chronic hepatitis panel pending  · Given LFTs not greater than 10 times the upper limit of normal will continue remdesivir  · Can continue statin    Lab Results   Component Value Date     (H) 01/21/2022     (H) 01/20/2022     (H) 01/19/2022     (H) 01/21/2022     (H) 01/20/2022     (H) 01/19/2022

## 2022-01-21 NOTE — PLAN OF CARE
Problem: PHYSICAL THERAPY ADULT  Goal: Performs mobility at highest level of function for planned discharge setting  See evaluation for individualized goals  Description: Treatment/Interventions: Functional transfer training,LE strengthening/ROM,Therapeutic exercise,Endurance training,Patient/family training,Equipment eval/education,Bed mobility,Gait training          See flowsheet documentation for full assessment, interventions and recommendations  1/21/2022 1639 by Parag Ureña PT  Note: Prognosis: Good  Problem List: Decreased endurance,Impaired balance,Decreased mobility,Decreased safety awareness (Desaturation in O2 w/ limited mobility)  Assessment: Esau Stone is a 80 y o  Male who [facility] on 1/19/2022 from home w/ c/o SOB and diagnosis of acute hypoxemic respiratory failure due to COVID-19  Orders for PT eval and treat received, w/ activity orders of ambulate patient w/ contact and airborne isolation precautions  Pt presents w/ comorbidities of arthritis, CKD, CAD, DM, HTN, liver disease and personal factors including: advanced age and inability to perform IADLs  At baseline, pt mobilizes independently w/ no AD, and reports 0 falls in the last 6 months  Upon evaluation, pt presents w/ the following deficits: weakness, impaired balance, decreased endurance and gait deviations  Pt requires Mod I for bed mobility, supervision for transfers, and supervision for gait  Pt's clinical presentation is unstable/unpredictable due to limited tolerance to ambulation, declining oxygen saturation w/ low level of activity, need for supplemental oxygen in order to maintain oxygen saturation, need for input for mobility technique/safety, ongoing medical monitoring/management, and recent drastic decline in mobility compared to baseline  Given the above findings, discharge recommendation is for Home w/ HHPT w/ family support/supervision pending medical optimization   During this admission, pt would benefit from continued skilled inpatient PT in the acute care setting in order to address the abovementioned deficits to maximize function and mobility before DC from acute care  PT Discharge Recommendation: Home with home health rehabilitation (w/ family support/supervision pending medical optimization)          See flowsheet documentation for full assessment

## 2022-01-21 NOTE — ASSESSMENT & PLAN NOTE
Lab Results   Component Value Date    HGBA1C 7 5 (H) 10/14/2021       Recent Labs     01/20/22  1650 01/20/22  2155 01/21/22  0837 01/21/22  1129   POCGLU 275* 335* 106 168*       Blood Sugar Average: Last 72 hrs:  (P) 328 3   · No evidence of DKA or HHS  · On home insulin-Lantus 14 units q h s , metformin  · Hold metformin  · Increase lantus QHS given steroid induced hyperglycemia and increase mealtime coverage  · Continue Lantus at 22 units q h s   Given morning glucose 106  · Continue Humalog 8 units TID with meals  · Continue SSI ACHS

## 2022-01-21 NOTE — ASSESSMENT & PLAN NOTE
· Positive test 1/14/2022  · Admitted 01/19/22  · Vaccinated x2 but no booster  2nd dose of Moderna in April 2021  · CXR: Bilateral groundglass presumed Covid 19 pneumonia infiltrates in this Covid positive patient  · Noted to be on 3-4 L on admission and increased to 13 L overnight  · Currently on 3L with oxygen saturations 91%  · Wean oxygen as able (13 L 1/20/22)  · Initially withheld her remdesivir secondary to transaminitis however given not greater than 10 times upper limit of normal was initiated 1/20/22 (day #2)  · Continue IV dexamethasone, day #3  · Continue lovenox to 30 mg q12 hrs  · Continue baricitinib day #2  · CRP slightly elevated from yesterday    Inflammatory Markers (last 3):   Lab Results   Component Value Date    DDIMER 0 88 (H) 01/19/2022    CRP 46 4 (H) 01/21/2022    CRP 36 9 (H) 01/20/2022       Cardiac Markers:  Lab Results   Component Value Date    NTBNP 1,007 (H) 01/19/2022    CKTOTAL 90 01/19/2022

## 2022-01-21 NOTE — PHYSICAL THERAPY NOTE
PHYSICAL THERAPY EVALUATION NOTE          Patient Name: Sukhdev Tim  [id number] Date: 2022       AGE:   80 y o   Mrn:   [id number]  ADMIT DX:  Dehydration [E86 0]  SOB (shortness of breath) [R06 02]  Hypoxia [R09 02]  Hyperglycemia due to type 2 diabetes mellitus ([facility] ) [E11 65]  Pneumonia due to COVID-19 virus [U07 1, J12 82]    Past Medical History:   Diagnosis Date    Arthritis     Cancer ([place] [address] )     Cardiac disease     abnormal stress test     Chronic kidney disease     protein urine    CLL (chronic lymphocytic leukemia) ([place] [address] ) 2014    6 months chemo    Constipation     Coronary artery disease     Diabetes mellitus ([place] [address] )     type 2    History of chemotherapy     Tx for CLL currently in remission since     Hyperlipidemia     Hypertension     Infectious viral hepatitis     Hep C Hx remission since 2016 post tx    Liver disease     Proteinuria      Length Of Stay: 2  PHYSICAL THERAPY EVALUATION :   Patient's identity confirmed via 2 patient identifiers (full name and ) at start of session       22 1537   PT Last Visit   PT Visit Date 22   Note Type   Note type Evaluation   Pain Assessment   Pain Assessment Tool 0-10   Pain Score No Pain   Restrictions/Precautions   Weight Bearing Precautions Per Order No   Other Precautions O2;Fall Risk;Contact/isolation; Airborne/isolation  (3L O2 via NC)   Home Living   Type of Home Apartment  (3rd floor apt w/ elevator access)   Home Layout One level;Performs ADLs on one level; Able to live on main level with bedroom/bathroom; Elevator   Bathroom Shower/Tub Walk-in shower   Bathroom Toilet Standard   Bathroom Equipment Grab bars in [facility]  (RW available, pt does not use at baseline)   Additional Comments Pt reports moving October of last year to a 1 level, 3rd floor apt w/ elevator access   Pt lives w/ spouse, grandson and great-granddaughter   Prior Function   Level of [place] Independent with ADLs and functional mobility   Lives With Spouse; Family   Receives Help From Family   ADL Assistance Independent   IADLs Needs assistance   Falls in the last 6 months 0   Vocational Retired  ()   Comments Pt reports ambulating independently w/o AD, performing ADLs independently, and requiring assistance w/ IADLs  Pt denies h/o recent falls  (+) driving   General   Additional Pertinent History COVID-19 (+); SpO2 fluctuated between 77-91% at rest and during mobility w/ questionable accuracy of reading chaka Vaz present during mobility and aware  SpO2 reading 91% at end of session w/ pt supine w/ HOB elevated   Family/Caregiver Present No   Cognition   Overall Cognitive Status WFL   Arousal/Participation Cooperative   Orientation Level Oriented X4   Memory Within functional limits   Following Commands Follows one step commands without difficulty   Comments Pt ID via name and ; pt agreeable to PT eval   Strength RLE   RLE Overall Strength 3+/5  (Grossly assessed w/ functional mobility)   Strength LLE   LLE Overall Strength 3+/5  (Grossly assessed w/ functional mobility)   Bed Mobility   Supine to Sit Unable to assess   Additional items   (pt standing in room upon arrival)   Sit to Supine 6  Modified independent   Additional items Assist x 1;Verbal cues   Additional Comments Pt able to maintain sitting balance at EOB for approximately 8-10 min w/o evidence of LOB  Pt able to jan socks at EOB independently   Transfers   Sit to Stand 5  Supervision   Additional items Increased time required   Stand to Sit 5  Supervision   Additional items Increased time required   Additional Comments Pt reports slight dizziness upon initial sit>stand change in position w/ pt stating has been ongoing for recent months   Pt educated on self-pacing w/ mobility/activity w/ pt confirming understanding   Ambulation/Elevation   Gait pattern Wide EFREM; Decreased foot clearance; Improper Weight shift   Gait Assistance 5  Supervision   Additional items Assist x 1  (O2 line management)   Assistive Device None   Distance 12'+40'   Stair Management Assistance Not tested  (pt does not have to perform steps to access apt)   Ambulation/Elevation Additional Comments Pt's SpO2 reading 89% at EOB prior to further mobility, dropped to 84% post 2nd ambulation trial  Pt educated on pursed-lip breathing technique throughout   Balance   Static Sitting Good   Dynamic Sitting Fair +   Static Standing Fair   Dynamic Standing Fair -   Ambulatory Fair -   Endurance Deficit   Endurance Deficit Yes   Endurance Deficit Description pt w/ limited tolerance to functional mobility   Activity Tolerance   Activity Tolerance Patient limited by fatigue; Other (Comment)  (Desat in O2 w/ limited mobility)   Medical Staff Made Aware BOLA [place]   Nurse Made Aware RN Milind present for mobility and aware of O2 readings   Assessment   Prognosis Good   Problem List Decreased endurance; Impaired balance;Decreased mobility; Decreased safety awareness  (Desaturation in O2 w/ limited mobility)   Assessment Rambo Granado is a 80 y o  Male who presents to [facility] on 1/19/2022 from home w/ c/o SOB and diagnosis of acute hypoxemic respiratory failure due to COVID-19  Orders for PT eval and treat received, w/ activity orders of ambulate patient w/ contact and airborne isolation precautions  Pt presents w/ comorbidities of arthritis, CKD, CAD, DM, HTN, liver disease and personal factors including: advanced age and inability to perform IADLs  At baseline, pt mobilizes independently w/ no AD, and reports 0 falls in the last 6 months  Upon evaluation, pt presents w/ the following deficits: weakness, impaired balance, decreased endurance and gait deviations  Pt requires Mod I for bed mobility, supervision for transfers, and supervision for gait   Pt's clinical presentation is unstable/unpredictable due to limited tolerance to ambulation, declining oxygen saturation w/ low level of activity, need for supplemental oxygen in order to maintain oxygen saturation, need for input for mobility technique/safety, ongoing medical monitoring/management, and recent drastic decline in mobility compared to baseline  Given the above findings, discharge recommendation is for Home w/ HHPT w/ family support/supervision pending medical optimization  During this admission, pt would benefit from continued skilled inpatient PT in the acute care setting in order to address the abovementioned deficits to maximize function and mobility before DC from acute care  Goals   Patient Goals to go home   STG Expiration Date 01/31/22   Short Term Goal #1 Pt will: perform bed mobility independently to decrease caregiver burden; perform transfers independently to increase OOB mobility; ambulate at least 250' independently to increase pt's ambulatory endurance; increase LE strength by 1/2 grade to increase pt's tolerance to physical activity; increase all balance ratings by 1 grade to decrease pt's risk of falls   PT Treatment Day 0   Plan   Treatment/Interventions Functional transfer training;LE strengthening/ROM; Therapeutic exercise; Endurance training;Patient/family training;Equipment eval/education; Bed mobility;Gait training   PT Frequency 2-3x/wk   Recommendation   PT Discharge Recommendation Home with home health rehabilitation  (w/ family support/supervision pending medical optimization)   AM-PAC Basic Mobility Inpatient   Turning in Bed Without Bedrails 4   Lying on Back to Sitting on Edge of Flat Bed 4   Moving Bed to Chair 3   Standing Up From Chair 3   Walk in Room 3   Climb 3-5 Stairs 3   Basic Mobility Inpatient Raw Score 20   Basic Mobility Standardized Score 43 99   Highest Level Of Mobility   JH-HLM Goal 6: Walk 10 steps or more   JH-HLM Highest Level of Mobility 7: Walk 25 feet or more   JH-HLM Goal Achieved Yes   End of Consult Patient Position at End of Consult Supine; All needs within reach  (HOB elevated, ok w/o alarm per RN, SpO2: 91%)       The patient's AM-PAC Basic Mobility Inpatient Short Form Raw Score is 20  A Raw score of greater than 16 suggests the patient may benefit from discharge to home  Please also refer to the recommendation of the Physical Therapist for safe discharge planning      Pt would benefit from skilled inpatient PT during this admission in order to facilitate progress towards goals to maximize functional independence    DC rec: home w/ HHPT and family support/supervision      Kandra Dandy, PT, DPT  01/21/22

## 2022-01-21 NOTE — PROGRESS NOTES
[facility]  Progress Note Yakov LOPEZ [place] 1940, 80 y o  male MRN: [id number]  Unit/Bed#: W -01 Encounter: [id number]  Primary Care Provider: Lincoln Orlando MD   Date and time admitted to hospital: 1/19/2022  9:06 AM    * Acute hypoxemic respiratory failure due to COVID-19 [facility])  Assessment & Plan  · Positive test 1/14/2022  · Admitted 01/19/22  · Vaccinated x2 but no booster  2nd dose of Moderna in April 2021  · CXR: Bilateral groundglass presumed Covid 19 pneumonia infiltrates in this Covid positive patient  · Noted to be on 3-4 L on admission and increased to 13 L overnight  · Currently on 3L with oxygen saturations 91%  · Wean oxygen as able (13 L 1/20/22)  · Initially withheld her remdesivir secondary to transaminitis however given not greater than 10 times upper limit of normal was initiated 1/20/22 (day #2)  · Continue IV dexamethasone, day #3  · Continue lovenox to 30 mg q12 hrs  · Continue baricitinib day #2  · CRP slightly elevated from yesterday    Inflammatory Markers (last 3): Lab Results   Component Value Date    DDIMER 0 88 (H) 01/19/2022    CRP 46 4 (H) 01/21/2022    CRP 36 9 (H) 01/20/2022       Cardiac Markers:  Lab Results   Component Value Date    NTBNP 1,007 (H) 01/19/2022    CKTOTAL 90 01/19/2022         Transaminitis  Assessment & Plan  · Appears to be acute on chronic - possible secondary to COVID 19  · Known history per records of Hep C and Hep C but unclear when/if treated  · Ultrasound performed as an outpatient recently with normal appearing liver  · Normal liver synthetic function    · Chronic hepatitis panel pending  · Given LFTs not greater than 10 times the upper limit of normal will continue remdesivir  · Can continue statin    Lab Results   Component Value Date     (H) 01/21/2022     (H) 01/20/2022     (H) 01/19/2022     (H) 01/21/2022     (H) 01/20/2022     (H) 01/19/2022         Type 2 diabetes mellitus with hyperglycemia, with long-term current use of insulin [facility])  Assessment & Plan  Lab Results   Component Value Date    HGBA1C 7 5 (H) 10/14/2021       Recent Labs     01/20/22  1650 01/20/22  2155 01/21/22  0837 01/21/22  1129   POCGLU 275* 335* 106 168*       Blood Sugar Average: Last 72 hrs:  (P) 328 3   · No evidence of DKA or HHS  · On home insulin-Lantus 14 units q h s , metformin  · Hold metformin  · Increase lantus QHS given steroid induced hyperglycemia and increase mealtime coverage  · Continue Lantus at 22 units q h s  Given morning glucose 106  · Continue Humalog 8 units TID with meals  · Continue SSI ACHS    Dyslipidemia  Assessment & Plan  · On statin which was held on admission secondary to transaminitis however transaminitis appears to be acute on chronic and therefore will resume Zetia and pravastatin    Hypertension  Assessment & Plan  · Continue amlodipine and Norvasc  · Blood pressure 110 to 140s  S/P coronary artery stent placement  Assessment & Plan  · Continue aspirin, statin and Zetia  · Continue ARB      VTE Pharmacologic Prophylaxis: VTE Score: 6 High Risk (Score >/= 5) - Pharmacological DVT Prophylaxis Ordered: enoxaparin (Lovenox)  Sequential Compression Devices Ordered  Patient Centered Rounds: I performed bedside rounds with nursing staff today  Discussions with Specialists or Other Care Team Provider: nursing    Education and Discussions with Family / Patient: Updated  (daughter) via phone  Time Spent for Care: 30 minutes  More than 50% of total time spent on counseling and coordination of care as described above  Current Length of Stay: 2 day(s)  Current Patient Status: Inpatient   Certification Statement: The patient will continue to require additional inpatient hospital stay due to resp failure  Discharge Plan: Anticipate discharge in 48 hrs to home      Code Status: Level 1 - Full Code    Subjective:   No nausea or vomiting  No chest pain Less cough  Wants to go home ASAP    Objective:     Vitals:   Temp (24hrs), Av 1 °F (37 3 °C), Min:98 °F (36 7 °C), Max:99 6 °F (37 6 °C)    Temp:  [98 °F (36 7 °C)-99 6 °F (37 6 °C)] 98 °F (36 7 °C)  HR:  [65-88] 65  Resp:  [22] 22  BP: (116-143)/(59-86) 116/73  SpO2:  [82 %-97 %] 97 %  Body mass index is 20 24 kg/m²  Input and Output Summary (last 24 hours): Intake/Output Summary (Last 24 hours) at 2022 1340  Last data filed at 2022 0314  Gross per 24 hour   Intake 480 ml   Output 1650 ml   Net -1170 ml       Physical Exam:   Physical Exam  Vitals and nursing note reviewed  Constitutional:       General: He is not in acute distress  Appearance: Normal appearance  He is ill-appearing  He is not diaphoretic  HENT:      Head: Normocephalic and atraumatic  Mouth/Throat:      Mouth: Mucous membranes are dry  Cardiovascular:      Rate and Rhythm: Normal rate and regular rhythm  Heart sounds: No murmur heard  Pulmonary:      Effort: Pulmonary effort is normal       Breath sounds: Normal breath sounds  No stridor  No wheezing, rhonchi or rales  Comments: 3L NC  Abdominal:      General: Bowel sounds are normal       Palpations: Abdomen is soft  There is no mass  Tenderness: There is no abdominal tenderness  There is no guarding  Musculoskeletal:      Right lower leg: No edema  Left lower leg: No edema  Skin:     General: Skin is warm and dry  Neurological:      Mental Status: He is alert and oriented to person, place, and time     Psychiatric:         Mood and Affect: Mood normal          Behavior: Behavior normal           Additional Data:     Labs:  Results from last 7 days   Lab Units 22  0846   WBC Thousand/uL 21 76*   HEMOGLOBIN g/dL 14 5   HEMATOCRIT % 43 6   PLATELETS Thousands/uL 344   NEUTROS PCT % 81*   LYMPHS PCT % 11*   MONOS PCT % 6   EOS PCT % 0     Results from last 7 days   Lab Units 22  0620   SODIUM mmol/L 140   POTASSIUM mmol/L 4 0   CHLORIDE mmol/L 103   CO2 mmol/L 25   BUN mg/dL 33*   CREATININE mg/dL 0 95   ANION GAP mmol/L 12   CALCIUM mg/dL 9 8   ALBUMIN g/dL 3 5   TOTAL BILIRUBIN mg/dL 0 80   ALK PHOS U/L 100   ALT U/L 359*   AST U/L 199*   GLUCOSE RANDOM mg/dL 110         Results from last 7 days   Lab Units 01/21/22  1129 01/21/22  0837 01/20/22  2155 01/20/22  1650 01/20/22  1154 01/20/22  0937 01/20/22  0005 01/19/22  2243 01/19/22  1619 01/19/22  1232 01/19/22  0950   POC GLUCOSE mg/dl 168* 106 335* 275* 372* 303* 424* 485* 454* 361* >500*         Results from last 7 days   Lab Units 01/21/22  0619   PROCALCITONIN ng/ml 0 11       Lines/Drains:  Invasive Devices  Report    Peripheral Intravenous Line            Peripheral IV 01/19/22 Right Antecubital 2 days                      Imaging: Reviewed radiology reports from this admission including: chest xray    Recent Cultures (last 7 days):         Last 24 Hours Medication List:   Current Facility-Administered Medications   Medication Dose Route Frequency Provider Last Rate    albuterol  2 puff Inhalation Q4H PRCL Yuan MD      amLODIPine  5 mg Oral Daily VIKTOR Brian-MARY CARMEN      aspirin  81 mg Oral Daily Fredy Caldwell MD      Baricitinib  4 mg Oral Q24H Rachel Rose, PA-C      dexamethasone  6 mg Intravenous Q24H Fredy Caldwell MD      enoxaparin  30 mg Subcutaneous Q12H [address] VIKTOR Brian-C      ezetimibe  10 mg Oral HS Rachel Rose PA-C      insulin glargine  22 Units Subcutaneous HS VIKTOR Brian-C      insulin lispro  1-5 Units Subcutaneous TID AC YVETTE Michaud      insulin lispro  1-5 Units Subcutaneous HS YVETTE Bui      insulin lispro  8 Units Subcutaneous TID With Meals Rachel Rose PA-C      losartan  100 mg Oral Daily Rachel Rose, PA-C      polyethylene glycol  17 g Oral Daily PRN Ida Yuan MD      pramipexole  0 25 mg Oral QAM Fredy Caldwell MD      pravastatin  40 mg Oral Daily With Ezra JESSIE      remdesivir  100 mg Intravenous Q24H Yusef Juarez PA-C          Today, Patient Was Seen By: Yusef Juarez PA-C    **Please Note: This note may have been constructed using a voice recognition system  **

## 2022-01-22 NOTE — PLAN OF CARE
Problem: PAIN - ADULT  Goal: Verbalizes/displays adequate comfort level or baseline comfort level  Description: Interventions:  - Encourage patient to monitor pain and request assistance  - Assess pain using appropriate pain scale  - Administer analgesics based on type and severity of pain and evaluate response  - Implement non-pharmacological measures as appropriate and evaluate response  - Consider cultural and social influences on pain and pain management  - Notify physician/advanced practitioner if interventions unsuccessful or patient reports new pain  Outcome: Progressing     Problem: INFECTION - ADULT  Goal: Absence or prevention of progression during hospitalization  Description: INTERVENTIONS:  - Assess and monitor for signs and symptoms of infection  - Monitor lab/diagnostic results  - Monitor all insertion sites, i e  indwelling lines, tubes, and drains  - Monitor endotracheal if appropriate and nasal secretions for changes in amount and color  - [place] appropriate cooling/warming therapies per order  - Administer medications as ordered  - Instruct and encourage patient and family to use good hand hygiene technique  - Identify and instruct in appropriate isolation precautions for identified infection/condition  Outcome: Progressing  Goal: Absence of fever/infection during neutropenic period  Description: INTERVENTIONS:  - Monitor WBC    Outcome: Progressing     Problem: SAFETY ADULT  Goal: Patient will remain free of falls  Description: INTERVENTIONS:  - Educate patient/family on patient safety including physical limitations  - Instruct patient to call for assistance with activity   - Consult OT/PT to assist with strengthening/mobility   - Keep Call bell within reach  - Keep bed low and locked with side rails adjusted as appropriate  - Keep care items and personal belongings within reach  - Initiate and maintain comfort rounds  - Make Fall Risk Sign visible to staff  - Offer Toileting every  Hours, in advance of need  - Initiate/Maintain alarm  - Obtain necessary fall risk management equipment:   - Apply yellow socks and bracelet for high fall risk patients  - Consider moving patient to room near nurses station  Outcome: Progressing  Goal: Maintain or return to baseline ADL function  Description: INTERVENTIONS:  -  Assess patient's ability to carry out ADLs; assess patient's baseline for ADL function and identify physical deficits which impact ability to perform ADLs (bathing, care of mouth/teeth, toileting, grooming, dressing, etc )  - Assess/evaluate cause of self-care deficits   - Assess range of motion  - Assess patient's mobility; develop plan if impaired  - Assess patient's need for assistive devices and provide as appropriate  - Encourage maximum independence but intervene and supervise when necessary  - Involve family in performance of ADLs  - Assess for home care needs following discharge   - Consider OT consult to assist with ADL evaluation and planning for discharge  - Provide patient education as appropriate  Outcome: Progressing  Goal: Maintains/Returns to pre admission functional level  Description: INTERVENTIONS:  - Perform BMAT or MOVE assessment daily    - Set and communicate daily mobility goal to care team and patient/family/caregiver  - Collaborate with rehabilitation services on mobility goals if consulted  - Perform Range of Motion  times a day  - Reposition patient every  hours    - Dangle patient  times a day  - Stand patient  times a day  - Ambulate patient  times a day  - Out of bed to chair  times a day   - Out of bed for me times a day  - Out of bed for toileting  - Record patient progress and toleration of activity level   Outcome: Progressing     Problem: DISCHARGE PLANNING  Goal: Discharge to home or other facility with appropriate resources  Description: INTERVENTIONS:  - Identify barriers to discharge w/patient and caregiver  - Arrange for needed discharge resources and transportation as appropriate  - Identify discharge learning needs (meds, wound care, etc )  - Arrange for interpretive services to assist at discharge as needed  - Refer to Case Management Department for coordinating discharge planning if the patient needs post-hospital services based on physician/advanced practitioner order or complex needs related to functional status, cognitive ability, or social support system  Outcome: Progressing     Problem: Knowledge Deficit  Goal: Patient/family/caregiver demonstrates understanding of disease process, treatment plan, medications, and discharge instructions  Description: Complete learning assessment and assess knowledge base  Interventions:  - Provide teaching at level of understanding  - Provide teaching via preferred learning methods  Outcome: Progressing     Problem: Nutrition/Hydration-ADULT  Goal: Nutrient/Hydration intake appropriate for improving, restoring or maintaining nutritional needs  Description: Monitor and assess patient's nutrition/hydration status for malnutrition  Collaborate with interdisciplinary team and initiate plan and interventions as ordered  Monitor patient's weight and dietary intake as ordered or per policy  Utilize nutrition screening tool and intervene as necessary  Determine patient's food preferences and provide high-protein, high-caloric foods as appropriate       INTERVENTIONS:  - Monitor oral intake, urinary output, labs, and treatment plans  - Assess nutrition and hydration status and recommend course of action  - Evaluate amount of meals eaten  - Assist patient with eating if necessary   - Allow adequate time for meals  - Recommend/ encourage appropriate diets, oral nutritional supplements, and vitamin/mineral supplements  - Order, calculate, and assess calorie counts as needed  - Recommend, monitor, and adjust tube feedings and TPN/PPN based on assessed needs  - Assess need for intravenous fluids  - Provide specific nutrition/hydration education as appropriate  - Include patient/family/caregiver in decisions related to nutrition  Outcome: Progressing     Problem: RESPIRATORY - ADULT  Goal: Achieves optimal ventilation and oxygenation  Description: INTERVENTIONS:  - Assess for changes in respiratory status  - Assess for changes in mentation and behavior  - Position to facilitate oxygenation and minimize respiratory effort  - Oxygen administered by appropriate delivery if ordered  - Initiate smoking cessation education as indicated  - Encourage broncho-pulmonary hygiene including cough, deep breathe, Incentive Spirometry  - Assess the need for suctioning and aspirate as needed  - Assess and instruct to report SOB or any respiratory difficulty  - Respiratory Therapy support as indicated  Outcome: Progressing

## 2022-01-22 NOTE — ASSESSMENT & PLAN NOTE
Lab Results   Component Value Date    HGBA1C 7 5 (H) 10/14/2021       Recent Labs     01/21/22  1656 01/21/22  2044 01/22/22  0205 01/22/22  0744   POCGLU 358* >500* 298* 159*       Blood Sugar Average: Last 72 hrs:  (P) 315 [id number]   · On home insulin-Lantus 14 units q h s , metformin  · Continue to Hold metformin  · Last evening patient was noted to have blood glucose of greater 500  However this morning glucose 183    · Continue Lantus - change dose to 25 units QHS (mild increase)  · Continue Humalog but increase to 10 units TID with meals  · Continue SSI ACHS

## 2022-01-22 NOTE — PROGRESS NOTES
[facility]  Progress Note Morelia LOPEZ [place] 1940, 80 y o  male MRN: [id number]  Unit/Bed#: W -01 Encounter: [id number]  Primary Care Provider: Brooke Phelps MD   Date and time admitted to hospital: 1/19/2022  9:06 AM    * Acute hypoxemic respiratory failure due to COVID-19 [facility])  Assessment & Plan  · Positive test 1/14/2022  · Admitted 01/19/22  · Vaccinated x2 but no booster  2nd dose of Moderna in April 2021  · CXR: Bilateral groundglass presumed Covid 19 pneumonia infiltrates in this Covid positive patient  · Noted to be on 3-4 L on admission and increased to 13 L overnight  · Currently on 5L with oxygen saturations 91%  Was on 3L on 1/21/22 but increased to 4-5 overnight  · Wean oxygen as able (13 L 1/20/22)  · Initially withheld her remdesivir secondary to transaminitis however given not greater than 10 times upper limit of normal was initiated 1/20/22 (day #3)  · Continue IV dexamethasone, day #4  · Continue lovenox to 30 mg q12 hrs  · Continue baricitinib day #3  · CRP slightly elevated from yesterday  · Repeat d-dimer and CRP  · procalcitonin 0 12    Inflammatory Markers (last 3): Lab Results   Component Value Date    DDIMER 0 88 (H) 01/19/2022    CRP 46 4 (H) 01/21/2022    CRP 36 9 (H) 01/20/2022       Cardiac Markers:  Lab Results   Component Value Date    NTBNP 1,007 (H) 01/19/2022    CKTOTAL 90 01/19/2022         Transaminitis  Assessment & Plan  · Appears to be acute on chronic - possible secondary to COVID 19  · History of hepatitis-C status post treatment with Harvoni several years ago  · Reports that at baseline he has mild transaminitis  · Ultrasound performed as an outpatient recently with normal appearing liver    · Given LFTs not greater than 10 times the upper limit of normal will continue remdesivir  · Can continue statin    Lab Results   Component Value Date     (H) 01/22/2022     (H) 01/21/2022     (H) 01/20/2022     (H) 01/22/2022     (H) 01/21/2022     (H) 01/20/2022         Type 2 diabetes mellitus with hyperglycemia, with long-term current use of insulin [facility])  Assessment & Plan  Lab Results   Component Value Date    HGBA1C 7 5 (H) 10/14/2021       Recent Labs     01/21/22  1656 01/21/22  2044 01/22/22  0205 01/22/22  0744   POCGLU 358* >500* 298* 159*       Blood Sugar Average: Last 72 hrs:  (P) 315 [id number]   · On home insulin-Lantus 14 units q h s , metformin  · Continue to Hold metformin  · Last evening patient was noted to have blood glucose of greater 500  However this morning glucose 183  · Continue Lantus - change dose to 25 units QHS (mild increase)  · Continue Humalog but increase to 10 units TID with meals  · Continue SSI ACHS    Dyslipidemia  Assessment & Plan  · On statin which was held on admission secondary to transaminitis however transaminitis appears to be acute on chronic and therefore will resume Zetia and pravastatin    Hypertension  Assessment & Plan  · Continue amlodipine  · Losartan stopped last night given rise in K and creatinine  · Resume losartan tomorrow given K/Creatinine stable  · Question if on losartan or ramipril as outpt - will need to confirm  · Blood pressure 130 to 140s  S/P coronary artery stent placement  Assessment & Plan  · Continue aspirin, statin and Zetia  · Continue ARB (held today given K and Cr increase overnight)      VTE Pharmacologic Prophylaxis: VTE Score: 6 High Risk (Score >/= 5) - Pharmacological DVT Prophylaxis Ordered: enoxaparin (Lovenox)  Sequential Compression Devices Ordered  Patient Centered Rounds: I performed bedside rounds with nursing staff today  Discussions with Specialists or Other Care Team Provider: nursing    Education and Discussions with Family / Patient: Updated  (daughter) via phone  1119    Time Spent for Care: 30 minutes   More than 50% of total time spent on counseling and coordination of care as described above     Current Length of Stay: 3 day(s)  Current Patient Status: Inpatient   Certification Statement: The patient will continue to require additional inpatient hospital stay due to COVID 19 and hypoxia  Discharge Plan: Anticipate discharge in 24-48 hrs to home with home services  per PT recs    Code Status: Level 1 - Full Code    Subjective:   Patient reports that he wants to go home as soon as possible however he understands the need to remain in the hospital at this point  Last evening the patient was noted to have a glucose of greater than 500  Laboratory studies were performed  Patient had no evidence of anion gap  Beta hydroxybutyrate was within normal limits however did have right potassium as well as creatinine  This has since improved  Objective:     Vitals:   Temp (24hrs), Av 1 °F (36 7 °C), Min:98 1 °F (36 7 °C), Max:98 1 °F (36 7 °C)    Temp:  [98 1 °F (36 7 °C)] 98 1 °F (36 7 °C)  HR:  [67-82] 72  Resp:  [16-20] 16  BP: (109-149)/(64-84) 133/76  SpO2:  [86 %-95 %] 86 %  Body mass index is 20 24 kg/m²  Input and Output Summary (last 24 hours): Intake/Output Summary (Last 24 hours) at 2022 1119  Last data filed at 2022 0748  Gross per 24 hour   Intake 680 ml   Output 700 ml   Net -20 ml       Physical Exam:   Physical Exam  Vitals and nursing note reviewed  Constitutional:       General: He is not in acute distress  Appearance: Normal appearance  He is not diaphoretic  HENT:      Head: Normocephalic and atraumatic  Mouth/Throat:      Mouth: Mucous membranes are dry  Cardiovascular:      Rate and Rhythm: Normal rate and regular rhythm  Pulmonary:      Effort: Pulmonary effort is normal       Breath sounds: Normal breath sounds  No stridor  No wheezing, rhonchi or rales  Comments: 4L NC 87% and thus increased to 5L NC  Abdominal:      General: Bowel sounds are normal       Palpations: Abdomen is soft  There is no mass  Tenderness:  There is no abdominal tenderness  There is no guarding  Musculoskeletal:      Right lower leg: No edema  Left lower leg: No edema  Skin:     General: Skin is warm and dry  Neurological:      Mental Status: He is alert  Psychiatric:         Mood and Affect: Mood normal          Behavior: Behavior normal           Additional Data:     Labs:  Results from last 7 days   Lab Units 01/22/22  0608 01/21/22  0846 01/21/22  0846   WBC Thousand/uL 15 00*   < > 21 76*   HEMOGLOBIN g/dL 13 9   < > 14 5   HEMATOCRIT % 41 3   < > 43 6   PLATELETS Thousands/uL 281   < > 344   NEUTROS PCT %  --   --  81*   LYMPHS PCT %  --   --  11*   MONOS PCT %  --   --  6   EOS PCT %  --   --  0    < > = values in this interval not displayed  Results from last 7 days   Lab Units 01/22/22  0608   SODIUM mmol/L 144   POTASSIUM mmol/L 4 3   CHLORIDE mmol/L 107   CO2 mmol/L 30   BUN mg/dL 41*   CREATININE mg/dL 1 20   ANION GAP mmol/L 7   CALCIUM mg/dL 9 2   ALBUMIN g/dL 2 6*   TOTAL BILIRUBIN mg/dL 0 55   ALK PHOS U/L 82   ALT U/L 299*   AST U/L 154*   GLUCOSE RANDOM mg/dL 183*         Results from last 7 days   Lab Units 01/22/22  0744 01/22/22  0205 01/21/22  2044 01/21/22  1656 01/21/22  1129 01/21/22  0837 01/20/22  2155 01/20/22  1650 01/20/22  1154 01/20/22  0937 01/20/22  0005 01/19/22  2243   POC GLUCOSE mg/dl 159* 298* >500* 358* 168* 106 335* 275* 372* 303* 424* 485*         Results from last 7 days   Lab Units 01/22/22  0608 01/21/22  0619   PROCALCITONIN ng/ml 0 12 0 11       Lines/Drains:  Invasive Devices  Report    Peripheral Intravenous Line            Peripheral IV 01/19/22 Right Antecubital 3 days                      Imaging: No pertinent imaging reviewed      Recent Cultures (last 7 days):         Last 24 Hours Medication List:   Current Facility-Administered Medications   Medication Dose Route Frequency Provider Last Rate    albuterol  2 puff Inhalation Q4H PRN Duane Garcia MD      amLODIPine  5 mg Oral Daily Maurice Read JESSIE Rose      aspirin  81 mg Oral Daily Ashely Cash MD      Baricitinib  4 mg Oral Q24H Rachel Rose PA-C      benzonatate  100 mg Oral TID PRN Pb Rose PA-C      dexamethasone  6 mg Intravenous Q24H Fredy Caldwell MD      enoxaparin  30 mg Subcutaneous Q12H [facility] Rachel Rose PA-C      ezetimibe  10 mg Oral HS Rachel Rose PA-C      famotidine  20 mg Oral Daily Rachel Rose PA-C      guaiFENesin  600 mg Oral Q12H [facility] Rachel Rose PA-C      insulin glargine  25 Units Subcutaneous HS Rachel Rose PA-C      insulin lispro  1-5 Units Subcutaneous TID AC YVETTE Michaud      insulin lispro  1-5 Units Subcutaneous HS Ana VillatoroYVETTE      insulin lispro  10 Units Subcutaneous TID With Meals Rachel Rose PA-C      loratadine  10 mg Oral Daily Rachel Rose PA-C      losartan  100 mg Oral Daily Rachel Rose PA-C      melatonin  6 mg Oral HS Rachel Rose PA-C      polyethylene glycol  17 g Oral Daily PRN Ashely Cash MD      pramipexole  0 25 mg Oral HS Oletha RiserYVETTE      pravastatin  40 mg Oral Daily With [facility] JESSIE Rose      remdesivir  100 mg Intravenous Q24H Rachel Rose PA-C      sodium chloride  1 spray Each Nare Q1H PRN Azra Bryson PA-C          Today, Patient Was Seen By: Azra Bryson PA-C    **Please Note: This note may have been constructed using a voice recognition system  **

## 2022-01-22 NOTE — ASSESSMENT & PLAN NOTE
· Positive test 1/14/2022  · Admitted 01/19/22  · Vaccinated x2 but no booster  2nd dose of Moderna in April 2021  · CXR: Bilateral groundglass presumed Covid 19 pneumonia infiltrates in this Covid positive patient  · Noted to be on 3-4 L on admission and increased to 13 L overnight  · Currently on 5L with oxygen saturations 91%  Was on 3L on 1/21/22 but increased to 4-5 overnight  · Wean oxygen as able (13 L 1/20/22)  · Initially withheld her remdesivir secondary to transaminitis however given not greater than 10 times upper limit of normal was initiated 1/20/22 (day #3)  · Continue IV dexamethasone, day #4  · Continue lovenox to 30 mg q12 hrs  · Continue baricitinib day #3  · CRP slightly elevated from yesterday  · Repeat d-dimer and CRP  · procalcitonin 0 12    Inflammatory Markers (last 3):   Lab Results   Component Value Date    DDIMER 0 88 (H) 01/19/2022    CRP 46 4 (H) 01/21/2022    CRP 36 9 (H) 01/20/2022       Cardiac Markers:  Lab Results   Component Value Date    NTBNP 1,007 (H) 01/19/2022    CKTOTAL 90 01/19/2022

## 2022-01-22 NOTE — ASSESSMENT & PLAN NOTE
· Continue amlodipine  · Losartan stopped last night given rise in K and creatinine  · Resume losartan tomorrow given K/Creatinine stable  · Question if on losartan or ramipril as outpt - will need to confirm  · Blood pressure 130 to 140s

## 2022-01-22 NOTE — ASSESSMENT & PLAN NOTE
· Appears to be acute on chronic - possible secondary to COVID 19  · History of hepatitis-C status post treatment with Harvoni several years ago  · Reports that at baseline he has mild transaminitis  · Ultrasound performed as an outpatient recently with normal appearing liver    · Given LFTs not greater than 10 times the upper limit of normal will continue remdesivir  · Can continue statin    Lab Results   Component Value Date     (H) 01/22/2022     (H) 01/21/2022     (H) 01/20/2022     (H) 01/22/2022     (H) 01/21/2022     (H) 01/20/2022

## 2022-01-22 NOTE — QUICK NOTE
SLIM on call -     Contacted by nursing about patient with blood pressure of 89/57 with a recheck of 82/45  The patient was asymptomatic at rest but earlier when going to the bathroom he was apparently lightheaded  Reviewed chart  Recent elevation of creatinine would imply that perhaps the patient might have some mild component of dehydration  Will hold antihypertensives, particularly those losartan but also, for now, amlodipine as well  Will give bolus 500 mL normal saline followed by 75 mL/hr for 8 hours  Continue to monitor blood pressures  Recheck blood pressure in a m  And determine which way to go in terms of management plan with antihypertensive regimen      Yosvany Wick,

## 2022-01-23 NOTE — PROGRESS NOTES
[facility]  Progress Note Estefany LOPEZ [place] 1940, 80 y o  male MRN: [id number]  Unit/Bed#: W -01 Encounter: [id number]  Primary Care Provider: Idalmis Bryson MD   Date and time admitted to hospital: 1/19/2022  9:06 AM    * Acute hypoxemic respiratory failure due to COVID-19 [facility])  Assessment & Plan  · Positive test 1/14/2022  · Admitted 01/19/22  · Vaccinated x2 but no booster  2nd dose of Moderna in April 2021  · CXR: Bilateral groundglass presumed Covid 19 pneumonia infiltrates in this Covid positive patient  · Noted to be on 3-4 L on admission and increased to 13 L overnight  · Currently on 3L with oxygen saturations 91%  · Wean oxygen as able (13 L 1/20/22)  · Initially withheld her remdesivir secondary to transaminitis however given not greater than 10 times upper limit of normal was initiated 1/20/22 (day #4)  · Continue IV dexamethasone, day #5  · Continue lovenox to 30 mg q12 hrs  · Continue baricitinib day #4  · procalcitonin 0 12    Inflammatory Markers (last 3): Lab Results   Component Value Date    DDIMER 0 88 (H) 01/19/2022    CRP 46 4 (H) 01/21/2022    CRP 36 9 (H) 01/20/2022       Cardiac Markers:  Lab Results   Component Value Date    NTBNP 1,007 (H) 01/19/2022    CKTOTAL 90 01/19/2022         Transaminitis  Assessment & Plan  · Appears to be acute on chronic - possible secondary to COVID 19  · History of hepatitis-C status post treatment with Harvoni several years ago  · Reports that at baseline he has mild transaminitis  · Chronic hepatitis panel shows history of hepatitis-B as well  · Ultrasound performed as an outpatient recently with normal appearing liver    · Given LFTs not greater than 10 times the upper limit of normal will continue remdesivir  · Not on statin given mylagias    Lab Results   Component Value Date     (H) 01/22/2022     (H) 01/21/2022     (H) 01/20/2022     (H) 01/22/2022     (H) 01/21/2022    ALT 253 (H) 01/20/2022         Type 2 diabetes mellitus with hyperglycemia, with long-term current use of insulin [facility])  Assessment & Plan  Lab Results   Component Value Date    HGBA1C 7 5 (H) 10/14/2021       Recent Labs     01/22/22  1304 01/22/22  1623 01/22/22  2135 01/23/22  1101   POCGLU 288* 260* 255* 205*       Blood Sugar Average: Last 72 hrs:  (P) 271 [id number]   · On home insulin-Lantus 14 units q h s , metformin  · Continue to Hold metformin  · Evening 1/21 patient was noted to have blood glucose of greater 500  · No AM fasting glucose this AM  · Continue Lantus 25 units QHS (mild increase)  · Continue Humalog 10 units TID with meals  · Continue SSI ACHS    Dyslipidemia  Assessment & Plan  · On statin which was held on admission secondary to transaminitis however transaminitis appears to be acute on chronic and thus resumed on Zetia  · Is not on statin given myalgias     Hypertension  Assessment & Plan  · With hypotension overnight which resolved with IV fluids  · Reviewed patient's medication list with him  He is not on amlodipine he reports this may have been previously prescribed to him however he did not take it     · At baseline is on ramipril    He reports that he is on this for diabetes and renal protection however not for hypertension  · Continue to hold ramipril in the setting of hypotension overnight  · He wants to potentially change this to an ARB as outpatient given chronic dry cough    S/P coronary artery stent placement  Assessment & Plan  · Continue aspirin and Zetia  · Continue to hold ramipril  · Not on statin given myalgias     CLL (chronic lymphocytic leukemia) ([facility] [address] )  Assessment & Plan  · Received treatment at [facility] for this had been on surveillance monitoring since 2016 however was noted to have progression and therefore underwent treatment  · FLUDARABINE/CYCLOPHOSPHAMIDE/RITUXIMAB for 6 cycles which ended in June 2021    VTE Pharmacologic Prophylaxis: VTE Score: 6 High Risk (Score >/= 5) - Pharmacological DVT Prophylaxis Ordered: enoxaparin (Lovenox)  Sequential Compression Devices Ordered  Patient Centered Rounds: I performed bedside rounds with nursing staff today  Discussions with Specialists or Other Care Team Provider: nursing,     Education and Discussions with Family / Patient: Updated  (daughter) via phone  Time Spent for Care: 30 minutes  More than 50% of total time spent on counseling and coordination of care as described above  Current Length of Stay: 4 day(s)  Current Patient Status: Inpatient   Certification Statement: The patient will continue to require additional inpatient hospital stay due to COVID-19 hypoxic respiratory failure  Discharge Plan: Anticipate discharge in 24-48 hrs to home  Code Status: Level 1 - Full Code    Subjective:   Per nursing patient desats when he is ambulating  Patient reported that he had not seen a provider since his admission  We discussed that I have been seeing him daily  He is grateful for this information  Denies any nausea or vomiting  It was dizzy last evening and became hypotensive  This has since resolved with the use of IV fluid resuscitation  Patient has no current shortness of breath or chest pain  Reports that when he had CLL he was prescribed an antihypertensive however was no longer taking this  Objective:     Vitals:   No data recorded  HR:  [56-78] 67  Resp:  [17-18] 18  BP: ()/(45-63) 109/62  SpO2:  [89 %-99 %] 99 %  Body mass index is 20 24 kg/m²  Input and Output Summary (last 24 hours): Intake/Output Summary (Last 24 hours) at 1/23/2022 1600  Last data filed at 1/23/2022 1401  Gross per 24 hour   Intake 2280 5 ml   Output 1575 ml   Net 705 5 ml       Physical Exam:   Physical Exam  Vitals and nursing note reviewed  Constitutional:       General: He is not in acute distress  Appearance: Normal appearance   He is not ill-appearing or diaphoretic  HENT:      Head: Normocephalic and atraumatic  Mouth/Throat:      Mouth: Mucous membranes are moist    Cardiovascular:      Rate and Rhythm: Normal rate and regular rhythm  Pulmonary:      Effort: Pulmonary effort is normal       Breath sounds: Normal breath sounds  No stridor  No wheezing, rhonchi or rales  Comments: 5L NC and weaned to 3 L nasal cannula oxygen saturations approximately 92%  Abdominal:      General: Bowel sounds are normal       Palpations: Abdomen is soft  There is no mass  Tenderness: There is no abdominal tenderness  There is no guarding  Musculoskeletal:      Right lower leg: No edema  Left lower leg: No edema  Skin:     General: Skin is warm and dry  Neurological:      Mental Status: He is alert and oriented to person, place, and time  Psychiatric:         Mood and Affect: Mood normal          Behavior: Behavior normal           Additional Data:     Labs:  Results from last 7 days   Lab Units 01/22/22  0608 01/21/22  0846 01/21/22  0846   WBC Thousand/uL 15 00*   < > 21 76*   HEMOGLOBIN g/dL 13 9   < > 14 5   HEMATOCRIT % 41 3   < > 43 6   PLATELETS Thousands/uL 281   < > 344   NEUTROS PCT %  --   --  81*   LYMPHS PCT %  --   --  11*   MONOS PCT %  --   --  6   EOS PCT %  --   --  0    < > = values in this interval not displayed       Results from last 7 days   Lab Units 01/22/22  0608   SODIUM mmol/L 144   POTASSIUM mmol/L 4 3   CHLORIDE mmol/L 107   CO2 mmol/L 30   BUN mg/dL 41*   CREATININE mg/dL 1 20   ANION GAP mmol/L 7   CALCIUM mg/dL 9 2   ALBUMIN g/dL 2 6*   TOTAL BILIRUBIN mg/dL 0 55   ALK PHOS U/L 82   ALT U/L 299*   AST U/L 154*   GLUCOSE RANDOM mg/dL 183*         Results from last 7 days   Lab Units 01/23/22  1101 01/22/22  2135 01/22/22  1623 01/22/22  1304 01/22/22  0744 01/22/22  0205 01/21/22  2044 01/21/22  1656 01/21/22  1129 01/21/22  0837 01/20/22  2155 01/20/22  1650   POC GLUCOSE mg/dl 205* 255* 260* 288* 159* 298* >500* 358* 168* 106 335* 275*         Results from last 7 days   Lab Units 01/22/22  0608 01/21/22  0619   PROCALCITONIN ng/ml 0 12 0 11       Lines/Drains:  Invasive Devices  Report    Peripheral Intravenous Line            Peripheral IV Right;Ventral (anterior) Forearm -- days                      Imaging: Reviewed radiology reports from this admission including: chest xray    Recent Cultures (last 7 days):         Last 24 Hours Medication List:   Current Facility-Administered Medications   Medication Dose Route Frequency Provider Last Rate    albuterol  2 puff Inhalation Q4H PRN Joe Marie MD      aspirin  81 mg Oral Daily Fredy Caldwell MD      Baricitinib  4 mg Oral Q24H Rachel Rose PA-C      benzonatate  100 mg Oral TID PRN Rachel Rose PA-C      dexamethasone  6 mg Intravenous Q24H Fredy Caldwell MD      enoxaparin  30 mg Subcutaneous Q12H [address] Rachel Rose PA-C      ezetimibe  10 mg Oral HS Rachel Rose PA-C      famotidine  20 mg Oral Daily Rachel Rose PA-C      guaiFENesin  600 mg Oral Q12H [address] Rachel Rose PA-C      insulin glargine  25 Units Subcutaneous HS Rachel Rose PA-C      insulin lispro  1-5 Units Subcutaneous TID YVETTE Clark      insulin lispro  1-5 Units Subcutaneous HS YVETTE Merino      insulin lispro  10 Units Subcutaneous TID With Meals Rachel Rose PA-C      loratadine  10 mg Oral Daily Rachel Rose PA-C      melatonin  6 mg Oral HS Rachel Rose PA-C      polyethylene glycol  17 g Oral Daily PRN Joe Marie MD      pramipexole  1 5 mg Oral HS Rachel oRse PA-C      remdesivir  100 mg Intravenous Q24H Rachel Rose PA-C Stopped (01/23/22 1222)    sodium chloride  1 spray Each Nare Q1H PRN Mandy Epps PA-C          Today, Patient Was Seen By: Mandy Epps PA-C    **Please Note: This note may have been constructed using a voice recognition system  **

## 2022-01-23 NOTE — ASSESSMENT & PLAN NOTE
· Appears to be acute on chronic - possible secondary to COVID 19  · History of hepatitis-C status post treatment with Harvoni several years ago  · Reports that at baseline he has mild transaminitis  · Chronic hepatitis panel shows history of hepatitis-B as well  · Ultrasound performed as an outpatient recently with normal appearing liver    · Given LFTs not greater than 10 times the upper limit of normal will continue remdesivir  · Not on statin given mylagias    Lab Results   Component Value Date     (H) 01/22/2022     (H) 01/21/2022     (H) 01/20/2022     (H) 01/22/2022     (H) 01/21/2022     (H) 01/20/2022

## 2022-01-23 NOTE — ASSESSMENT & PLAN NOTE
· Received treatment at [facility] for this had been on surveillance monitoring since 2016 however was noted to have progression and therefore underwent treatment  · FLUDARABINE/CYCLOPHOSPHAMIDE/RITUXIMAB for 6 cycles which ended in June 2021

## 2022-01-23 NOTE — PLAN OF CARE
Problem: PAIN - ADULT  Goal: Verbalizes/displays adequate comfort level or baseline comfort level  Description: Interventions:  - Encourage patient to monitor pain and request assistance  - Assess pain using appropriate pain scale  - Administer analgesics based on type and severity of pain and evaluate response  - Implement non-pharmacological measures as appropriate and evaluate response  - Consider cultural and social influences on pain and pain management  - Notify physician/advanced practitioner if interventions unsuccessful or patient reports new pain  Outcome: Progressing     Problem: INFECTION - ADULT  Goal: Absence or prevention of progression during hospitalization  Description: INTERVENTIONS:  - Assess and monitor for signs and symptoms of infection  - Monitor lab/diagnostic results  - Monitor all insertion sites, i e  indwelling lines, tubes, and drains  - Monitor endotracheal if appropriate and nasal secretions for changes in amount and color  - [place] appropriate cooling/warming therapies per order  - Administer medications as ordered  - Instruct and encourage patient and family to use good hand hygiene technique  - Identify and instruct in appropriate isolation precautions for identified infection/condition  Outcome: Progressing  Goal: Absence of fever/infection during neutropenic period  Description: INTERVENTIONS:  - Monitor WBC    Outcome: Progressing     Problem: SAFETY ADULT  Goal: Patient will remain free of falls  Description: INTERVENTIONS:  - Educate patient/family on patient safety including physical limitations  - Instruct patient to call for assistance with activity   - Consult OT/PT to assist with strengthening/mobility   - Keep Call bell within reach  - Keep bed low and locked with side rails adjusted as appropriate  - Keep care items and personal belongings within reach  - Initiate and maintain comfort rounds  - Make Fall Risk Sign visible to staff  - Offer Toileting every  Hours, in advance of need  - Initiate/Maintain alarm  - Obtain necessary fall risk management equipment:   - Apply yellow socks and bracelet for high fall risk patients  - Consider moving patient to room near nurses station  Outcome: Progressing  Goal: Maintain or return to baseline ADL function  Description: INTERVENTIONS:  -  Assess patient's ability to carry out ADLs; assess patient's baseline for ADL function and identify physical deficits which impact ability to perform ADLs (bathing, care of mouth/teeth, toileting, grooming, dressing, etc )  - Assess/evaluate cause of self-care deficits   - Assess range of motion  - Assess patient's mobility; develop plan if impaired  - Assess patient's need for assistive devices and provide as appropriate  - Encourage maximum independence but intervene and supervise when necessary  - Involve family in performance of ADLs  - Assess for home care needs following discharge   - Consider OT consult to assist with ADL evaluation and planning for discharge  - Provide patient education as appropriate  Outcome: Progressing  Goal: Maintains/Returns to pre admission functional level  Description: INTERVENTIONS:  - Perform BMAT or MOVE assessment daily    - Set and communicate daily mobility goal to care team and patient/family/caregiver  - Collaborate with rehabilitation services on mobility goals if consulted  - Perform Range of Motion  times a day  - Reposition patient every  hours    - Dangle patient  times a day  - Stand patient  times a day  - Ambulate patient  times a day  - Out of bed to chair  times a day   - Out of bed for meals times a day  - Out of bed for toileting  - Record patient progress and toleration of activity level   Outcome: Progressing     Problem: DISCHARGE PLANNING  Goal: Discharge to home or other facility with appropriate resources  Description: INTERVENTIONS:  - Identify barriers to discharge w/patient and caregiver  - Arrange for needed discharge resources and transportation as appropriate  - Identify discharge learning needs (meds, wound care, etc )  - Arrange for interpretive services to assist at discharge as needed  - Refer to Case Management Department for coordinating discharge planning if the patient needs post-hospital services based on physician/advanced practitioner order or complex needs related to functional status, cognitive ability, or social support system  Outcome: Progressing     Problem: Knowledge Deficit  Goal: Patient/family/caregiver demonstrates understanding of disease process, treatment plan, medications, and discharge instructions  Description: Complete learning assessment and assess knowledge base  Interventions:  - Provide teaching at level of understanding  - Provide teaching via preferred learning methods  Outcome: Progressing     Problem: Nutrition/Hydration-ADULT  Goal: Nutrient/Hydration intake appropriate for improving, restoring or maintaining nutritional needs  Description: Monitor and assess patient's nutrition/hydration status for malnutrition  Collaborate with interdisciplinary team and initiate plan and interventions as ordered  Monitor patient's weight and dietary intake as ordered or per policy  Utilize nutrition screening tool and intervene as necessary  Determine patient's food preferences and provide high-protein, high-caloric foods as appropriate       INTERVENTIONS:  - Monitor oral intake, urinary output, labs, and treatment plans  - Assess nutrition and hydration status and recommend course of action  - Evaluate amount of meals eaten  - Assist patient with eating if necessary   - Allow adequate time for meals  - Recommend/ encourage appropriate diets, oral nutritional supplements, and vitamin/mineral supplements  - Order, calculate, and assess calorie counts as needed  - Recommend, monitor, and adjust tube feedings and TPN/PPN based on assessed needs  - Assess need for intravenous fluids  - Provide specific nutrition/hydration education as appropriate  - Include patient/family/caregiver in decisions related to nutrition  Outcome: Progressing     Problem: RESPIRATORY - ADULT  Goal: Achieves optimal ventilation and oxygenation  Description: INTERVENTIONS:  - Assess for changes in respiratory status  - Assess for changes in mentation and behavior  - Position to facilitate oxygenation and minimize respiratory effort  - Oxygen administered by appropriate delivery if ordered  - Initiate smoking cessation education as indicated  - Encourage broncho-pulmonary hygiene including cough, deep breathe, Incentive Spirometry  - Assess the need for suctioning and aspirate as needed  - Assess and instruct to report SOB or any respiratory difficulty  - Respiratory Therapy support as indicated  Outcome: Progressing

## 2022-01-23 NOTE — CASE MANAGEMENT
Case Management Assessment & Discharge Planning Note    Patient name Ismael De Souza  Location W /W -01 MRN [id number]  : 1940 Date 2022       Current Admission Date: 2022  Current Admission Diagnosis:Acute hypoxemic respiratory failure due to COVID-19 [facility])   Patient Active Problem List    Diagnosis Date Noted    [facility] 20[facility]    Acute hypoxemic respiratory failure due to COVID-19 ([facility] ) 2022    Transaminitis 2020    Coronary artery disease 2017    Type 2 diabetes mellitus with hyperglycemia, with long-term current use of insulin ([place] [address] ) 2017    S/P coronary artery stent placement 2016    CAD (coronary artery disease), native coronary artery 2016    Chronic hepatitis C virus infection ([facility] ) 2016    Atypical chest pain 2015    Bruising 2014    [facility] 2014    CLL (chronic lymphocytic leukemia) ([facility] ) 2014    Chronic constipation 2013    Dyslipidemia 2013    Hypertension 2013      LOS (days): 4  Geometric Mean LOS (GMLOS) (days): 5 40  Days to GMLOS:1 3     OBJECTIVE:    Risk of Unplanned Readmission Score: 20         Current admission status: Inpatient       Preferred Pharmacy:   [facility]/pharmacy [address] [place] [address]  Phone: [phone] Fax: [phone]    Primary Care Provider: Shaina Reid MD    Primary Insurance: MEDICARE  Secondary Insurance: [facility] HEALTH OPTIONS PROGRAM    ASSESSMENT:  Andrea 22, Sherri Paris 42 Representative - Spouse   Primary Phone: [phone] (Mobile)  Home Phone: [phone]               Advance Directives  Does patient have a [address]?: Yes  Does patient have Advance Directives?: Yes  Advance Directives: Living will,Power of  for health care  Primary Contact: Yoon-spouse or daughter Wallace Feliciano Readmission Root Cause  30 Day Readmission: No    Patient Information  Admitted from[de-identified] Home  Mental Status: Alert  During Assessment patient was accompanied by: Not accompanied during assessment  Assessment information provided by[de-identified] Spouse  Primary Caregiver: Spouse  Caregiver's Name[de-identified] Ernesto Holguin Relationship to Patient[de-identified] Family Member  Support Systems: Spouse/significant other,Daughter,Family members  [place] of Residence: [address] [place] [address] do you live in?: [place] entry access options   Select all that apply : No steps to enter home  Type of Current Residence: Apartment  Floor Level: 3  Upon entering residence, is there a bedroom on the main floor (no further steps)?: Yes  Upon entering residence, is there a bathroom on the main floor (no further steps)?: Yes  Living Arrangements: Lives w/ Spouse/significant other    Activities of Daily Living Prior to Admission  Functional Status: Independent  Completes ADLs independently?: Yes  Ambulates independently?: Yes  Does patient use assisted devices?: No  Does patient currently own DME?: Yes  What DME does the patient currently own?: Brandi Jansen  Does patient have a history of Outpatient Therapy (PT/OT)?: No  Does the patient have a history of Short-Term Rehab?: No  Does patient have a history of HHC?: Yes ([place])  Does patient currently have [facility]?: No         Patient Information Continued  Income Source: Pension/group home  Does patient have prescription coverage?: Yes  Food insecurity resource given?: No  Does patient receive dialysis treatments?: No  Does patient have a history of substance abuse?: No  Does patient have a history of Mental Health Diagnosis?: No    PHQ 2/9 Screening   Reviewed PHQ 2/9 Depression Screening Score?: No    Means of Transportation  Means of Transport to Appts[de-identified] Drives Self  In the past 12 months, has lack of transportation kept you from medical appointments or from getting medications?: No  In the past 12 months, has lack of transportation kept you from meetings, work, or from getting things needed for daily living?: No  Was application for public transport provided?: No        DISCHARGE DETAILS:    Discharge planning discussed with[de-identified] patient's spouse-unable to reach patient via phone  Freedom of Choice: Yes     CM contacted family/caregiver?: Yes  Were Treatment Team discharge recommendations reviewed with patient/caregiver?: Yes  Did patient/caregiver verbalize understanding of patient care needs?: Yes  Were patient/caregiver advised of the risks associated with not following Treatment Team discharge recommendations?: Yes    Contacts  Patient Contacts: Patient's spouse  Relationship to Patient[de-identified] Family  Contact Method: Phone  Reason/Outcome: Continuity of Care,Emergency Contact,Discharge Planning    Treatment Team Recommendation: Home with [address] [place] [facility] unable to reach patient via phone  CM spoke with patient's spouse Remington Carpenter at   CM introduced self and role  Prior to hospital admission, patient very active and independent at home  Per wife, patient has been ambulating in the room  Does not wear home oxygen  CM discussed with wife per PT, the recommendation at discharge is Home PT  CM discussed [place] of Choice with patient's spouse  CM also discussed Home Oxygen process if needed at discharge  CM offered to f/u with patient/spouse re:DCP closer to discharge  All questions/concerns answered at this time  CM reviewed discharge planning process including the following: identifying caregivers at home, preference for d/c planning needs,   availability of [facility] Meds to Bed program, availability of treatment team to discuss questions or concerns patient and/or family may have regarding diagnosis, plan of care, old or new medications and discharge planning   CM will continue to follow for care coordination and update assessment as appropriate

## 2022-01-23 NOTE — QUICK NOTE
Reviewed home meds with patient:    NOT ON NORVASC - NEVER ENDED UP TAKING IT  NOT ON LOSARTAN - IS ON RAMIPRIL 5 MG AT BASELINE    Metformin 1000 mg PO BID  ramirpil 5 mg daily  zetia 10 mg daily  pramiprexole 1 5 mg daily  lantus 14-15 units QAM  ASA 81 mg    Not taking statin given myalgias    Known CLL history in 2016 but progressed and needed tx - treated at [facility] with FLUDARABINE/CYCLOPHOSPHAMIDE/RITUXIMAB for 6 cycles which ended in June 2021

## 2022-01-23 NOTE — ASSESSMENT & PLAN NOTE
Lab Results   Component Value Date    HGBA1C 7 5 (H) 10/14/2021       Recent Labs     01/22/22  1304 01/22/22  1623 01/22/22  2135 01/23/22  1101   POCGLU 288* 260* 255* 205*       Blood Sugar Average: Last 72 hrs:  (P) 271 [id number]   · On home insulin-Lantus 14 units q h s , metformin  · Continue to Hold metformin  · Evening 1/21 patient was noted to have blood glucose of greater 500    · No AM fasting glucose this AM  · Continue Lantus 25 units QHS (mild increase)  · Continue Humalog 10 units TID with meals  · Continue SSI ACHS

## 2022-01-23 NOTE — ASSESSMENT & PLAN NOTE
· On statin which was held on admission secondary to transaminitis however transaminitis appears to be acute on chronic and thus resumed on Zetia  · Is not on statin given myalgias

## 2022-01-23 NOTE — ASSESSMENT & PLAN NOTE
· Positive test 1/14/2022  · Admitted 01/19/22  · Vaccinated x2 but no booster  2nd dose of Moderna in April 2021  · CXR: Bilateral groundglass presumed Covid 19 pneumonia infiltrates in this Covid positive patient  · Noted to be on 3-4 L on admission and increased to 13 L overnight  · Currently on 3L with oxygen saturations 91%  · Wean oxygen as able (13 L 1/20/22)  · Initially withheld her remdesivir secondary to transaminitis however given not greater than 10 times upper limit of normal was initiated 1/20/22 (day #4)  · Continue IV dexamethasone, day #5  · Continue lovenox to 30 mg q12 hrs  · Continue baricitinib day #4  · procalcitonin 0 12    Inflammatory Markers (last 3):   Lab Results   Component Value Date    DDIMER 0 88 (H) 01/19/2022    CRP 46 4 (H) 01/21/2022    CRP 36 9 (H) 01/20/2022       Cardiac Markers:  Lab Results   Component Value Date    NTBNP 1,007 (H) 01/19/2022    CKTOTAL 90 01/19/2022

## 2022-01-23 NOTE — ASSESSMENT & PLAN NOTE
· With hypotension overnight which resolved with IV fluids  · Reviewed patient's medication list with him  He is not on amlodipine he reports this may have been previously prescribed to him however he did not take it     · At baseline is on ramipril    He reports that he is on this for diabetes and renal protection however not for hypertension  · Continue to hold ramipril in the setting of hypotension overnight  · He wants to potentially change this to an ARB as outpatient given chronic dry cough

## 2022-01-24 NOTE — RESPIRATORY THERAPY NOTE
Home Oxygen Qualifying Test       Patient name: Ria Douglas        : 1940   Date of Test:  2022  Diagnosis:      Home Oxygen Test:    **Medicare Guidelines require item(s) 1-5 on all ambulatory patients or 1 and 2 on non-ambulatory patients  1   Baseline SPO2 on Room Air at rest 84  %  2   SPO2 during exercise on Room Air 77 %  During exercise monitor SpO2  If SPO2 increases >=89% with ambulation do not add supplemental             oxygen  If <= 88% on room air add O2 via NC and titrate patient  Patient must be ambulated with O2 and titrated to > 88% with exertion  3   SPO2 on Oxygen at rest 95  % 5 lpm     4   SPO2 during exercise on Oxygen 90% a liter flow of 6 lpm     5   Exercise performed: pt low SPO2 at rest 84% and with exertion 77%  Placed on 6 lpm     Pt ambulated in  Room on  6lpm and Low SPO2 was 90               [x]  Supplemental Home Oxygen is indicated  []  Client does not qualify for home oxygen  Respiratory Additional Notes- PT Qualifies for home 02 24hrs/day via nasal cannula  Pt to use 5lpm at rest and 6 lpm with exertion   Pt requires humidifier bottles  Tip Lawrence

## 2022-01-24 NOTE — ASSESSMENT & PLAN NOTE
· With hypotension overnight which resolved with IV fluids  · Reviewed patient's medication list with him  He is not on amlodipine he reports this may have been previously prescribed to him however he did not take it     · At baseline is on ramipril    He reports that he is on this for diabetes and renal protection however not for hypertension  · He wants to potentially change this to an ARB as outpatient given chronic dry cough

## 2022-01-24 NOTE — PROGRESS NOTES
[facility]  Progress Note Gato LOPEZ [place] 1940, 80 y o  male MRN: [id number]  Unit/Bed#: W -01 Encounter: [id number]  Primary Care Provider: Lu Pena MD   Date and time admitted to hospital: 1/19/2022  9:06 AM    * Acute hypoxemic respiratory failure due to COVID-19 [facility])  Assessment & Plan  · Positive test 1/14/2022  · Admitted 01/19/22  · Vaccinated x2 but no booster  2nd dose of Moderna in April 2021  · CXR: Bilateral groundglass presumed Covid 19 pneumonia infiltrates in this Covid positive patient  · Noted to be on 3-4 L on admission and increased to 13 L overnight  · Currently on 3L with oxygen saturations 91%  · Home O2 eval prior to d/c  · Initially withheld the remdesivir secondary to transaminitis however given not greater than 10 times upper limit of normal was initiated 1/20/22 (day #5)  · s/p IV dexamethasone, day #6  · Transition to PO dexamethisone taper starting tomorroe  · 4 mg x3 days, then 2 mg x3 days then stop  · Rec'd baricitinib day #5  · procalcitonin 0 12    Inflammatory Markers (last 3): Lab Results   Component Value Date    DDIMER 1 28 (H) 01/24/2022    DDIMER 0 88 (H) 01/19/2022    CRP 23 8 (H) 01/24/2022    CRP 46 4 (H) 01/21/2022    CRP 36 9 (H) 01/20/2022       Cardiac Markers:  Lab Results   Component Value Date    NTBNP 1,007 (H) 01/19/2022    CKTOTAL 90 01/19/2022         Type 2 diabetes mellitus with hyperglycemia, with long-term current use of insulin [facility])  Assessment & Plan  Lab Results   Component Value Date    HGBA1C 7 5 (H) 10/14/2021       Recent Labs     01/23/22  1643 01/23/22  2126 01/24/22  0753 01/24/22  0837   POCGLU 250* 313* 57* 87       Blood Sugar Average: Last 72 hrs:  (P) 215 [id number]   · On home insulin-Lantus 14 units q h s , metformin  · Resume metformin  · Evening 1/21 patient was noted to have blood glucose of greater 500  · This a m  He was asymptomatic but had a blood sugar of 57    · Would recommend decreasing Lantus to 20 units q h s  Moving forward and close Endocrinology follow-up  Transaminitis  Assessment & Plan  · Appears to be acute on chronic - possible secondary to COVID 19  · History of hepatitis-C status post treatment with Harvoni several years ago  · Reports that at baseline he has mild transaminitis  · Chronic hepatitis panel shows history of hepatitis-B as well  · Ultrasound performed as an outpatient recently with normal appearing liver  · Not on statin given mylagias    Lab Results   Component Value Date     (H) 01/24/2022     (H) 01/22/2022     (H) 01/21/2022     (H) 01/24/2022     (H) 01/22/2022     (H) 01/21/2022         S/P coronary artery stent placement  Assessment & Plan  · Continue aspirin and Zetia  · Not on statin given myalgias     Hypertension  Assessment & Plan  · With hypotension overnight which resolved with IV fluids  · Reviewed patient's medication list with him  He is not on amlodipine he reports this may have been previously prescribed to him however he did not take it     · At baseline is on ramipril    He reports that he is on this for diabetes and renal protection however not for hypertension  · He wants to potentially change this to an ARB as outpatient given chronic dry cough    Dyslipidemia  Assessment & Plan  · On statin which was held on admission secondary to transaminitis however transaminitis appears to be acute on chronic and thus resumed on Zetia  · Is not on statin given myalgias     CLL (chronic lymphocytic leukemia) ([facility] [address] )  Assessment & Plan  · Received treatment at [facility] for this had been on surveillance monitoring since 2016 however was noted to have progression and therefore underwent treatment  · FLUDARABINE/CYCLOPHOSPHAMIDE/RITUXIMAB for 6 cycles which ended in June 2021      Discharging Physician / Practitioner: William Griffin PA-C  PCP: Bia Peter MD  Admission Date: Admission Orders (From admission, onward)     Ordered        01/19/22 0957  INPATIENT ADMISSION  Once                      Discharge Date: 01/24/22    Medical Problems             Resolved Problems  Date Reviewed: 1/22/2022    None                Consultations During Hospital Stay:  · None     Procedures Performed:   · None    Significant Findings / Test Results:   · CXR: "Bilateral groundglass presumed Covid 19 pneumonia infiltrates in this Covid positive patient "    Incidental Findings:   · none     Test Results Pending at Discharge (will require follow up):   · none     Outpatient Tests Requested:  · none    Complications:  none    Reason for Admission: COVID [address] [facility] [address] [place] Hospital Course:     Neysa Najjar Defrancesco is a 80 y o  male patient who originally presented to the hospital on 1/19/2022 due to shortness of breath secondary to COVID-19 pneumonia  Patient reported nonproductive cough at home with hypoxia with worsening symptoms over the past few days prior to admission  Was COVID positive 1/14 and progressively worse  The patient was admitted under the mild pathway and received remdesivir, baricitinib, dexamethasone as above  He has a history of mild transaminitis, while hospitalized his LFTs remain stable  Throughout his stay he was oxygen levels were weaned down to 5 L upon hospital discharge with home oxygen evaluation completed  Patient was discharged in stable condition with dexamethasone taper  We told the patient to increase his Lantus to 20 units q h s  Given hyperglycemia here in the hospital and follow up closely with outpatient endocrinologist     Please see above list of diagnoses and related plan for additional information  Condition at Discharge: stable     Discharge Day Visit / Exam:     Subjective:  No overnight events per nursing  O2 has been acceptable at 90% on 5 LPM  Home O2 eval completed prior to hospital discharge     Vitals: Blood Pressure: 132/76 (01/24/22 0752)  Pulse: 68 (01/24/22 0752)  Temperature: 98 1 °F (36 7 °C) (01/22/22 0748)  Temp Source: Oral (01/19/22 2007)  Respirations: 18 (01/24/22 0752)  Height: 5' 10" (177 8 cm) (01/19/22 2007)  Weight - Scale: 64 kg (141 lb 1 5 oz) (01/19/22 2007)  SpO2: (!) 87 % (01/24/22 0752)  Exam:   Physical Exam  Vitals and nursing note reviewed  Constitutional:       General: He is not in acute distress  Appearance: Normal appearance  Interventions: Nasal cannula in place  HENT:      Head: Normocephalic  Mouth/Throat:      Mouth: Mucous membranes are moist    Eyes:      Pupils: Pupils are equal, round, and reactive to light  Cardiovascular:      Rate and Rhythm: Normal rate and regular rhythm  Heart sounds: No murmur heard  Pulmonary:      Effort: Pulmonary effort is normal  No respiratory distress  Breath sounds: Normal breath sounds  No wheezing, rhonchi or rales  Abdominal:      General: Bowel sounds are normal  There is no distension  Palpations: Abdomen is soft  Tenderness: There is no abdominal tenderness  There is no guarding  Musculoskeletal:         General: No deformity  Cervical back: Normal range of motion  Right lower leg: No edema  Left lower leg: No edema  Skin:     Capillary Refill: Capillary refill takes less than 2 seconds  Neurological:      General: No focal deficit present  Mental Status: He is alert and oriented to person, place, and time  Mental status is at baseline  Discussion with Family: called daughter Jasvir Dose [phone], left message for callback    Discharge instructions/Information to patient and family:   See after visit summary for information provided to patient and family  Provisions for Follow-Up Care:  See after visit summary for information related to follow-up care and any pertinent home health orders        Disposition:     Home with [facility]NA Services (Reminder: Complete face to face encounter)    For Discharges to [place] [address] Affiliated SNF:   · Not Applicable to this Patient - Not Applicable to this Patient    Planned Readmission: no     Discharge Statement:  I spent 45 minutes discharging the patient  This time was spent on the day of discharge  I had direct contact with the patient on the day of discharge  Greater than 50% of the total time was spent examining patient, answering all patient questions, arranging and discussing plan of care with patient as well as directly providing post-discharge instructions  Additional time then spent on discharge activities  Discharge Medications:  See after visit summary for reconciled discharge medications provided to patient and family        ** Please Note: This note has been constructed using a voice recognition system **

## 2022-01-24 NOTE — CASE MANAGEMENT
Case Management Progress Note    Patient name Maryann President  Location W /W -01 MRN [id number]  : 1940 Date 2022       LOS (days): 5  Geometric Mean LOS (GMLOS) (days): 5 40  Days to GMLOS:0 2        OBJECTIVE:        Current admission status: Inpatient  [facility]acy[facility] [address] [place] [address]  Phone: [phone] Fax: [phone]    Primary Care Provider: Thi Samuels MD    Primary Insurance: MEDICARE  Secondary Insurance: [facility] HEALTH OPTIONS PROGRAM    PROGRESS NOTE:  [address] [place] [address]  is unable to accept due to capacity per response in [facility]  Additional referrals placed  Daughter does not have a preference on [facility]  [facility] [address] is able to accept per response from Alex in Admissions with this agency at [phone]  AVS faxed to [facility] at [phone]

## 2022-01-24 NOTE — CASE MANAGEMENT
Case Management Discharge Planning Note    Patient name Myrtle Ventura  Location W /W -01 MRN [id number]  : 1940 Date 2022       Current Admission Date: 2022  Current Admission Diagnosis:Acute hypoxemic respiratory failure due to COVID-19 [facility])   Patient Active Problem List    Diagnosis Date Noted    Acute respiratory failure with hypoxia ([facility] ) 2022    Acute hypoxemic respiratory failure due to COVID-19 ([place] [address] ) 2022    Transaminitis 2020    Coronary artery disease 2017    Type 2 diabetes mellitus with hyperglycemia, with long-term current use of insulin ([place] [address] ) 2017    S/P coronary artery stent placement 2016    CAD (coronary artery disease), native coronary artery 2016    Chronic hepatitis C virus infection ([place] [address] ) 2016    Atypical chest pain 2015    Bruising 2014    Chronic hepatitis B ([place] [address] ) 2014    CLL (chronic lymphocytic leukemia) ([place] [address] ) 2014    Chronic constipation 2013    Dyslipidemia 2013    Hypertension 2013      LOS (days): 5  Geometric Mean LOS (GMLOS) (days): 5 40  Days to GMLOS:0 2     OBJECTIVE:  Risk of Unplanned Readmission Score: 21         Current admission status: Inpatient   Preferred Pharmacy:   [facility] [address] [place] [address]  Phone: [phone] Fax: [phone]    Primary Care Provider: Jessica Coppola MD    Primary Insurance: MEDICARE  Secondary Insurance: [facility] HEALTH OPTIONS PROGRAM    DISCHARGE DETAILS:       Freedom of Choice: Yes  Comments - Freedom of Choice: Patient qualifies for Supplemental Home O2 per Respiratory Eval   CM called patient's first emergency contact daughter Amparo Pinto at [phone] to discuss  Daughter does not list a preference for DME companies  Referral to [facility] placed in [facility]    Daughter also requests a new referral to [place] [address] with any available agency that accepts patient's insurance  CM contacted family/caregiver?: Yes  Were Treatment Team discharge recommendations reviewed with patient/caregiver?: Yes  Did patient/caregiver verbalize understanding of patient care needs?: Yes  Were patient/caregiver advised of the risks associated with not following Treatment Team discharge recommendations?: Yes    Contacts  Patient Contacts: daughter Cindy Gutierrez  Relationship to Patient[de-identified] Family  Contact Method: Phone  Phone Number: [phone]  Reason/Outcome: Continuity of [address]         Is the patient interested in [facility] at discharge?: Yes         Other Referral/Resources/Interventions Provided:  Referral Comments: [facility] is unable to accept due to capacity  Additional referrals placed  [facility] is able to accept per [facility] in Admissions, to see the patient tomorrow  Discharge instructions faxed to [facility] at [phone]  Contact information to [facility] placed on patient's AVS   [facility] is able to accept for supplemental O2  Green tank delivered to patient at bedside, dispatch team is scheduling delivery to the home  Treatment Team Recommendation: Home with [address]  Discharge Destination Plan[de-identified] Home with Kamila at Discharge : Auto with designated   Dispatcher Contacted:  No

## 2022-01-24 NOTE — ASSESSMENT & PLAN NOTE
Lab Results   Component Value Date    HGBA1C 7 5 (H) 10/14/2021       Recent Labs     01/23/22  1643 01/23/22  2126 01/24/22  0753 01/24/22  0837   POCGLU 250* 313* 57* 87       Blood Sugar Average: Last 72 hrs:  (P) 215 [id number]   · On home insulin-Lantus 14 units q h s , metformin  · Resume metformin  · Evening 1/21 patient was noted to have blood glucose of greater 500  · This a m  He was asymptomatic but had a blood sugar of 57  · Would recommend decreasing Lantus to 20 units q h s  Moving forward and close Endocrinology follow-up

## 2022-01-24 NOTE — ASSESSMENT & PLAN NOTE
· Received treatment at [facility] for this had been on surveillance monitoring since 2016 however was noted to have progression and therefore underwent treatment  · FLUDARABINE/CYCLOPHOSPHAMIDE/RITUXIMAB for 6 cycles which ended in June 2021

## 2022-01-24 NOTE — CASE MANAGEMENT
Case Management Progress Note    Patient name Marti Santiago  Location W /W -01 MRN [id number]  : 1940 Date 2022       LOS (days): 5  Geometric Mean LOS (GMLOS) (days): 5 40  Days to GMLOS:0 2        OBJECTIVE:        Current admission status: Inpatient  [facility] [address] [place] [address]  Phone: [phone] Fax: [phone]    Primary Care Provider: Gokul Rubio MD    Primary Insurance: MEDICARE  Secondary Insurance: [facility] HEALTH OPTIONS PROGRAM    PROGRESS NOTE:  CM received notification from [facility] rep Para Raring that Home Oxygen set up will be done between 4 - 7 in the home per [facility] dispatch team  CM updated nursing

## 2022-01-24 NOTE — PLAN OF CARE
Problem: PAIN - ADULT  Goal: Verbalizes/displays adequate comfort level or baseline comfort level  Description: Interventions:  - Encourage patient to monitor pain and request assistance  - Assess pain using appropriate pain scale  - Administer analgesics based on type and severity of pain and evaluate response  - Implement non-pharmacological measures as appropriate and evaluate response  - Consider cultural and social influences on pain and pain management  - Notify physician/advanced practitioner if interventions unsuccessful or patient reports new pain  Outcome: Progressing     Problem: INFECTION - ADULT  Goal: Absence or prevention of progression during hospitalization  Description: INTERVENTIONS:  - Assess and monitor for signs and symptoms of infection  - Monitor lab/diagnostic results  - Monitor all insertion sites, i e  indwelling lines, tubes, and drains  - Monitor endotracheal if appropriate and nasal secretions for changes in amount and color  - [place] appropriate cooling/warming therapies per order  - Administer medications as ordered  - Instruct and encourage patient and family to use good hand hygiene technique  - Identify and instruct in appropriate isolation precautions for identified infection/condition  Outcome: Progressing  Goal: Absence of fever/infection during neutropenic period  Description: INTERVENTIONS:  - Monitor WBC    Outcome: Progressing     Problem: SAFETY ADULT  Goal: Patient will remain free of falls  Description: INTERVENTIONS:  - Educate patient/family on patient safety including physical limitations  - Instruct patient to call for assistance with activity   - Consult OT/PT to assist with strengthening/mobility   - Keep Call bell within reach  - Keep bed low and locked with side rails adjusted as appropriate  - Keep care items and personal belongings within reach  - Initiate and maintain comfort rounds  - Make Fall Risk Sign visible to staff  - Offer Toileting every  Hours, in advance of need  - Initiate/Maintain alarm  - Obtain necessary fall risk management equipment:   - Apply yellow socks and bracelet for high fall risk patients  - Consider moving patient to room near nurses station  Outcome: Progressing  Goal: Maintain or return to baseline ADL function  Description: INTERVENTIONS:  -  Assess patient's ability to carry out ADLs; assess patient's baseline for ADL function and identify physical deficits which impact ability to perform ADLs (bathing, care of mouth/teeth, toileting, grooming, dressing, etc )  - Assess/evaluate cause of self-care deficits   - Assess range of motion  - Assess patient's mobility; develop plan if impaired  - Assess patient's need for assistive devices and provide as appropriate  - Encourage maximum independence but intervene and supervise when necessary  - Involve family in performance of ADLs  - Assess for home care needs following discharge   - Consider OT consult to assist with ADL evaluation and planning for discharge  - Provide patient education as appropriate  Outcome: Progressing  Goal: Maintains/Returns to pre admission functional level  Description: INTERVENTIONS:  - Perform BMAT or MOVE assessment daily    - Set and communicate daily mobility goal to care team and patient/family/caregiver  - Collaborate with rehabilitation services on mobility goals if consulted  - Perform Range of Motion  times a day  - Reposition patient every  hours    - Dangle patient  times a day  - Stand patient  times a day  - Ambulate patient  times a day  - Out of bed to chair  times a day   - Out of bed for meals times a day  - Out of bed for toileting  - Record patient progress and toleration of activity level   Outcome: Progressing     Problem: DISCHARGE PLANNING  Goal: Discharge to home or other facility with appropriate resources  Description: INTERVENTIONS:  - Identify barriers to discharge w/patient and caregiver  - Arrange for needed discharge resources and transportation as appropriate  - Identify discharge learning needs (meds, wound care, etc )  - Arrange for interpretive services to assist at discharge as needed  - Refer to Case Management Department for coordinating discharge planning if the patient needs post-hospital services based on physician/advanced practitioner order or complex needs related to functional status, cognitive ability, or social support system  Outcome: Progressing     Problem: Knowledge Deficit  Goal: Patient/family/caregiver demonstrates understanding of disease process, treatment plan, medications, and discharge instructions  Description: Complete learning assessment and assess knowledge base  Interventions:  - Provide teaching at level of understanding  - Provide teaching via preferred learning methods  Outcome: Progressing     Problem: Nutrition/Hydration-ADULT  Goal: Nutrient/Hydration intake appropriate for improving, restoring or maintaining nutritional needs  Description: Monitor and assess patient's nutrition/hydration status for malnutrition  Collaborate with interdisciplinary team and initiate plan and interventions as ordered  Monitor patient's weight and dietary intake as ordered or per policy  Utilize nutrition screening tool and intervene as necessary  Determine patient's food preferences and provide high-protein, high-caloric foods as appropriate       INTERVENTIONS:  - Monitor oral intake, urinary output, labs, and treatment plans  - Assess nutrition and hydration status and recommend course of action  - Evaluate amount of meals eaten  - Assist patient with eating if necessary   - Allow adequate time for meals  - Recommend/ encourage appropriate diets, oral nutritional supplements, and vitamin/mineral supplements  - Order, calculate, and assess calorie counts as needed  - Recommend, monitor, and adjust tube feedings and TPN/PPN based on assessed needs  - Assess need for intravenous fluids  - Provide specific nutrition/hydration education as appropriate  - Include patient/family/caregiver in decisions related to nutrition  Outcome: Progressing     Problem: RESPIRATORY - ADULT  Goal: Achieves optimal ventilation and oxygenation  Description: INTERVENTIONS:  - Assess for changes in respiratory status  - Assess for changes in mentation and behavior  - Position to facilitate oxygenation and minimize respiratory effort  - Oxygen administered by appropriate delivery if ordered  - Initiate smoking cessation education as indicated  - Encourage broncho-pulmonary hygiene including cough, deep breathe, Incentive Spirometry  - Assess the need for suctioning and aspirate as needed  - Assess and instruct to report SOB or any respiratory difficulty  - Respiratory Therapy support as indicated  Outcome: Progressing

## 2022-01-24 NOTE — ASSESSMENT & PLAN NOTE
· Appears to be acute on chronic - possible secondary to COVID 19  · History of hepatitis-C status post treatment with Harvoni several years ago  · Reports that at baseline he has mild transaminitis  · Chronic hepatitis panel shows history of hepatitis-B as well  · Ultrasound performed as an outpatient recently with normal appearing liver    · Not on statin given mylagias    Lab Results   Component Value Date     (H) 01/24/2022     (H) 01/22/2022     (H) 01/21/2022     (H) 01/24/2022     (H) 01/22/2022     (H) 01/21/2022

## 2022-01-24 NOTE — PLAN OF CARE
Problem: PAIN - ADULT  Goal: Verbalizes/displays adequate comfort level or baseline comfort level  Description: Interventions:  - Encourage patient to monitor pain and request assistance  - Assess pain using appropriate pain scale  - Administer analgesics based on type and severity of pain and evaluate response  - Implement non-pharmacological measures as appropriate and evaluate response  - Consider cultural and social influences on pain and pain management  - Notify physician/advanced practitioner if interventions unsuccessful or patient reports new pain  Outcome: Adequate for Discharge     Problem: INFECTION - ADULT  Goal: Absence or prevention of progression during hospitalization  Description: INTERVENTIONS:  - Assess and monitor for signs and symptoms of infection  - Monitor lab/diagnostic results  - Monitor all insertion sites, i e  indwelling lines, tubes, and drains  - Monitor endotracheal if appropriate and nasal secretions for changes in amount and color  - Clay appropriate cooling/warming therapies per order  - Administer medications as ordered  - Instruct and encourage patient and family to use good hand hygiene technique  - Identify and instruct in appropriate isolation precautions for identified infection/condition  Outcome: Adequate for Discharge  Goal: Absence of fever/infection during neutropenic period  Description: INTERVENTIONS:  - Monitor WBC    Outcome: Adequate for Discharge     Problem: SAFETY ADULT  Goal: Patient will remain free of falls  Description: INTERVENTIONS:  - Educate patient/family on patient safety including physical limitations  - Instruct patient to call for assistance with activity   - Consult OT/PT to assist with strengthening/mobility   - Keep Call bell within reach  - Keep bed low and locked with side rails adjusted as appropriate  - Keep care items and personal belongings within reach  - Initiate and maintain comfort rounds  - Make Fall Risk Sign visible to staff  - Offer Toileting every  Hours, in advance of need  - Initiate/Maintain alarm  - Obtain necessary fall risk management equipment:   - Apply yellow socks and bracelet for high fall risk patients  - Consider moving patient to room near nurses station  Outcome: Adequate for Discharge  Goal: Maintain or return to baseline ADL function  Description: INTERVENTIONS:  -  Assess patient's ability to carry out ADLs; assess patient's baseline for ADL function and identify physical deficits which impact ability to perform ADLs (bathing, care of mouth/teeth, toileting, grooming, dressing, etc )  - Assess/evaluate cause of self-care deficits   - Assess range of motion  - Assess patient's mobility; develop plan if impaired  - Assess patient's need for assistive devices and provide as appropriate  - Encourage maximum independence but intervene and supervise when necessary  - Involve family in performance of ADLs  - Assess for home care needs following discharge   - Consider OT consult to assist with ADL evaluation and planning for discharge  - Provide patient education as appropriate  Outcome: Adequate for Discharge  Goal: Maintains/Returns to pre admission functional level  Description: INTERVENTIONS:  - Perform BMAT or MOVE assessment daily    - Set and communicate daily mobility goal to care team and patient/family/caregiver  - Collaborate with rehabilitation services on mobility goals if consulted  - Perform Range of Motion  times a day  - Reposition patient every  hours    - Dangle patient  times a day  - Stand patient  times a day  - Ambulate patient  times a day  - Out of bed to chair  times a day   - Out of bed for meals  times a day  - Out of bed for toileting  - Record patient progress and toleration of activity level   Outcome: Adequate for Discharge     Problem: DISCHARGE PLANNING  Goal: Discharge to home or other facility with appropriate resources  Description: INTERVENTIONS:  - Identify barriers to discharge w/patient and caregiver  - Arrange for needed discharge resources and transportation as appropriate  - Identify discharge learning needs (meds, wound care, etc )  - Arrange for interpretive services to assist at discharge as needed  - Refer to Case Management Department for coordinating discharge planning if the patient needs post-hospital services based on physician/advanced practitioner order or complex needs related to functional status, cognitive ability, or social support system  Outcome: Adequate for Discharge     Problem: Knowledge Deficit  Goal: Patient/family/caregiver demonstrates understanding of disease process, treatment plan, medications, and discharge instructions  Description: Complete learning assessment and assess knowledge base  Interventions:  - Provide teaching at level of understanding  - Provide teaching via preferred learning methods  Outcome: Adequate for Discharge     Problem: Nutrition/Hydration-ADULT  Goal: Nutrient/Hydration intake appropriate for improving, restoring or maintaining nutritional needs  Description: Monitor and assess patient's nutrition/hydration status for malnutrition  Collaborate with interdisciplinary team and initiate plan and interventions as ordered  Monitor patient's weight and dietary intake as ordered or per policy  Utilize nutrition screening tool and intervene as necessary  Determine patient's food preferences and provide high-protein, high-caloric foods as appropriate       INTERVENTIONS:  - Monitor oral intake, urinary output, labs, and treatment plans  - Assess nutrition and hydration status and recommend course of action  - Evaluate amount of meals eaten  - Assist patient with eating if necessary   - Allow adequate time for meals  - Recommend/ encourage appropriate diets, oral nutritional supplements, and vitamin/mineral supplements  - Order, calculate, and assess calorie counts as needed  - Recommend, monitor, and adjust tube feedings and TPN/PPN based on assessed needs  - Assess need for intravenous fluids  - Provide specific nutrition/hydration education as appropriate  - Include patient/family/caregiver in decisions related to nutrition  Outcome: Adequate for Discharge     Problem: RESPIRATORY - ADULT  Goal: Achieves optimal ventilation and oxygenation  Description: INTERVENTIONS:  - Assess for changes in respiratory status  - Assess for changes in mentation and behavior  - Position to facilitate oxygenation and minimize respiratory effort  - Oxygen administered by appropriate delivery if ordered  - Initiate smoking cessation education as indicated  - Encourage broncho-pulmonary hygiene including cough, deep breathe, Incentive Spirometry  - Assess the need for suctioning and aspirate as needed  - Assess and instruct to report SOB or any respiratory difficulty  - Respiratory Therapy support as indicated  Outcome: Adequate for Discharge

## 2022-01-24 NOTE — ASSESSMENT & PLAN NOTE
· Positive test 1/14/2022  · Admitted 01/19/22  · Vaccinated x2 but no booster  2nd dose of Moderna in April 2021  · CXR: Bilateral groundglass presumed Covid 19 pneumonia infiltrates in this Covid positive patient  · Noted to be on 3-4 L on admission and increased to 13 L overnight  · Currently on 3L with oxygen saturations 91%  · Home O2 eval prior to d/c was completed, patient discharged on 5 LPM of O2  · Initially withheld the remdesivir secondary to transaminitis however given not greater than 10 times upper limit of normal was initiated 1/20/22 (day #5)  · s/p IV dexamethasone, day #6  · Transition to PO dexamethisone taper starting tomorroe  · 4 mg x3 days, then 2 mg x3 days then stop  · Rec'd baricitinib day #5  · procalcitonin 0 12    Inflammatory Markers (last 3):   Lab Results   Component Value Date    DDIMER 1 28 (H) 01/24/2022    DDIMER 0 88 (H) 01/19/2022    CRP 23 8 (H) 01/24/2022    CRP 46 4 (H) 01/21/2022    CRP 36 9 (H) 01/20/2022       Cardiac Markers:  Lab Results   Component Value Date    NTBNP 1,007 (H) 01/19/2022    CKTOTAL 90 01/19/2022

## 2022-01-24 NOTE — DISCHARGE INSTR - AVS FIRST PAGE
Dear Ashlee Bradley,     It was our pleasure to care for you here at [facility], [email or web address]  It is our hope that we were always able to exceed the expected standards for your care during your stay  You were hospitalized due to COVID  You were cared for on the 4th floor by Hazel Burger PA-C under the service of Chasity Vaca* with the [facility] who covers for your primary care physician (PCP), Brooke Phelps MD, while you were hospitalized  If you have any questions or concerns related to this hospitalization, you may contact us at [phone]  For follow up as well as any medication refills, we recommend that you follow up with your primary care physician  A registered nurse will reach out to you by phone within a few days after your discharge to answer any additional questions that you may have after going home  However, at this time we provide for you here, the most important instructions / recommendations at discharge:     Notable Medication Adjustments -   Take 20 units of Lantus at night  Take albuterol as needed for shortness of breath  Starting tomorrow please take dexamethasone 4 mg daily for 3 days, decreasing to 2 mg daily for 3 days and then stopping  Testing Required after Discharge -   None  Important follow up information -   Please follow-up with your family doctor and your endocrinologist within the next 2 weeks  Other Instructions -   Return to the ER with any worsening complaints of shortness of breath, chest pain  Please review this entire after visit summary as additional general instructions including medication list, appointments, activity, diet, any pertinent wound care, and other additional recommendations from your care team that may be provided for you        Sincerely,     Hazel Burger PA-C

## 2022-01-25 NOTE — DISCHARGE SUMMARY
[facility]  Discharge- Anna Ruvalcabarancesco 1940, 80 y o  male MRN: [id number]  Unit/Bed#: W -01 Encounter: [id number]  Primary Care Provider: Amanda Frankel MD   Date and time admitted to hospital: 1/19/2022  9:06 AM    * Acute hypoxemic respiratory failure due to COVID-19 [facility])  Assessment & Plan  · Positive test 1/14/2022  ? Admitted 01/19/22  ? Vaccinated x2 but no booster  2nd dose of Moderna in April 2021  · CXR: Bilateral groundglass presumed Covid 19 pneumonia infiltrates in this Covid positive patient  · Noted to be on 3-4 L on admission and increased to 13 L overnight  · Currently on 3L with oxygen saturations 91%  ? Home O2 eval prior to d/c  · Initially withheld the remdesivir secondary to transaminitis however given not greater than 10 times upper limit of normal was initiated 1/20/22 (day #5)  · s/p IV dexamethasone, day #6  ? Transition to PO dexamethisone taper starting tomorroe  § 4 mg x3 days, then 2 mg x3 days then stop  · Rec'd baricitinib day #5  · procalcitonin 0 12     Inflammatory Markers (last 3): Lab Results   Component Value Date     DDIMER 1 28 (H) 01/24/2022     DDIMER 0 88 (H) 01/19/2022     CRP 23 8 (H) 01/24/2022     CRP 46 4 (H) 01/21/2022     CRP 36 9 (H) 01/20/2022         Cardiac Markers:        Lab Results   Component Value Date     NTBNP 1,007 (H) 01/19/2022     CKTOTAL 90 01/19/2022            Type 2 diabetes mellitus with hyperglycemia, with long-term current use of insulin [facility])  Assessment & Plan        Lab Results   Component Value Date     HGBA1C 7 5 (H) 10/14/2021                Recent Labs     01/23/22  1643 01/23/22  2126 01/24/22  0753 01/24/22  0837   POCGLU 250* 313* 57* 87         Blood Sugar Average: Last 72 hrs:  (P) 215 [id number]   · On home insulin-Lantus 14 units q h s , metformin  · Resume metformin  ? Evening 1/21 patient was noted to have blood glucose of greater 500  ? This a m   He was asymptomatic but had a blood sugar of 57   ? Would recommend decreasing Lantus to 20 units q h s  Moving forward and close Endocrinology follow-up      Transaminitis  Assessment & Plan  · Appears to be acute on chronic - possible secondary to COVID 19  ? History of hepatitis-C status post treatment with [facility] several years ago  ? Reports that at baseline he has mild transaminitis  ? Chronic hepatitis panel shows history of hepatitis-B as well  · Ultrasound performed as an outpatient recently with normal appearing liver  · Not on statin given mylagias           Lab Results   Component Value Date      (H) 01/24/2022      (H) 01/22/2022      (H) 01/21/2022      (H) 01/24/2022      (H) 01/22/2022      (H) 01/21/2022            S/P coronary artery stent placement  Assessment & Plan  · Continue aspirin and Zetia  · Not on statin given myalgias      Hypertension  Assessment & Plan  · With hypotension overnight which resolved with IV fluids  · Reviewed patient's medication list with him  He is not on amlodipine he reports this may have been previously prescribed to him however he did not take it     ? At baseline is on ramipril    He reports that he is on this for diabetes and renal protection however not for hypertension  · He wants to potentially change this to an ARB as outpatient given chronic dry cough     Dyslipidemia  Assessment & Plan  · On statin which was held on admission secondary to transaminitis however transaminitis appears to be acute on chronic and thus resumed on Zetia  · Is not on statin given myalgias      CLL (chronic lymphocytic leukemia) ([facility] [address] )  Assessment & Plan  · Received treatment at [facility] for this had been on surveillance monitoring since 2016 however was noted to have progression and therefore underwent treatment  · FLUDARABINE/CYCLOPHOSPHAMIDE/RITUXIMAB for 6 cycles which ended in June 2021        Discharging Physician / Practitioner: George Burch Rollo Sever, PA-C  PCP: Edenilson Dietrich MD  Admission Date:       Admission Orders (From admission, onward)              Ordered          01/19/22 0957   INPATIENT ADMISSION  Once                          Discharge Date: 01/24/22         Medical Problems                        Resolved Problems  Date Reviewed: 1/22/2022           None                     Consultations During Hospital Stay:  · None      Procedures Performed:   · None     Significant Findings / Test Results:   · CXR: "Bilateral groundglass presumed Covid 19 pneumonia infiltrates in this Covid positive patient "     Incidental Findings:   · none      Test Results Pending at Discharge (will require follow up):   · none     Outpatient Tests Requested:  · none     Complications:  none     Reason for Admission: COVID [address]  Course:      Joseph Tim is a 80 y o  male patient who originally presented to the hospital on 1/19/2022 due to shortness of breath secondary to COVID-19 pneumonia  Patient reported nonproductive cough at home with hypoxia with worsening symptoms over the past few days prior to admission  Was COVID positive 1/14 and progressively worse  The patient was admitted under the mild pathway and received remdesivir, baricitinib, dexamethasone as above  He has a history of mild transaminitis, while hospitalized his LFTs remain stable  Throughout his stay he was oxygen levels were weaned down to 5 L upon hospital discharge with home oxygen evaluation completed  Patient was discharged in stable condition with dexamethasone taper  We told the patient to increase his Lantus to 20 units q h s  Given hyperglycemia here in the hospital and follow up closely with outpatient endocrinologist      Please see above list of diagnoses and related plan for additional information       Condition at Discharge: stable      Discharge Day Visit / Exam:      Subjective:  No overnight events per nursing   O2 has been acceptable at 90% on 5 LPM  Home O2 eval completed prior to hospital discharge  Vitals: Blood Pressure: 132/76 (01/24/22 0752)  Pulse: 68 (01/24/22 0752)  Temperature: 98 1 °F (36 7 °C) (01/22/22 0748)  Temp Source: Oral (01/19/22 2007)  Respirations: 18 (01/24/22 0752)  Height: 5' 10" (177 8 cm) (01/19/22 2007)  Weight - Scale: 64 kg (141 lb 1 5 oz) (01/19/22 2007)  SpO2: (!) 87 % (01/24/22 0752)  Exam:   Physical Exam  Vitals and nursing note reviewed  Constitutional:       General: He is not in acute distress  Appearance: Normal appearance  Interventions: Nasal cannula in place  HENT:      Head: Normocephalic  Mouth/Throat:      Mouth: Mucous membranes are moist    Eyes:      Pupils: Pupils are equal, round, and reactive to light  Cardiovascular:      Rate and Rhythm: Normal rate and regular rhythm  Heart sounds: No murmur heard        Pulmonary:      Effort: Pulmonary effort is normal  No respiratory distress  Breath sounds: Normal breath sounds  No wheezing, rhonchi or rales  Abdominal:      General: Bowel sounds are normal  There is no distension  Palpations: Abdomen is soft  Tenderness: There is no abdominal tenderness  There is no guarding  Musculoskeletal:         General: No deformity  Cervical back: Normal range of motion  Right lower leg: No edema  Left lower leg: No edema  Skin:     Capillary Refill: Capillary refill takes less than 2 seconds  Neurological:      General: No focal deficit present  Mental Status: He is alert and oriented to person, place, and time  Mental status is at baseline        Discussion with Family: called august Curiel [phone], left message for callback     Discharge instructions/Information to patient and family:   See after visit summary for information provided to patient and family        Provisions for Follow-Up Care:  See after visit summary for information related to follow-up care and any pertinent home health orders     Disposition:      Home with VNA Services (Reminder: Complete face to face encounter)     For Discharges to [facility]:   · Not Applicable to this Patient - Not Applicable to this Patient     Planned Readmission: no     Discharge Statement:  I spent 45 minutes discharging the patient  This time was spent on the day of discharge  I had direct contact with the patient on the day of discharge  Greater than 50% of the total time was spent examining patient, answering all patient questions, arranging and discussing plan of care with patient as well as directly providing post-discharge instructions    Additional time then spent on discharge activities      Discharge Medications:  See after visit summary for reconciled discharge medications provided to patient and family        ** Please Note: This note has been constructed using a voice recognition system **
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
## 2022-01-26 PROBLEM — I26.99 ACUTE PULMONARY EMBOLISM WITHOUT ACUTE COR PULMONALE (HCC): Status: ACTIVE | Noted: 2022-01-01

## 2022-01-26 NOTE — ED ATTENDING ATTESTATION
1/26/2022  I, Joss Casillas MD, saw and evaluated the patient  I have discussed the patient with the resident/non-physician practitioner and agree with the resident's/non-physician practitioner's findings, Plan of Care, and MDM as documented in the resident's/non-physician practitioner's note, except where noted  All available labs and Radiology studies were reviewed  I was present for key portions of any procedure(s) performed by the resident/non-physician practitioner and I was immediately available to provide assistance  At this point I agree with the current assessment done in the Emergency Department  I have conducted an independent evaluation of this patient a history and physical is as follows:  Level C trauma activation  Personally saw and evaluated patient  CT head unremarkable  Patient reports syncopal/near syncopal episode  Recent COVID-19  Discharged on home oxygen  Was not wearing at the time  Initial saturation 60% on room air, required non-rebreather followed by mid flow at roughly 15 liters/minute to maintain saturation above 90%  D-dimer markedly elevated over baseline, CT scan with evidence of chronic pulmonary embolism, cannot rule out acute pulmonary embolism  Due to recent COVID infection, increasing oxygen requirement, markedly increased D-dimer, nonspecific CT findings, heparin infusion initiated  Patient maintained his saturations on mid flow oxygen  Will need admission to the hospitalist service      ED Course         Critical Care Time  Procedures

## 2022-01-26 NOTE — ASSESSMENT & PLAN NOTE
· POA, in the setting of recent COVID infection  · Started on heparin infusion  Continue for now  · No evidence of right heart strain on CT    Check echocardiogram  · Eventual transition to NOAC when respiratory status improves

## 2022-01-26 NOTE — ASSESSMENT & PLAN NOTE
· This has trended up since recent discharge  · Likely in the setting of acute viral infection    Also has history of hepatitis C infection  · Reported with chronic transaminitis at baseline  · Recent outpatient ultrasound on 1/7/22 was unremarkable  · Continue to monitor

## 2022-01-26 NOTE — ASSESSMENT & PLAN NOTE
Lab Results   Component Value Date    HGBA1C 7 5 (H) 10/14/2021       Recent Labs     01/24/22  0753 01/24/22  0837 01/24/22  1121 01/26/22  0900   POCGLU 57* 87 386* 198*     Blood Sugar Average: Last 72 hrs:  · (P) 198, check hemoglobin A1c  · Continue Lantus 20 units q h s   With SSI coverage  · Continue to adjust insulin as indicated while on steroids

## 2022-01-26 NOTE — ED PROVIDER NOTES
Emergency Department Trauma Note  Tita Tim 80 y o  male MRN: [id number]  Unit/Bed#: ED 27/ED 27 Encounter: [id number]      Trauma Alert: Trauma Acuity: C  Model of Arrival: Mode of Arrival: Direct from scene via    Trauma Team: Current Providers  Attending Provider: Robbi Gamez MD  Attending Provider: Yudy Reynolds MD  ED Technician: Prince Chaudhary  : Paras Holden  : Paras Holden  Resident: Derik Guerin MD  Registered Nurse: Gerald Pedraza RN  Consultants: None      History of Present Illness     Chief Complaint:   Chief Complaint   Patient presents with    Fall     Pt  arrives as a Trauma level C with complaints of fall +head strike + ASA (possible LOC?), pt  c/o dizziness prior to fall  Pt  oxygen low upon arrival  + covid-19 on 1/14   Trauma     HPI:  Tita Tim is a 80 y o  male who presents with   Mechanism:Details of Incident: fall from standing (possible LOC) ASA+ , pt states he fell while in the bathroom (dizziness) after urinating  Injury Date: 01/26/22   Injury Occurence Location - [address] [place] [address] Way: bathroom     81 y/o male h/o CLL, type 2 diabetes, recently admitted for moderate COVID, BIBEMS for syncope resulting in head injury on aspirin  Patient states he went to the bathroom, was sitting to urinate, stood up and felt dizzy, fell hitting his head and left arm  Not exactly sure what caused the dizziness  Not sure if he passed out at all  Wife heard him fall and came in the bathroom about the time she entered he was already sitting up and awake  Patient was discharged on 3LPM of oxygen, but was not using his oxygen when he was going to the bathroom  Denies head pain, dyspnea, nausea, vomiting, chest pain  Says he is not have any trouble breathing but appears to be in bit of respiratory distress  Was treated 5 months ago for CLL, believes that was completely cleared up          Review of Systems   Constitutional: Positive for activity change and fatigue  Negative for fever and unexpected weight change  HENT: Positive for congestion  Negative for postnasal drip and rhinorrhea  Eyes: Negative for visual disturbance  Respiratory: Positive for cough, chest tightness and shortness of breath  Cardiovascular: Negative for chest pain, palpitations and leg swelling  Gastrointestinal: Negative for abdominal pain, blood in stool, constipation, diarrhea, nausea and vomiting  Endocrine: Negative for cold intolerance and heat intolerance  Genitourinary: Negative for difficulty urinating and hematuria  Skin: Positive for wound  Negative for color change  Allergic/Immunologic: Positive for immunocompromised state  Neurological: Positive for dizziness, syncope and weakness  Psychiatric/Behavioral: Negative for dysphoric mood  The patient is not nervous/anxious  All other systems reviewed and are negative        Historical Information     Immunizations:   Immunization History   Administered Date(s) Administered    COVID-19 MODERNA VACC 0 5 ML IM 03/24/2021, 04/23/2021    Pneumococcal Conjugate 13-Valent 03/26/2015    Pneumococcal Polysaccharide PPV23 03/25/2014    Tdap 01/26/2022       Past Medical History:   Diagnosis Date    Arthritis     Cancer [facility])     Cardiac disease     abnormal stress test 2012    Chronic kidney disease     protein urine    CLL [facility]chronic lymphocytic leukemia) ([facility] [address] ) 2014    6 months chemo    Constipation     Coronary artery disease     Diabetes mellitus ([facility] [address] )     type 2    History of chemotherapy     Tx for CLL currently in remission since 2016    Hyperlipidemia     Hypertension     Infectious viral hepatitis     Hep C Hx remission since 2016 post tx    Liver disease     Proteinuria        Family History   Problem Relation Age of Onset    Diabetes unspecified Brother     Diabetes Brother     Diabetes unspecified Maternal Grandfather     Diabetes Maternal Grandfather     Cancer Mother         breast    Alzheimer's disease Mother     Early death Father         age 64 peritonitis from gallbladder    Cancer Sister         small cell carcinoma    No Known Problems Daughter     No Known Problems Daughter      Past Surgical History:   Procedure Laterality Date    ANGIOPLASTY      one stent    CARDIAC CATHETERIZATION      CARDIAC SURGERY  2016    x1 stent    CATARACT EXTRACTION      CYST REMOVAL      mid back benign    FRACTURE SURGERY Right     Fx Femur age 17, pin in place    MS XCAPSL CTRC RMVL INSJ IO LENS PROSTH W/O ECP Right 7/23/2018    Procedure: EXTRACTION EXTRACAPSULAR CATARACT PHACO INTRAOCULAR LENS (IOL); Surgeon: Elena Muse MD;  Location: [facility] MAIN OR;  Service: Ophthalmology    MS XCAPSL CTRC RMVL INSJ IO LENS PROSTH W/O ECP Left 10/1/2018    Procedure: EXTRACTION EXTRACAPSULAR CATARACT PHACO INTRAOCULAR LENS (IOL); Surgeon: Elena Muse MD;  Location: [facility] MAIN OR;  Service: Ophthalmology    TONSILLECTOMY       Social History     Tobacco Use    Smoking status: Former Smoker    Smokeless tobacco: Never Used    Tobacco comment: quit 20 years ago   Vaping Use    Vaping Use: Never used   Substance Use Topics    Alcohol use: Yes     Comment: SOCIALLY    Drug use: No     E-Cigarette/Vaping    E-Cigarette Use Never User      E-Cigarette/Vaping Substances       Family History: non-contributory    Meds/Allergies   Prior to Admission Medications   Prescriptions Last Dose Informant Patient Reported? Taking?    Blood Glucose Monitoring Suppl (ONE TOUCH ULTRA MINI) w/Device KIT   No No   Sig: Use meter as directed   Insulin Pen Needle 32G X 6 MM MISC   Yes No   Sig: BD Ultra-Fine Beatrice Pen Needle 32 gauge x 5/32"   NON FORMULARY  Self Yes No   Sig: as needed Fiber Therapy Powder-One heaping tablespoon to 12 oz of water   ONETOUCH DELICA LANCETS FINE MISC   No No   Sig: Twice a day   albuterol (PROVENTIL HFA,VENTOLIN HFA) 90 mcg/act inhaler   No No   Sig: Inhale 2 puffs every 4 (four) hours as needed for wheezing   aspirin 81 MG tablet  Self Yes No   Sig: Take 1 tablet by mouth every morning     benzonatate (TESSALON PERLES) 100 mg capsule   No No   Sig: Take 1 capsule (100 mg total) by mouth 3 (three) times a day as needed for cough   dexamethasone (DECADRON) 2 mg tablet   No No   Sig: Take 2 tablets (4 mg total) by mouth daily with breakfast for 3 days, THEN 1 tablet (2 mg total) daily with breakfast for 3 days  ezetimibe (ZETIA) 10 mg tablet   Yes No   Sig: Take 10 mg by mouth daily   glucose blood (ONE TOUCH ULTRA TEST) test strip   No No   Sig: Check blood sugar twice a day   insulin glargine (Lantus SoloStar) 100 units/mL injection pen   No No   Sig: Inject 20 Units under the skin daily at bedtime   metFORMIN (GLUCOPHAGE) 1000 MG tablet   No No   Sig: TAKE 1 TABLET BY MOUTH TWICE A DAY   pramipexole (MIRAPEX) 1 mg tablet   Yes No   Sig: Take 1 5 mg by mouth daily    ramipril (ALTACE) 5 mg capsule   Yes No   Sig: Take 5 mg by mouth daily      Facility-Administered Medications: None       Allergies   Allergen Reactions    Other      Seasonal allergies       PHYSICAL EXAM    PE limited by: n/a    Objective   Vitals:   First set: Temperature: 97 5 °F (36 4 °C) (01/26/22 0855)  Pulse: 94 (01/26/22 0843)  Respirations: 20 (01/26/22 0843)  Blood Pressure: 136/66 (01/26/22 0843)  SpO2: (!) 68 % (patient placed on NRB/ trouble reading pulse ox) (01/26/22 0849)    Primary Survey:   (A) Airway: patent  (B) Breathing: tachypnea, unlabored, lungs cta   (C) Circulation: Pulses:   normal  (D) Disabliity:  GCS Total:  15  (E) Expose:  Completed    Secondary Survey: (Click on Physical Exam tab above)  Physical Exam  Vitals and nursing note reviewed  Constitutional:       General: He is in acute distress  Appearance: Normal appearance  He is ill-appearing  He is not diaphoretic  Comments: Sitting up in bed   HENT:      Head: Normocephalic and atraumatic          Right Ear: External ear normal       Left Ear: External ear normal       Nose: Nose normal       Mouth/Throat:      Mouth: Mucous membranes are moist    Eyes:      General: No scleral icterus  Extraocular Movements: Extraocular movements intact  Conjunctiva/sclera: Conjunctivae normal    Cardiovascular:      Rate and Rhythm: Regular rhythm  Tachycardia present  Pulses: Normal pulses  Radial pulses are 2+ on the right side  Dorsalis pedis pulses are 2+ on the right side and 2+ on the left side  Heart sounds: Normal heart sounds, S1 normal and S2 normal  No murmur heard  Pulmonary:      Effort: Tachypnea and accessory muscle usage present  No respiratory distress  Breath sounds: Normal breath sounds  No stridor or transmitted upper airway sounds  No decreased breath sounds, wheezing, rhonchi or rales  Abdominal:      General: Bowel sounds are normal       Palpations: Abdomen is soft  Tenderness: There is no abdominal tenderness  Musculoskeletal:         General: Normal range of motion  Cervical back: Normal range of motion  No rigidity or crepitus  No spinous process tenderness or muscular tenderness  Normal range of motion  Right lower leg: No edema  Left lower leg: No edema  Comments: Left upper extremity has 3 skin tears, not actively bleeding   Skin:     General: Skin is warm and dry  Coloration: Skin is not jaundiced  Neurological:      General: No focal deficit present  Mental Status: He is alert and oriented to person, place, and time  Psychiatric:         Mood and Affect: Mood normal          Cervical spine cleared by clinical criteria?  Yes     Invasive Devices  Report    Peripheral Intravenous Line            Peripheral IV 01/26/22 Left Antecubital <1 day    Peripheral IV 01/26/22 Right;Ventral (anterior) Forearm <1 day                Lab Results:   Results Reviewed     Procedure Component Value Units Date/Time    Fingerstick Glucose (POCT) [[id number]]  (Abnormal) Collected: 01/26/22 1539    Lab Status: Final result Updated: 01/26/22 1540     POC Glucose 371 mg/dl     APTT [[id number]]     Lab Status: No result Specimen: Blood     Blood culture #1 [[id number]] Collected: 01/26/22 1035    Lab Status: Preliminary result Specimen: Blood from Arm, Right Updated: 01/26/22 1501     Blood Culture Received in Microbiology Lab  Culture in Progress  Blood culture #2 [[id number]] Collected: 01/26/22 1035    Lab Status: Preliminary result Specimen: Blood from Arm, Left Updated: 01/26/22 1501     Blood Culture Received in Microbiology Lab  Culture in Progress  Lactic acid 2 Hours [[id number]]  (Normal) Collected: 01/26/22 1350    Lab Status: Final result Specimen: Blood from Arm, Left Updated: 01/26/22 1442     LACTIC ACID 2 0 mmol/L     Narrative:      Result may be elevated if tourniquet was used during collection  HS Troponin I 4hr [[id number]]  (Normal) Collected: 01/26/22 1350    Lab Status: Final result Specimen: Blood from Arm, Left Updated: 01/26/22 1433     hs TnI 4hr 18 ng/L      Delta 4hr hsTnI 0 ng/L     Hemoglobin A1c w/EAG Estimation (Orders if not completed within the last 90 days) [[id number]] Collected: 01/26/22 0911    Lab Status:  In process Specimen: Blood from Arm, Left Updated: 01/26/22 1407    HS Troponin I 2hr [[id number]]  (Normal) Collected: 01/26/22 1113    Lab Status: Final result Specimen: Blood from Arm, Left Updated: 01/26/22 1259     hs TnI 2hr 16 ng/L      Delta 2hr hsTnI -2 ng/L     Protime-INR [[id number]]  (Abnormal) Collected: 01/26/22 1039    Lab Status: Final result Specimen: Blood from Arm, Left Updated: 01/26/22 1255     Protime 17 1 seconds      INR 1 40    Lactic acid, plasma [[id number]]  (Abnormal) Collected: 01/26/22 1035    Lab Status: Final result Specimen: Blood from Arm, Right Updated: 01/26/22 1200     LACTIC ACID 2 3 mmol/L     Narrative:      Result may be elevated if tourniquet was used during collection      Procalcitonin with AM Reflex [[id number]]  (Abnormal) Collected: 01/26/22 0911    Lab Status: Final result Specimen: Blood from Arm, Left Updated: 01/26/22 1137     Procalcitonin 0 27 ng/ml     Procalcitonin Reflex [[id number]]     Lab Status: No result Specimen: Blood     APTT [[id number]]  (Normal) Collected: 01/26/22 1039    Lab Status: Final result Specimen: Blood from Arm, Left Updated: 01/26/22 1136     PTT 30 seconds     C-reactive protein [[id number]]  (Abnormal) Collected: 01/26/22 0911    Lab Status: Final result Specimen: Blood from Arm, Left Updated: 01/26/22 1116     CRP 74 6 mg/L     Magnesium [[id number]]  (Normal) Collected: 01/26/22 0911    Lab Status: Final result Specimen: Blood from Arm, Left Updated: 01/26/22 1115     Magnesium 2 0 mg/dL     NT-BNP PRO [[id number]]  (Abnormal) Collected: 01/26/22 0911    Lab Status: Final result Specimen: Blood from Arm, Left Updated: 01/26/22 1115     NT-proBNP 714 pg/mL     Comprehensive metabolic panel [[id number]]  (Abnormal) Collected: 01/26/22 0911    Lab Status: Final result Specimen: Blood from Arm, Left Updated: 01/26/22 1016     Sodium 138 mmol/L      Potassium 4 1 mmol/L      Chloride 102 mmol/L      CO2 25 mmol/L      ANION GAP 11 mmol/L      BUN 35 mg/dL      Creatinine 1 03 mg/dL      Glucose 217 mg/dL      Calcium 9 5 mg/dL      Corrected Calcium 10 5 mg/dL       U/L       U/L      Alkaline Phosphatase 172 U/L      Total Protein 6 1 g/dL      Albumin 2 8 g/dL      Total Bilirubin 1 04 mg/dL      eGFR 68 ml/min/1 73sq m     Narrative:      [place] guidelines for Chronic Kidney Disease (CKD):     Stage 1 with normal or high GFR (GFR > 90 mL/min/1 73 square meters)    Stage 2 Mild CKD (GFR = 60-89 mL/min/1 73 square meters)    Stage 3A Moderate CKD (GFR = 45-59 mL/min/1 73 square meters)    Stage 3B Moderate CKD (GFR = 30-44 mL/min/1 73 square meters)    Stage 4 Severe CKD (GFR = 15-29 mL/min/1 73 square meters)    Stage 5 End Stage CKD (GFR <15 mL/min/1 73 square meters)  Note: GFR calculation is accurate only with a steady state creatinine    D-dimer, quantitative [[id number]]  (Abnormal) Collected: 01/26/22 0911    Lab Status: Final result Specimen: Blood from Arm, Left Updated: 01/26/22 1011     D-Dimer, Quant >20 00 ug/ml FEU     HS Troponin 0hr (reflex protocol) [[id number]]  (Normal) Collected: 01/26/22 0911    Lab Status: Final result Specimen: Blood from Arm, Left Updated: 01/26/22 1002     hs TnI 0hr 18 ng/L     CBC and differential [[id number]]  (Abnormal) Collected: 01/26/22 0911    Lab Status: Final result Specimen: Blood from Arm, Left Updated: 01/26/22 0923     WBC 29 56 Thousand/uL      RBC 4 95 Million/uL      Hemoglobin 14 2 g/dL      Hematocrit 43 1 %      MCV 87 fL      MCH 28 7 pg      MCHC 32 9 g/dL      RDW 15 9 %      MPV 11 8 fL      Platelets 888 Thousands/uL      nRBC 0 /100 WBCs      Neutrophils Relative 85 %      Immat GRANS % 2 %      Lymphocytes Relative 5 %      Monocytes Relative 8 %      Eosinophils Relative 0 %      Basophils Relative 0 %      Neutrophils Absolute 24 95 Thousands/µL      Immature Grans Absolute >0 50 Thousand/uL      Lymphocytes Absolute 1 48 Thousands/µL      Monocytes Absolute 2 47 Thousand/µL      Eosinophils Absolute 0 04 Thousand/µL      Basophils Absolute 0 09 Thousands/µL     Fingerstick Glucose (POCT) [[id number]]  (Abnormal) Collected: 01/26/22 0900    Lab Status: Final result Updated: 01/26/22 0903     POC Glucose 198 mg/dl                  Imaging Studies:   Direct to CT: Yes  CTA ED chest PE study   Final Result by Isac Avery MD (01/26 1133)      A few tiny subsegmental filling defects in all lobes of the right lung and in the left lower lobe, too small to determine if they are acute or chronic pulmonary emboli  A small web in the right interlobar pulmonary artery is the sequela of a chronic    pulmonary embolus        1 3 x 0 8 cm juxtapleural nodule in the left upper lobe  It is associated with traction bronchiolectasis and is likely a scar but consider follow-up with a chest CT with no contrast in 6 months  Severe bilateral groundglass opacity due to Covid-19  I notified Lesly Little and Casey Evangelista by secure text on 1/26/2022 at 11:31 AM   They both responded immediately  Workstation performed: [id number]         XR chest 1 view portable   Final Result by Jose Sotelo MD (01/26 6206)      Slightly increased pulmonary infiltrates compared with 1/19/2022  No acute traumatic injury seen  Workstation performed: [id number]         CT head without contrast   Final Result by Jose Sotelo MD (01/26 6293)      No acute intracranial abnormality  Report was telephoned to the emergency department physician caring for the patient at 9:34 AM on 1/26/2022                  Workstation performed: [id number]               Procedures  ECG 12 Lead Documentation Only    Date/Time: 1/26/2022 8:58 AM  Performed by: Carrie House MD  Authorized by: Carrie House MD     ECG reviewed by me, the ED Provider: yes    Patient location:  ED  Previous ECG:     Previous ECG:  Compared to current    Similarity:  No change    Comparison to cardiac monitor: Yes    Interpretation:     Interpretation: normal    Quality:     Tracing quality:  Limited by artifact  Rate:     ECG rate:  81    ECG rate assessment: normal    Rhythm:     Rhythm: sinus rhythm    Ectopy:     Ectopy: none    QRS:     QRS axis:  Normal    QRS intervals:  Normal  Conduction:     Conduction: normal    ST segments:     ST segments:  Normal  T waves:     T waves: normal               ED Course  ED Course as of 01/26/22 1735   Wed Jan 26, 2022   0930 POC Glucose(!): 198   0930 WBC(!): 29 56  Similar to recent   0938 CT head without contrast  No acute intracranial abnormality     0474 XR chest 1 view portable  Slightly increased pulmonary infiltrates compared with 1/19/2022  No acute traumatic injury seen  1021 D-Dimer, Quant(!): >20 00  Ordering cta  Was 1 28 2 days ago   1022 hs TnI 0hr: 18   1023 ALT(!): 487  Elevated compared to 2 days ago   1024 Started moderate covid meds   1031 SpO2 88-90 despite midflow  Having nurse put nrb on  Calling respiratory for high flow   1100 Reassessed patient  Now more comfortable on mid flow, still at 15 LPM, but no longer requiring non-rebreather  No tachypnea or respiratory distress   1104 C-REACTIVE PROTEIN(!): 74 6   1123 NT-proBNP(!): 714           MDM  Number of Diagnoses or Management Options  COVID: established and worsening  Hypoxia: established and worsening  Injury of head, initial encounter: new and requires workup  Pulmonary emboli [facility]): new and requires workup  Diagnosis management comments: Initial impression:  Has head injury on aspirin and will need evaluation for that, however on evaluation does not show any signs of severe head injury  No tenderness, crepitus, bruising any point in his body  No C-spine tenderness and moving his head and neck without any issue, can clear C-spine clinically  Left upper extremity does have the skin tears however there is no tenderness or crepitation, able to move his extremities without any issues  No indication for fracture that extremity  More concerning, it seems like patient had a syncopal episode that led into the fall  Possible this was because he was not wearing his oxygen  Cannot rule out more concerning medical causes including cardiac syncope, metabolic issues, hypoglycemia, PE, or any other issues  All is especially true given that he is still COVID positive and requiring oxygen at home  PE is especially concerning given recent CLL, current COVID patient, tachypnea, hypoxia      Initial work up:  CT head without contrast due to the head injury on aspirin, ACS workup including troponin/EKG/chest x-ray, D-dimer, basic labs, fingerstick glucose    Final impression:  Cleared from a trauma perspective with a negative CT head  D-dimer markedly elevated which led to discovery of multiple PEs on CTA chest   Although radiologist read as unable to differentiate chronic versus acute, given his increasing oxygen requirements and his markedly elevated D-dimer compared to 2 days ago, likely some of these thrombi are acute  Will start on heparin drip and admit under the moderate COVID pathway  Explain these findings to patient and his         Amount and/or Complexity of Data Reviewed  Clinical lab tests: ordered and reviewed  Tests in the radiology section of CPT®: ordered and reviewed  Tests in the medicine section of CPT®: reviewed and ordered  Discussion of test results with the performing providers: yes  Decide to obtain previous medical records or to obtain history from someone other than the patient: yes  Obtain history from someone other than the patient: yes  Review and summarize past medical records: yes  Discuss the patient with other providers: yes  Independent visualization of images, tracings, or specimens: yes    Risk of Complications, Morbidity, and/or Mortality  Presenting problems: high  Diagnostic procedures: high  Management options: high    Patient Progress  Patient progress: improved          Disposition  [facility] Transfer: No  Final diagnoses:   COVID   Pulmonary emboli ([facility] [address] )   Hypoxia   Injury of head, initial encounter     Time reflects when diagnosis was documented in both MDM as applicable and the Disposition within this note     Time User Action Codes Description Comment    1/26/2022 11:44 AM Lily Bash Add [U07 1] COVID     1/26/2022 11:44 AM Lily Bash Add [I26 99] Pulmonary emboli ([facility] [address] )     1/26/2022 11:44 AM Lily Bash Add [R09 02] Hypoxia     1/26/2022 11:44 AM Lily Bash Add [S09 90XA] Injury of head, initial encounter     1/26/2022 11:44 AM Lily Bash Modify [U07 1] COVID 1/26/2022 11:44 AM Harshal Johnston [R09 02] Hypoxia       ED Disposition     ED Disposition Condition Date/Time Comment    Admit Stable Wed Jan 26, 2022 11:44 AM Case was discussed with Dr Urbina Needs and the patient's admission status was agreed to be Admission Status: inpatient status to the service of Dr Urbina Needs   Follow-up Information    None       Patient's Medications   Discharge Prescriptions    No medications on file     No discharge procedures on file      PDMP Review     None          ED Provider  Electronically Signed by         Radha Salas MD  01/26/22 0173

## 2022-01-26 NOTE — ASSESSMENT & PLAN NOTE
· POA, tested positive for COVID on 1/14/22  Was admitted from 01/19 through 1/24/22  · Discharge home on 5 L supplemental O2 at rest and 6 L with exertion, steroid taper  · Appears patient had not been very compliant at home with supplemental O2  · Currently requiring up to 15 L mid flow  · Chest x-ray showed slightly increased pulmonary infiltrates  · CTA of the chest revealed "A few tiny subsegmental filling defects in all lobes of the right lung and in the left lower lobe, too small to determine if they are acute or chronic pulmonary emboli  A small web in the right interlobar pulmonary artery is the sequela of a chronic pulmonary embolus  1 3 x 0 8 cm juxtapleural nodule in the left upper lobe  It is associated with traction bronchiolectasis and is likely a scar but consider follow-up with a chest CT with no contrast in 6 months  Severe bilateral groundglass opacity due to Covid-19"  · Patient admitted to step-down 2, continue respiratory protocol, supplemental O2 and titrate to maintain sats > 90%  · He will be resumed on IV Decadron but increased to q 12 hours dose, resume Baricitinib  · Out of window for Remdesivir  Will discontinue  · Procalcitonin slightly elevated  Patient started on ceftriaxone and doxycycline in the ED    Will continue

## 2022-01-26 NOTE — ASSESSMENT & PLAN NOTE
· Presented as a trauma level C post fall home  · Patient reports he had taking off supplemental O2 when going to the bathroom  · Health dizzy after voiding and fell sustaining striking his head and left upper extremity sustaining skin tears with head abrasion  · Was evaluated and cleared by Trauma in the ED  · PT/OT evaluation obtained

## 2022-01-26 NOTE — H&P
[facility]  H&P Darya Martinezsco 1940, 80 y o  male MRN: [id number]  Unit/Bed#: ED 27 Encounter: [id number]  Primary Care Provider: Idalmis Bryson MD   Date and time admitted to hospital: 1/26/2022  8:43 AM    * Acute hypoxemic respiratory failure due to COVID-19 [facility])  Assessment & Plan  · POA, tested positive for COVID on 1/14/22  Was admitted from 01/19 through 1/24/22  · Discharge home on 5 L supplemental O2 at rest and 6 L with exertion, steroid taper  · Appears patient had not been very compliant at home with supplemental O2  · Currently requiring up to 15 L mid flow  · Chest x-ray showed slightly increased pulmonary infiltrates  · CTA of the chest revealed "A few tiny subsegmental filling defects in all lobes of the right lung and in the left lower lobe, too small to determine if they are acute or chronic pulmonary emboli  A small web in the right interlobar pulmonary artery is the sequela of a chronic pulmonary embolus  1 3 x 0 8 cm juxtapleural nodule in the left upper lobe  It is associated with traction bronchiolectasis and is likely a scar but consider follow-up with a chest CT with no contrast in 6 months  Severe bilateral groundglass opacity due to Covid-19"  · Patient admitted to step-down 2, continue respiratory protocol, supplemental O2 and titrate to maintain sats > 90%  · He will be resumed on IV Decadron but increased to q 12 hours dose, resume Baricitinib  · Out of window for Remdesivir  Will discontinue  · Procalcitonin slightly elevated  Patient started on ceftriaxone and doxycycline in the ED  Will continue    Acute pulmonary embolism without acute cor pulmonale (HCC)  Assessment & Plan  · POA, in the setting of recent COVID infection  · Started on heparin infusion  Continue for now  · No evidence of right heart strain on CT    Check echocardiogram  · Eventual transition to NOAC when respiratory status improves    Fall at home  Assessment & Plan  · Presented as a trauma level C post fall home  · Patient reports he had taking off supplemental O2 when going to the bathroom  · Health dizzy after voiding and fell sustaining striking his head and left upper extremity sustaining skin tears with head abrasion  · Was evaluated and cleared by Trauma in the ED  · PT/OT evaluation obtained    Type 2 diabetes mellitus with hyperglycemia, with long-term current use of insulin [facility])  Assessment & Plan  Lab Results   Component Value Date    HGBA1C 7 5 (H) 10/14/2021       Recent Labs     01/24/22  0753 01/24/22  0837 01/24/22  1121 01/26/22  0900   POCGLU 57* 87 386* 198*     Blood Sugar Average: Last 72 hrs:  · (P) 198, check hemoglobin A1c  · Continue Lantus 20 units q h s  With SSI coverage  · Continue to adjust insulin as indicated while on steroids    Transaminitis  Assessment & Plan  · This has trended up since recent discharge  · Likely in the setting of acute viral infection  Also has history of hepatitis C infection  · Reported with chronic transaminitis at baseline  · Recent outpatient ultrasound on 1/7/22 was unremarkable  · Continue to monitor        VTE Prophylaxis: Heparin Drip  / sequential compression device     Code Status:  Full code    Anticipated Length of Stay:  Patient will be admitted on an Inpatient basis with an anticipated length of stay of  > 2 midnights  Justification for Hospital Stay:  Acute respiratory failure, pulmonary embolism    Chief Complaint:     Fall    History of Present Illness:  Mariano Anderson is a 80 y o  male who presents as a trauma level C alert post fall at home  Patient reports he had taking off supplemental O2 to use the bathroom this morning  After voiding, he felt dizzy and subsequently fell on the bathroom floor sustaining injuries to his left upper extremity and head  He was recently admitted to the hospital from 01/19 through 1/24/22 secondary to acute respiratory failure from COVID-19 infection    He was discharged home on oral steroids and supplemental O2  He denies fever or chills  He denies chest pain or shortness and breath at rest   No loss of consciousness  Review of Systems   Constitutional: Positive for fatigue  Negative for appetite change, chills, diaphoresis and fever  HENT: Positive for congestion and sinus pressure  Respiratory: Positive for cough  Negative for chest tightness and shortness of breath  Cardiovascular: Negative for chest pain, palpitations and leg swelling  Gastrointestinal: Negative  Genitourinary: Negative  Musculoskeletal: Negative  Neurological: Positive for dizziness  Negative for syncope, speech difficulty and headaches  Psychiatric/Behavioral: Negative  All other systems reviewed and are negative  Past Medical History:   Diagnosis Date    Arthritis     Cancer [facility])     Cardiac disease     abnormal stress test 2012    Chronic kidney disease     protein urine    CLL (chronic lymphocytic leukemia) ([facility] [address] ) 2014    6 months chemo    Constipation     Coronary artery disease     Diabetes mellitus ([facility] [address] )     type 2    History of chemotherapy     Tx for CLL currently in remission since 2016    Hyperlipidemia     Hypertension     Infectious viral hepatitis     Hep C Hx remission since 2016 post tx    Liver disease     Proteinuria        Past Surgical History:   Procedure Laterality Date    ANGIOPLASTY      one stent    CARDIAC CATHETERIZATION      CARDIAC SURGERY  2016    x1 stent    CATARACT EXTRACTION      CYST REMOVAL      mid back benign    FRACTURE SURGERY Right     Fx Femur age 17, pin in place    IN XCAPSL CTRC RMVL INSJ IO LENS PROSTH W/O ECP Right 7/23/2018    Procedure: EXTRACTION EXTRACAPSULAR CATARACT PHACO INTRAOCULAR LENS (IOL);   Surgeon: Cristin Crawford MD;  Location: [facility] MAIN OR;  Service: Ophthalmology    IN XCAPSL CTRC RMVL INSJ IO LENS PROSTH W/O ECP Left 10/1/2018    Procedure: EXTRACTION EXTRACAPSULAR CATARACT PHACO INTRAOCULAR LENS (IOL); Surgeon: Anastasia Kessler MD;  Location: [facility] MAIN OR;  Service: Ophthalmology    TONSILLECTOMY         Family History:  Family History   Problem Relation Age of Onset    Diabetes unspecified Brother     Diabetes Brother     Diabetes unspecified Maternal Grandfather     Diabetes Maternal Grandfather     Cancer Mother         breast    Alzheimer's disease Mother    Katie Caceres Early death Father         age 64 peritonitis from gallbladder    Cancer Sister         small cell carcinoma    No Known Problems Daughter     No Known Problems Daughter        Home Meds:  all medications and allergies reviewed    Allergies: Allergies   Allergen Reactions    Other      Seasonal allergies         Marital Status: /Civil Union     Social History     Substance and Sexual Activity   Alcohol Use Yes    Comment: SOCIALLY     Social History     Tobacco Use   Smoking Status Former Smoker   Smokeless Tobacco Never Used   Tobacco Comment    quit 20 years ago     Social History     Substance and Sexual Activity   Drug Use No           Physical Exam:   Vitals:   Blood Pressure: 119/69 (01/26/22 1345)  Pulse: 70 (01/26/22 1345)  Temperature: 97 5 °F (36 4 °C) (01/26/22 0855)  Temp Source: Axillary (01/26/22 0855)  Respirations: 22 (01/26/22 1200)  Weight - Scale: 64 1 kg (141 lb 5 oz) (01/26/22 1026)  SpO2: 98 % (01/26/22 1345)    Physical Exam  Vitals reviewed  Constitutional:       General: He is not in acute distress  Appearance: Normal appearance  He is underweight  He is ill-appearing  He is not toxic-appearing or diaphoretic  HENT:      Head: Normocephalic and atraumatic  Nose: Nose normal       Mouth/Throat:      Mouth: Mucous membranes are dry  Eyes:      General: No scleral icterus  Right eye: No discharge  Left eye: No discharge  Extraocular Movements: Extraocular movements intact  Cardiovascular:      Rate and Rhythm: Normal rate        Heart sounds: Normal heart sounds  No murmur heard  Pulmonary:      Effort: Pulmonary effort is normal       Breath sounds: Examination of the right-middle field reveals rhonchi  Examination of the left-middle field reveals rhonchi  Examination of the right-lower field reveals decreased breath sounds, rhonchi and rales  Examination of the left-lower field reveals decreased breath sounds, rhonchi and rales  Decreased breath sounds, rhonchi and rales present  No wheezing  Abdominal:      General: There is no distension  Palpations: Abdomen is soft  There is no mass  Tenderness: There is no abdominal tenderness  Musculoskeletal:         General: Normal range of motion  Cervical back: Neck supple  Right lower leg: No edema  Left lower leg: No edema  Skin:     General: Skin is warm  Neurological:      General: No focal deficit present  Mental Status: He is alert and oriented to person, place, and time  Mental status is at baseline  Lab Results: I have personally reviewed pertinent reports  Results from last 7 days   Lab Units 01/26/22  0911   WBC Thousand/uL 29 56*   HEMOGLOBIN g/dL 14 2   HEMATOCRIT % 43 1   PLATELETS Thousands/uL 279   NEUTROS PCT % 85*   LYMPHS PCT % 5*   MONOS PCT % 8   EOS PCT % 0     Results from last 7 days   Lab Units 01/26/22  0911   POTASSIUM mmol/L 4 1   CHLORIDE mmol/L 102   CO2 mmol/L 25   BUN mg/dL 35*   CREATININE mg/dL 1 03   CALCIUM mg/dL 9 5   ALK PHOS U/L 172*   ALT U/L 487*   AST U/L 221*     Results from last 7 days   Lab Units 01/26/22  1039   INR  1 40*       Imaging: I have personally reviewed pertinent reports  XR chest 1 view portable    Result Date: 1/26/2022  Narrative: CHEST INDICATION:   trauma, sob  Patient has confirmed COVID-19  As of 1/14/2022 COMPARISON:  1/19/2022 EXAM PERFORMED/VIEWS:  XR CHEST PORTABLE FINDINGS: Cardiomediastinal silhouette appears unremarkable   There are persistent bilateral groundglass pulmonary infiltrates which have increased since the prior exam but only slightly  Findings involve about the lower two thirds of the lung fields with the right side slightly more than the left  Again, this presumably is Covid pneumonia  No pleural fluid or pneumothorax identified  No displaced fractures are seen  Osseous structures appear within normal limits for patient age  Impression: Slightly increased pulmonary infiltrates compared with 1/19/2022  No acute traumatic injury seen  Workstation performed: [id number]     XR chest 1 view portable    Result Date: 1/19/2022  Narrative: CHEST INDICATION:   sob  COMPARISON:  None EXAM PERFORMED/VIEWS:  XR CHEST PORTABLE FINDINGS: Cardiomediastinal silhouette appears unremarkable  Bibasilar peripheral right greater and peripheral right upper lung slight groundglass airspace opacities  No pneumothorax or pleural effusion  Osseous structures appear within normal limits for patient age  Impression: Bilateral groundglass presumed Covid 19 pneumonia infiltrates in this Covid positive patient  Findings concur with the preliminary report by the referring clinician already in PACS and/or our electronic record EPIC  Workstation performed: [id number]     CT head without contrast    Result Date: 1/26/2022  Narrative: CT BRAIN - WITHOUT CONTRAST INDICATION:   fall + ASA  COMPARISON:  None  TECHNIQUE:  CT examination of the brain was performed  In addition to axial images, sagittal and coronal 2D reformatted images were created and submitted for interpretation  Radiation dose length product (DLP) for this visit:  842 mGy-cm   This examination, like all CT scans performed in the [facility], was performed utilizing techniques to minimize radiation dose exposure, including the use of iterative reconstruction and automated exposure control  IMAGE QUALITY:  Diagnostic  FINDINGS: PARENCHYMA:  No intracranial mass, mass effect or midline shift   No CT signs of acute infarction  No acute parenchymal hemorrhage  VENTRICLES AND EXTRA-AXIAL SPACES:  Normal for the patient's age  There is dural calcification along the tentorium and falx cerebri  VISUALIZED ORBITS AND PARANASAL SINUSES:  Unremarkable  CALVARIUM AND EXTRACRANIAL SOFT TISSUES:  Normal      Impression: No acute intracranial abnormality  Report was telephoned to the emergency department physician caring for the patient at 9:34 AM on 1/26/2022 Workstation performed: [id number]      right upper quadrant    Result Date: 1/10/2022  Narrative: RIGHT UPPER QUADRANT ULTRASOUND INDICATION:     K76 0: Fatty (change of) liver, not elsewhere classified  COMPARISON:  Right upper quadrant ultrasound 5/24/2019  TECHNIQUE:   Real-time ultrasound of the right upper quadrant was performed with a curvilinear transducer with both volumetric sweeps and still imaging techniques  FINDINGS: PANCREAS:  The pancreas is obscured by bowel gas  AORTA AND IVC:  Visualized portions are normal for patient age  LIVER: Size:  Within normal range  The liver measures 12 7 cm in the midclavicular line  Contour:  Surface contour is smooth  Parenchyma:  Echogenicity and echotexture are within normal limits  No evidence of suspicious mass  Limited imaging of the main portal vein shows it to be patent and flowing in the normal direction  BILIARY: The gallbladder is normal in caliber  No wall thickening or pericholecystic fluid  Wall measures 1 mm thickness  No stones or sludge identified  No sonographic Singh's sign  No intrahepatic biliary dilatation  CBD measures 5 mm  No choledocholithiasis  KIDNEY: Right kidney measures 9 4 x 5 6 x 4 8 cm  Within normal limits  An upper pole cyst measures 18 x 8 x 11 mm (previously 17 x 9 x 11 mm)  ASCITES:   None       Impression: Normal  Workstation performed: [id number]     CTA ED chest PE study    Result Date: 1/26/2022  Narrative: CTA - CHEST WITH IV CONTRAST - PULMONARY ANGIOGRAM INDICATION:   syncope elevated ddimer  Patient has confirmed COVID-19  Positive on 1/14/2022  Status post fall  History of CLL, in remission since 2016  COMPARISON: Chest radiograph from 1/26/2022 and abdomen CT from 12/25/2020  TECHNIQUE: CT angiogram timed for optimal opacification of the pulmonary arteries  Axial, sagittal, and coronal 2D reformats created from source data  Coronal 3D MIP postprocessing on the acquisition scanner  Radiation dose length product (DLP):  195 mGy-cm   Radiation dose exposure minimized using iterative reconstruction and automated exposure control  IV Contrast:  85 mL of iohexol (OMNIPAQUE)  FINDINGS: PULMONARY ARTERIES:  A few tiny subsegmental emboli in all lobes of the right lung and in the left lower lobe, marked on series 6  Small web in the right interlobar pulmonary artery (6/121-125)  LUNGS:  Severe bilateral groundglass opacity  1 2 x 0 8 cm juxtapleural left upper lobe nodule or scar, associated with mild traction bronchiolectasis AIRWAYS: No significant filling defects  PLEURA:  Unremarkable  HEART/GREAT VESSELS:  Normal heart size  Nothing to indicate right heart strain  Moderate coronary artery calcification indicating atherosclerotic heart disease  Dense calcification of the mitral annulus  Aberrant right subclavian artery coursing posterior to the esophagus, a normal variant  MEDIASTINUM AND KATHERYN:  Unremarkable  CHEST WALL AND LOWER NECK: Unremarkable  UPPER ABDOMEN:  Unremarkable  OSSEOUS STRUCTURES:  Mild degenerative disease in the spine  Impression: A few tiny subsegmental filling defects in all lobes of the right lung and in the left lower lobe, too small to determine if they are acute or chronic pulmonary emboli  A small web in the right interlobar pulmonary artery is the sequela of a chronic pulmonary embolus  1 3 x 0 8 cm juxtapleural nodule in the left upper lobe    It is associated with traction bronchiolectasis and is likely a scar but consider follow-up with a chest CT with no contrast in 6 months  Severe bilateral groundglass opacity due to Covid-19  I notified Lesly Cedillo and Jo Lopez by secure text on 1/26/2022 at 11:31 AM   They both responded immediately  Workstation performed: [id number]     ** Please Note: Dragon 360 Dictation voice to text software may have been used in the creation of this document   **

## 2022-01-27 NOTE — ED NOTES
PTT 68, per Heparin protocol result within therapeutic range  No change in dose/pump indicated at this time   Repeat due at [place], RN  01/27/22 [facility], [id number] [facility]  01/27/22 0403

## 2022-01-27 NOTE — OCCUPATIONAL THERAPY NOTE
Occupational Therapy Evaluation     Patient Name: Cheryl CROUCH Date: 1/27/2022  Problem List  Principal Problem:    Acute hypoxemic respiratory failure due to COVID-19 [facility])  Active Problems:    Type 2 diabetes mellitus with hyperglycemia, with long-term current use of insulin (HCC)    Transaminitis    Fall at home    Acute pulmonary embolism without acute cor pulmonale (HCC)    Past Medical History  Past Medical History:   Diagnosis Date    Arthritis     Cancer [facility])     Cardiac disease     abnormal stress test 2012    Chronic kidney disease     protein urine    CLL (chronic lymphocytic leukemia) ([facility] [address] ) 2014    6 months chemo    Constipation     Coronary artery disease     Diabetes mellitus ([facility] [address] )     type 2    History of chemotherapy     Tx for CLL currently in remission since 2016    Hyperlipidemia     Hypertension     Infectious viral hepatitis     Hep C Hx remission since 2016 post tx    Liver disease     Proteinuria      Past Surgical History  Past Surgical History:   Procedure Laterality Date    ANGIOPLASTY      one stent    CARDIAC CATHETERIZATION      CARDIAC SURGERY  2016    x1 stent    CATARACT EXTRACTION      CYST REMOVAL      mid back benign    FRACTURE SURGERY Right     Fx Femur age 17, pin in place    AR XCAPSL CTRC RMVL INSJ IO LENS PROSTH W/O ECP Right 7/23/2018    Procedure: EXTRACTION EXTRACAPSULAR CATARACT PHACO INTRAOCULAR LENS (IOL); Surgeon: Joann Quiroz MD;  Location: [facility] MAIN OR;  Service: Ophthalmology    AR XCAPSL CTRC RMVL INSJ IO LENS PROSTH W/O ECP Left 10/1/2018    Procedure: EXTRACTION EXTRACAPSULAR CATARACT PHACO INTRAOCULAR LENS (IOL);   Surgeon: Joann Quiroz MD;  Location: [facility] OR;  Service: Ophthalmology    TONSILLECTOMY           01/27/22 1249   OT Last Visit   OT Visit Date 01/27/22  (Thursday)   Note Type   Note type Evaluation   Restrictions/Precautions   Weight Bearing Precautions Per Order No   Other Precautions Contact/isolation; Airborne/isolation;Droplet precautions;Cognitive; Chair Alarm; Bed Alarm; Fall Risk;O2  (Gold on 15L mid-flow O2 w/ use of NRB PRN)   Pain Assessment   Pain Assessment Tool 0-10   Pain Score No Pain   Home Living   Type of Home Apartment  (3rd floor elevator apt at [facility])   Home Layout One level;Elevator   Bathroom Shower/Tub Walk-in shower   Bathroom Toilet Standard   Bathroom Equipment   (no DME)   [address] Other (Comment)  (DC home 1/24/22 on 5L at rest vs 6 L exertion)   Additional Comments Pt reports living w/ wifeMatthew in 3rd floor elevator access apt  Son in law and grand daughter also w/ pt   Prior Function   Level of [place] Independent with ADLs and functional mobility   Lives With Spouse; Other (Comment)  (son in law, grand daughter)   Receives Help From Family   ADL Assistance Independent   IADLs Needs assistance  (- drive)   Falls in the last 6 months 1 to 4  (1 traumatic leading to admit)   Vocational Retired   Comments Pt reports I w/ ADL at baseline w/ out use of AD  Pt recently [facility] home [facility] on 1/24/22 on 5L O2 at rest  Pt does not drive   Lifestyle   Autonomy Pt reports I w/ ADL PTA w/ out use of AD  Pt added that he was only home for 2 days and did not do much   Reciprocal Relationships Pt reports living w/ wife, Matthew Pacheco; son in law and grand daughter   Service to Others Pt reports retired    Intrinsic Gratification Pt reports enjoying resting and reslaxing  Enjoys his family   Subjective   Subjective "I was only home for 2 days"   ADL   Where Assessed Edge of bed  (vs OOB In chair)   Eating Assistance 6  Modified independent   Eating Deficit Setup; Increased time to complete  (seated OOB in chair post eval eating lunch; O2 sats 91-92%)   Grooming Assistance 6  Modified Independent  (seated OOB cues for pacing due to O2 sats)   Grooming Deficit Setup;Verbal cueing;Supervision/safety; Increased time to complete   UB Bathing Assistance Unable to assess  (decreased activity tolerance, O2 sats follow LBD)   LB Bathing Assistance Unable to assess  (decreased activity clovis, lunch tray arrived)   UB Dressing Assistance 6  Modified independent   UB Dressing Deficit Setup; Increased time to complete   LB Dressing Assistance 5  Supervision/Setup  (don pants seated at EOB; O2 sats dropped to 86%)   LB Dressing Deficit Setup; Increased time to complete;Verbal cueing;Supervision/safety   Toileting Assistance    (recommend use of commode w/ RN staff due to < O2 sats)   Additional Comments on 15 L midflow w/ use of NRB PRN   Bed Mobility   Supine to Sit 6  Modified independent   Additional items Assist x 1;HOB elevated; Increased time required   Sit to Supine Unable to assess   Additional Comments Pt seated OOB in chair post eval eating lunch w/ chair alarm activated   Transfers   Sit to Stand 5  Supervision   Additional items Assist x 1   Stand to Sit 5  Supervision   Additional items Assist x 1; Increased time required   Additional Comments Pt required S and + time to complete sit to stand  Observed forward flexed posture and forward head   Functional Mobility   Additional Comments S short distances w/ out use of AD   Balance   Static Sitting Good   Dynamic Sitting Fair   Static Standing Fair   Ambulatory Fair   Activity Tolerance   Activity Tolerance Patient limited by fatigue; Other (Comment)  (limited insight into deficits, decreased O2 w/ min exertion)   Medical Staff Made Aware care coordination w/ PTGisela 39 spoke to RNSteven Assessment   RUE Assessment [facility]   RU Strength   RUE Overall Strength Within Functional Limits - able to perform ADL tasks with strength   LUE Assessment   LUE Assessment WFL   LUE Strength   LUE Overall Strength Within Functional Limits - able to perform ADL tasks with strength   Hand Function   Gross Motor Coordination Functional   Fine Motor Coordination Functional   Sensation   Light Touch Not tested   Sharp/Dull Not tested   Cognition   Overall Cognitive Status Impaired   Arousal/Participation Alert; Cooperative   Attention Attends with cues to redirect   Orientation Level Oriented to person;Oriented to place;Oriented to situation; Other (Comment)  (generally to time (month, year w/ + time))   Memory Decreased recall of recent events;Decreased recall of precautions  (unable to recall DC instructions from 1/24/22 RE: O2)   Following Commands Follows one step commands with increased time or repetition   Comments Identified pt by full name and birthdate  Alert and able to communicate wants / needs, follow directions w/ + time  Able to provide home set- up and demonstrated appropriate long term recall  Limited recall details fall, events PTA and O2 recommendations folllwing recent DC home 1/24/22  Pt engaged in [place] w/ score of 12 indicating cognitive impairment  Pt able to stated month and year w  +time for accurate year  Pt able to immediately recall name and address but only able to recall city after few minutes  Pt able to state time w/ in one hour w/ out looking at the clock  Pt able to count backwards from 20 to 1 but unable to stated months of year in reverse order  Recommend ongoing eval of functional cognitive skills to assist in DC planning   Assessment   Limitation Decreased ADL status; Decreased Safe judgement during ADL;Decreased cognition;Decreased endurance;Decreased self-care trans;Decreased high-level ADLs   Assessment Pt is a 73yo male [facility] 1/[facility]22  Pt presents w/ acute hypoxemic respiratory failure due to COVID-19 and significant PMH impacting his occupational performance including cardiac disease, CAD, HTN, CLL s/p chemo, hx R femur fracture sx  Pt admitted as trauma following a fall at home  Trauma transferred care to [facility] service  Pt recently DC home on 1/24/22 on 5L O2 at rest and 6L w/ exertion   Pt lives in 3rd floor apt w/ elevator access w/ wife and son in law  Pt reports I w/ ADL PTA w/ out use of AD or O2 at baseline  Pt w/ active OT orders and activity orders  Personal factors impacting performance includes difficulty completing IADLs, limited insight into deficits, recent admission  Upon eval, pt alert and oriented to person, place, and generally to time  Limited recall recent events and precautions  Pt completed bed mobility w/ mod I  Pt required S to complete sit <> stand and short distance functional mobility on 15L O2  Pt engaged in LBD w/ S after set- up  O2 sats dropped to 86%  Pt required mod I for + time, set- up to complete UBD and grooming while seated  Pt scored 12 on Short Blessed Screen indicating cognitive impairment  Pt presents w/ decreased activity tolerance, decreased endurance, decreased standing tolerance, limited insight into deficits, decreased cognition impacting his I w/ dressing, bathing, oral hygiene, functional mobility, functional transfers, activity engagement, clothing mgmt  Pt completing ADL below baseline level of I and would benefit from OT while in acute care to address deficits  Recommend ongoing eval of functional cognitive skills to assist in [address]  Recommend post acute rehab vs DC home w/ family support and Home OT pend progress, improved insight / recall, decreased O2 requirements and improved activity tolerance  Will continue to follow   Goals   Patient Goals Pt stated that he would like to return home   Plan   Treatment Interventions ADL retraining;Functional transfer training;UE strengthening/ROM; Endurance training;Patient/family training;Equipment evaluation/education; Compensatory technique education;Continued evaluation; Activityengagement; Energy conservation   Goal Expiration Date 02/08/22   OT Frequency 2-3x/wk   Recommendation   OT Discharge Recommendation   (Rehab vs Home pend cont'd eval,+act clovis, <O2 )   Equipment Recommended Shower/Tub chair with back ($)   AM-PAC Daily Activity Inpatient   Lower Body Dressing 3   Bathing 3   Toileting 3   Upper Body Dressing 4   Grooming 4   Eating 4   Daily Activity Raw Score 21   Daily Activity Standardized Score (Calc for Raw Score >=11) 44 27   AM-PAC Applied Cognition Inpatient   Following a Speech/Presentation 2   Understanding Ordinary Conversation 4   Taking Medications 2   Remembering Where Things Are Placed or Put Away 4   Remembering List of 4-5 Errands 2   Taking Care of Complicated Tasks 2   Applied Cognition Raw Score 16   Applied Cognition Standardized Score 35 03   Barthel Index   Feeding 5   Bathing 0   Grooming Score 5   Dressing Score 5   Bladder Score 10   Bowels Score 10   Toilet Use Score 5   Transfers (Bed/Chair) Score 10   Mobility (Level Surface) Score 0   Stairs Score 0   Barthel Index Score 50   Modified [place] Scale   Modified [place]ke Scale 4     The patient's raw score on the AM-PAC Daily Activity inpatient short form is 21, standardized score is 44 27, greater than 39 4  Patients at this level are likely to benefit from discharge to home  Please refer to the recommendation of the Occupational Therapist for safe discharge planning  OT DC recommendation does not coincide w/ AMPAC score  Pt recently DC home and return as trauma following a fall  Recommend rehab vs home pend progress, improved activity clovis, decreased O2 requirements and improved insight       Pt goals to be met by 2/8/22:  -Pt will demonstrate good attention and participation in continued evaluation of functional cognitive skills to assist in [address] will consistently follow 2 step directions during ADLs w/ good recall and no more than 1 cue /prompt to max I w/ ADLs to return home w/ wife    -Pt will complete UBD/LBD w/ mod I after set- up w/ no more than 1 rest break to max I and return home    -Pt will demonstrate good attention and understanding EC tech to max I w/ ADLs and return home    -Pt will complete functional transfers to all surfaces w/ mod I using AD, DME as needed to max I and return home to baseline level of I    -Pt will consistently engage in functional mobility using AD as needed w/ mod I to/ from bathroom to max I w/ ADL to return home to baseline level of I    -Pt will demonstrate improved functional standing tolerance for at least 5 minutes w/ fair + balance while engaged in ADL standing at sink to max I and improve engagement to return home    Neelam Rangel, OTR/L

## 2022-01-27 NOTE — ASSESSMENT & PLAN NOTE
· Likely in the setting of acute viral infection    Also has history of hepatitis C infection  · Reported with chronic transaminitis at baseline  · Recent outpatient ultrasound on 1/7/22 was unremarkable  · Continue to monitor

## 2022-01-27 NOTE — ED NOTES
O2 Sat 89-90% on Mid-Flow  Good Resp pleth noted  Respiratory paged  Pt denies any increased SOB       Eder Chamorro RN  01/26/22 1919

## 2022-01-27 NOTE — ASSESSMENT & PLAN NOTE
· POA, in the setting of recent COVID infection  · No evidence of right heart strain on CT  · Echo (1/27): LVEF 21%, grade 1 diastolic dysfunction, mild AR and MR     Plan:  - continue with heparin drip for now, eventual transition to [address] prior to discharge

## 2022-01-27 NOTE — ASSESSMENT & PLAN NOTE
· POA, in the setting of recent COVID infection  · No evidence of right heart strain on CT       Plan:  - continue with heparin drip for now, eventual transition to NOAC when respiratory status improves  - follow up echo

## 2022-01-27 NOTE — PROGRESS NOTES
[facility]  Progress Note Temi Martinzesco 1940, 80 y o  male MRN: [id number]  Unit/Bed#: S -Jef Encounter: [id number]  Primary Care Provider: Danish Estrada MD   Date and time admitted to hospital: 1/26/2022  8:43 AM    * Acute hypoxemic respiratory failure due to COVID-19 [facility])  Assessment & Plan  · POA, tested positive for COVID on 1/14/22  Was admitted from 01/19 through 1/24/22  · Discharge home on 5 L supplemental O2 at rest and 6 L with exertion, steroid taper  · Appears patient had not been very compliant at home with supplemental O2  · Chest x-ray showed slightly increased pulmonary infiltrates  · CTA chest (1/26): few tiny subsegmental filling defects in all lobes of the right lung and in the left lower lobe, too small to determine if they are acute or chronic pulmonary emboli  A small web in the right interlobar pulmonary artery is the sequela of a chronic pulmonary embolus  1 3 x 0 8 cm juxtapleural nodule in the left upper lobe  It is associated with traction bronchiolectasis and is likely a scar but consider follow-up with a chest CT with no contrast in 6 months  Severe bilateral groundglass opacity due to Covid-19  · Patient admitted to step-down 2   · O2 sats 80-94% on 15L midflow this morning, saturations within 90s when patient is resting quietly, decrease when patient is talking/eating    Plan:  - Covid moderate   - IV Decadron, increased to q 12 hours dose  - resumed Baricitinib day 2/14  - continue respiratory protocol, supplemental O2 and titrate to maintain sats > 90%  - initial procalcitonin slightly elevated, will continue on ceftriaxone and doxycycline for now    Fall at home  [address] [place] [address]  · Presented as a trauma level C post fall home  Patient and wife report he had taken off supplemental O2 when going to the bathroom  · Lightheadedness after voiding and fell, wife reports mild headstrike as she caught his fall   Head and left upper extremity sustaining skin tears with head abrasion  · Was evaluated and cleared by Trauma in the ED    Plan:  - PT/OT evaluation     Acute pulmonary embolism without acute cor pulmonale (HCC)  Assessment & Plan  · POA, in the setting of recent COVID infection  · No evidence of right heart strain on CT  Plan:  - continue with heparin drip for now, eventual transition to NOAC when respiratory status improves  - follow up echo     Transaminitis  Assessment & Plan  · Likely in the setting of acute viral infection  Also has history of hepatitis C infection  · Reported with chronic transaminitis at baseline  · Recent outpatient ultrasound on 1/7/22 was unremarkable  · Continue to monitor    Type 2 diabetes mellitus with hyperglycemia, with long-term current use of insulin [facility])  Assessment & Plan  Lab Results   Component Value Date    HGBA1C 10 1 (H) 01/26/2022       Recent Labs     01/26/22  2200 01/27/22  0044 01/27/22  0743 01/27/22  1121   POCGLU 460* 498* 236* 368*     Blood Sugar Average: Last 72 hrs:  · (P) 364 [id number],   · hemoglobin A1c 10 1 this admission     Plan:  - Increased to Lantus 30 units q h s   - added humalong 5 U TID with meals    - SSI coverage  - Continue to adjust insulin as indicated while on steroids    VTE Pharmacologic Prophylaxis: VTE Score: 8 High Risk (Score >/= 5) - Pharmacological DVT Prophylaxis Ordered: heparin drip  Sequential Compression Devices Ordered  Patient Centered Rounds: I evaluated the patient without nursing staff present due to pt present in ED  Discussions with Specialists or Other Care Team Provider: none    Education and Discussions with Family / Patient: Updated  (wife) via phone  Current Length of Stay: 1 day(s)  Current Patient Status: Inpatient   Discharge Plan: Anticipate discharge in >72 hrs to discharge location to be determined pending rehab evaluations      Code Status: Level 1 - Full Code    Subjective:   Patient awake and alert sitting up in ED stretcher when interviewed this morning  No acute distress on 15L O2 Midflow  Eating breakfast with O2 saturations decreasing into low 80s  Despite this, patient states he is having no SOB and otherwise feels well  Has mild tenderness of left forearm from skin abrasions sustained in fall  Denies any chest pain, palpitations, or recurrent lightheadedness  Objective:     Vitals:   Temp (24hrs), Av 5 °F (36 4 °C), Min:97 5 °F (36 4 °C), Max:97 5 °F (36 4 °C)    Temp:  [97 5 °F (36 4 °C)] 97 5 °F (36 4 °C)  HR:  [54-84] 72  Resp:  [18-20] 20  BP: (107-148)/(53-88) 114/58  SpO2:  [90 %-100 %] 99 %  Body mass index is 20 28 kg/m²  Input and Output Summary (last 24 hours): Intake/Output Summary (Last 24 hours) at 2022 1247  Last data filed at 2022 1123  Gross per 24 hour   Intake 0 ml   Output 2000 ml   Net -2000 ml       Physical Exam:   Physical Exam  Vitals and nursing note reviewed  Constitutional:       General: He is not in acute distress  Appearance: Normal appearance  He is not diaphoretic  HENT:      Head: Normocephalic and atraumatic  Mouth/Throat:      Mouth: Mucous membranes are moist    Cardiovascular:      Rate and Rhythm: Normal rate and regular rhythm  Pulmonary:      Effort: Pulmonary effort is normal       Breath sounds: No stridor  Rhonchi (coarse rhonchi bilaterally, all lung fields) present  No wheezing or rales  Abdominal:      General: Bowel sounds are normal       Palpations: Abdomen is soft  There is no mass  Tenderness: There is no abdominal tenderness  There is no guarding  Musculoskeletal:         General: No tenderness  Right lower leg: No edema  Left lower leg: No edema  Skin:     General: Skin is warm and dry  Neurological:      General: No focal deficit present  Mental Status: He is alert and oriented to person, place, and time     Psychiatric:         Mood and Affect: Mood normal          Behavior: Behavior normal  Additional Data:     Labs:  Results from last 7 days   Lab Units 01/27/22  0435 01/26/22  0911 01/26/22  0911 01/24/22  0630 01/24/22  0630   WBC Thousand/uL 13 60*   < > 29 56*   < > 27 78*   HEMOGLOBIN g/dL 12 2   < > 14 2   < > 14 2   HEMATOCRIT % 37 4   < > 43 1   < > 43 2   PLATELETS Thousands/uL 206   < > 279   < > 356   BANDS PCT %  --   --   --   --  1   NEUTROS PCT %  --   --  85*  --   --    LYMPHS PCT %  --   --  5*  --   --    LYMPHO PCT %  --   --   --   --  7*   MONOS PCT %  --   --  8  --   --    MONO PCT %  --   --   --   --  5   EOS PCT %  --   --  0  --  1    < > = values in this interval not displayed  Results from last 7 days   Lab Units 01/27/22  0435 01/26/22  0911 01/26/22  0911   SODIUM mmol/L 141   < > 138   POTASSIUM mmol/L 4 5   < > 4 1   CHLORIDE mmol/L 107   < > 102   CO2 mmol/L 25   < > 25   BUN mg/dL 35*   < > 35*   CREATININE mg/dL 1 01   < > 1 03   ANION GAP mmol/L 9   < > 11   CALCIUM mg/dL 8 2*   < > 9 5   ALBUMIN g/dL  --   --  2 8*   TOTAL BILIRUBIN mg/dL  --   --  1 04*   ALK PHOS U/L  --   --  172*   ALT U/L  --   --  487*   AST U/L  --   --  221*   GLUCOSE RANDOM mg/dL 259*   < > 217*    < > = values in this interval not displayed       Results from last 7 days   Lab Units 01/26/22  1039   INR  1 40*     Results from last 7 days   Lab Units 01/27/22  1121 01/27/22  0743 01/27/22  0044 01/26/22  2200 01/26/22 2052 01/26/22  1539 01/26/22  0900 01/24/22  1121 01/24/22  0837 01/24/22  0753 01/23/22  2126 01/23/22  1643   POC GLUCOSE mg/dl 368* 236* 498* 460* 420* 371* 198* 386* 87 57* 313* 250*     Results from last 7 days   Lab Units 01/26/22  0911   HEMOGLOBIN A1C % 10 1*     Results from last 7 days   Lab Units 01/27/22  0435 01/26/22  1350 01/26/22  1035 01/26/22  0911 01/22/22  0608 01/21/22  0619   LACTIC ACID mmol/L  --  2 0 2 3*  --   --   --    PROCALCITONIN ng/ml 0 15  --   --  0 27* 0 12 0 11       Lines/Drains:  Invasive Devices  Report    Peripheral Intravenous Line            Peripheral IV 22 Left Antecubital 1 day    Peripheral IV 22 Right;Ventral (anterior) Forearm 1 day              Telemetry:  Telemetry Orders (From admission, onward)             24 Hour Telemetry Monitoring  Continuous x 24 Hours (Telem)           References:    Telemetry Guidelines   Question:  Reason for 24 Hour Telemetry  Answer:  Pulmonary Embolism - 24 hours without resp  compromise, dysrhythmias, hemodynamically stable                 Telemetry Reviewed: Normal Sinus Rhythm  Indication for Continued Telemetry Use: PE         Imaging:   CTA ED chest PE study   Final Result by Eddy Michelle MD ( 2004)      A few tiny subsegmental filling defects in all lobes of the right lung and in the left lower lobe, too small to determine if they are acute or chronic pulmonary emboli  A small web in the right interlobar pulmonary artery is the sequela of a chronic    pulmonary embolus  1 3 x 0 8 cm juxtapleural nodule in the left upper lobe  It is associated with traction bronchiolectasis and is likely a scar but consider follow-up with a chest CT with no contrast in 6 months  Severe bilateral groundglass opacity due to Covid-19  XR chest 1 view portable   Final Result by Manish Viveros MD ( [id number])      Slightly increased pulmonary infiltrates compared with 2022  No acute traumatic injury seen  CT head without contrast   Final Result by Manish Viveros MD ( [id number])      No acute intracranial abnormality  Report was telephoned to the emergency department physician caring for the patient at 9:34 AM on 2022             Recent Cultures (last 7 days):   Results from last 7 days   Lab Units 22  1035   BLOOD CULTURE  Received in Microbiology Lab  Culture in Progress  Received in Microbiology Lab  Culture in Progress         Last 24 Hours Medication List:   Current Facility-Administered Medications Medication Dose Route Frequency Provider Last Rate    albuterol  2 puff Inhalation Q4H PRN Juan Beckford MD      aspirin  81 mg Oral Daily Diane Francis MD      Baricitinib  4 mg Oral Q24H Diane Francis MD      benzonatate  100 mg Oral TID PRN Juan Beckford MD      cefTRIAXone  1,000 mg Intravenous Q24H Juan Becfkord MD 1,000 mg (01/27/22 1138)    dexamethasone  0 1 mg/kg Intravenous Q12H Dinae Francis MD      doxycycline hyclate  100 mg Oral Q12H Diane Francis MD      ezetimibe  10 mg Oral Daily Diane Francis MD      heparin (porcine)  3-30 Units/kg/hr (Order-Specific) Intravenous Titrated Juan Beckford MD 15 Units/kg/hr (01/27/22 0900)    heparin (porcine)  2,400 Units Intravenous Q1H PRN Juan Beckford MD      heparin (porcine)  4,800 Units Intravenous Q1H PRN Juan Beckford MD      insulin glargine  30 Units Subcutaneous HS Juan Carlos Santillan MD      insulin lispro  1-5 Units Subcutaneous TID AC Diane Francis MD      insulin lispro  1-5 Units Subcutaneous HS Diane Francis MD      insulin lispro  5 Units Subcutaneous TID With Meals Juan Carlos Santillan MD      lisinopril  20 mg Oral Daily Diane Francis MD      pramipexole  1 5 mg Oral Daily Diane Francis MD      sodium chloride  100 mL/hr Intravenous Continuous Juan Beckford  mL/hr (01/27/22 1037)        Today, Patient Was Seen By: Val Haley MD    **Please Note: This note may have been constructed using a voice recognition system  **

## 2022-01-27 NOTE — PHYSICAL THERAPY NOTE
PHYSICAL THERAPY EVALUATION  NAME:  Memo Tim  DATE: 01/27/22    AGE:   80 y o   Mrn:   [id number]  ADMIT DX:  Head injury [S09 90XA]  Hypoxia [R09 02]  Pulmonary emboli ([facility] ) [I26 99]  Injury of head, initial encounter [S09 90XA]  COVID [U07 1]  Problem List:   Patient Active Problem List   Diagnosis    Coronary artery disease    Atypical chest pain    Bruising    CAD (coronary artery disease), native coronary artery    Chronic constipation    Chronic hepatitis B (HCC)    CLL (chronic lymphocytic leukemia) ([facility] )    Type 2 diabetes mellitus with hyperglycemia, with long-term current use of insulin ([facility] )    Dyslipidemia    Hypertension    Chronic hepatitis C virus infection ([facility] )    S/P coronary artery stent placement    Transaminitis    Acute respiratory failure with hypoxia ([facility] )    Acute hypoxemic respiratory failure due to COVID-19 ([facility] )    Fall at home    Acute pulmonary embolism without acute cor pulmonale ([facility] )     Length Of Stay: 1  Performed at least 2 patient identifiers during session: Name and Birthday  PHYSICAL THERAPY EVALUATION :    01/27/22 1220   PT Last Visit   PT Visit Date 01/27/22   Note Type   Note type Evaluation   Pain Assessment   Pain Assessment Tool 0-10   Pain Score No Pain   Restrictions/Precautions   Weight Bearing Precautions Per Order No   Other Precautions Contact/isolation; Airborne/isolation;Droplet precautions;Cognitive; Chair Alarm; Bed Alarm; Fall Risk;O2  (Masimo on 15L mid-flow O2 w/ use of NRB PRN)   Home Living   Type of Home Apartment  (3rd floor elevator apt at [facility])   Home Layout One level;Elevator   Home Equipment   (Patient reportedly had walker prior to last admission, did not use recently prior to this admission)   Additional Comments DC home 1/24/22 on 5L at rest vs 6 L exertion per OT   Prior Function   Level of [place] Independent with ADLs and functional mobility   Lives With Spouse; Other (Comment)  (son in law, grand daughter) Receives Help From Family   ADL Assistance Independent   IADLs Needs assistance  (- drive)   Falls in the last 6 months   (at least one)   Vocational Retired   General   Additional Pertinent History Supplemental O2 and titrate to maintain sats > 90%   Family/Caregiver Present No   Cognition   Overall Cognitive Status Impaired   Arousal/Participation Alert   Orientation Level Oriented to person   Memory Decreased recall of recent events;Decreased short term memory;Decreased recall of precautions   Following Commands Follows one step commands with increased time or repetition   Subjective   Subjective "I do not feel like any physical therapy, I went to [facility] (outpatient PT) in the past and did all that stuff"  Patient educated on purpose of IP PT assessment and after some time was agreeable to participate   Strength RLE   R Hip Flexion 4/5   R Knee Extension 4+/5   R Ankle Dorsiflexion 4/5   Strength LLE   L Hip Flexion 4-/5   L Knee Extension 4+/5   L Ankle Dorsiflexion 4/5   Light Touch   RLE Light Touch Grossly intact   LLE Light Touch   (inconsistent with responses)   Bed Mobility   Supine to Sit 6  Modified independent   Additional items Increased time required   Transfers   Sit to Stand 5  Supervision   Additional items Increased time required;Verbal cues;Armrests   Stand to Sit 5  Supervision   Additional items Increased time required;Armrests   Additional Comments Posture: forward head, rounded shoulders   Ambulation/Elevation   Gait pattern Excessively slow  (increased sway)   Gait Assistance 5  Supervision   Additional items Assist x 1   Assistive Device None   Distance 3'   Stair Management Assistance Not tested   Balance   Static Sitting Good   Static Standing Fair  (standing tolerance time 2 5 min)   Ambulatory Fair   Endurance Deficit   Endurance Deficit Yes   Endurance Deficit Description 15 L/m at rest:  SpO2 98%, heart rate 59, RR 19   With exertion, SpO2 dropped to below 85% after performing standing tolerance and sitting for 1 minute   NRB applied-- after 1 minute with patient performing pursed lip breathing on mid flow   Pt recovered within 2 minutes to greater than 90 % SpO2   Activity Tolerance   Activity Tolerance Patient limited by fatigue   Medical Staff Made Aware care coordination w/ Belen Gordon from [address] spoke to [facility]   Assessment   Prognosis Good   Problem List Decreased strength   Assessment Pt is a 80 y o  male seen for PT evaluation s/p admit to [facility] on 1/26/2022 w/ Acute hypoxemic respiratory failure due to COVID-19 [facility])  Order placed for PT  Prior to last admission: Pt lived with spouse in apartment with elevator access  Patient did not use DME prior to last admission  Patient was recently discharged home from hospital on [address], but reports unaware of discharge instructions for O2 management  Patient confirmed that he took oxygen off to walk to the bathroom  Upon evaluation: Pt requires no physical assistance for bed mobility, transfers, nor short distance ambulation  However patient unable to ambulate room distances due to increased SOB, low SpO2  Pt's clinical presentation is currently unstable/unpredictable given the functional mobility deficits above, especially (but not limited to) decreased endurance, decreased functional mobility tolerance and SOB upon exertion, coupled with fall risks including hx of falls, impaired balance and decreased cognition, and combined with medical complications of abnormal WBCs, low SpO2 values, new onset O2 use and + new PE  Recommend continue mobility training over next 1-3 sessions without using DME for mobility, further Education with mobility with compliance of O2 line management  Increasing activity tolerance including standing tolerance, higher level balance activities      Barriers to Discharge Inaccessible home environment   Goals   Patient Goals to get home   STG Expiration Date 02/06/22   Short Term Goal #1 Pt will: Perform bed mobility tasks with modified I to prepare for transfers and reposition in bed  Perform transfers with modified I to improve ease of transfers  Perform ambulation with out device for at least 50 ft with supervision to increase Indep in home environment and decrease risk for falls  Perform tug balance test and decreased baseline score to demonstrate less risk for falls  Increase standing tolerance to 5 minutes to promote increased upright activity tolerance time   PT Treatment Day 0   Plan   Treatment/Interventions Functional transfer training;LE strengthening/ROM; Therapeutic exercise;Cognitive reorientation;Patient/family training;Gait training;Bed mobility; Equipment eval/education; Endurance training;Spoke to nursing   PT Frequency 3-5x/wk   Recommendation   PT Discharge Recommendation   (At this time more likely rehab, less likely home w/PT servic)   Equipment Recommended   (None at present)   Additional Comments Definitive discharge recommendation dependent on progression of mobility, OT recommendations, medical optimization with O2 management, compliance with O2 management   AM-PAC Basic Mobility Inpatient   Turning in Bed Without Bedrails 4   Lying on Back to Sitting on Edge of Flat Bed 4   Moving Bed to Chair 3   Standing Up From Chair 3   Walk in Room 1   Climb 3-5 Stairs 1   Basic Mobility Inpatient Raw Score 16   Basic Mobility Standardized Score 38 32   Highest Level Of Mobility   JH-HLM Goal 5: Stand one or more mins   -HL Highest Level of Mobility 5: Stand (1 or more minutes)   -HLM Goal Achieved No   End of Consult   Patient Position at End of Consult Bedside chair; All needs within reach;Bed/Chair alarm activated   End of Consult Comments RN and charge RN aware that additional cord needed to call bell system   Pt requires PT /OT co-eval due to potential signficant assistance with mobility, + cognitive-behavioral impairments, and limited activity tolerance     PT and OT goals addressed separately  (Please find full objective findings from PT assessment regarding body systems outlined above)  Pt to benefit from continued skilled PT tx while in hospital and upon DC to address deficits as defined above and maximize level of functional mobility  Co-morbidities affecting pt's physical performance at time of assessment include: RECENT COVID, Arthritis, Cancer ([facility] ), Cardiac disease, Chronic kidney disease, CLL (chronic lymphocytic leukemia)(2014), Coronary artery disease, Diabetes mellitus ([facility] ), History of chemotherapy, Hypertension, Infectious viral hepatitis, Liver disease, Cardiac surgery (2016); Fracture surgery (Right); Angioplasty  Personal factors affecting pt at time of IE include: advanced age, behavioral pattern, inability to perform IADLs, inability to navigate community distances, limited insight into impairments, recent fall(s) and questionable non-compliance, self reported sedentary lifestyle  The patient's [facility] Basic Mobility Inpatient Short Form Raw Score is 16, Standardized Score is 38 32  A standardized score greater than or equal to 40 78 or Raw Score of 16 suggests the patient may benefit from discharge to post-acute rehabilitation services, which DOES NOT coincide with CURRENT above PT recommendations  However please refer to therapist recommendation for discharge planning given other factors that may influence destination  Definitive discharge recommendation dependent on progression of mobility, OT recommendations, medical optimization with O2 management, compliance with O2 management    Adapted from [employer]  [employer] of [facility] 6-Clicks Basic Mobility and Daily Activity Scores With Discharge Destination  Physical Therapy, 2021;101:1-9  DOI: [email or web address]    Portions of the record may have been created with voice recognition software    Occasional wrong word or "sound a like" substitutions may have occurred due to the inherent limitations of voice recognition software    Read the chart carefully and recognize, using context, where substitutions have occurred

## 2022-01-27 NOTE — PLAN OF CARE
Problem: OCCUPATIONAL THERAPY ADULT  Goal: Performs self-care activities at highest level of function for planned discharge setting  See evaluation for individualized goals  Description: Treatment Interventions: ADL retraining,Functional transfer training,UE strengthening/ROM,Endurance training,Patient/family training,Equipment evaluation/education,Compensatory technique education,Continued evaluation,Activityengagement,Energy conservation  Equipment Recommended: Shower/Tub chair with back ($)       See flowsheet documentation for full assessment, interventions and recommendations  Note: Limitation: Decreased ADL status,Decreased Safe judgement during ADL,Decreased cognition,Decreased endurance,Decreased self-care trans,Decreased high-level ADLs     Assessment: Pt is a 71yo male [facility] on 1/26/22  Pt presents w/ acute hypoxemic respiratory failure due to COVID-19 and significant PMH impacting his occupational performance including cardiac disease, CAD, HTN, CLL s/p chemo, hx R femur fracture sx  Pt admitted as trauma following a fall at home  Trauma transferred care to [facility] service  Pt recently DC home on 1/24/22 on 5L O2 at rest and 6L w/ exertion  Pt lives in 3rd floor apt w/ elevator access w/ wife and son in law  Pt reports I w/ ADL PTA w/ out use of AD or O2 at baseline  Pt w/ active OT orders and activity orders  Personal factors impacting performance includes difficulty completing IADLs, limited insight into deficits, recent admission  Upon eval, pt alert and oriented to person, place, and generally to time  Limited recall recent events and precautions  Pt completed bed mobility w/ mod I  Pt required S to complete sit <> stand and short distance functional mobility on 15L O2  Pt engaged in LBD w/ S after set- up  O2 sats dropped to 86%  Pt required mod I for + time, set- up to complete UBD and grooming while seated  Pt scored 12 on Short Blessed Screen indicating cognitive impairment   Pt presents w/ decreased activity tolerance, decreased endurance, decreased standing tolerance, limited insight into deficits, decreased cognition impacting his I w/ dressing, bathing, oral hygiene, functional mobility, functional transfers, activity engagement, clothing mgmt  Pt completing ADL below baseline level of I and would benefit from OT while in acute care to address deficits  Recommend ongoing eval of functional cognitive skills to assist in [address] [place] [address]  Recommend post acute rehab vs DC home w/ family support and Home OT pend progress, improved insight / recall, decreased O2 requirements and improved activity tolerance   Will continue to follow     OT Discharge Recommendation:  (Rehab vs Home pend cont'd eval,+act clovis, <O2 )

## 2022-01-27 NOTE — ASSESSMENT & PLAN NOTE
Lab Results   Component Value Date    HGBA1C 10 1 (H) 01/26/2022       Recent Labs     01/26/22  2200 01/27/22  0044 01/27/22  0743 01/27/22  1121   POCGLU 460* 498* 236* 368*     Blood Sugar Average: Last 72 hrs:  · (P) 364 [id number],   · hemoglobin A1c 10 1 this admission     Plan:  - Increased to Lantus 30 units q h s   - added humalong 5 U TID with meals    - SSI coverage  - Continue to adjust insulin as indicated while on steroids

## 2022-01-27 NOTE — ASSESSMENT & PLAN NOTE
· Presented as a trauma level C post fall home  Patient and wife report he had taken off supplemental O2 when going to the bathroom  · Lightheadedness after voiding and fell, wife reports mild headstrike as she caught his fall   Head and left upper extremity sustaining skin tears with head abrasion  · Was evaluated and cleared by Trauma in the ED    Plan:  - PT/OT evaluation

## 2022-01-27 NOTE — ASSESSMENT & PLAN NOTE
· POA, tested positive for COVID on 1/14/22  Was admitted from 01/19 through 1/24/22, DC home on 5L O2  · Chest x-ray showed slightly increased pulmonary infiltrates  · CTA chest (1/26): few tiny subsegmental filling defects in all lobes of the right lung and in the left lower lobe  1 3 x 0 8 cm juxtapleural nodule in the EDUARDO  It is associated with traction bronchiolectasis and is likely a scar but consider follow-up with a chest CT with no contrast in 6 months  Severe bilateral groundglass opacity due to Covid-19  · O2 sats 92-99% on 15L midflow NC with 15L non-rebreather  Desaturtions to 79% with 15L midflow without nonrebreather  Patient denies SOB at all even with saturations at 79%  Plan:  - Covid moderate   - IV Decadron 0 1mg/kg q 12h, day 3/10   - Baricitinib day 3/14 (did receive during prior admission)   - continue respiratory protocol, supplemental O2 and titrate to maintain sats > 90%  - initial procalcitonin slightly elevated, will continue on ceftriaxone and doxycycline, day 3   - OOB TID, Self-proning if able, Incentive spirometry  - PT recs:  At this time more likely rehab, less likely home w/PT servic

## 2022-01-27 NOTE — ASSESSMENT & PLAN NOTE
· POA, tested positive for COVID on 1/14/22  Was admitted from 01/19 through 1/24/22  · Discharge home on 5 L supplemental O2 at rest and 6 L with exertion, steroid taper  · Appears patient had not been very compliant at home with supplemental O2  · Chest x-ray showed slightly increased pulmonary infiltrates  · CTA chest (1/26): few tiny subsegmental filling defects in all lobes of the right lung and in the left lower lobe, too small to determine if they are acute or chronic pulmonary emboli  A small web in the right interlobar pulmonary artery is the sequela of a chronic pulmonary embolus  1 3 x 0 8 cm juxtapleural nodule in the left upper lobe  It is associated with traction bronchiolectasis and is likely a scar but consider follow-up with a chest CT with no contrast in 6 months   Severe bilateral groundglass opacity due to Covid-19  · Patient admitted to step-down 2   · O2 sats 80-94% on 15L midflow this morning, saturations within 90s when patient is resting quietly, decrease when patient is talking/eating    Plan:  - Covid moderate   - IV Decadron, increased to q 12 hours dose  - resumed Baricitinib day 2/14  - continue respiratory protocol, supplemental O2 and titrate to maintain sats > 90%  - initial procalcitonin slightly elevated, will continue on ceftriaxone and doxycycline for now

## 2022-01-27 NOTE — PLAN OF CARE
Problem: Potential for Falls  Goal: Patient will remain free of falls  Description: INTERVENTIONS:  - Educate patient/family on patient safety including physical limitations  - Instruct patient to call for assistance with activity   - Consult OT/PT to assist with strengthening/mobility   - Keep Call bell within reach  - Keep bed low and locked with side rails adjusted as appropriate  - Keep care items and personal belongings within reach  - Initiate and maintain comfort rounds  - Make Fall Risk Sign visible to staff  - Offer Toileting every 2 Hours, in advance of need  - Initiate/Maintain bed alarm  - Apply yellow socks and bracelet for high fall risk patients  - Consider moving patient to room near nurses station  Outcome: Progressing     Problem: MOBILITY - ADULT  Goal: Maintain or return to baseline ADL function  Description: INTERVENTIONS:  -  Assess patient's ability to carry out ADLs; assess patient's baseline for ADL function and identify physical deficits which impact ability to perform ADLs (bathing, care of mouth/teeth, toileting, grooming, dressing, etc )  - Assess/evaluate cause of self-care deficits   - Assess range of motion  - Assess patient's mobility; develop plan if impaired  - Assess patient's need for assistive devices and provide as appropriate  - Encourage maximum independence but intervene and supervise when necessary  - Involve family in performance of ADLs  - Assess for home care needs following discharge   - Consider OT consult to assist with ADL evaluation and planning for discharge  - Provide patient education as appropriate  Outcome: Progressing  Goal: Maintains/Returns to pre admission functional level  Description: INTERVENTIONS:  - Perform BMAT or MOVE assessment daily    - Set and communicate daily mobility goal to care team and patient/family/caregiver  - Collaborate with rehabilitation services on mobility goals if consulted  - Perform Range of Motion 3 times a day    - Reposition patient every 2 hours    - Dangle patient 3 times a day  - Stand patient 3 times a day  - Ambulate patient 3 times a day  - Out of bed to chair 3 times a day   - Out of bed for meals 3 times a day  - Out of bed for toileting  - Record patient progress and toleration of activity level   Outcome: Progressing

## 2022-01-27 NOTE — PLAN OF CARE
Problem: PHYSICAL THERAPY ADULT  Goal: Performs mobility at highest level of function for planned discharge setting  See evaluation for individualized goals  Description: Treatment/Interventions: Functional transfer training,LE strengthening/ROM,Therapeutic exercise,Cognitive reorientation,Patient/family training,Gait training,Bed mobility,Equipment eval/education,Endurance training,Spoke to nursing  Equipment Recommended:  (None at present)       See flowsheet documentation for full assessment, interventions and recommendations  Note: Prognosis: Good  Problem List: Decreased strength  Assessment: Pt is a 80 y o  male seen for PT evaluation s/p admit to [facility] on 1/26/2022 w/ Acute hypoxemic respiratory failure due to COVID-19 [facility])  Order placed for PT  Prior to last admission: Pt lived with spouse in apartment with elevator access  Patient did not use DME prior to last admission  Patient was recently discharged home from hospital on [address], but reports unaware of discharge instruction for O2 management  Patient confirmed that he took oxygen off to walk to the bathroom  Upon evaluation: Pt requires no physical assistance for bed mobility, transfers, nor short distance ambulation  However patient unable to ambulate room distances due to increased SOB, low SpO2  Pt's clinical presentation is currently unstable/unpredictable given the functional mobility deficits above, especially (but not limited to) decreased endurance, decreased functional mobility tolerance and SOB upon exertion, coupled with fall risks including hx of falls, impaired balance and decreased cognition, and combined with medical complications of abnormal WBCs, low SpO2 values, new onset O2 use and + new PE  Recommend continue mobility training over next 1-3 sessions without using DME for mobility, further Education with mobility with compliance of O2 line management    Increasing activity tolerance including standing tolerance, higher level balance activities  Barriers to Discharge: Inaccessible home environment        PT Discharge Recommendation:  (At this time more likely rehab, less likely home w/PT servic)          See flowsheet documentation for full assessment

## 2022-01-28 NOTE — PLAN OF CARE
Problem: Potential for Falls  Goal: Patient will remain free of falls  Description: INTERVENTIONS:  - Educate patient/family on patient safety including physical limitations  - Instruct patient to call for assistance with activity   - Consult OT/PT to assist with strengthening/mobility   - Keep Call bell within reach  - Keep bed low and locked with side rails adjusted as appropriate  - Keep care items and personal belongings within reach  - Initiate and maintain comfort rounds  - Make Fall Risk Sign visible to staff  - Offer Toileting every 2 Hours, in advance of need  - Initiate/Maintain bed and chair alarm  - Obtain necessary fall risk management equipment  - Apply yellow socks and bracelet for high fall risk patients  - Consider moving patient to room near nurses station  Outcome: Progressing     Problem: CARDIOVASCULAR - ADULT  Goal: Maintains optimal cardiac output and hemodynamic stability  Description: INTERVENTIONS:  - Monitor I/O, vital signs and rhythm  - Monitor for S/S and trends of decreased cardiac output  - Administer and titrate ordered vasoactive medications to optimize hemodynamic stability  - Assess quality of pulses, skin color and temperature  - Assess for signs of decreased coronary artery perfusion  - Instruct patient to report change in severity of symptoms  Outcome: Progressing  Goal: Absence of cardiac dysrhythmias or at baseline rhythm  Description: INTERVENTIONS:  - Continuous cardiac monitoring, vital signs, obtain 12 lead EKG if ordered  - Administer antiarrhythmic and heart rate control medications as ordered  - Monitor electrolytes and administer replacement therapy as ordered  Outcome: Progressing     Problem: RESPIRATORY - ADULT  Goal: Achieves optimal ventilation and oxygenation  Description: INTERVENTIONS:  - Assess for changes in respiratory status  - Assess for changes in mentation and behavior  - Position to facilitate oxygenation and minimize respiratory effort  - Oxygen administered by appropriate delivery if ordered  - Initiate smoking cessation education as indicated  - Encourage broncho-pulmonary hygiene including cough, deep breathe, Incentive Spirometry  - Assess the need for suctioning and aspirate as needed  - Assess and instruct to report SOB or any respiratory difficulty  - Respiratory Therapy support as indicated  Outcome: Progressing     Problem: Nutrition/Hydration-ADULT  Goal: Nutrient/Hydration intake appropriate for improving, restoring or maintaining nutritional needs  Description: Monitor and assess patient's nutrition/hydration status for malnutrition  Collaborate with interdisciplinary team and initiate plan and interventions as ordered  Monitor patient's weight and dietary intake as ordered or per policy  Utilize nutrition screening tool and intervene as necessary  Determine patient's food preferences and provide high-protein, high-caloric foods as appropriate       INTERVENTIONS:  - Monitor oral intake, urinary output, labs, and treatment plans  - Assess nutrition and hydration status and recommend course of action  - Evaluate amount of meals eaten  - Assist patient with eating if necessary   - Allow adequate time for meals  - Recommend/ encourage appropriate diets, oral nutritional supplements, and vitamin/mineral supplements  - Order, calculate, and assess calorie counts as needed  - Recommend, monitor, and adjust tube feedings and TPN/PPN based on assessed needs  - Assess need for intravenous fluids  - Provide specific nutrition/hydration education as appropriate  - Include patient/family/caregiver in decisions related to nutrition  Outcome: Progressing     Problem: HEMATOLOGIC - ADULT  Goal: Maintains hematologic stability  Description: INTERVENTIONS  - Assess for signs and symptoms of bleeding or hemorrhage  - Monitor labs  - Administer supportive blood products/factors as ordered and appropriate  Outcome: Progressing

## 2022-01-28 NOTE — PROGRESS NOTES
[facility]  Progress Note Lucinda Yuan [place] 1940, 80 y o  male MRN: [id number]  Unit/Bed#: S -Jef Encounter: [id number]  Primary Care Provider: Joe Muñoz MD   Date and time admitted to hospital: 1/26/2022  8:43 AM    * Acute hypoxemic respiratory failure due to COVID-19 [facility])  Assessment & Plan  · POA, tested positive for COVID on 1/14/22  Was admitted from 01/19 through 1/24/22, DC home on 5L O2  · Chest x-ray showed slightly increased pulmonary infiltrates  · CTA chest (1/26): few tiny subsegmental filling defects in all lobes of the right lung and in the left lower lobe  1 3 x 0 8 cm juxtapleural nodule in the EDUARDO  It is associated with traction bronchiolectasis and is likely a scar but consider follow-up with a chest CT with no contrast in 6 months  Severe bilateral groundglass opacity due to Covid-19  · O2 sats 92-99% on 15L midflow NC with 15L non-rebreather  Desaturtions to 79% with 15L midflow without nonrebreather  Patient denies SOB at all even with saturations at 79%  Plan:  - Covid moderate   - IV Decadron 0 1mg/kg q 12h, day 3/10   - Baricitinib day 3/14 (did receive during prior admission)   - continue respiratory protocol, supplemental O2 and titrate to maintain sats > 90%  - initial procalcitonin slightly elevated, will continue on ceftriaxone and doxycycline, day 3   - OOB TID, Self-proning if able, Incentive spirometry  - PT recs: At this time more likely rehab, less likely home w/PT servic    [facility]  · Presented as a trauma level C post fall home  Patient and wife report he had taken off supplemental O2 when going to the bathroom  · Lightheadedness after voiding and fell, wife reports mild headstrike as she caught his fall  Head and left upper extremity sustaining skin tears with head abrasion  · Was evaluated and cleared by Trauma in the ED    Plan:  - PT/OT evaluation:  At this time more likely rehab, less likely home w/PT servic    Acute pulmonary embolism without acute cor pulmonale (HCC)  Assessment & Plan  · POA, in the setting of recent COVID infection  · No evidence of right heart strain on CT  · Echo (1/27): LVEF 36%, grade 1 diastolic dysfunction, mild AR and MR     Plan:  - continue with heparin drip for now, eventual transition to [address] prior to discharge    Transaminitis  Assessment & Plan  · Likely in the setting of acute viral infection  Also has history of hepatitis C infection  · Reported with chronic transaminitis at baseline  · Recent outpatient ultrasound on 1/7/22 was unremarkable  · Continue to monitor, AST and ALT downtrending from 1/26    Type 2 diabetes mellitus with hyperglycemia, with long-term current use of insulin [facility])  Assessment & Plan  Lab Results   Component Value Date    HGBA1C 10 1 (H) 01/26/2022     Recent Labs     01/27/22  1624 01/27/22  1752 01/27/22  2156 01/28/22  0729   POCGLU 448* 223* 369* 148*     Blood Sugar Average: Last 72 hrs:  · (P) 339 [id number],   · hemoglobin A1c 10 1 this admission     Plan:  - Increased to Lantus 30 units q h s   - added humalong 10 U TID with meals    - SSI coverage  - Continue to adjust insulin as indicated while on steroids      VTE Pharmacologic Prophylaxis: VTE Score: 8 High Risk (Score >/= 5) - Pharmacological DVT Prophylaxis Ordered: heparin drip  Sequential Compression Devices Ordered  Patient Centered Rounds: I performed bedside rounds with nursing staff today  Discussions with Specialists or Other Care Team Provider: none    Education and Discussions with Family / Patient: Updated  (wife) via phone  Current Length of Stay: 2 day(s)  Current Patient Status: Inpatient   Discharge Plan: Anticipate discharge in >72 hrs to rehab facility  Code Status: Level 1 - Full Code    Subjective:   Doing well this morning  Use to deny any shortness of breath even when oxygen saturations dipped down to 79% this morning    Ambulating to bathroom without issue  Tolerating diet without issue and having regular bowel movements  Denies any chest pain  Objective:     Vitals:   Temp (24hrs), Av °F (36 7 °C), Min:97 7 °F (36 5 °C), Max:98 6 °F (37 °C)    Temp:  [97 7 °F (36 5 °C)-98 6 °F (37 °C)] 98 6 °F (37 °C)  HR:  [62-66] 66  Resp:  [18-20] 19  BP: (104-130)/(52-78) 130/78  SpO2:  [90 %-97 %] 97 %  Body mass index is 20 23 kg/m²  Input and Output Summary (last 24 hours): Intake/Output Summary (Last 24 hours) at 2022 1101  Last data filed at 2022 0700  Gross per 24 hour   Intake 720 ml   Output 1400 ml   Net -680 ml       Physical Exam:   Physical Exam  Vitals and nursing note reviewed  Constitutional:       General: He is not in acute distress  Appearance: Normal appearance  He is not diaphoretic  HENT:      Head: Normocephalic and atraumatic  Mouth/Throat:      Mouth: Mucous membranes are moist    Cardiovascular:      Rate and Rhythm: Normal rate and regular rhythm  Pulses: Normal pulses  Heart sounds: Normal heart sounds  No murmur heard  No friction rub  No gallop  Pulmonary:      Effort: Pulmonary effort is normal       Breath sounds: No stridor  Rales (crackles in all lung fields, bottom more significant than top) present  No wheezing or rhonchi  Abdominal:      General: Bowel sounds are normal       Palpations: Abdomen is soft  There is no mass  Tenderness: There is no abdominal tenderness  There is no guarding  Musculoskeletal:         General: No tenderness  Right lower leg: No edema  Left lower leg: No edema  Skin:     General: Skin is warm and dry  Neurological:      General: No focal deficit present  Mental Status: He is alert and oriented to person, place, and time     Psychiatric:         Mood and Affect: Mood normal          Behavior: Behavior normal         Additional Data:     Labs:  Results from last 7 days   Lab Units 22  0633 22  4682 01/26/22  0911   WBC Thousand/uL 22 85*   < > 29 56*   HEMOGLOBIN g/dL 11 7*   < > 14 2   HEMATOCRIT % 36 6   < > 43 1   PLATELETS Thousands/uL 224   < > 279   BANDS PCT % 7  --   --    NEUTROS PCT %  --   --  85*   LYMPHS PCT %  --   --  5*   LYMPHO PCT % 5*  --   --    MONOS PCT %  --   --  8   MONO PCT % 0*  --   --    EOS PCT % 0  --  0    < > = values in this interval not displayed  Results from last 7 days   Lab Units 01/28/22  0633   SODIUM mmol/L 139   POTASSIUM mmol/L 4 2   CHLORIDE mmol/L 107   CO2 mmol/L 25   BUN mg/dL 37*   CREATININE mg/dL 0 80   ANION GAP mmol/L 7   CALCIUM mg/dL 7 8*   ALBUMIN g/dL 2 1*   TOTAL BILIRUBIN mg/dL 0 66   ALK PHOS U/L 175*   ALT U/L 421*   AST U/L 206*   GLUCOSE RANDOM mg/dL 163*     Results from last 7 days   Lab Units 01/26/22  1039   INR  1 40*     Results from last 7 days   Lab Units 01/28/22  0729 01/27/22  2156 01/27/22  1752 01/27/22  1624 01/27/22  1553 01/27/22  1121 01/27/22  0743 01/27/22  0044 01/26/22  2200 01/26/22  2052 01/26/22  1539 01/26/22  0900   POC GLUCOSE mg/dl 148* 369* 223* 448* >500* 368* 236* 498* 460* 420* 371* 198*     Results from last 7 days   Lab Units 01/26/22  0911   HEMOGLOBIN A1C % 10 1*     Results from last 7 days   Lab Units 01/27/22  0435 01/26/22  1350 01/26/22  1035 01/26/22  0911 01/22/22  0608   LACTIC ACID mmol/L  --  2 0 2 3*  --   --    PROCALCITONIN ng/ml 0 15  --   --  0 27* 0 12     Lines/Drains:  Invasive Devices  Report    Peripheral Intravenous Line            Peripheral IV 01/26/22 Left Antecubital 2 days    Peripheral IV 01/26/22 Right;Ventral (anterior) Forearm 1 day              Imaging:   CTA ED chest PE study   Final Result by Laurent Canchola MD (01/26 5253)      A few tiny subsegmental filling defects in all lobes of the right lung and in the left lower lobe, too small to determine if they are acute or chronic pulmonary emboli    A small web in the right interlobar pulmonary artery is the sequela of a chronic    pulmonary embolus  1 3 x 0 8 cm juxtapleural nodule in the left upper lobe  It is associated with traction bronchiolectasis and is likely a scar but consider follow-up with a chest CT with no contrast in 6 months  Severe bilateral groundglass opacity due to Covid-19  XR chest 1 view portable   Final Result by Wilton Amezquita MD (01/26 9274)      Slightly increased pulmonary infiltrates compared with 1/19/2022  No acute traumatic injury seen  CT head without contrast   Final Result by Wilton Amezquita MD (01/26 3592)      No acute intracranial abnormality  Report was telephoned to the emergency department physician caring for the patient at 9:34 AM on 1/26/2022        Recent Cultures (last 7 days):   Results from last 7 days   Lab Units 01/26/22  1035   BLOOD CULTURE  No Growth at 24 hrs  No Growth at 24 hrs         Last 24 Hours Medication List:   Current Facility-Administered Medications   Medication Dose Route Frequency Provider Last Rate    albuterol  2 puff Inhalation Q4H PRN Juan Beckford MD      aspirin  81 mg Oral Daily Diane Francis MD      Baricitinib  4 mg Oral Q24H Diane Francis MD      benzonatate  100 mg Oral TID PRN Juan Beckford MD      cefTRIAXone  1,000 mg Intravenous Q24H Diane Francis MD 1,000 mg (01/28/22 1023)    dexamethasone  0 1 mg/kg Intravenous Q12H Diane Francis MD      doxycycline hyclate  100 mg Oral Q12H Diane Francis MD      ezetimibe  10 mg Oral Daily Diane Francis MD      heparin (porcine)  3-30 Units/kg/hr (Order-Specific) Intravenous Titrated Juan Beckford MD Stopped (01/28/22 1000)    heparin (porcine)  2,400 Units Intravenous Q1H PRN Juan Beckford MD      heparin (porcine)  4,800 Units Intravenous Q1H PRN Juan Beckford MD      insulin glargine  30 Units Subcutaneous HS Juan Carlos Santillan MD      insulin lispro  1-5 Units Subcutaneous TID AC Diane Frazier MD      insulin lispro  1-5 Units Subcutaneous HS Diane Frazier MD      insulin lispro  10 Units Subcutaneous TID With Meals Do Dawson MD      lisinopril  20 mg Oral Daily Diane Frazier MD      pramipexole  1 5 mg Oral Daily Diane Frazier MD      sodium chloride  100 mL/hr Intravenous Continuous Richelle Epps  mL/hr (01/27/22 1037)        Today, Patient Was Seen By: Do Dawson MD    **Please Note: This note may have been constructed using a voice recognition system  **

## 2022-01-28 NOTE — ASSESSMENT & PLAN NOTE
Lab Results   Component Value Date    HGBA1C 10 1 (H) 01/26/2022     Recent Labs     01/27/22  1624 01/27/22  1752 01/27/22  2156 01/28/22  0729   POCGLU 448* 223* 369* 148*     Blood Sugar Average: Last 72 hrs:  · (P) 339 [id number],   · hemoglobin A1c 10 1 this admission     Plan:  - Increased to Lantus 30 units q h s   - added humalong 10 U TID with meals    - SSI coverage  - Continue to adjust insulin as indicated while on steroids

## 2022-01-28 NOTE — ASSESSMENT & PLAN NOTE
· Presented as a trauma level C post fall home  Patient and wife report he had taken off supplemental O2 when going to the bathroom  · Lightheadedness after voiding and fell, wife reports mild headstrike as she caught his fall  Head and left upper extremity sustaining skin tears with head abrasion  · Was evaluated and cleared by Trauma in the ED    Plan:  - PT/OT evaluation:  At this time more likely rehab, less likely home w/PT servic

## 2022-01-28 NOTE — PLAN OF CARE
Problem: Potential for Falls  Goal: Patient will remain free of falls  Description: INTERVENTIONS:  - Educate patient/family on patient safety including physical limitations  - Instruct patient to call for assistance with activity   - Consult OT/PT to assist with strengthening/mobility   - Keep Call bell within reach  - Keep bed low and locked with side rails adjusted as appropriate  - Keep care items and personal belongings within reach  - Initiate and maintain comfort rounds  - Make Fall Risk Sign visible to staff  - Offer Toileting every  Hours, in advance of need  - Initiate/Maintain alarm  - Obtain necessary fall risk management equipment:   - Apply yellow socks and bracelet for high fall risk patients  - Consider moving patient to room near nurses station  Outcome: Progressing     Problem: MOBILITY - ADULT  Goal: Maintain or return to baseline ADL function  Description: INTERVENTIONS:  -  Assess patient's ability to carry out ADLs; assess patient's baseline for ADL function and identify physical deficits which impact ability to perform ADLs (bathing, care of mouth/teeth, toileting, grooming, dressing, etc )  - Assess/evaluate cause of self-care deficits   - Assess range of motion  - Assess patient's mobility; develop plan if impaired  - Assess patient's need for assistive devices and provide as appropriate  - Encourage maximum independence but intervene and supervise when necessary  - Involve family in performance of ADLs  - Assess for home care needs following discharge   - Consider OT consult to assist with ADL evaluation and planning for discharge  - Provide patient education as appropriate  Outcome: Progressing  Goal: Maintains/Returns to pre admission functional level  Description: INTERVENTIONS:  - Perform BMAT or MOVE assessment daily    - Set and communicate daily mobility goal to care team and patient/family/caregiver     - Collaborate with rehabilitation services on mobility goals if consulted  - Perform Range of Motion  times a day  - Reposition patient every  hours    - Dangle patient  times a day  - Stand patient  times a day  - Ambulate patient  times a day  - Out of bed to chair  times a day   - Out of bed for meal times a day  - Out of bed for toileting  - Record patient progress and toleration of activity level   Outcome: Progressing     Problem: CARDIOVASCULAR - ADULT  Goal: Maintains optimal cardiac output and hemodynamic stability  Description: INTERVENTIONS:  - Monitor I/O, vital signs and rhythm  - Monitor for S/S and trends of decreased cardiac output  - Administer and titrate ordered vasoactive medications to optimize hemodynamic stability  - Assess quality of pulses, skin color and temperature  - Assess for signs of decreased coronary artery perfusion  - Instruct patient to report change in severity of symptoms  Outcome: Progressing  Goal: Absence of cardiac dysrhythmias or at baseline rhythm  Description: INTERVENTIONS:  - Continuous cardiac monitoring, vital signs, obtain 12 lead EKG if ordered  - Administer antiarrhythmic and heart rate control medications as ordered  - Monitor electrolytes and administer replacement therapy as ordered  Outcome: Progressing     Problem: RESPIRATORY - ADULT  Goal: Achieves optimal ventilation and oxygenation  Description: INTERVENTIONS:  - Assess for changes in respiratory status  - Assess for changes in mentation and behavior  - Position to facilitate oxygenation and minimize respiratory effort  - Oxygen administered by appropriate delivery if ordered  - Initiate smoking cessation education as indicated  - Encourage broncho-pulmonary hygiene including cough, deep breathe, Incentive Spirometry  - Assess the need for suctioning and aspirate as needed  - Assess and instruct to report SOB or any respiratory difficulty  - Respiratory Therapy support as indicated  Outcome: Progressing     Problem: HEMATOLOGIC - ADULT  Goal: Maintains hematologic stability  Description: INTERVENTIONS  - Assess for signs and symptoms of bleeding or hemorrhage  - Monitor labs  - Administer supportive blood products/factors as ordered and appropriate  Outcome: Progressing

## 2022-01-28 NOTE — ASSESSMENT & PLAN NOTE
· Likely in the setting of acute viral infection    Also has history of hepatitis C infection  · Reported with chronic transaminitis at baseline  · Recent outpatient ultrasound on 1/7/22 was unremarkable  · Continue to monitor, AST and ALT downtrending from 1/26

## 2022-01-29 NOTE — CONSULTS
Pulmonary Consultation   Bess Tim 80 y o  male MRN: [id number]  Unit/Bed#: S -01 Encounter: [id number]    Reason for consultation: Acute hypoxic respiratory failure due to COVID-19 pneumonia  Requesting physician: Dr Kory Schreiber  Impressions:  1  Acute hypoxic respiratory failure due to COVID-19 pneumonia  2  CLL, not in remission  Recommendations:  1  Patient reported significant sinus congestion - saturations significantly improved with non-rebreather, now on 40L/60% + NRB  2  Add afrin, nasal saline  3  Patient reports having a living will indicating he is DNR/I, noted on previous admissions - he is going to discuss with his wife but anticipates identifying as DNR/I again  4  Discussed with nursing, will attempt to keep on the floor for now due to ICU nursing shortages, but can re-visit if he decides on being full code and continues to have increasing oxygen requirements  5  No apparent needs for IVF, so I discontinued  History of Present Illness   HPI:  Sumanth Engle is a 80 y o  male who is being treated for COVID-19 pneumonia  In the morning on 1/29 he developed progressive dyspnea, requiring increase in HFNC to 50L/100% and a nonrebreather  He reports he is having nasal congestion, feels like he can't get the high flow in through his nose, and since being placed on the non-rebreather and breathing through his mouth, he is doing much better  HFNC has been lowered to 40L/60% and he has nonlabored breathing with the addition of the NRB  He reports he has a living will indicating he does not want to be resuscitated; this is consistent with his code status on previous hospitalizations  He reports now initially he does not want CPR or a ventilator, but hesitates as he has not discussed it with his wife  He had no other acute complaints  Review of systems:  Review of Systems   Respiratory: Positive for cough and shortness of breath      All other systems reviewed and are negative  All other 12-point review of systems are negative  Historical Information   Past Medical History:   Diagnosis Date    Arthritis     Cancer [facility])     Cardiac disease     abnormal stress test 2012    Chronic kidney disease     protein urine    CLL (chronic lymphocytic leukemia) ([facility] [address] ) 2014    6 months chemo    Constipation     Coronary artery disease     Diabetes mellitus ([facility] [address] )     type 2    History of chemotherapy     Tx for CLL currently in remission since 2016    Hyperlipidemia     Hypertension     Infectious viral hepatitis     Hep C Hx remission since 2016 post tx    Liver disease     Proteinuria      Past Surgical History:   Procedure Laterality Date    ANGIOPLASTY      one stent    CARDIAC CATHETERIZATION      CARDIAC SURGERY  2016    x1 stent    CATARACT EXTRACTION      CYST REMOVAL      mid back benign    FRACTURE SURGERY Right     Fx Femur age 17, pin in place    WY XCAPSL CTRC RMVL INSJ IO LENS PROSTH W/O ECP Right 7/23/2018    Procedure: EXTRACTION EXTRACAPSULAR CATARACT PHACO INTRAOCULAR LENS (IOL); Surgeon: Mónica Cheng MD;  Location: [facility] MAIN OR;  Service: Ophthalmology    W[place] XCAPSL CTRC RMVL INSJ IO LENS PROSTH W/O ECP Left 10/1/2018    Procedure: EXTRACTION EXTRACAPSULAR CATARACT PHACO INTRAOCULAR LENS (IOL); Surgeon: Mónica Cheng MD;  Location: [facility] MAIN OR;  Service: Ophthalmology    TONSILLECTOMY       Family History   Problem Relation Age of Onset    Diabetes unspecified Brother     Diabetes Brother     Diabetes unspecified Maternal Grandfather     Diabetes Maternal Grandfather     Cancer Mother         breast    Alzheimer's disease Mother     Early death Father         age 64 peritonitis from gallbladder    Cancer Sister         small cell carcinoma    No Known Problems Daughter     No Known Problems Daughter        Tobacco history:  Former smoker, quit 20 years ago    Family history: NC    Meds/Allergies   Current Facility-Administered Medications   Medication Dose Route Frequency    albuterol (PROVENTIL HFA,VENTOLIN HFA) inhaler 2 puff  2 puff Inhalation Q4H PRN    aspirin (ECOTRIN LOW STRENGTH) EC tablet 81 mg  81 mg Oral Daily    Baricitinib (OLUMIANT) (COVID EUA) tablet 4 mg  4 mg Oral Q24H    benzonatate (TESSALON PERLES) capsule 100 mg  100 mg Oral TID PRN    ceftriaxone (ROCEPHIN) 1 g/50 mL in dextrose IVPB  1,000 mg Intravenous Q24H    dexamethasone (DECADRON) injection 6 4 mg  0 1 mg/kg Intravenous Q12H    doxycycline hyclate (VIBRAMYCIN) capsule 100 mg  100 mg Oral Q12H    ezetimibe (ZETIA) tablet 10 mg  10 mg Oral Daily    heparin (porcine) 25,000 units in 0 45% in sodium chloride 250 ml infusion (CMPD)  3-30 Units/kg/hr (Order-Specific) Intravenous Titrated    heparin (porcine) injection 2,400 Units  2,400 Units Intravenous Q1H PRN    heparin (porcine) injection 4,800 Units  4,800 Units Intravenous Q1H PRN    insulin glargine (LANTUS) subcutaneous injection 15 Units 0 15 mL  15 Units Subcutaneous HS    insulin lispro (HumaLOG) 100 units/mL subcutaneous injection 1-5 Units  1-5 Units Subcutaneous TID AC    insulin lispro (HumaLOG) 100 units/mL subcutaneous injection 1-5 Units  1-5 Units Subcutaneous HS    insulin lispro (HumaLOG) 100 units/mL subcutaneous injection 10 Units  10 Units Subcutaneous TID With Meals    lisinopril (ZESTRIL) tablet 20 mg  20 mg Oral Daily    melatonin tablet 3 mg  3 mg Oral HS    pramipexole (MIRAPEX) tablet 1 5 mg  1 5 mg Oral Daily    sodium chloride 0 9 % infusion  100 mL/hr Intravenous Continuous     Allergies   Allergen Reactions    Other      Seasonal allergies       Vitals: Blood pressure 120/68, pulse 61, temperature 98 1 °F (36 7 °C), temperature source Oral, resp  rate 18, height 5' 10" (1 778 m), weight 64 kg (141 lb), SpO2 (!) 89 % , on 40 L / 60% O2 + NRB, Body mass index is 20 23 kg/m²      Intake/Output Summary (Last 24 hours) at 1/29/2022 1352  Last data filed at 1/29/2022 0940  Gross per 24 hour   Intake 740 ml   Output 400 ml   Net 340 ml     Physical exam:     Physical Exam  Vitals and nursing note reviewed  Constitutional:       General: He is not in acute distress  Appearance: Normal appearance  He is well-developed  He is not ill-appearing  HENT:      Head: Normocephalic and atraumatic  Eyes:      General: No scleral icterus  Conjunctiva/sclera: Conjunctivae normal    Neck:      Thyroid: No thyromegaly  Vascular: No JVD  Cardiovascular:      Rate and Rhythm: Normal rate and regular rhythm  Heart sounds: Normal heart sounds  No murmur heard  No friction rub  No gallop  Pulmonary:      Effort: Pulmonary effort is normal  No respiratory distress  Breath sounds: Rhonchi present  No wheezing or rales  Musculoskeletal:      Cervical back: Neck supple  Right lower leg: No edema  Left lower leg: No edema  Skin:     General: Skin is warm and dry  Findings: No rash  Neurological:      General: No focal deficit present  Mental Status: He is alert and oriented to person, place, and time  Mental status is at baseline  Psychiatric:         Mood and Affect: Mood normal          Behavior: Behavior normal      Labs: I have personally reviewed pertinent lab results      Results from last 7 days   Lab Units 01/29/22  0526 01/28/22  0633 01/27/22  0435   WBC Thousand/uL 24 01* 22 85* 13 60*   HEMOGLOBIN g/dL 12 9 11 7* 12 2   HEMATOCRIT % 40 7 36 6 37 4   PLATELETS Thousands/uL 258 224 206         Results from last 7 days   Lab Units 01/29/22  0526 01/28/22  1067 01/27/22  0435 01/26/22  0911 01/26/22  0911 01/24/22  0630 01/24/22  0630   POTASSIUM mmol/L 4 1 4 2 4 5   < > 4 1   < > 4 2   CHLORIDE mmol/L 107 107 107   < > 102   < > 106   CO2 mmol/L 27 25 25   < > 25   < > 29   BUN mg/dL 33* 37* 35*   < > 35*   < > 33*   CREATININE mg/dL 0 83 0 80 1 01   < > 1 03   < > 0 96   CALCIUM mg/dL 8 3 7 8* 8 2*   < > 9 5   < > 9 0   ALK PHOS U/L --  175*  --   --  172*  --  101   ALT U/L  --  421*  --   --  487*  --  372*   AST U/L  --  206*  --   --  221*  --  218*    < > = values in this interval not displayed  Results from last 7 days   Lab Units 01/29/22  0219 01/28/22  1758 01/28/22  0633 01/26/22  1816 01/26/22  1039   INR   --   --   --   --  1 40*   PTT seconds 119* 35 >210*   < > 30    < > = values in this interval not displayed  Results from last 7 days   Lab Units 01/26/22  0911   MAGNESIUM mg/dL 2 0     Imaging and other studies: I have personally reviewed pertinent films in PACS    Code Status: Level 1 - Full Code    Thank you for allowing us to participate in the care of your patient      MORE Mccloud

## 2022-01-29 NOTE — PROGRESS NOTES
[facility]  Progress Note Reji Martinezsco 1940, 80 y o  male MRN: [id number]  Unit/Bed#: S -01 Encounter: [id number]  Primary Care Provider: Viral Walters MD   Date and time admitted to hospital: 1/26/2022  8:43 AM    * Acute hypoxemic respiratory failure due to COVID-19 [facility])  Assessment & Plan  · POA, tested positive for COVID on 1/14/22  Was admitted from 01/19 through 1/24/22, and before was DC home on 5L O2  · Chest x-ray showed slightly increased pulmonary infiltrates  · CTA chest (1/26): few tiny subsegmental filling defects in all lobes of the right lung and in the left lower lobe  1 3 x 0 8 cm juxtapleural nodule in the EDUARDO  It is associated with traction bronchiolectasis and is likely a scar but consider follow-up with a chest CT with no contrast in 6 months  Severe bilateral groundglass opacity due to Covid-19  · O2 sats 94% on 50L high-flow NC because the patient had increased oxygen requirement  · Patient still denies SOB even when his saturating low    Plan:  - Covid severe  - IV Decadron 0 1mg/kg q 12h, day 4/10   - Baricitinib day 4/14 (did receive during prior admission)   - continue respiratory protocol, supplemental O2 and titrate to maintain sats > 90%  - initial procalcitonin slightly elevated, will continue on ceftriaxone and doxycycline, day 4   - continue heparin drip  - pulmonology consulted  - OOB TID, Self-proning if able, Incentive spirometry  - PT recs: At this time more likely rehab, less likely home w/PT servic      Acute pulmonary embolism without acute cor pulmonale (HCC)  Assessment & Plan  · POA, in the setting of recent COVID infection  · No evidence of right heart strain on CT  · Echo (1/27): LVEF 90%, grade 1 diastolic dysfunction, mild AR and MR     Plan:  - continue with heparin drip for now, eventual transition to [address] prior to discharge    Fall at home  [facility]  · Presented as a trauma level C post fall home   Patient and wife report he had taken off supplemental O2 when going to the bathroom  · Lightheadedness after voiding and fell, wife reports mild headstrike as she caught his fall  Head and left upper extremity sustaining skin tears with head abrasion  · Was evaluated and cleared by Trauma in the ED    Plan:  - PT/OT evaluation: At this time more likely rehab, less likely home w/PT servic    Transaminitis  Assessment & Plan  · Likely in the setting of acute viral infection  Also has history of hepatitis C infection  · Reported with chronic transaminitis at baseline  · Recent outpatient ultrasound on 1/7/22 was unremarkable  · Continue to monitor, AST and ALT downtrending from 1/26    Type 2 diabetes mellitus with hyperglycemia, with long-term current use of insulin [facility])  Assessment & Plan  Lab Results   Component Value Date    HGBA1C 10 1 (H) 01/26/2022     Recent Labs     01/28/22  2119 01/29/22  0750 01/29/22  0832 01/29/22  1115   POCGLU 164* 51* 108 266*     Blood Sugar Average: Last 72 hrs:  · (P) 904 [id number],   · hemoglobin A1c 10 1 this admission   · Patient had hypoglycemic episode this a m  with Glucose of 51    Plan:  - decreased Lantus to 15 units q h s  today  - humalong 10 U TID with meals    - SSI coverage  - Continue to adjust insulin as indicated while on steroids        VTE Pharmacologic Prophylaxis: VTE Score: 8 High Risk (Score >/= 5) - Pharmacological DVT Prophylaxis Ordered: heparin drip  Sequential Compression Devices Ordered  Patient Centered Rounds: I performed bedside rounds with nursing staff today  Discussions with Specialists or Other Care Team Provider:  Pulmonology    Education and Discussions with Family / Patient: Will update patient's wife  Current Length of Stay: 3 day(s)  Current Patient Status: Inpatient   Discharge Plan: Anticipate discharge in >72 hrs to rehab facility  Code Status: Level 1 - Full Code    Subjective:    The patient does not have any subjective symptoms or complaints  He desaturated today on 15 L mid flow so he was placed on high-flow nasal cannula  During this saturation, did not have any shortness of breath or any subjective complaints  Objective:     Vitals:   Temp (24hrs), Av 8 °F (36 6 °C), Min:97 5 °F (36 4 °C), Max:98 1 °F (36 7 °C)    Temp:  [97 5 °F (36 4 °C)-98 1 °F (36 7 °C)] 98 1 °F (36 7 °C)  HR:  [59-68] 61  Resp:  [18-20] 18  BP: ()/(58-68) 120/68  SpO2:  [89 %-99 %] 89 %  Body mass index is 20 23 kg/m²  Input and Output Summary (last 24 hours): Intake/Output Summary (Last 24 hours) at 2022 1254  Last data filed at 2022 0940  Gross per 24 hour   Intake 980 ml   Output 950 ml   Net 30 ml       Physical Exam:   Physical Exam  Constitutional:       Appearance: Normal appearance  HENT:      Head: Normocephalic and atraumatic  Nose: Congestion present  Mouth/Throat:      Pharynx: No oropharyngeal exudate or posterior oropharyngeal erythema  Eyes:      General: No scleral icterus  Right eye: No discharge  Left eye: No discharge  Cardiovascular:      Rate and Rhythm: Normal rate and regular rhythm  Pulses: Normal pulses  Pulmonary:      Effort: Pulmonary effort is normal  No respiratory distress  Breath sounds: Rales present  No wheezing  Abdominal:      General: There is no distension  Palpations: Abdomen is soft  Tenderness: There is no abdominal tenderness  Musculoskeletal:         General: No swelling  Cervical back: Normal range of motion  No rigidity  Right lower leg: No edema  Left lower leg: No edema  Skin:     Coloration: Skin is not jaundiced  Neurological:      Mental Status: He is alert and oriented to person, place, and time  Psychiatric:         Mood and Affect: Mood normal          Behavior: Behavior normal          Thought Content:  Thought content normal          Judgment: Judgment normal           Additional Data: Labs:  Results from last 7 days   Lab Units 01/29/22  0526 01/28/22  4082 01/28/22  0633 01/27/22  0435 01/26/22  0911   WBC Thousand/uL 24 01*   < > 22 85*   < > 29 56*   HEMOGLOBIN g/dL 12 9   < > 11 7*   < > 14 2   HEMATOCRIT % 40 7   < > 36 6   < > 43 1   PLATELETS Thousands/uL 258   < > 224   < > 279   BANDS PCT %  --   --  7  --   --    NEUTROS PCT %  --   --   --   --  85*   LYMPHS PCT %  --   --   --   --  5*   LYMPHO PCT %  --   --  5*  --   --    MONOS PCT %  --   --   --   --  8   MONO PCT %  --   --  0*  --   --    EOS PCT %  --   --  0  --  0    < > = values in this interval not displayed  Results from last 7 days   Lab Units 01/29/22  0526 01/28/22  0633 01/28/22  0633   SODIUM mmol/L 139   < > 139   POTASSIUM mmol/L 4 1   < > 4 2   CHLORIDE mmol/L 107   < > 107   CO2 mmol/L 27   < > 25   BUN mg/dL 33*   < > 37*   CREATININE mg/dL 0 83   < > 0 80   ANION GAP mmol/L 5   < > 7   CALCIUM mg/dL 8 3   < > 7 8*   ALBUMIN g/dL  --   --  2 1*   TOTAL BILIRUBIN mg/dL  --   --  0 66   ALK PHOS U/L  --   --  175*   ALT U/L  --   --  421*   AST U/L  --   --  206*   GLUCOSE RANDOM mg/dL 69   < > 163*    < > = values in this interval not displayed       Results from last 7 days   Lab Units 01/26/22  1039   INR  1 40*     Results from last 7 days   Lab Units 01/29/22  1115 01/29/22  0832 01/29/22  0750 01/28/22  2119 01/28/22  1538 01/28/22  1140 01/28/22  0729 01/27/22  2156 01/27/22  1752 01/27/22  1624 01/27/22  1553 01/27/22  1121   POC GLUCOSE mg/dl 266* 108 51* 164* 128 257* 148* 369* 223* 448* >500* 368*     Results from last 7 days   Lab Units 01/26/22  0911   HEMOGLOBIN A1C % 10 1*     Results from last 7 days   Lab Units 01/27/22  0435 01/26/22  1350 01/26/22  1035 01/26/22  0911   LACTIC ACID mmol/L  --  2 0 2 3*  --    PROCALCITONIN ng/ml 0 15  --   --  0 27*       Lines/Drains:  Invasive Devices  Report    Peripheral Intravenous Line            Peripheral IV 01/26/22 Left Antecubital 3 days Peripheral IV 01/26/22 Right;Ventral (anterior) Forearm 3 days                      Imaging: Reviewed radiology reports from this admission including: chest xray and chest CT scan    Recent Cultures (last 7 days):   Results from last 7 days   Lab Units 01/26/22  1035   BLOOD CULTURE  No Growth at 48 hrs  No Growth at 48 hrs         Last 24 Hours Medication List:   Current Facility-Administered Medications   Medication Dose Route Frequency Provider Last Rate    albuterol  2 puff Inhalation Q4H PRN Libra Hdz MD      aspirin  81 mg Oral Daily Diane Johns MD      Baricitinib  4 mg Oral Q24H Diane Johns MD      benzonatate  100 mg Oral TID PRN Libra Hdz MD      cefTRIAXone  1,000 mg Intravenous Q24H Libra Hdz MD 1,000 mg (01/29/22 0950)    dexamethasone  0 1 mg/kg Intravenous Q12H Diane Jonhs MD      doxycycline hyclate  100 mg Oral Q12H Diane Johns MD      ezetimibe  10 mg Oral Daily Diane Johns MD      heparin (porcine)  3-30 Units/kg/hr (Order-Specific) Intravenous Titrated Libra Hdz MD 13 Units/kg/hr (01/29/22 7142)    heparin (porcine)  2,400 Units Intravenous Q1H PRN Diane Johns MD      heparin (porcine)  4,800 Units Intravenous Q1H PRN Libra Hdz MD      insulin glargine  15 Units Subcutaneous HS Gabe Lin MD      insulin lispro  1-5 Units Subcutaneous TID AC Diane Johns MD      insulin lispro  1-5 Units Subcutaneous HS Diane Johns MD      insulin lispro  10 Units Subcutaneous TID With Meals Dusty Waller MD      lisinopril  20 mg Oral Daily Diane Johns MD      melatonin  3 mg Oral HS Gisselle Odom MD      pramipexole  1 5 mg Oral Daily Diane Johns MD      sodium chloride  100 mL/hr Intravenous Continuous Libra Hdz  mL/hr (01/29/22 8787)        Today, Patient Was Seen By: Gabe Lin MD    **Please Note: This note may have been constructed using a voice recognition system  **

## 2022-01-29 NOTE — PLAN OF CARE
Problem: Potential for Falls  Goal: Patient will remain free of falls  Description: INTERVENTIONS:  - Educate patient/family on patient safety including physical limitations  - Instruct patient to call for assistance with activity   - Consult OT/PT to assist with strengthening/mobility   - Keep Call bell within reach  - Keep bed low and locked with side rails adjusted as appropriate  - Keep care items and personal belongings within reach  - Initiate and maintain comfort rounds  - Make Fall Risk Sign visible to staff  - Offer Toileting every  Hours, in advance of need  - Initiate/Maintain alarm  - Obtain necessary fall risk management equipment:   - Apply yellow socks and bracelet for high fall risk patients  - Consider moving patient to room near nurses station  Outcome: Progressing     Problem: MOBILITY - ADULT  Goal: Maintain or return to baseline ADL function  Description: INTERVENTIONS:  -  Assess patient's ability to carry out ADLs; assess patient's baseline for ADL function and identify physical deficits which impact ability to perform ADLs (bathing, care of mouth/teeth, toileting, grooming, dressing, etc )  - Assess/evaluate cause of self-care deficits   - Assess range of motion  - Assess patient's mobility; develop plan if impaired  - Assess patient's need for assistive devices and provide as appropriate  - Encourage maximum independence but intervene and supervise when necessary  - Involve family in performance of ADLs  - Assess for home care needs following discharge   - Consider OT consult to assist with ADL evaluation and planning for discharge  - Provide patient education as appropriate  Outcome: Progressing  Goal: Maintains/Returns to pre admission functional level  Description: INTERVENTIONS:  - Perform BMAT or MOVE assessment daily    - Set and communicate daily mobility goal to care team and patient/family/caregiver     - Collaborate with rehabilitation services on mobility goals if consulted  - Perform Range of Motion  times a day  - Reposition patient every  hours    - Dangle patient  times a day  - Stand patient  times a day  - Ambulate patient  times a day  - Out of bed to chair  times a day   - Out of bed for meals  times a day  - Out of bed for toileting  - Record patient progress and toleration of activity level   Outcome: Progressing     Problem: CARDIOVASCULAR - ADULT  Goal: Maintains optimal cardiac output and hemodynamic stability  Description: INTERVENTIONS:  - Monitor I/O, vital signs and rhythm  - Monitor for S/S and trends of decreased cardiac output  - Administer and titrate ordered vasoactive medications to optimize hemodynamic stability  - Assess quality of pulses, skin color and temperature  - Assess for signs of decreased coronary artery perfusion  - Instruct patient to report change in severity of symptoms  Outcome: Progressing  Goal: Absence of cardiac dysrhythmias or at baseline rhythm  Description: INTERVENTIONS:  - Continuous cardiac monitoring, vital signs, obtain 12 lead EKG if ordered  - Administer antiarrhythmic and heart rate control medications as ordered  - Monitor electrolytes and administer replacement therapy as ordered  Outcome: Progressing     Problem: RESPIRATORY - ADULT  Goal: Achieves optimal ventilation and oxygenation  Description: INTERVENTIONS:  - Assess for changes in respiratory status  - Assess for changes in mentation and behavior  - Position to facilitate oxygenation and minimize respiratory effort  - Oxygen administered by appropriate delivery if ordered  - Initiate smoking cessation education as indicated  - Encourage broncho-pulmonary hygiene including cough, deep breathe, Incentive Spirometry  - Assess the need for suctioning and aspirate as needed  - Assess and instruct to report SOB or any respiratory difficulty  - Respiratory Therapy support as indicated  Outcome: Progressing     Problem: HEMATOLOGIC - ADULT  Goal: Maintains hematologic stability  Description: INTERVENTIONS  - Assess for signs and symptoms of bleeding or hemorrhage  - Monitor labs  - Administer supportive blood products/factors as ordered and appropriate  Outcome: Progressing     Problem: Nutrition/Hydration-ADULT  Goal: Nutrient/Hydration intake appropriate for improving, restoring or maintaining nutritional needs  Description: Monitor and assess patient's nutrition/hydration status for malnutrition  Collaborate with interdisciplinary team and initiate plan and interventions as ordered  Monitor patient's weight and dietary intake as ordered or per policy  Utilize nutrition screening tool and intervene as necessary  Determine patient's food preferences and provide high-protein, high-caloric foods as appropriate       INTERVENTIONS:  - Monitor oral intake, urinary output, labs, and treatment plans  - Assess nutrition and hydration status and recommend course of action  - Evaluate amount of meals eaten  - Assist patient with eating if necessary   - Allow adequate time for meals  - Recommend/ encourage appropriate diets, oral nutritional supplements, and vitamin/mineral supplements  - Order, calculate, and assess calorie counts as needed  - Recommend, monitor, and adjust tube feedings and TPN/PPN based on assessed needs  - Assess need for intravenous fluids  - Provide specific nutrition/hydration education as appropriate  - Include patient/family/caregiver in decisions related to nutrition  Outcome: Progressing

## 2022-01-29 NOTE — ASSESSMENT & PLAN NOTE
· Presented as a trauma level C post fall home  Patient and wife report he had taken off supplemental O2 when going to the bathroom  · Lightheadedness after voiding and fell, wife reports mild headstrike as she caught his fall   Head and left upper extremity sustaining skin tears with head abrasion  · Was evaluated and cleared by Trauma in the ED    Plan:  - PT/OT evaluation: post acute rehab

## 2022-01-29 NOTE — ASSESSMENT & PLAN NOTE
· POA, in the setting of recent COVID infection  · No evidence of right heart strain on CT  · Echo (1/27): LVEF 57%, grade 1 diastolic dysfunction, mild AR and MR     Plan:  - continue with heparin drip for now, eventual transition to [address] prior to discharge

## 2022-01-29 NOTE — ASSESSMENT & PLAN NOTE
Lab Results   Component Value Date    HGBA1C 10 1 (H) 01/26/2022     Recent Labs     01/28/22  2119 01/29/22  0750 01/29/22  0832 01/29/22  1115   POCGLU 164* 51* 108 266*     Blood Sugar Average: Last 72 hrs:  · (P) 321 [id number],   · hemoglobin A1c 10 1 this admission   · Patient had hypoglycemic episode this a m  with Glucose of 51    Plan:  - decreased Lantus to 15 units q h s  today  - humalong 10 U TID with meals    - SSI coverage  - Continue to adjust insulin as indicated while on steroids

## 2022-01-29 NOTE — ASSESSMENT & PLAN NOTE
· Likely in the setting of acute viral infection    Also has history of hepatitis C infection  · Reported with chronic transaminitis at baseline  · Recent outpatient ultrasound on 1/7/22 was unremarkable  Lab Results   Component Value Date     (H) 01/30/2022     (H) 01/30/2022    ALKPHOS 272 (H) 01/30/2022    BILITOT 0 7 08/14/2017     Plan:  - Continue to monitor, AST and ALT

## 2022-01-29 NOTE — ASSESSMENT & PLAN NOTE
· POA, tested positive for COVID on 1/14/22  Admitted 01/19 -1/24/22, and DC home on 5L O2   · CTA chest (1/26): few tiny subsegmental filling defects in all lobes of the right lung and in the left lower lobe  1 3 x 0 8 cm juxtapleural nodule in the EDUARDO  It is associated with traction bronchiolectasis and is likely a scar but consider follow-up with a chest CT with no contrast in 6 months  Severe bilateral groundglass opacity due to Covid-19  · CXR (1/29): Worsening diffuse ground glass opacities throughout the lung parenchyma consistent with COVID-19 pneumonia  · O2 sats 86-98% on 50L high-flow NC with FiO2 70%, no NRB, patient has significant desaturations with movement, observed desaturations down to 71%     Plan:  - Covid: severe pathway   - IV Decadron 0 1mg/kg q 12h, day 5/10   - Baricitinib day 5/14 (did receive during prior admission)   - anticoagulation with heparin drip given acute PE   - patient confirmed level 1 code status, would accept endotracheal intubation if required   - continue respiratory protocol, supplemental O2 and titrate to maintain sats > 90%  - pulmonology consulted     - OOB TID, Self-proning if able, Incentive spirometry  - PT recs: post acute rehab

## 2022-01-29 NOTE — ASSESSMENT & PLAN NOTE
· POA, in the setting of recent COVID infection  · No evidence of right heart strain on CT  · Echo (1/27): LVEF 03%, grade 1 diastolic dysfunction, mild AR and MR     Plan:  - continue with heparin drip for now, eventual transition to [address] prior to discharge

## 2022-01-29 NOTE — ASSESSMENT & PLAN NOTE
Lab Results   Component Value Date    HGBA1C 10 1 (H) 01/26/2022     Recent Labs     01/29/22  1115 01/29/22  1543 01/29/22 2034 01/30/22  0712   POCGLU 266* 253* 170* 95     Blood Sugar Average: Last 72 hrs:  · (P) 236 375,   · hemoglobin A1c 10 1 this admission     Plan:  - Lantus 15 units q h s    - humalong 10 U TID with meals    - SSI coverage  - Continue to adjust insulin as indicated while on steroids

## 2022-01-29 NOTE — ASSESSMENT & PLAN NOTE
· POA, tested positive for COVID on 1/14/22  Was admitted from 01/19 through 1/24/22, and before was DC home on 5L O2  · Chest x-ray showed slightly increased pulmonary infiltrates  · CTA chest (1/26): few tiny subsegmental filling defects in all lobes of the right lung and in the left lower lobe  1 3 x 0 8 cm juxtapleural nodule in the EDUARDO  It is associated with traction bronchiolectasis and is likely a scar but consider follow-up with a chest CT with no contrast in 6 months  Severe bilateral groundglass opacity due to Covid-19  · O2 sats 94% on 50L high-flow NC because the patient had increased oxygen requirement  · Patient still denies SOB even when his saturating low    Plan:  - Covid severe  - IV Decadron 0 1mg/kg q 12h, day 4/10   - Baricitinib day 4/14 (did receive during prior admission)   - continue respiratory protocol, supplemental O2 and titrate to maintain sats > 90%  - initial procalcitonin slightly elevated, will continue on ceftriaxone and doxycycline, day 4   - continue heparin drip  - pulmonology consulted  - OOB TID, Self-proning if able, Incentive spirometry  - PT recs:  At this time more likely rehab, less likely home w/PT servic

## 2022-01-30 NOTE — ASSESSMENT & PLAN NOTE
· POA, tested positive for COVID on 1/14/22  Admitted 01/19 -1/24/22, and DC home on 5L O2   · CTA chest (1/26): few tiny subsegmental filling defects in all lobes of the right lung and in the left lower lobe  1 3 x 0 8 cm juxtapleural nodule in the EDUARDO  It is associated with traction bronchiolectasis and is likely a scar but consider follow-up with a chest CT with no contrast in 6 months  Severe bilateral groundglass opacity due to Covid-19  · CXR (1/29): Worsening diffuse ground glass opacities throughout the lung parenchyma consistent with COVID-19 pneumonia     · O2 sats 93% on 12L [address] [facility]    Plan:  - Covid: severe pathway   - IV Decadron 6mg day 5/10   - Baricitinib day 6/14 (did receive during prior admission)   - anticoagulation with heparin drip given acute PE -- discussing with Pulm if we can transition to [facility]   - patient confirmed level 1 code status, would accept endotracheal intubation if required   - continue respiratory protocol, supplemental O2 and titrate to maintain sats > 90%  - pulmonology on board  - OOB TID, Self-proning if able, Incentive spirometry  - PT recs: post acute rehab

## 2022-01-30 NOTE — PROGRESS NOTES
Progress Note - Pulmonary   Vi Neda Glenroy 80 y o  male MRN: [id number]  Unit/Bed#: S -01 Encounter: [id number]      Assessment/Plan:         1  Acute hypoxic respiratory failure secondary to COVID 19  1  Titrate oxygen as needed to maintain SpO2 >82%  2  Pulmonary toilet: IS, cough deep breathe  3  Side lying and prone position  4  Current O2 needs: 12-15L midflow  5  Baseline O2 needs none  2  COVID 19 moderate protocol started  1  Imaging CXR 1/29/22 worsening diffuse GGOs  2  Recommendations  1  Continue to wean supplemental O2 as tolerated and follow protocol  3  Labs  1  CRP 23 8--74 6--31 0  2  Procalcitonin 0 27--0 15--0 09  3  D-dimer >20 00  4  Medications  1  lipitor  2  Anticoagulation heparin drip  3  Remdesivir day 5/5  4  decadron dosing 0 1mg/kg BIDday 5/10-may consider reduction tomorrow  5  Diuretics NA  6  Baricitinib 5/14  7  Antibiotics: complete abx today 5 day course  3  CLL-not in remission          Subjective:     Comment was seen sitting in bed upon entering  Denies acute overnight events  Reports that overall his breathing has improved today  He is able to eat lunch without difficulty  Has continues to have a dry cough  Denies chest pain, pain inspiration, bronchospasm hemoptysis  Objective:         Vitals: Blood pressure 110/68, pulse 55, temperature 97 9 °F (36 6 °C), temperature source Oral, resp  rate 18, height 5' 10" (1 778 m), weight 64 kg (141 lb), SpO2 91 %  , 12L midflow, Body mass index is 20 23 kg/m²        Intake/Output Summary (Last 24 hours) at 1/30/2022 1339  Last data filed at 1/30/2022 1223  Gross per 24 hour   Intake 480 ml   Output 2115 ml   Net -1635 ml         Physical Exam  Gen: Awake, alert, oriented x 3, no acute distress  HEENT: Mucous membranes moist, no oral lesions, no thrush  NECK: no accessory muscle use, JVP not elevated  Cardiac: Regular, single S1, single S2, no murmurs, no rubs, no gallops  Lungs: clear breath sounds bilaterally  Abdomen: normoactive bowel sounds, soft nontender, nondistended, no rebound or rigidity, no guarding  Extremities: no cyanosis, no clubbing, no edema    Labs: I have personally reviewed pertinent lab results  , CBC:   Lab Results   Component Value Date    WBC 21 71 (H) 01/30/2022    HGB 11 9 (L) 01/30/2022    HCT 39 0 01/30/2022    MCV 94 01/30/2022     01/30/2022    MCH 28 7 01/30/2022    MCHC 30 5 (L) 01/30/2022    RDW 16 6 (H) 01/30/2022    MPV 12 4 01/30/2022   , CMP:   Lab Results   Component Value Date    SODIUM 137 01/30/2022    K 4 8 01/30/2022     01/30/2022    CO2 24 01/30/2022    BUN 26 (H) 01/30/2022    CREATININE 0 71 01/30/2022    CALCIUM 8 4 01/30/2022     (H) 01/30/2022     (H) 01/30/2022    ALKPHOS 272 (H) 01/30/2022    EGFR 89 01/30/2022     Imaging and other studies: none      Yudi Pereyra

## 2022-01-30 NOTE — ASSESSMENT & PLAN NOTE
Lab Results   Component Value Date    HGBA1C 10 1 (H) 01/26/2022     Recent Labs     01/30/22  1539 01/30/22  2035 01/31/22  0749 01/31/22  1126   POCGLU 129 217* 95 140     Blood Sugar Average: Last 72 hrs:  · (P) 117 [id number],   · hemoglobin A1c 10 1 this admission     Plan:  - Lantus 15 units q h s    - humalong 10 U TID with meals    - SSI coverage  - Continue to adjust insulin as indicated while on steroids

## 2022-01-30 NOTE — ASSESSMENT & PLAN NOTE
· Likely in the setting of acute viral infection    Also has history of hepatitis C infection  · Reported with chronic transaminitis at baseline  · Recent outpatient ultrasound on 1/7/22 was unremarkable  Lab Results   Component Value Date     (H) 01/31/2022     (H) 01/31/2022    ALKPHOS 272 (H) 01/31/2022    BILITOT 0 7 08/14/2017     Plan:  - Continue to monitor, AST and ALT

## 2022-01-30 NOTE — ASSESSMENT & PLAN NOTE
· POA, in the setting of recent COVID infection  · No evidence of right heart strain on CT  · Echo (1/27): LVEF 95%, grade 1 diastolic dysfunction, mild AR and MR     Plan:  - continue with heparin drip for now, eventual transition to [address] prior to discharge

## 2022-01-30 NOTE — ACP (ADVANCE CARE PLANNING)
I had a long discussion with the patient this morning regarding code status  Initially he stated he would like to be level 3 without intubation  I explained in plain, explicit terms that this would mean if his oxygen levels became persistently low on the 50L high flow, that there would be little more we could do and he would likely pass away  I was called back to the patient room less than 20 minutes later by nursing staff, as the patient had expressed a change of heart and was now requesting level 1 including all heroic measures and intubation if necessary  Discussion was had regarding level 1 code status with the patient's wife and daughter Jessi Sellers  The family all agreed that they support the patient's Level 1 decision

## 2022-01-30 NOTE — PLAN OF CARE
Problem: Potential for Falls  Goal: Patient will remain free of falls  Description: INTERVENTIONS:  - Educate patient/family on patient safety including physical limitations  - Instruct patient to call for assistance with activity   - Consult OT/PT to assist with strengthening/mobility   - Keep Call bell within reach  - Keep bed low and locked with side rails adjusted as appropriate  - Keep care items and personal belongings within reach  - Initiate and maintain comfort rounds  - Make Fall Risk Sign visible to staff  - Offer Toileting every Hours, in advance of need  - Initiate/Maintain alarm  - Obtain necessary fall risk management equipment:   - Apply yellow socks and bracelet for high fall risk patients  - Consider moving patient to room near nurses station  Outcome: Progressing     Problem: MOBILITY - ADULT  Goal: Maintain or return to baseline ADL function  Description: INTERVENTIONS:  -  Assess patient's ability to carry out ADLs; assess patient's baseline for ADL function and identify physical deficits which impact ability to perform ADLs (bathing, care of mouth/teeth, toileting, grooming, dressing, etc )  - Assess/evaluate cause of self-care deficits   - Assess range of motion  - Assess patient's mobility; develop plan if impaired  - Assess patient's need for assistive devices and provide as appropriate  - Encourage maximum independence but intervene and supervise when necessary  - Involve family in performance of ADLs  - Assess for home care needs following discharge   - Consider OT consult to assist with ADL evaluation and planning for discharge  - Provide patient education as appropriate  Outcome: Progressing  Goal: Maintains/Returns to pre admission functional level  Description: INTERVENTIONS:  - Perform BMAT or MOVE assessment daily    - Set and communicate daily mobility goal to care team and patient/family/caregiver     - Collaborate with rehabilitation services on mobility goals if consulted  - Perform Range of Motion times a day  - Reposition patient every hours    - Dangle patient  times a day  - Stand patient  times a day  - Ambulate patient  times a day  - Out of bed to chair  times a day   - Out of bed for meals  times a day  - Out of bed for toileting  - Record patient progress and toleration of activity level   Outcome: Progressing     Problem: CARDIOVASCULAR - ADULT  Goal: Maintains optimal cardiac output and hemodynamic stability  Description: INTERVENTIONS:  - Monitor I/O, vital signs and rhythm  - Monitor for S/S and trends of decreased cardiac output  - Administer and titrate ordered vasoactive medications to optimize hemodynamic stability  - Assess quality of pulses, skin color and temperature  - Assess for signs of decreased coronary artery perfusion  - Instruct patient to report change in severity of symptoms  Outcome: Progressing  Goal: Absence of cardiac dysrhythmias or at baseline rhythm  Description: INTERVENTIONS:  - Continuous cardiac monitoring, vital signs, obtain 12 lead EKG if ordered  - Administer antiarrhythmic and heart rate control medications as ordered  - Monitor electrolytes and administer replacement therapy as ordered  Outcome: Progressing     Problem: RESPIRATORY - ADULT  Goal: Achieves optimal ventilation and oxygenation  Description: INTERVENTIONS:  - Assess for changes in respiratory status  - Assess for changes in mentation and behavior  - Position to facilitate oxygenation and minimize respiratory effort  - Oxygen administered by appropriate delivery if ordered  - Initiate smoking cessation education as indicated  - Encourage broncho-pulmonary hygiene including cough, deep breathe, Incentive Spirometry  - Assess the need for suctioning and aspirate as needed  - Assess and instruct to report SOB or any respiratory difficulty  - Respiratory Therapy support as indicated  Outcome: Progressing

## 2022-01-30 NOTE — PROGRESS NOTES
[facility]  Progress Note Armando Rodriguez [place] 1940, 80 y o  male MRN: [id number]  Unit/Bed#: S -Jef Encounter: [id number]  Primary Care Provider: Amanda Frankel MD   Date and time admitted to hospital: 1/26/2022  8:43 AM    * Acute hypoxemic respiratory failure due to COVID-19 [facility])  Assessment & Plan  · POA, tested positive for COVID on 1/14/22  Admitted 01/19 -1/24/22, and DC home on 5L O2   · CTA chest (1/26): few tiny subsegmental filling defects in all lobes of the right lung and in the left lower lobe  1 3 x 0 8 cm juxtapleural nodule in the EDUARDO  It is associated with traction bronchiolectasis and is likely a scar but consider follow-up with a chest CT with no contrast in 6 months  Severe bilateral groundglass opacity due to Covid-19  · CXR (1/29): Worsening diffuse ground glass opacities throughout the lung parenchyma consistent with COVID-19 pneumonia  · O2 sats 86-98% on 50L high-flow NC with FiO2 70%, no NRB, patient has significant desaturations with movement, observed desaturations down to 71%     Plan:  - Covid: severe pathway   - IV Decadron 0 1mg/kg q 12h, day 5/10   - Baricitinib day 5/14 (did receive during prior admission)   - anticoagulation with heparin drip given acute PE   - continue with abx (day 5 - ceftriaxone and doxycycline) given severe covid pathway   - patient confirmed level 1 code status, would accept endotracheal intubation if required   - continue respiratory protocol, supplemental O2 and titrate to maintain sats > 90%  - pulmonology consulted  - OOB TID, Self-proning if able, Incentive spirometry  - PT recs: post acute rehab    Fall at home  [address] [place] [address]  · Presented as a trauma level C post fall home  Patient and wife report he had taken off supplemental O2 when going to the bathroom  · Lightheadedness after voiding and fell, wife reports mild headstrike as she caught his fall   Head and left upper extremity sustaining skin tears with head abrasion  · Was evaluated and cleared by Trauma in the ED    Plan:  - PT/OT evaluation: post acute rehab     Acute pulmonary embolism without acute cor pulmonale (HCC)  Assessment & Plan  · POA, in the setting of recent COVID infection  · No evidence of right heart strain on CT  · Echo (1/27): LVEF 08%, grade 1 diastolic dysfunction, mild AR and MR     Plan:  - continue with heparin drip for now, eventual transition to [address] prior to discharge    Transaminitis  Assessment & Plan  · Likely in the setting of acute viral infection  Also has history of hepatitis C infection  · Reported with chronic transaminitis at baseline  · Recent outpatient ultrasound on 1/7/22 was unremarkable  Lab Results   Component Value Date     (H) 01/30/2022     (H) 01/30/2022    ALKPHOS 272 (H) 01/30/2022    BILITOT 0 7 08/14/2017     Plan:  - Continue to monitor, AST and ALT    Type 2 diabetes mellitus with hyperglycemia, with long-term current use of insulin [facility])  Assessment & Plan  Lab Results   Component Value Date    HGBA1C 10 1 (H) 01/26/2022     Recent Labs     01/29/22  1115 01/29/22  1543 01/29/22  2034 01/30/22  0712   POCGLU 266* 253* 170* 95     Blood Sugar Average: Last 72 hrs:  · (P) 236 375,   · hemoglobin A1c 10 1 this admission     Plan:  - Lantus 15 units q h s    - humalong 10 U TID with meals    - SSI coverage  - Continue to adjust insulin as indicated while on steroids      VTE Pharmacologic Prophylaxis: VTE Score: 8 High Risk (Score >/= 5) - Pharmacological DVT Prophylaxis Ordered: heparin drip  Sequential Compression Devices Ordered  Patient Centered Rounds: I performed bedside rounds with nursing staff today  Discussions with Specialists or Other Care Team Provider: pulmonology    Education and Discussions with Family / Patient: Updated  (wife and daughter) via phone      Current Length of Stay: 4 day(s)  Current Patient Status: Inpatient   Discharge Plan: pending significant clinical improvement    Code Status: Level 1 - Full Code    Subjective:   Patient sitting up in bed eating breakfast on 50L HFNC this morning  Denies any SOB while interviewed despite O2 saturations decreasing as low as 71% during interview  No conversational dyspnea during discussion  Is very compliant with self proning and ICS  Had long conversation regarding code status, patient ultimately decided he would want to be level 1 with intubation if absolutely necessary  Has very good attitude and is in good spirits despite the heavy conversation  Does endorse shortness of breath with attempts to stand up, which is corroborated by nursing  Otherwise denies any other complaints  Practiced deep breathing exercises with patient  He is an avid yoga enthusiast and has good breathing techniques which increased O2 saturations to 98%  Objective:     Vitals:   Temp (24hrs), Av 2 °F (36 8 °C), Min:97 9 °F (36 6 °C), Max:98 4 °F (36 9 °C)    Temp:  [97 9 °F (36 6 °C)-98 4 °F (36 9 °C)] 97 9 °F (36 6 °C)  HR:  [55-77] 55  Resp:  [18] 18  BP: ()/(50-68) 110/68  SpO2:  [89 %-98 %] 93 %  Body mass index is 20 23 kg/m²  Input and Output Summary (last 24 hours): Intake/Output Summary (Last 24 hours) at 2022 1041  Last data filed at 2022 0249  Gross per 24 hour   Intake 480 ml   Output 765 ml   Net -285 ml       Physical Exam:   Physical Exam  Vitals and nursing note reviewed  Constitutional:       General: He is not in acute distress  Appearance: Normal appearance  He is not diaphoretic  HENT:      Head: Normocephalic and atraumatic  Mouth/Throat:      Mouth: Mucous membranes are moist    Eyes:      Conjunctiva/sclera: Conjunctivae normal    Cardiovascular:      Rate and Rhythm: Normal rate and regular rhythm  Pulses: Normal pulses  Heart sounds: Normal heart sounds  No murmur heard  No friction rub  No gallop      Pulmonary:      Effort: Pulmonary effort is normal       Breath sounds: No stridor  Rales (scattered crackles) present  No wheezing or rhonchi  Abdominal:      General: Bowel sounds are normal       Palpations: Abdomen is soft  There is no mass  Tenderness: There is no abdominal tenderness  There is no guarding  Musculoskeletal:         General: No tenderness  Right lower leg: No edema  Left lower leg: No edema  Skin:     General: Skin is warm and dry  Neurological:      General: No focal deficit present  Mental Status: He is alert and oriented to person, place, and time  Psychiatric:         Mood and Affect: Mood normal          Behavior: Behavior normal         Additional Data:     Labs:  Results from last 7 days   Lab Units 01/30/22  0603 01/29/22  0526 01/28/22  0633 01/27/22  0435 01/26/22  0911   WBC Thousand/uL 21 71*   < > 22 85*   < > 29 56*   HEMOGLOBIN g/dL 11 9*   < > 11 7*   < > 14 2   HEMATOCRIT % 39 0   < > 36 6   < > 43 1   PLATELETS Thousands/uL 240   < > 224   < > 279   BANDS PCT %  --   --  7  --   --    NEUTROS PCT %  --   --   --   --  85*   LYMPHS PCT %  --   --   --   --  5*   LYMPHO PCT % 3*  --  5*  --   --    MONOS PCT %  --   --   --   --  8   MONO PCT % 2*  --  0*  --   --    EOS PCT % 0  --  0  --  0    < > = values in this interval not displayed       Results from last 7 days   Lab Units 01/30/22  0603   SODIUM mmol/L 137   POTASSIUM mmol/L 4 8   CHLORIDE mmol/L 107   CO2 mmol/L 24   BUN mg/dL 26*   CREATININE mg/dL 0 71   ANION GAP mmol/L 6   CALCIUM mg/dL 8 4   ALBUMIN g/dL 2 2*   TOTAL BILIRUBIN mg/dL 0 80   ALK PHOS U/L 272*   ALT U/L 526*   AST U/L 225*   GLUCOSE RANDOM mg/dL 99     Results from last 7 days   Lab Units 01/26/22  1039   INR  1 40*     Results from last 7 days   Lab Units 01/30/22  0712 01/29/22  2034 01/29/22  1543 01/29/22  1115 01/29/22  0832 01/29/22  0750 01/28/22  2119 01/28/22  1538 01/28/22  1140 01/28/22  0729 01/27/22  2156 01/27/22  1752   POC GLUCOSE mg/dl 95 170* 253* 266* 108 51* 164* 128 257* 148* 369* 223*     Results from last 7 days   Lab Units 01/26/22  0911   HEMOGLOBIN A1C % 10 1*     Results from last 7 days   Lab Units 01/30/22  0603 01/27/22  0435 01/26/22  1350 01/26/22  1035 01/26/22  0911   LACTIC ACID mmol/L  --   --  2 0 2 3*  --    PROCALCITONIN ng/ml 0 09 0 15  --   --  0 27*       Lines/Drains:  Invasive Devices  Report    Peripheral Intravenous Line            Peripheral IV 01/26/22 Right;Ventral (anterior) Forearm 3 days    Long-Dwell Peripheral IV (Midline) 41/28/41 Left Basilic <1 day                Imaging:   XR chest portable   Final Result by Annie Quintanilla MD (01/30 0732)      Worsening diffuse ground glass opacities throughout the lung parenchyma consistent with COVID-19 pneumonia  CTA ED chest PE study   Final Result by Jose King MD (01/26 8903)      A few tiny subsegmental filling defects in all lobes of the right lung and in the left lower lobe, too small to determine if they are acute or chronic pulmonary emboli  A small web in the right interlobar pulmonary artery is the sequela of a chronic    pulmonary embolus  1 3 x 0 8 cm juxtapleural nodule in the left upper lobe  It is associated with traction bronchiolectasis and is likely a scar but consider follow-up with a chest CT with no contrast in 6 months  Severe bilateral groundglass opacity due to Covid-19  I notified Lesly Pack and Jostin Dasilva by secure text on 1/26/2022 at 11:31 AM   They both responded immediately  XR chest 1 view portable   Final Result by Anton Martínez MD (01/26 4847)      Slightly increased pulmonary infiltrates compared with 1/19/2022  No acute traumatic injury seen  CT head without contrast   Final Result by Anton Martínez MD (01/26 5244)      No acute intracranial abnormality    Report was telephoned to the emergency department physician caring for the patient at 9:34 AM on 1/26/2022        Recent Cultures (last 7 days):   Results from last 7 days   Lab Units 01/26/22  1035   BLOOD CULTURE  No Growth at 72 hrs  No Growth at 72 hrs  Last 24 Hours Medication List:   Current Facility-Administered Medications   Medication Dose Route Frequency Provider Last Rate    albuterol  2 puff Inhalation Q4H PRN Vreonika Patel MD      aspirin  81 mg Oral Daily Diane Bains MD      Baricitinib  4 mg Oral Q24H Diane Bains MD      benzonatate  100 mg Oral TID PRN Veronika Patel MD      cefTRIAXone  1,000 mg Intravenous Q24H Veronika Patel MD 1,000 mg (01/29/22 0950)    dexamethasone  0 1 mg/kg Intravenous Q12H Diane Bains MD      doxycycline hyclate  100 mg Oral Q12H Diane Bains MD      ezetimibe  10 mg Oral Daily Diane Bains MD      heparin (porcine)  3-30 Units/kg/hr (Order-Specific) Intravenous Titrated Veronika Patel MD 14 Units/kg/hr (01/30/22 0740)    heparin (porcine)  2,400 Units Intravenous Q1H PRN Diane Bains MD      heparin (porcine)  4,800 Units Intravenous Q1H PRN Veronika Patel MD      insulin glargine  15 Units Subcutaneous HS Kwame Ocampo MD      insulin lispro  1-5 Units Subcutaneous TID AC Diane Bains MD      insulin lispro  1-5 Units Subcutaneous HS Diane Bains MD      insulin lispro  10 Units Subcutaneous TID With Meals Odilia Severe, MD      lisinopril  20 mg Oral Daily Diane Bains MD      melatonin  3 mg Oral HS Tiff Herrera MD      oxymetazoline  2 spray Each Nare Q12H [facility] Kary Kee MD      pramipexole  1 5 mg Oral Daily Veronika Patel MD      sodium chloride  1 spray Each Juancarlos Forward PRN Kary Kee MD          Today, Patient Was Seen By: Odilia Severe, MD    **Please Note: This note may have been constructed using a voice recognition system  **

## 2022-01-31 NOTE — PROGRESS NOTES
Progress Note - Pulmonary   Adina Martinezsco 80 y o  male MRN: [id number]  Unit/Bed#: S -01 Encounter: [id number]      Assessment/Plan:        1  Acute hypoxic respiratory failure secondary to COVID 19  1  Titrate oxygen as needed to maintain SpO2 >82%  2  Pulmonary toilet: IS, cough deep breathe  3  Side lying and prone position  4  Current O2 needs: 12L  Midflow with NRB PRN-patient reports he uses for comfort  5  Baseline O2 needs none  2  COVID 19 moderate protocol started  1  Imaging CXR 1/29/22 worsening diffuse GGOs  2  Recommendations  1  Continue to wean supplemental O2 as tolerated and follow protocol  3  Labs  1  CRP 23 8--74 6--31 0  2  Procalcitonin 0 27--0 15--0 09  3  D-dimer >20 00  4  Medications  1  lipitor  2  Anticoagulation heparin drip  3  Remdesivir day 5/5 completed  4  decadron dosing 0 1mg/kg BIDday 6/10  5  Diuretics NA  6  Baricitinib 6/14  7  Antibiotics: complete abx today 5 day course completed  3  CLL-not in remission        Subjective:     Yehuda Rodas was seen in bed upon entering the room  Denies acute overnight events  Breathing overall is improving  He continues to feel the need to use NRB for comfort and dyspnea  Denies: chest pain, fevers, chills or hemoptysis  + dry cough    Objective:         Vitals: Blood pressure 132/70, pulse 76, temperature 98 1 °F (36 7 °C), temperature source Oral, resp  rate 20, height 5' 10" (1 778 m), weight 64 kg (141 lb), SpO2 92 %  , 12L midflow, Body mass index is 20 23 kg/m²        Intake/Output Summary (Last 24 hours) at 1/31/2022 1150  Last data filed at 1/31/2022 0807  Gross per 24 hour   Intake 790 ml   Output 3250 ml   Net -2460 ml         Physical Exam  Gen: Awake, alert, oriented x 3, no acute distress  HEENT: Mucous membranes moist, no oral lesions, no thrush  NECK: no accessory muscle use, JVP not elevated  Cardiac: Regular, single S1, single S2, no murmurs, no rubs, no gallops  Lungs: clear breath sounds bialterally  Abdomen: normoactive bowel sounds, soft nontender, nondistended, no rebound or rigidity, no guarding  Extremities: no cyanosis, no clubbing, no edema    Labs: I have personally reviewed pertinent lab results  , CBC:   Lab Results   Component Value Date    WBC 26 13 (H) 01/31/2022    HGB 12 7 01/31/2022    HCT 39 1 01/31/2022    MCV 88 01/31/2022     01/31/2022    MCH 28 6 01/31/2022    MCHC 32 5 01/31/2022    RDW 16 0 (H) 01/31/2022    MPV 13 2 (H) 01/31/2022    NRBC 0 01/31/2022   , CMP:   Lab Results   Component Value Date    SODIUM 141 01/31/2022    K 4 5 01/31/2022     01/31/2022    CO2 27 01/31/2022    BUN 26 (H) 01/31/2022    CREATININE 0 73 01/31/2022    CALCIUM 7 8 (L) 01/31/2022     (H) 01/31/2022     (H) 01/31/2022    ALKPHOS 272 (H) 01/31/2022    EGFR 88 01/31/2022     Imaging and other studies: none      Yudi Mccoy Ni

## 2022-01-31 NOTE — PLAN OF CARE
Problem: Potential for Falls  Goal: Patient will remain free of falls  Description: INTERVENTIONS:  - Educate patient/family on patient safety including physical limitations  - Instruct patient to call for assistance with activity   - Consult OT/PT to assist with strengthening/mobility   - Keep Call bell within reach  - Keep bed low and locked with side rails adjusted as appropriate  - Keep care items and personal belongings within reach  - Initiate and maintain comfort rounds  - Make Fall Risk Sign visible to staff  - Offer Toileting every 2 Hours, in advance of need  - Initiate/Maintain bed alarm  - Obtain necessary fall risk management equipment: bed alarm  - Apply yellow socks and bracelet for high fall risk patients  - Consider moving patient to room near nurses station  Outcome: Progressing     Problem: MOBILITY - ADULT  Goal: Maintain or return to baseline ADL function  Description: INTERVENTIONS:  -  Assess patient's ability to carry out ADLs; assess patient's baseline for ADL function and identify physical deficits which impact ability to perform ADLs (bathing, care of mouth/teeth, toileting, grooming, dressing, etc )  - Assess/evaluate cause of self-care deficits   - Assess range of motion  - Assess patient's mobility; develop plan if impaired  - Assess patient's need for assistive devices and provide as appropriate  - Encourage maximum independence but intervene and supervise when necessary  - Involve family in performance of ADLs  - Assess for home care needs following discharge   - Consider OT consult to assist with ADL evaluation and planning for discharge  - Provide patient education as appropriate  Outcome: Progressing  Goal: Maintains/Returns to pre admission functional level  Description: INTERVENTIONS:  - Perform BMAT or MOVE assessment daily    - Set and communicate daily mobility goal to care team and patient/family/caregiver     - Collaborate with rehabilitation services on mobility goals if consulted  - Perform Range of Motion 2 times a day  - Reposition patient every 2 hours    - Dangle patient 2 times a day  - Stand patient 2 times a day  - Ambulate patient 2 times a day  - Out of bed to chair 2 times a day   - Out of bed for meals 2 times a day  - Out of bed for toileting  - Record patient progress and toleration of activity level   Outcome: Progressing     Problem: CARDIOVASCULAR - ADULT  Goal: Maintains optimal cardiac output and hemodynamic stability  Description: INTERVENTIONS:  - Monitor I/O, vital signs and rhythm  - Monitor for S/S and trends of decreased cardiac output  - Administer and titrate ordered vasoactive medications to optimize hemodynamic stability  - Assess quality of pulses, skin color and temperature  - Assess for signs of decreased coronary artery perfusion  - Instruct patient to report change in severity of symptoms  Outcome: Progressing  Goal: Absence of cardiac dysrhythmias or at baseline rhythm  Description: INTERVENTIONS:  - Continuous cardiac monitoring, vital signs, obtain 12 lead EKG if ordered  - Administer antiarrhythmic and heart rate control medications as ordered  - Monitor electrolytes and administer replacement therapy as ordered  Outcome: Progressing     Problem: RESPIRATORY - ADULT  Goal: Achieves optimal ventilation and oxygenation  Description: INTERVENTIONS:  - Assess for changes in respiratory status  - Assess for changes in mentation and behavior  - Position to facilitate oxygenation and minimize respiratory effort  - Oxygen administered by appropriate delivery if ordered  - Initiate smoking cessation education as indicated  - Encourage broncho-pulmonary hygiene including cough, deep breathe, Incentive Spirometry  - Assess the need for suctioning and aspirate as needed  - Assess and instruct to report SOB or any respiratory difficulty  - Respiratory Therapy support as indicated  Outcome: Progressing

## 2022-01-31 NOTE — PHYSICAL THERAPY NOTE
Physical Therapy Cancellation Note     01/31/22 1607   PT Last Visit   PT Visit Date 01/31/22   Note Type   Note Type Cancelled Session   Cancel Reasons Other   Assessment   Assessment Attempted to see pt for PT session  pt declined participation due to fatigue and dypsnea  will follow and continue PT as appropriate           Jus ASCENCIO

## 2022-01-31 NOTE — PROGRESS NOTES
[facility]  Progress Note Hugo Christinao 1940, 80 y o  male MRN: [id number]  Unit/Bed#: S -01 Encounter: [id number]  Primary Care Provider: Milton Bland MD   Date and time admitted to hospital: 1/26/2022  8:43 AM    * Acute hypoxemic respiratory failure due to COVID-19 [facility])  Assessment & Plan  · POA, tested positive for COVID on 1/14/22  Admitted 01/19 -1/24/22, and DC home on 5L O2   · CTA chest (1/26): few tiny subsegmental filling defects in all lobes of the right lung and in the left lower lobe  1 3 x 0 8 cm juxtapleural nodule in the EDUARDO  It is associated with traction bronchiolectasis and is likely a scar but consider follow-up with a chest CT with no contrast in 6 months  Severe bilateral groundglass opacity due to Covid-19  · CXR (1/29): Worsening diffuse ground glass opacities throughout the lung parenchyma consistent with COVID-19 pneumonia  · O2 sats 93% on 12L [address] [facility]    Plan:  - Covid: severe pathway   - IV Decadron 6mg day 5/10   - Baricitinib day 6/14 (did receive during prior admission)   - anticoagulation with heparin drip given acute PE -- discussing with Pulm if we can transition to Eliquis   - patient confirmed level 1 code status, would accept endotracheal intubation if required   - continue respiratory protocol, supplemental O2 and titrate to maintain sats > 90%  - pulmonology on board  - OOB TID, Self-proning if able, Incentive spirometry  - PT recs: post acute rehab    Acute pulmonary embolism without acute cor pulmonale (HCC)  Assessment & Plan  · POA, in the setting of recent COVID infection  · No evidence of right heart strain on CT  · Echo (1/27): LVEF 95%, grade 1 diastolic dysfunction, mild AR and MR     Plan:  - continue with heparin drip for now, eventual transition to [address] prior to discharge    Fall at home  Assessment & Plan  · Presented as a trauma level C post fall home   Patient and wife report he had taken off supplemental O2 when going to the bathroom  · Lightheadedness after voiding and fell, wife reports mild headstrike as she caught his fall  Head and left upper extremity sustaining skin tears with head abrasion  · Was evaluated and cleared by Trauma in the ED    Plan:  - PT/OT evaluation: post acute rehab     Transaminitis  Assessment & Plan  · Likely in the setting of acute viral infection  Also has history of hepatitis C infection  · Reported with chronic transaminitis at baseline  · Recent outpatient ultrasound on 1/7/22 was unremarkable  Lab Results   Component Value Date     (H) 01/31/2022     (H) 01/31/2022    ALKPHOS 272 (H) 01/31/2022    BILITOT 0 7 08/14/2017     Plan:  - Continue to monitor, AST and ALT    Type 2 diabetes mellitus with hyperglycemia, with long-term current use of insulin [facility])  Assessment & Plan  Lab Results   Component Value Date    HGBA1C 10 1 (H) 01/26/2022     Recent Labs     01/30/22  1539 01/30/22  2035 01/31/22  0749 01/31/22  1126   POCGLU 129 217* 95 140     Blood Sugar Average: Last 72 hrs:  · (P) 261 [id number],   · hemoglobin A1c 10 1 this admission     Plan:  - Lantus 15 units q h s    - humalong 10 U TID with meals    - SSI coverage  - Continue to adjust insulin as indicated while on steroids      VTE Pharmacologic Prophylaxis: VTE Score: 8 High Risk (Score >/= 5) - Pharmacological DVT Prophylaxis Ordered: heparin drip  Sequential Compression Devices Ordered  Patient Centered Rounds: I performed bedside rounds with nursing staff today  Discussions with Specialists or Other Care Team Provider: pulmonary    Education and Discussions with Family / Patient: Updated  (daughter) via phone  Time Spent for Care: 30 minutes  More than 50% of total time spent on counseling and coordination of care as described above      Current Length of Stay: 5 day(s)  Current Patient Status: Inpatient   Certification Statement: The patient will continue to require additional inpatient hospital stay due to oxygen requirements  Discharge Plan: TBD    Code Status: Level 1 - Full Code    Subjective:   Patient resting comfortably, and says he feels "better " He feels that his breathing is improving  He denies any pain, and is eating and drinking well  Urinating and having normal BMs  Objective:     Vitals:   Temp (24hrs), Av 8 °F (36 6 °C), Min:97 5 °F (36 4 °C), Max:98 1 °F (36 7 °C)    Temp:  [97 5 °F (36 4 °C)-98 1 °F (36 7 °C)] 98 1 °F (36 7 °C)  HR:  [64-76] 76  Resp:  [18-20] 20  BP: (129-132)/(62-70) 132/70  SpO2:  [90 %-97 %] 93 %  Body mass index is 20 23 kg/m²  Input and Output Summary (last 24 hours): Intake/Output Summary (Last 24 hours) at 2022 1513  Last data filed at 2022 1300  Gross per 24 hour   Intake 910 ml   Output 1800 ml   Net -890 ml       Physical Exam:   Physical Exam  Vitals and nursing note reviewed  Constitutional:       General: He is not in acute distress  HENT:      Mouth/Throat:      Mouth: Mucous membranes are moist       Pharynx: Oropharynx is clear  Eyes:      Extraocular Movements: Extraocular movements intact  Conjunctiva/sclera: Conjunctivae normal       Pupils: Pupils are equal, round, and reactive to light  Cardiovascular:      Rate and Rhythm: Normal rate and regular rhythm  Pulses: Normal pulses  Heart sounds: Normal heart sounds  No murmur heard  Pulmonary:      Effort: Pulmonary effort is normal  No respiratory distress  Breath sounds: Normal breath sounds  Abdominal:      General: Abdomen is flat  Bowel sounds are normal  There is no distension  Palpations: Abdomen is soft  Tenderness: There is no abdominal tenderness  Musculoskeletal:         General: No swelling or tenderness  Cervical back: Neck supple  Skin:     General: Skin is warm and dry  Neurological:      Mental Status: He is alert and oriented to person, place, and time            Additional Data:     Labs:  Results from last 7 days   Lab Units 01/31/22  0559 01/29/22  0526 01/28/22  0633   WBC Thousand/uL 26 13*   < > 22 85*   HEMOGLOBIN g/dL 12 7   < > 11 7*   HEMATOCRIT % 39 1   < > 36 6   PLATELETS Thousands/uL 267   < > 224   BANDS PCT %  --   --  7   NEUTROS PCT % 89*  --   --    LYMPHS PCT % 4*  --   --    LYMPHO PCT   --    < > 5*   MONOS PCT % 5  --   --    MONO PCT   --    < > 0*   EOS PCT % 0   < > 0    < > = values in this interval not displayed  Results from last 7 days   Lab Units 01/31/22  0530   SODIUM mmol/L 141   POTASSIUM mmol/L 4 5   CHLORIDE mmol/L 107   CO2 mmol/L 27   BUN mg/dL 26*   CREATININE mg/dL 0 73   ANION GAP mmol/L 7   CALCIUM mg/dL 7 8*   ALBUMIN g/dL 2 1*   TOTAL BILIRUBIN mg/dL 0 76   ALK PHOS U/L 272*   ALT U/L 473*   AST U/L 191*   GLUCOSE RANDOM mg/dL 80     Results from last 7 days   Lab Units 01/26/22  1039   INR  1 40*     Results from last 7 days   Lab Units 01/31/22  1126 01/31/22  0749 01/30/22  2035 01/30/22  1539 01/30/22  1207 01/30/22  0712 01/29/22  2034 01/29/22  1543 01/29/22  1115 01/29/22  0832 01/29/22  0750 01/28/22  2119   POC GLUCOSE mg/dl 140 95 217* 129 118 95 170* 253* 266* 108 51* 164*     Results from last 7 days   Lab Units 01/26/22  0911   HEMOGLOBIN A1C % 10 1*     Results from last 7 days   Lab Units 01/31/22  0530 01/30/22  0603 01/27/22  0435 01/26/22  1350 01/26/22  1035 01/26/22  0911   LACTIC ACID mmol/L  --   --   --  2 0 2 3*  --    PROCALCITONIN ng/ml 0 08 0 09 0 15  --   --  0 27*       Lines/Drains:  Invasive Devices  Report    Peripheral Intravenous Line            Long-Dwell Peripheral IV (Midline) 77/75/66 Left Basilic 1 day    Peripheral IV 01/30/22 Left;Medial Forearm 1 day              Imaging: Reviewed radiology reports from this admission including: chest xray    Recent Cultures (last 7 days):   Results from last 7 days   Lab Units 01/26/22  1035   BLOOD CULTURE  No Growth After 5 Days  No Growth After 5 Days         Last 24 Hours Medication List:   Current Facility-Administered Medications   Medication Dose Route Frequency Provider Last Rate    albuterol  2 puff Inhalation Q4H PRN Veronika Patel MD      aspirin  81 mg Oral Daily Diane Bains MD      Baricitinib  4 mg Oral Q24H Diane Bains MD      benzonatate  100 mg Oral TID PRN eVronika Patel MD      dexamethasone  6 mg Intravenous Daily Kary Kee MD      ezetimibe  10 mg Oral Daily Diane Bains MD      heparin (porcine)  3-30 Units/kg/hr (Order-Specific) Intravenous Titrated Veronika Patel MD 16 Units/kg/hr (01/31/22 0216)    heparin (porcine)  2,400 Units Intravenous Q1H PRN Diane Bains MD      heparin (porcine)  4,800 Units Intravenous Q1H PRN Veronika Patel MD      insulin glargine  15 Units Subcutaneous HS Kwame Ocampo MD      insulin lispro  1-5 Units Subcutaneous TID AC Diane Bains MD      insulin lispro  1-5 Units Subcutaneous HS Diane Bains MD      insulin lispro  10 Units Subcutaneous TID With Meals Odilia Severe, MD      lisinopril  20 mg Oral Daily Diane Bains MD      melatonin  3 mg Oral HS Tiff Herrera MD      oxymetazoline  2 spray Each Nare Q12H [address] Kary Kee MD      pramipexole  1 5 mg Oral Daily Veronika Patel MD      sodium chloride  1 spray Each Nare Q1H PRN Kary Kee MD          Today, Patient Was Seen By: Abdias Tran DO    **Please Note: This note may have been constructed using a voice recognition system  **

## 2022-01-31 NOTE — CASE MANAGEMENT
Case Management Assessment & Discharge Planning Note    Patient name Penny Burch  Location S MS 80/S -41 MRN [id number]  : 1940 Date 2022       Current Admission Date: 2022  Current Admission Diagnosis:Acute hypoxemic respiratory failure due to COVID-19 [facility])   Patient Active Problem List    Diagnosis Date Noted    Fall at home 2022    Acute pulmonary embolism without acute cor pulmonale ([address] ) 2022    Acute [facility] failure with hypoxia ([facility] ) 2022    Acute hypoxemic respiratory failure due to COVID-19 ([facility] ) 2022    Transaminitis 2020    Coronary artery disease 2017    Type 2 diabetes mellitus with hyperglycemia, with long-term current use of insulin ([place] [address] ) 2017    S/P coronary artery stent placement 2016    CAD (coronary artery disease), native coronary artery 2016    Chronic hepatitis C virus infection ([facility] ) 2016    Atypical chest pain 2015    Bruising 2014    Chronic hepatitis B ([facility] ) 2014    CLL (chronic lymphocytic leukemia) ([facility] ) 2014    Chronic constipation 2013    Dyslipidemia 2013    Hypertension 2013      LOS (days): 5  Geometric Mean LOS (GMLOS) (days): 4 10  Days to GMLOS:-1 1     OBJECTIVE:  PATIENT READMITTED TO HOSPITAL  Risk of Unplanned Readmission Score: 24         Current admission status: Inpatient       Preferred Pharmacy:   [facility] [address] [place] [address]  Phone: [phone] Fax: [phone]    Primary Care Provider: Lorraine Zavala MD    Primary Insurance: MEDICARE  Secondary Insurance: [facility] HEALTH OPTIONS PROGRAM    ASSESSMENT:  Sherri Clark 42 Representative - Spouse   Primary Phone: [phone] (Mobile)  Home Phone: [phone]               Advance Directives  Does patient have a [address]?: Yes  Does patient have Advance Directives?: Yes  Advance Directives: Living will  Primary Contact: Carrie Velazco - daughter         Readmission Root Cause  30 Day Readmission: Yes  Who directed you to return to the hospital?: Family (fell in bathroom)  Did you understand whom to contact if you had questions or problems?: Yes  Did you get your prescriptions before you left the hospital?: Yes  Were you able to get your prescriptions filled when you left the hospital?: Yes  Did you take your medications as prescribed?: Yes  Were you able to get to your follow-up appointments?: Yes  During previous admission, was a post-acute recommendation made?: No  Patient was readmitted due to: syncopal episode d/t not wearing oxygen when ambulating    Patient Information  Admitted from[de-identified] Home  Mental Status: Other (Comment) (Pt did not answer room phone or mobile phone)  During Assessment patient was accompanied by: Not accompanied during assessment  Assessment information provided by[de-identified] Daughter  Primary Caregiver: Self  Support Systems: Self,Daughter,Spouse/significant other,Family members  [place] of Residence: [address] [place] [address] do you live in?: [place] in [facility] entry access options   Select all that apply : Elevator (3rd floor, no steps to elevator)  Type of Current Residence: Apartment  Floor Level: 3  Upon entering residence, is there a bedroom on the main floor (no further steps)?: Yes  Upon entering residence, is there a bathroom on the main floor (no further steps)?: Yes  In the last 12 months, was there a time when you were not able to pay the mortgage or rent on time?: No  In the last 12 months, was there a time when you did not have a steady place to sleep or slept in a shelter (including now)?: No  Homeless/housing insecurity resource given?: N/A  Living Arrangements: Lives w/ Spouse/significant other  Is patient a ?: Yes  Is patient active with [facility])?: No    Activities of Daily Living Prior to Admission  Functional Status: Independent  Completes ADLs independently?: Yes  Ambulates independently?: Yes  Does patient use assisted devices?: No  Does patient currently own DME?: No  Does patient have a history of Outpatient Therapy (PT/OT)?: Yes ([facility])  Does the patient have a history of Short-Term Rehab?: No  Does patient have a history of HHC?: Yes  Does patient currently have Nicole Steele?: Yes ([facility])    Current Home Health Care  Type of Current Home Care Services: Home PT,Home RT,Nurse visit  Current Home Health Agency[de-identified] Other (please enter name in comment) ([facility])  Current [address] Provider[de-identified] PCP    Patient Information Continued  Income Source: Pension/residential  Does patient have prescription coverage?: Yes  Within the past 12 months, you worried that your food would run out before you got the money to buy more : Never true  Within the past 12 months, the food you bought just didnt last and you didnt have money to get more : Never true  Food insecurity resource given?: N/A  Does patient receive dialysis treatments?: No  Does patient have a history of substance abuse?: Yes  Historical substance use preference: Alcohol/ETOH  History of Withdrawal Symptoms: Denies past symptoms  Is patient currently in treatment for substance abuse?: No  Patient declined treatment information  ,N/A - sober  Does patient have a history of Mental Health Diagnosis?: No         Means of Transportation  Means of Transport to Appts[de-identified] Drives Self  In the past 12 months, has lack of transportation kept you from medical appointments or from getting medications?: No  In the past 12 months, has lack of transportation kept you from meetings, work, or from getting things needed for daily living?: No        DISCHARGE DETAILS:    Discharge planning discussed with[de-identified] Daughter Freedom [place] of Choice: Yes  Comments - Freedom of Choice: Daughter would like to place a blanket referral, with follow-up for choices if IP rehab is recommended  Daughter states mom prefers pt go to rehab vs  home health care until pt is a little stronger  Pt d/n have a walker at home, daughter is requesting a walker if pt is to discharge to home  Daughter would prefer pt go to IP rehab if pt is to discharge on O2  CM contacted family/caregiver?: Yes  Were Treatment Team discharge recommendations reviewed with patient/caregiver?: Yes  Did patient/caregiver verbalize understanding of patient care needs?: Yes  Were patient/caregiver advised of the risks associated with not following Treatment Team discharge recommendations?: Yes    Contacts  Patient Contacts: Carrie Velazco- daughter  Relationship to Patient[de-identified] Family  Contact Method: Phone  Phone Number: [phone]  Reason/Outcome: Continuity of Care,Referral,Discharge Planning              Other Referral/Resources/Interventions Provided:  Referral Comments: Daughter would prefer a blanket referral be sent with a follow-up of choices for pt , as she is not familiar with facilities in this area  Would you like to participate in our [address] service program?  : No - Declined                                                                    CM placed a blanket referral for IP rehab choices

## 2022-02-01 NOTE — ASSESSMENT & PLAN NOTE
Lab Results   Component Value Date    HGBA1C 10 1 (H) 01/26/2022     Recent Labs     02/04/22  1458 02/04/22  2106 02/05/22  0628 02/05/22  0821   POCGLU 323* 358* 69 177*     Blood Sugar Average: Last 72 hrs:  · (P) 168 [id number],   · hemoglobin A1c 10 1 this admission   · Patient had the hypoglycemic event this morning    Plan:  - will decrease  Lantus from 10-5 units and decrease lispro from 5 to 3 units with each meal   - SSI coverage  - Continue to adjust insulin as indicated while on steroids

## 2022-02-01 NOTE — ASSESSMENT & PLAN NOTE
· POA, in the setting of recent COVID infection  · No evidence of right heart strain on CT  · Echo (1/27): LVEF 96%, grade 1 diastolic dysfunction, mild AR and MR     Plan:  - continue eliquis

## 2022-02-01 NOTE — ASSESSMENT & PLAN NOTE
· POA, tested positive for COVID on 1/14/22  Admitted 01/19 -1/24/22, and DC home on 5L O2   · CTA chest (1/26): few tiny subsegmental filling defects in all lobes of the right lung and in the left lower lobe  1 3 x 0 8 cm juxtapleural nodule in the EDUARDO  It is associated with traction bronchiolectasis and is likely a scar but consider follow-up with a chest CT with no contrast in 6 months  Severe bilateral groundglass opacity due to Covid-19  · CXR (1/29): Worsening diffuse ground glass opacities throughout the lung parenchyma consistent with COVID-19 pneumonia  · Using 15 L MFNC + NRB , was desatting while eating  · While anxiety is a factor, xanax made the patient feel more anxious  Plan:  - Covid: severe pathway   - Continue IV Decadron 6mg   - Baricitinib day 11/14 (did receive during prior admission)   - continue with Eliquis  - patient confirmed level 1 code status  - continue respiratory protocol, supplemental O2 and titrate to maintain sats > 82%  - pulmonology on board  - OOB TID, Self-proning if able, Incentive spirometry  -upon discussion with pulmonology, if patient is able to wean off 12 L mid flow, can be switched to Oxymizer at 10 L, since patient is mouth breather, can consider switching to mask such as Venturi mask to try weaning the patient off    This was discussed with Respiratory therapy by my attending  -patient was COVID positive on 01/14/2022, has been on airborne precaution for more than 21 days, will likely be able to discontinue, will discuss with Infectious team if okay to be removed from isolation tomorrow  - PT recs: post acute rehab

## 2022-02-01 NOTE — QUICK NOTE
Peter text did with hypoglycemia episode, discussed to follow hypoglycemia protocol, was reported to have poor oral intake, will discontinue insulin for now    Will resume insulin glargine and lispro at half dose

## 2022-02-01 NOTE — ASSESSMENT & PLAN NOTE
· Likely in the setting of acute viral infection  Also has history of hepatitis C infection  · Reported with chronic transaminitis at baseline  · Recent outpatient ultrasound on 1/7/22 was unremarkable  · Trending upwards    but is on baricitinib  Lab Results   Component Value Date     (H) 02/05/2022     (H) 02/05/2022    ALKPHOS 237 (H) 02/05/2022    BILITOT 0 7 08/14/2017     Plan:  - Continue to monitor, AST and ALT, CMP and CRP  - if rising further, can consider holding baricitinib, but 3 more doses

## 2022-02-01 NOTE — ASSESSMENT & PLAN NOTE
· Presented as a trauma level C post fall home  Patient and wife report he had taken off supplemental O2 when going to the bathroom  · Lightheadedness after voiding and fell, wife reports mild headstrike as she caught his fall   Head and left upper extremity sustaining skin tears with head abrasion  · Was evaluated and cleared by Trauma in the ED    Plan:  - PT/OT evaluation: post acute rehab   - Case management working on placement  - feels anxious to ambulate due to hypoxia currently

## 2022-02-01 NOTE — PROGRESS NOTES
Progress Note - Pulmonary   Roise Mora [place] 80 y o  male MRN: [id number]  Unit/Bed#: S -01 Encounter: [id number]      Assessment/Plan:      1  Acute hypoxic respiratory failure secondary to COVID 19  1  Titrate oxygen as needed to maintain SpO2 >82%  2  Pulmonary toilet: IS, cough deep breathe  3  Side lying and prone position  4  Current O2 needs: 10L  Midflow, PRN NRB  5  Baseline O2 needs none  2  COVID 19 moderate protocol started  1  Imaging CXR 1/29/22 worsening diffuse GGOs  2  Echo with EF 05% grade 1 diastolic dysfunction and RV dilation  3  Recommendations  1  Continue to wean supplemental O2 as tolerated and follow protocol  4  Labs  1  CRP 23 8--74 6--31 0  2  Procalcitonin 0 27--0 15--0 09  3  D-dimer >20 00  5  Medications  1  lipitor  2  Anticoagulation heparin drip  3  Remdesivir day 5/5 completed  4  decadron 6mg daily day 7/10  5  Diuretics: if his oxygen does not improve in the next 24 hours would recommend trial of lasix  6  Baricitinib 6/14  7  Antibiotics: complete abx today 5 day course completed  3  CLL-not in remission          Subjective:     Jaimee Dai was seen in bed upon entering the room  Denies acute overnight events  + dry cough and dyspnea-mild improvement reported  Denies: chest pain, fevers, chills or hemoptysis    Objective:         Vitals: Blood pressure 100/58, pulse 72, temperature 98 °F (36 7 °C), temperature source Oral, resp  rate 18, height 5' 10" (1 778 m), weight 64 kg (141 lb), SpO2 93 %  , 10L Midflow, Body mass index is 20 23 kg/m²        Intake/Output Summary (Last 24 hours) at 2/1/2022 1411  Last data filed at 2/1/2022 1335  Gross per 24 hour   Intake 720 ml   Output 2400 ml   Net -1680 ml         Physical Exam  Gen: Awake, alert, oriented x 3, no acute distress  HEENT: Mucous membranes moist, no oral lesions, no thrush  NECK: no accessory muscle use, JVP not elevated  Cardiac: Regular, single S1, single S2, no murmurs, no rubs, no gallops  Lungs: right basilar rales  Abdomen: normoactive bowel sounds, soft nontender, nondistended, no rebound or rigidity, no guarding  Extremities: no cyanosis, no clubbing, no edema    Labs: I have personally reviewed pertinent lab results  , CBC:   Lab Results   Component Value Date    WBC 28 95 (H) 02/01/2022    HGB 14 2 02/01/2022    HCT 43 3 02/01/2022    MCV 90 02/01/2022     02/01/2022    MCH 29 4 02/01/2022    MCHC 32 8 02/01/2022    RDW 16 7 (H) 02/01/2022    MPV 12 9 (H) 02/01/2022   , CMP:   Lab Results   Component Value Date    SODIUM 135 (L) 02/01/2022    K 4 5 02/01/2022     02/01/2022    CO2 25 02/01/2022    BUN 33 (H) 02/01/2022    CREATININE 0 91 02/01/2022    CALCIUM 8 1 (L) 02/01/2022     (H) 02/01/2022     (H) 02/01/2022    ALKPHOS 270 (H) 02/01/2022    EGFR 79 02/01/2022     Imaging and other studies: none      Yudi Ramires

## 2022-02-01 NOTE — PROGRESS NOTES
[facility]  Progress Note Dio Martinezsco 1940, 80 y o  male MRN: [id number]  Unit/Bed#: S -01 Encounter: [id number]  Primary Care Provider: Logan Vo MD   Date and time admitted to hospital: 1/26/2022  8:43 AM    * Acute hypoxemic respiratory failure due to COVID-19 [facility])  Assessment & Plan  · POA, tested positive for COVID on 1/14/22  Admitted 01/19 -1/24/22, and DC home on 5L O2   · CTA chest (1/26): few tiny subsegmental filling defects in all lobes of the right lung and in the left lower lobe  1 3 x 0 8 cm juxtapleural nodule in the EDUARDO  It is associated with traction bronchiolectasis and is likely a scar but consider follow-up with a chest CT with no contrast in 6 months  Severe bilateral groundglass opacity due to Covid-19  · CXR (1/29): Worsening diffuse ground glass opacities throughout the lung parenchyma consistent with COVID-19 pneumonia  · Using 15 L [address] + NRB as needed     Plan:  - Covid: severe pathway   - IV Decadron 6mg day 7/10 --> will increase to weight based today  - Baricitinib day 7/14 (did receive during prior admission)   - will switch heparin drip to eliquis tomorrow   - patient confirmed level 1 code status, would accept endotracheal intubation if required   - continue respiratory protocol, supplemental O2 and titrate to maintain sats > 82%  - pulmonology on board  - OOB TID, Self-proning if able, Incentive spirometry  - PT recs: post acute rehab    Acute pulmonary embolism without acute cor pulmonale (HCC)  Assessment & Plan  · POA, in the setting of recent COVID infection  · No evidence of right heart strain on CT  · Echo (1/27): LVEF 01%, grade 1 diastolic dysfunction, mild AR and MR     Plan:  - continue with heparin drip for now, transition to eliquis    Fall at home  Assessment & Plan  · Presented as a trauma level C post fall home   Patient and wife report he had taken off supplemental O2 when going to the bathroom  · Lightheadedness after voiding and fell, wife reports mild headstrike as she caught his fall  Head and left upper extremity sustaining skin tears with head abrasion  · Was evaluated and cleared by Trauma in the ED    Plan:  - PT/OT evaluation: post acute rehab   - Case management working on placement    Transaminitis  Assessment & Plan  · Likely in the setting of acute viral infection  Also has history of hepatitis C infection  · Reported with chronic transaminitis at baseline  · Recent outpatient ultrasound on 1/7/22 was unremarkable  Lab Results   Component Value Date     (H) 02/01/2022     (H) 02/01/2022    ALKPHOS 270 (H) 02/01/2022    BILITOT 0 7 08/14/2017     Plan:  - Continue to monitor, AST and ALT    Type 2 diabetes mellitus with hyperglycemia, with long-term current use of insulin [facility])  Assessment & Plan  Lab Results   Component Value Date    HGBA1C 10 1 (H) 01/26/2022     Recent Labs     02/01/22  0813 02/01/22  0909 02/01/22  1028 02/01/22  1514   POCGLU 264* 394* 369* 183*     Blood Sugar Average: Last 72 hrs:  · (P) 277 [id number],   · hemoglobin A1c 10 1 this admission     Plan:  - Lantus 15 units q h s  and humalog 10 U TID with meals  --- will decrease by half ( 8 Lantus and 5 w/ meals) due to hypoglycemia  - SSI coverage  - Continue to adjust insulin as indicated while on steroids      VTE Pharmacologic Prophylaxis: VTE Score: 8 High Risk (Score >/= 5) - Pharmacological DVT Prophylaxis Ordered: heparin drip  Sequential Compression Devices Ordered  Patient Centered Rounds: I performed bedside rounds with nursing staff today  Discussions with Specialists or Other Care Team Provider: pulmonary    Education and Discussions with Family / Patient: Updated  (daughter) via phone  Time Spent for Care: 30 minutes  More than 50% of total time spent on counseling and coordination of care as described above      Current Length of Stay: 6 day(s)  Current Patient Status: Inpatient Certification Statement: The patient will continue to require additional inpatient hospital stay due to oxygen requirements  Discharge Plan: TBD    Code Status: Level 1 - Full Code    Subjective:   Patient resting initially, was saturating 91% with 15L +NRB  Patient removed NRB and then became very tachypneic and tachycardic, still maintaining good saturations, but then eventually desaturating to 66%  NRB replaced and saturations increased  This morning, nursing reported hypoglycemia as low as 37  Patient was given D5 amp and ate, with resolution and subsequent elevation to 394  Despite all this, patient endorses feeling better  He does endorse anxiety when he takes of the NRB and when he gets up to ambulate  Objective:     Vitals:   Temp (24hrs), Av 2 °F (36 8 °C), Min:98 °F (36 7 °C), Max:98 4 °F (36 9 °C)    Temp:  [98 °F (36 7 °C)-98 4 °F (36 9 °C)] 98 °F (36 7 °C)  HR:  [72-78] 72  Resp:  [18-20] 18  BP: (100-145)/(58-72) 100/58  SpO2:  [92 %-100 %] 93 %  Body mass index is 20 23 kg/m²  Input and Output Summary (last 24 hours): Intake/Output Summary (Last 24 hours) at 2022 1629  Last data filed at 2022 1335  Gross per 24 hour   Intake 720 ml   Output 2400 ml   Net -1680 ml       Physical Exam:   Physical Exam  Vitals and nursing note reviewed  Constitutional:       General: He is not in acute distress  HENT:      Mouth/Throat:      Mouth: Mucous membranes are moist       Pharynx: Oropharynx is clear  Eyes:      Extraocular Movements: Extraocular movements intact  Conjunctiva/sclera: Conjunctivae normal       Pupils: Pupils are equal, round, and reactive to light  Cardiovascular:      Rate and Rhythm: Regular rhythm  Tachycardia present  Pulses: Normal pulses  Heart sounds: Normal heart sounds  No murmur heard  Pulmonary:      Effort: Respiratory distress present  Breath sounds: Normal breath sounds  Abdominal:      General: Abdomen is flat   Bowel sounds are normal  There is no distension  Palpations: Abdomen is soft  Tenderness: There is no abdominal tenderness  Musculoskeletal:         General: No swelling or tenderness  Cervical back: Neck supple  Skin:     General: Skin is warm and dry  Neurological:      Mental Status: He is alert and oriented to person, place, and time  Additional Data:     Labs:  Results from last 7 days   Lab Units 02/01/22  0821 01/31/22  0559 01/31/22  0559 01/29/22  0526 01/28/22  0633   WBC Thousand/uL 28 95*   < > 26 13*   < > 22 85*   HEMOGLOBIN g/dL 14 2   < > 12 7   < > 11 7*   HEMATOCRIT % 43 3   < > 39 1   < > 36 6   PLATELETS Thousands/uL 256   < > 267   < > 224   BANDS PCT %  --   --   --   --  7   NEUTROS PCT %  --   --  89*  --   --    LYMPHS PCT %  --   --  4*  --   --    LYMPHO PCT   --   --   --    < > 5*   MONOS PCT %  --   --  5  --   --    MONO PCT   --   --   --    < > 0*   EOS PCT %  --   --  0   < > 0    < > = values in this interval not displayed       Results from last 7 days   Lab Units 02/01/22  0821   SODIUM mmol/L 135*   POTASSIUM mmol/L 4 5   CHLORIDE mmol/L 101   CO2 mmol/L 25   BUN mg/dL 33*   CREATININE mg/dL 0 91   ANION GAP mmol/L 9   CALCIUM mg/dL 8 1*   ALBUMIN g/dL 2 2*   TOTAL BILIRUBIN mg/dL 0 80   ALK PHOS U/L 270*   ALT U/L 489*   AST U/L 193*   GLUCOSE RANDOM mg/dL 360*     Results from last 7 days   Lab Units 01/26/22  1039   INR  1 40*     Results from last 7 days   Lab Units 02/01/22  1514 02/01/22  1028 02/01/22  0909 02/01/22  0813 02/01/22  0728 02/01/22  0709 02/01/22  0706 02/01/22  0647 02/01/22  0631 02/01/22  0630 01/31/22  2115 01/31/22  1613   POC GLUCOSE mg/dl 183* 369* 394* 264* 129 64* 59* 61* 37* 36* 119 169*     Results from last 7 days   Lab Units 01/26/22  0911   HEMOGLOBIN A1C % 10 1*     Results from last 7 days   Lab Units 01/31/22  0530 01/30/22  0603 01/27/22  0435 01/26/22  1350 01/26/22  1035 01/26/22  0911   LACTIC ACID mmol/L  --   -- --  2 0 2 3*  --    PROCALCITONIN ng/ml 0 08 0 09 0 15  --   --  0 27*       Lines/Drains:  Invasive Devices  Report    Peripheral Intravenous Line            Long-Dwell Peripheral IV (Midline) 75/24/16 Left Basilic 2 days              Imaging: Reviewed radiology reports from this admission including: chest xray    Recent Cultures (last 7 days):   Results from last 7 days   Lab Units 01/26/22  1035   BLOOD CULTURE  No Growth After 5 Days  No Growth After 5 Days         Last 24 Hours Medication List:   Current Facility-Administered Medications   Medication Dose Route Frequency Provider Last Rate    albuterol  2 puff Inhalation Q4H PRN Arturo Thayer MD      ALPRAZolam  0 25 mg Oral HS PRN Andrade Friend MD      aspirin  81 mg Oral Daily Diane Dang MD      Baricitinib  4 mg Oral Q24H Diane Dang MD      benzonatate  100 mg Oral TID PRN Arturo Thayer MD      calcium carbonate  500 mg Oral Daily PRN Jacqueline Araiza DO      dexamethasone  0 1 mg/kg Intravenous Q12H Jose Fragoso MD      dextrose           ezetimibe  10 mg Oral Daily Diane Dang MD      heparin (porcine)  3-30 Units/kg/hr (Order-Specific) Intravenous Titrated Arturo Thayer MD 16 Units/kg/hr (02/01/22 9794)    heparin (porcine)  2,400 Units Intravenous Q1H PRN Arturo Thayer MD      heparin (porcine)  4,800 Units Intravenous Q1H PRN Arturo Thayer MD      insulin glargine  8 Units Subcutaneous HS Jose Fragoso MD      insulin lispro  1-5 Units Subcutaneous TID AC Diane Dang MD      insulin lispro  1-5 Units Subcutaneous HS Diane Dang MD      insulin lispro  5 Units Subcutaneous TID With Meals Jose Fragoso MD      lisinopril  20 mg Oral Daily Diane Dang MD      melatonin  3 mg Oral HS Andrade Friend MD      pramipexole  1 5 mg Oral Daily Diane Dang MD      sodium chloride  1 spray Each Maicol Mccormack MD          Today, Patient Was Seen By: Richard Brush DO    **Please Note: This note may have been constructed using a voice recognition system  **

## 2022-02-02 NOTE — PLAN OF CARE
Problem: Potential for Falls  Goal: Patient will remain free of falls  Description: INTERVENTIONS:  - Educate patient/family on patient safety including physical limitations  - Instruct patient to call for assistance with activity   - Consult OT/PT to assist with strengthening/mobility   - Keep Call bell within reach  - Keep bed low and locked with side rails adjusted as appropriate  - Keep care items and personal belongings within reach  - Initiate and maintain comfort rounds  - Make Fall Risk Sign visible to staff  - Offer Toileting every 2 Hours, in advance of need  - Initiate/Maintain bed alarm  - Obtain necessary fall risk management equipment: bed alarm  - Apply yellow socks and bracelet for high fall risk patients  - Consider moving patient to room near nurses station  Outcome: Progressing     Problem: MOBILITY - ADULT  Goal: Maintain or return to baseline ADL function  Description: INTERVENTIONS:  -  Assess patient's ability to carry out ADLs; assess patient's baseline for ADL function and identify physical deficits which impact ability to perform ADLs (bathing, care of mouth/teeth, toileting, grooming, dressing, etc )  - Assess/evaluate cause of self-care deficits   - Assess range of motion  - Assess patient's mobility; develop plan if impaired  - Assess patient's need for assistive devices and provide as appropriate  - Encourage maximum independence but intervene and supervise when necessary  - Involve family in performance of ADLs  - Assess for home care needs following discharge   - Consider OT consult to assist with ADL evaluation and planning for discharge  - Provide patient education as appropriate  Outcome: Progressing  Goal: Maintains/Returns to pre admission functional level  Description: INTERVENTIONS:  - Perform BMAT or MOVE assessment daily    - Set and communicate daily mobility goal to care team and patient/family/caregiver     - Collaborate with rehabilitation services on mobility goals if consulted  - Perform Range of Motion 2 times a day  - Reposition patient every 2 hours    - Dangle patient 2 times a day  - Stand patient 3 times a day  - Ambulate patient 3 times a day  - Out of bed to chair 3 times a day   - Out of bed for meals 3 times a day  - Out of bed for toileting  - Record patient progress and toleration of activity level   Outcome: Progressing     Problem: CARDIOVASCULAR - ADULT  Goal: Maintains optimal cardiac output and hemodynamic stability  Description: INTERVENTIONS:  - Monitor I/O, vital signs and rhythm  - Monitor for S/S and trends of decreased cardiac output  - Administer and titrate ordered vasoactive medications to optimize hemodynamic stability  - Assess quality of pulses, skin color and temperature  - Assess for signs of decreased coronary artery perfusion  - Instruct patient to report change in severity of symptoms  Outcome: Progressing  Goal: Absence of cardiac dysrhythmias or at baseline rhythm  Description: INTERVENTIONS:  - Continuous cardiac monitoring, vital signs, obtain 12 lead EKG if ordered  - Administer antiarrhythmic and heart rate control medications as ordered  - Monitor electrolytes and administer replacement therapy as ordered  Outcome: Progressing     Problem: RESPIRATORY - ADULT  Goal: Achieves optimal ventilation and oxygenation  Description: INTERVENTIONS:  - Assess for changes in respiratory status  - Assess for changes in mentation and behavior  - Position to facilitate oxygenation and minimize respiratory effort  - Oxygen administered by appropriate delivery if ordered  - Initiate smoking cessation education as indicated  - Encourage broncho-pulmonary hygiene including cough, deep breathe, Incentive Spirometry  - Assess the need for suctioning and aspirate as needed  - Assess and instruct to report SOB or any respiratory difficulty  - Respiratory Therapy support as indicated  Outcome: Progressing     Problem: HEMATOLOGIC - ADULT  Goal: Maintains hematologic stability  Description: INTERVENTIONS  - Assess for signs and symptoms of bleeding or hemorrhage  - Monitor labs  - Administer supportive blood products/factors as ordered and appropriate  Outcome: Progressing     Problem: Nutrition/Hydration-ADULT  Goal: Nutrient/Hydration intake appropriate for improving, restoring or maintaining nutritional needs  Description: Monitor and assess patient's nutrition/hydration status for malnutrition  Collaborate with interdisciplinary team and initiate plan and interventions as ordered  Monitor patient's weight and dietary intake as ordered or per policy  Utilize nutrition screening tool and intervene as necessary  Determine patient's food preferences and provide high-protein, high-caloric foods as appropriate       INTERVENTIONS:  - Monitor oral intake, urinary output, labs, and treatment plans  - Assess nutrition and hydration status and recommend course of action  - Evaluate amount of meals eaten  - Assist patient with eating if necessary   - Allow adequate time for meals  - Recommend/ encourage appropriate diets, oral nutritional supplements, and vitamin/mineral supplements  - Order, calculate, and assess calorie counts as needed  - Recommend, monitor, and adjust tube feedings and TPN/PPN based on assessed needs  - Assess need for intravenous fluids  - Provide specific nutrition/hydration education as appropriate  - Include patient/family/caregiver in decisions related to nutrition  Outcome: Progressing     Problem: Prexisting or High Potential for Compromised Skin Integrity  Goal: Skin integrity is maintained or improved  Description: INTERVENTIONS:  - Identify patients at risk for skin breakdown  - Assess and monitor skin integrity  - Assess and monitor nutrition and hydration status  - Monitor labs   - Assess for incontinence   - Turn and reposition patient  - Assist with mobility/ambulation  - Relieve pressure over bony prominences  - Avoid friction and shearing  - Provide appropriate hygiene as needed including keeping skin clean and dry  - Evaluate need for skin moisturizer/barrier cream  - Collaborate with interdisciplinary team   - Patient/family teaching  - Consider wound care consult   Outcome: Progressing

## 2022-02-02 NOTE — PLAN OF CARE
Problem: Potential for Falls  Goal: Patient will remain free of falls  Description: INTERVENTIONS:  - Educate patient/family on patient safety including physical limitations  - Instruct patient to call for assistance with activity   - Consult OT/PT to assist with strengthening/mobility   - Keep Call bell within reach  - Keep bed low and locked with side rails adjusted as appropriate  - Keep care items and personal belongings within reach  - Initiate and maintain comfort rounds  - Make Fall Risk Sign visible to staff  - Offer Toileting every 2 Hours, in advance of need  - Initiate/Maintain bed and chair alarm  - Obtain necessary fall risk management equipment  - Apply yellow socks and bracelet for high fall risk patients  - Consider moving patient to room near nurses station  Outcome: Progressing     Problem: CARDIOVASCULAR - ADULT  Goal: Maintains optimal cardiac output and hemodynamic stability  Description: INTERVENTIONS:  - Monitor I/O, vital signs and rhythm  - Monitor for S/S and trends of decreased cardiac output  - Administer and titrate ordered vasoactive medications to optimize hemodynamic stability  - Assess quality of pulses, skin color and temperature  - Assess for signs of decreased coronary artery perfusion  - Instruct patient to report change in severity of symptoms  Outcome: Progressing  Goal: Absence of cardiac dysrhythmias or at baseline rhythm  Description: INTERVENTIONS:  - Continuous cardiac monitoring, vital signs, obtain 12 lead EKG if ordered  - Administer antiarrhythmic and heart rate control medications as ordered  - Monitor electrolytes and administer replacement therapy as ordered  Outcome: Progressing     Problem: RESPIRATORY - ADULT  Goal: Achieves optimal ventilation and oxygenation  Description: INTERVENTIONS:  - Assess for changes in respiratory status  - Assess for changes in mentation and behavior  - Position to facilitate oxygenation and minimize respiratory effort  - Oxygen administered by appropriate delivery if ordered  - Initiate smoking cessation education as indicated  - Encourage broncho-pulmonary hygiene including cough, deep breathe, Incentive Spirometry  - Assess the need for suctioning and aspirate as needed  - Assess and instruct to report SOB or any respiratory difficulty  - Respiratory Therapy support as indicated  Outcome: Progressing     Problem: HEMATOLOGIC - ADULT  Goal: Maintains hematologic stability  Description: INTERVENTIONS  - Assess for signs and symptoms of bleeding or hemorrhage  - Monitor labs  - Administer supportive blood products/factors as ordered and appropriate  Outcome: Progressing     Problem: Nutrition/Hydration-ADULT  Goal: Nutrient/Hydration intake appropriate for improving, restoring or maintaining nutritional needs  Description: Monitor and assess patient's nutrition/hydration status for malnutrition  Collaborate with interdisciplinary team and initiate plan and interventions as ordered  Monitor patient's weight and dietary intake as ordered or per policy  Utilize nutrition screening tool and intervene as necessary  Determine patient's food preferences and provide high-protein, high-caloric foods as appropriate       INTERVENTIONS:  - Monitor oral intake, urinary output, labs, and treatment plans  - Assess nutrition and hydration status and recommend course of action  - Evaluate amount of meals eaten  - Assist patient with eating if necessary   - Allow adequate time for meals  - Recommend/ encourage appropriate diets, oral nutritional supplements, and vitamin/mineral supplements  - Order, calculate, and assess calorie counts as needed  - Recommend, monitor, and adjust tube feedings and TPN/PPN based on assessed needs  - Assess need for intravenous fluids  - Provide specific nutrition/hydration education as appropriate  - Include patient/family/caregiver in decisions related to nutrition  Outcome: Progressing     Problem: Prexisting or High Potential for Compromised Skin Integrity  Goal: Skin integrity is maintained or improved  Description: INTERVENTIONS:  - Identify patients at risk for skin breakdown  - Assess and monitor skin integrity  - Assess and monitor nutrition and hydration status  - Monitor labs   - Assess for incontinence   - Turn and reposition patient  - Assist with mobility/ambulation  - Relieve pressure over bony prominences  - Avoid friction and shearing  - Provide appropriate hygiene as needed including keeping skin clean and dry  - Evaluate need for skin moisturizer/barrier cream  - Collaborate with interdisciplinary team   - Patient/family teaching  - Consider wound care consult   Outcome: Progressing

## 2022-02-02 NOTE — PHYSICAL THERAPY NOTE
PHYSICAL THERAPY NOTE          Patient Name: Stormy Tim  RNFLR'H Date: 22 7894   PT Last Visit   PT Visit Date 22   Note Type   Note Type Treatment   Pain Assessment   Pain Assessment Tool 0-10   Pain Score No Pain   Patient's Stated Pain Goal No pain   Hospital Pain Intervention(s) Repositioned; Ambulation/increased activity; Emotional support; Rest   Multiple Pain Sites No   Restrictions/Precautions   Weight Bearing Precautions Per Order No   Other Precautions Contact/isolation; Airborne/isolation;Droplet precautions;Cognitive; Chair Alarm; Bed Alarm; Fall Risk;O2  (15L mid flow with NRB PRN )   General   Chart Reviewed Yes   Additional Pertinent History pre tx session pt Sp02 95%, BPM 66, active Sp02 while seated EOb 76% and active while standing 72% 15L mid flow with NRB PRN   Response to Previous Treatment Patient reporting fatigue but able to participate  Family/Caregiver Present No   Cognition   Overall Cognitive Status Impaired   Arousal/Participation Alert; Responsive; Cooperative   Attention Attends with cues to redirect   Orientation Level Oriented X4   Memory Decreased recall of recent events;Decreased recall of precautions   Following Commands Follows one step commands with increased time or repetition   Comments pt identified by name and     Subjective   Subjective pt was agreeable to participate in PT intervention    Bed Mobility   Supine to Sit 6  Modified independent   Additional items Assist x 1;HOB elevated; Bedrails; Increased time required   Sit to Supine 6  Modified independent   Additional items Assist x 1;HOB elevated; Bedrails; Increased time required   Additional Comments while seated EOB pt Sp02 decreased to 76% with 15L midflow  pt required several minutes for Sp02 to increase back into mid 80's   pt was able to sit EoB w/o LOB    Transfers   Sit to Stand 5  Supervision Additional items Assist x 1; Increased time required;Verbal cues   Stand to Sit 5  Supervision   Additional items Assist x 1; Increased time required;Verbal cues   Additional Comments pt standing tolerance was limited in todays tx session secondary to a decreased Sp02 after 45 seconds of standing 72% as pt required a therapeutic seated rest break at EOB in order to allow Sp02 to increase    Ambulation/Elevation   Gait pattern Not appropriate   Balance   Static Sitting Good   Dynamic Sitting Fair   Static Standing Poor +   Ambulatory Poor -   Endurance Deficit   Endurance Deficit Yes   Endurance Deficit Description limited sitting and standing tolerance    Activity Tolerance   Activity Tolerance Patient limited by fatigue; Other (Comment)  (SOB and decreased Sp02 )   Nurse Made Aware Spoke to RN    Exercises   Knee AROM Long Arc Quad Sitting;15 reps;AROM; Bilateral   Ankle Pumps Sitting;20 reps;AROM; Bilateral   Marching Sitting;20 reps;AROM; Bilateral   Assessment   Prognosis Good   Problem List Decreased endurance;Decreased strength;Decreased mobility   Assessment pt began tx session lying supine in the bed and was agreeable to participate in PT intervention  pt continues to remain consistant with mod I with use of bed rails to pull up into a seated EoB position  pt was able to maintain sitting EOB balance w/o LOB but required several therapeutic rest breaks secondary to decreases in Sp02 while performing TE activities at EOB  in todays tx session pt was limited with static standing with RW due to decreased Sp02 Saad@[employer] as pt was only able to tolerate 45 seconds of standing  pt required to be placed back in bed in order to allow Sp02 to increase back into normal ranges  pt would benefit from continued focus on activity tolerance and increasing standing tolerance  post tx session pt supine in bed with call bell and all pt needs met  pt Sp02 post tx session 92%      Barriers to Discharge Inaccessible home environment   Goals Patient Goals pt would like to go home    STG Expiration Date 02/06/22   PT Treatment Day 1   Plan   Treatment/Interventions Functional transfer training;LE strengthening/ROM; Therapeutic exercise; Endurance training;Cognitive reorientation;Patient/family training;Equipment eval/education; Bed mobility;Gait training;Spoke to nursing   PT Frequency 3-5x/wk   Recommendation   PT Discharge Recommendation   (at this time more likely rehab less likely home )   [address] Mobility Inpatient   Turning in Bed Without Bedrails 4   Lying on Back to Sitting on Edge of Flat Bed 4   Moving Bed to Chair 3   Standing Up From Chair 3   Walk in Room 1   Climb 3-5 Stairs 1   Basic Mobility Inpatient Raw Score 16   Basic Mobility Standardized Score 38 32   Highest Level Of Mobility   -[facility] Goal 5: Stand one or more mins   -[facility] Highest Level of Mobility 5: Stand (1 or more minutes)   -[facility] Goal Achieved No   Education   Education Provided Mobility training;Assistive device   End of Consult   Patient Position at End of Consult Supine;Bed/Chair alarm activated; All needs within reach   The patient's AM-PAC Basic Mobility Inpatient Short Form Raw Score is 16  A Raw score of less than or equal to 16 suggests the patient may benefit from discharge to post-acute rehabilitation services  Please also refer to the recommendation of the Physical Therapist for safe discharge planning  Pt continues to be limited by decrease Sp02 while active in PT intervention  Pt continues to demonstrate a limited standing tolerance and required several therapeutic rest breaks throughout tx session   Pt would benefit from continued skilled PT intervention    Gail Ambrose, PTA

## 2022-02-02 NOTE — PROGRESS NOTES
[facility]  Progress Note Darian Tim 1940, 80 y o  male MRN: [id number]  Unit/Bed#: S -Jef Encounter: [id number]  Primary Care Provider: Maykel Stroud MD   Date and time admitted to hospital: 1/26/2022  8:43 AM    * Acute hypoxemic respiratory failure due to COVID-19 [facility])  Assessment & Plan  · POA, tested positive for COVID on 1/14/22  Admitted 01/19 -1/24/22, and DC home on 5L O2   · CTA chest (1/26): few tiny subsegmental filling defects in all lobes of the right lung and in the left lower lobe  1 3 x 0 8 cm juxtapleural nodule in the EDUARDO  It is associated with traction bronchiolectasis and is likely a scar but consider follow-up with a chest CT with no contrast in 6 months  Severe bilateral groundglass opacity due to Covid-19  · CXR (1/29): Worsening diffuse ground glass opacities throughout the lung parenchyma consistent with COVID-19 pneumonia  · Using 15 L [address] + NRB as needed     Plan:  - Covid: severe pathway   - IV Decadron 6mg day 8/10 (weight based)  - Baricitinib day 8/14 (did receive during prior admission)   - will switch to eliquis today  - patient confirmed level 1 code status, would accept endotracheal intubation if required   - continue respiratory protocol, supplemental O2 and titrate to maintain sats > 82%  - pulmonology on board  - OOB TID, Self-proning if able, Incentive spirometry  - PT recs: post acute rehab    Acute pulmonary embolism without acute cor pulmonale (HCC)  Assessment & Plan  · POA, in the setting of recent COVID infection  · No evidence of right heart strain on CT  · Echo (1/27): LVEF 99%, grade 1 diastolic dysfunction, mild AR and MR     Plan:  - transition to eliquis    Fall at home  [address] [place] [address]  · Presented as a trauma level C post fall home  Patient and wife report he had taken off supplemental O2 when going to the bathroom  · Lightheadedness after voiding and fell, wife reports mild headstrike as she caught his fall  Head and left upper extremity sustaining skin tears with head abrasion  · Was evaluated and cleared by Trauma in the ED    Plan:  - PT/OT evaluation: post acute rehab   - Case management working on placement    Transaminitis  Assessment & Plan  · Likely in the setting of acute viral infection  Also has history of hepatitis C infection  · Reported with chronic transaminitis at baseline  · Recent outpatient ultrasound on 1/7/22 was unremarkable  Lab Results   Component Value Date     (H) 02/02/2022     (H) 02/02/2022    ALKPHOS 281 (H) 02/02/2022    BILITOT 0 7 08/14/2017     Plan:  - Continue to monitor, AST and ALT    Type 2 diabetes mellitus with hyperglycemia, with long-term current use of insulin [facility])  Assessment & Plan  Lab Results   Component Value Date    HGBA1C 10 1 (H) 01/26/2022     Recent Labs     02/02/22  0020 02/02/22  0258 02/02/22  0653 02/02/22  1118   POCGLU 200* 206* 189* 373*     Blood Sugar Average: Last 72 hrs:  · (P) 151 88,   · hemoglobin A1c 10 1 this admission     Plan:  - Lantus 15 units q h s  and humalog 10 U TID with meals  ---> will lower to 8 units lantus and no meal time insulin due to hypoglycemia  - SSI coverage  - Continue to adjust insulin as indicated while on steroids      VTE Pharmacologic Prophylaxis: VTE Score: 8 High Risk (Score >/= 5) - Pharmacological DVT Prophylaxis Ordered: apixaban (Eliquis)  Sequential Compression Devices Ordered  Patient Centered Rounds: I performed bedside rounds with nursing staff today  Discussions with Specialists or Other Care Team Provider: medical team, case management    Education and Discussions with Family / Patient: Updated  (daughter) via phone  Time Spent for Care: 30 minutes  More than 50% of total time spent on counseling and coordination of care as described above      Current Length of Stay: 7 day(s)  Current Patient Status: Inpatient   Certification Statement: The patient will continue to require additional inpatient hospital stay due to oxygen requirements  Discharge Plan: TBD    Code Status: Level 1 - Full Code    Subjective:   Patient resting Comfortably in bed my assessment  Oxygen saturations were from 88% to 95% on 15 L high-flow plus non-rebreather  Patient endorsed feeling anxious when he ambulates and when he takes off the non-rebreather  Nursing reports quick desaturations when removing non-rebreather  He denies any other complaints  Encouraged him to use the incentive spirometer at bedside  Objective:     Vitals:   Temp (24hrs), Av 1 °F (36 7 °C), Min:98 °F (36 7 °C), Max:98 2 °F (36 8 °C)    Temp:  [98 °F (36 7 °C)-98 2 °F (36 8 °C)] 98 2 °F (36 8 °C)  HR:  [65-72] 72  Resp:  [19-20] 19  BP: (105-134)/(53-79) 105/65  SpO2:  [90 %-100 %] 96 %  Body mass index is 20 23 kg/m²  Input and Output Summary (last 24 hours): Intake/Output Summary (Last 24 hours) at 2022 1409  Last data filed at 2022 1304  Gross per 24 hour   Intake 360 ml   Output 500 ml   Net -140 ml       Physical Exam:   Physical Exam  Vitals and nursing note reviewed  Constitutional:       General: He is not in acute distress  HENT:      Mouth/Throat:      Mouth: Mucous membranes are moist       Pharynx: Oropharynx is clear  Eyes:      Extraocular Movements: Extraocular movements intact  Conjunctiva/sclera: Conjunctivae normal       Pupils: Pupils are equal, round, and reactive to light  Cardiovascular:      Rate and Rhythm: Regular rhythm  Tachycardia present  Pulses: Normal pulses  Heart sounds: Normal heart sounds  No murmur heard  Pulmonary:      Effort: No respiratory distress  Breath sounds: Rales (Slight in the right base) present  Abdominal:      General: Abdomen is flat  Bowel sounds are normal  There is no distension  Palpations: Abdomen is soft  Tenderness: There is no abdominal tenderness  Musculoskeletal:         General: No swelling or tenderness  Cervical back: Neck supple  Skin:     General: Skin is warm and dry  Neurological:      Mental Status: He is alert and oriented to person, place, and time  Additional Data:     Labs:  Results from last 7 days   Lab Units 02/01/22  0821 01/31/22  0559 01/31/22  0559 01/29/22  0526 01/28/22  0633   WBC Thousand/uL 28 95*   < > 26 13*   < > 22 85*   HEMOGLOBIN g/dL 14 2   < > 12 7   < > 11 7*   HEMATOCRIT % 43 3   < > 39 1   < > 36 6   PLATELETS Thousands/uL 256   < > 267   < > 224   BANDS PCT %  --   --   --   --  7   NEUTROS PCT %  --   --  89*  --   --    LYMPHS PCT %  --   --  4*  --   --    LYMPHO PCT   --   --   --    < > 5*   MONOS PCT %  --   --  5  --   --    MONO PCT   --   --   --    < > 0*   EOS PCT %  --   --  0   < > 0    < > = values in this interval not displayed       Results from last 7 days   Lab Units 02/02/22  0801   SODIUM mmol/L 137   POTASSIUM mmol/L 4 7   CHLORIDE mmol/L 103   CO2 mmol/L 29   BUN mg/dL 30*   CREATININE mg/dL 0 80   ANION GAP mmol/L 5   CALCIUM mg/dL 8 1*   ALBUMIN g/dL 2 2*   TOTAL BILIRUBIN mg/dL 0 90   ALK PHOS U/L 281*   ALT U/L 490*   AST U/L 166*   GLUCOSE RANDOM mg/dL 224*         Results from last 7 days   Lab Units 02/02/22  1118 02/02/22  0653 02/02/22  0258 02/02/22  0020 02/01/22  2137 02/01/22  2110 02/01/22  2108 02/01/22  1514 02/01/22  1028 02/01/22  0909 02/01/22  0813 02/01/22  0728   POC GLUCOSE mg/dl 373* 189* 206* 200* 77 36* 38* 183* 369* 394* 264* 129         Results from last 7 days   Lab Units 01/31/22  0530 01/30/22  0603 01/27/22  0435   PROCALCITONIN ng/ml 0 08 0 09 0 15       Lines/Drains:  Invasive Devices  Report    Peripheral Intravenous Line            Long-Dwell Peripheral IV (Midline) 59/02/53 Left Basilic 3 days              Imaging: Reviewed radiology reports from this admission including: chest xray    Recent Cultures (last 7 days):         Last 24 Hours Medication List:   Current Facility-Administered Medications Medication Dose Route Frequency Provider Last Rate    acetaminophen  650 mg Oral Q6H PRN María Cuellar,       albuterol  2 puff Inhalation Q4H PRN Debora Salgado MD      ALPRAZolam  0 25 mg Oral HS PRN Paul Dorsey MD      apixaban  10 mg Oral BID Rusty Bullard DO      Followed by   Supa David ON 2/9/2022] apixaban  5 mg Oral BID Rusty Bullard DO      aspirin  81 mg Oral Daily Diane Wagner MD      Baricitinib  4 mg Oral Q24H Diane Wagner MD      benzonatate  200 mg Oral TID PRN María Cuellar DO      calcium carbonate  500 mg Oral Daily PRN Rusty Bullard DO      dexamethasone  0 1 mg/kg Intravenous Q12H Elen Salmon MD      ezetimibe  10 mg Oral Daily Diane Wagner MD      insulin glargine  8 Units Subcutaneous HS Elen Salmon MD      insulin lispro  1-5 Units Subcutaneous TID AC Diane Wagner MD      insulin lispro  1-5 Units Subcutaneous HS Diane Wagner MD      losartan  25 mg Oral Daily Elen Salmon MD      melatonin  3 mg Oral HS Paul Dorsey MD      pramipexole  1 5 mg Oral Daily Diane Wagner MD      sodium chloride  1 spray Each Nare Q1H PRN Светлана Hook MD          Today, Patient Was Seen By: Rusty Bullard DO    **Please Note: This note may have been constructed using a voice recognition system  **

## 2022-02-02 NOTE — CASE MANAGEMENT
Case Management Progress Note    Patient name Catarino Burkitt  Location S /S -01 MRN [id number]  : 1940 Date 2022       LOS (days): 7  Geometric Mean LOS (GMLOS) (days): 4 10  Days to GMLOS:-2 8        OBJECTIVE:        Current admission status: Inpatient  Preferred Pharmacy:   [facility], [address]  Phone: [phone] Fax: [phone]    Primary Care Provider: Vince Fernandez MD    Primary Insurance: MEDICARE  Secondary Insurance: [facility] HEALTH OPTIONS PROGRAM    PROGRESS NOTE:  CM was contacted by [employer] to follow up on the cost of an Eliquis rx that was sent to [facility]  CM spoke with Bhavesh Ballesteros at [facility] ([phone]) and she confirmed that patient has a $0 copay with his insurance plan  [facility] made aware

## 2022-02-02 NOTE — PROGRESS NOTES
Progress Note - Pulmonary   Jaimee Dai A [place] 80 y o  male MRN: [id number]  Unit/Bed#: S -Jef Encounter: [id number]    Assessment/Plan:    Acute hypoxic respiratory failure due to abnormal chest imaging consistent with COVID-19 pneumonia   Titrate oxygen to maintain saturations greater than or equal to 89%  Continue COVID-19 pathway as below  Positive Test:  January 14, 2022   Dexamethasone: On day 8 of 10 of 6 milligrams daily   Remdesivir:  Completed five day course   Antibiotics:  Completed five day course   IL-6 therapy:  Continue Baricitinib   Anticoagulation/DVT prophylaxis:  Per protocol   Monitor CRP   Activity, self-proning, and incentive spirometry as able  CLL   Management per primary team     Discussed with nursing  Chief Complaint:    I am okay I guess      Subjective:    Jaimee Dai is sitting up in bed  He reports he feels okay  Per nursing, he was dyspneic on exertion when walking to the bathroom on 6 liters of oxygen and desaturated into the 50s earlier  He does not feel any worsening of shortness of breath from yesterday and denies any significant change in cough cough or chest pain  He has an occasional dry cough with exertion  Objective:    Vitals: Blood pressure 105/65, pulse 72, temperature 98 2 °F (36 8 °C), temperature source Oral, resp  rate 19, height 5' 10" (1 778 m), weight 64 kg (141 lb), SpO2 96 %  15 liters nasal cannula,Body mass index is 20 23 kg/m²  Intake/Output Summary (Last 24 hours) at 2/2/2022 1233  Last data filed at 2/2/2022 0900  Gross per 24 hour   Intake 420 ml   Output 550 ml   Net -130 ml       Invasive Devices  Report    Peripheral Intravenous Line            Long-Dwell Peripheral IV (Midline) 53/12/81 Left Basilic 3 days                Physical Exam:     Physical Exam  Vitals reviewed  Constitutional:       General: He is not in acute distress  Appearance: He is well-developed  He is not toxic-appearing or diaphoretic  Interventions: Nasal cannula in place  HENT:      Head: Normocephalic and atraumatic  Eyes:      General: No scleral icterus  Neck:      Trachea: No tracheal deviation  Cardiovascular:      Rate and Rhythm: Normal rate and regular rhythm  Heart sounds: S1 normal and S2 normal  No murmur heard  No friction rub  No gallop  Pulmonary:      Effort: Pulmonary effort is normal  No tachypnea, accessory muscle usage or respiratory distress  Breath sounds: Normal breath sounds  No stridor  No decreased breath sounds, wheezing, rhonchi or rales  Chest:      Chest wall: No tenderness  Abdominal:      General: Bowel sounds are normal  There is no distension  Palpations: Abdomen is soft  Tenderness: There is no abdominal tenderness  Musculoskeletal:         General: No tenderness  Cervical back: Neck supple  Skin:     General: Skin is warm and dry  Findings: No rash  Neurological:      Mental Status: He is alert and oriented to person, place, and time  GCS: GCS eye subscore is 4  GCS verbal subscore is 5  GCS motor subscore is 6  Psychiatric:         Speech: Speech normal          Behavior: Behavior normal  Behavior is cooperative           Labs:   CMP:   Lab Results   Component Value Date    SODIUM 137 02/02/2022    K 4 7 02/02/2022     02/02/2022    CO2 29 02/02/2022    BUN 30 (H) 02/02/2022    CREATININE 0 80 02/02/2022    CALCIUM 8 1 (L) 02/02/2022     (H) 02/02/2022     (H) 02/02/2022    ALKPHOS 281 (H) 02/02/2022    EGFR 84 02/02/2022     CRP 42 7    Imaging and other studies: None new

## 2022-02-02 NOTE — PLAN OF CARE
Problem: PHYSICAL THERAPY ADULT  Goal: Performs mobility at highest level of function for planned discharge setting  See evaluation for individualized goals  Description: Treatment/Interventions: Functional transfer training,LE strengthening/ROM,Therapeutic exercise,Cognitive reorientation,Patient/family training,Gait training,Bed mobility,Equipment eval/education,Endurance training,Spoke to nursing  Equipment Recommended:  (None at present)       See flowsheet documentation for full assessment, interventions and recommendations  Outcome: Not Progressing  Note: Prognosis: Good  Problem List: Decreased endurance,Decreased strength,Decreased mobility  Assessment: pt began tx session lying supine in the bed and was agreeable to participate in PT intervention  pt continues to remain consistant with mod I with use of bed rails to pull up into a seated EoB position  pt was able to maintain sitting EOB balance w/o LOB but required several therapeutic rest breaks secondary to decreases in Sp02 while performing TE activities at EOB  in todays tx session pt was limited with static standing with RW due to decreased Sp02 [email or web address] as pt was only able to tolerate 45 seconds of standing  pt required to be placed back in bed in order to allow Sp02 to increase back into normal ranges  pt would benefit from continued focus on activity tolerance and increasing standing tolerance  post tx session pt supine in bed with call bell and all pt needs met  pt Sp02 post tx session 92%  Barriers to Discharge: Inaccessible home environment        PT Discharge Recommendation:  (at this time more likely rehab less likely home )          See flowsheet documentation for full assessment

## 2022-02-03 NOTE — PLAN OF CARE
Problem: Potential for Falls  Goal: Patient will remain free of falls  Description: INTERVENTIONS:  - Educate patient/family on patient safety including physical limitations  - Instruct patient to call for assistance with activity   - Consult OT/PT to assist with strengthening/mobility   - Keep Call bell within reach  - Keep bed low and locked with side rails adjusted as appropriate  - Keep care items and personal belongings within reach  - Initiate and maintain comfort rounds  - Make Fall Risk Sign visible to staff  - Offer Toileting every 2 Hours, in advance of need  - Initiate/Maintain bed alarm  - Obtain necessary fall risk management equipment  - Apply yellow socks and bracelet for high fall risk patients  - Consider moving patient to room near nurses station  Outcome: Progressing     Problem: CARDIOVASCULAR - ADULT  Goal: Maintains optimal cardiac output and hemodynamic stability  Description: INTERVENTIONS:  - Monitor I/O, vital signs and rhythm  - Monitor for S/S and trends of decreased cardiac output  - Administer and titrate ordered vasoactive medications to optimize hemodynamic stability  - Assess quality of pulses, skin color and temperature  - Assess for signs of decreased coronary artery perfusion  - Instruct patient to report change in severity of symptoms  Outcome: Progressing  Goal: Absence of cardiac dysrhythmias or at baseline rhythm  Description: INTERVENTIONS:  - Continuous cardiac monitoring, vital signs, obtain 12 lead EKG if ordered  - Administer antiarrhythmic and heart rate control medications as ordered  - Monitor electrolytes and administer replacement therapy as ordered  Outcome: Progressing     Problem: RESPIRATORY - ADULT  Goal: Achieves optimal ventilation and oxygenation  Description: INTERVENTIONS:  - Assess for changes in respiratory status  - Assess for changes in mentation and behavior  - Position to facilitate oxygenation and minimize respiratory effort  - Oxygen administered by appropriate delivery if ordered  - Initiate smoking cessation education as indicated  - Encourage broncho-pulmonary hygiene including cough, deep breathe, Incentive Spirometry  - Assess the need for suctioning and aspirate as needed  - Assess and instruct to report SOB or any respiratory difficulty  - Respiratory Therapy support as indicated  Outcome: Progressing     Problem: HEMATOLOGIC - ADULT  Goal: Maintains hematologic stability  Description: INTERVENTIONS  - Assess for signs and symptoms of bleeding or hemorrhage  - Monitor labs  - Administer supportive blood products/factors as ordered and appropriate  Outcome: Progressing     Problem: Nutrition/Hydration-ADULT  Goal: Nutrient/Hydration intake appropriate for improving, restoring or maintaining nutritional needs  Description: Monitor and assess patient's nutrition/hydration status for malnutrition  Collaborate with interdisciplinary team and initiate plan and interventions as ordered  Monitor patient's weight and dietary intake as ordered or per policy  Utilize nutrition screening tool and intervene as necessary  Determine patient's food preferences and provide high-protein, high-caloric foods as appropriate       INTERVENTIONS:  - Monitor oral intake, urinary output, labs, and treatment plans  - Assess nutrition and hydration status and recommend course of action  - Evaluate amount of meals eaten  - Assist patient with eating if necessary   - Allow adequate time for meals  - Recommend/ encourage appropriate diets, oral nutritional supplements, and vitamin/mineral supplements  - Order, calculate, and assess calorie counts as needed  - Recommend, monitor, and adjust tube feedings and TPN/PPN based on assessed needs  - Assess need for intravenous fluids  - Provide specific nutrition/hydration education as appropriate  - Include patient/family/caregiver in decisions related to nutrition  Outcome: Progressing     Problem: Prexisting or High Potential for Compromised Skin Integrity  Goal: Skin integrity is maintained or improved  Description: INTERVENTIONS:  - Identify patients at risk for skin breakdown  - Assess and monitor skin integrity  - Assess and monitor nutrition and hydration status  - Monitor labs   - Assess for incontinence   - Turn and reposition patient  - Assist with mobility/ambulation  - Relieve pressure over bony prominences  - Avoid friction and shearing  - Provide appropriate hygiene as needed including keeping skin clean and dry  - Evaluate need for skin moisturizer/barrier cream  - Collaborate with interdisciplinary team   - Patient/family teaching  - Consider wound care consult   Outcome: Progressing Never

## 2022-02-03 NOTE — PROGRESS NOTES
[facility]  Progress Note Abbi Flores Glenroy 1940, 80 y o  male MRN: [id number]  Unit/Bed#: S -Jef Encounter: [id number]  Primary Care Provider: Mai Nesbitt MD   Date and time admitted to hospital: 1/26/2022  8:43 AM    * Acute hypoxemic respiratory failure due to COVID-19 [facility])  Assessment & Plan  · POA, tested positive for COVID on 1/14/22  Admitted 01/19 -1/24/22, and DC home on 5L O2   · CTA chest (1/26): few tiny subsegmental filling defects in all lobes of the right lung and in the left lower lobe  1 3 x 0 8 cm juxtapleural nodule in the EDUARDO  It is associated with traction bronchiolectasis and is likely a scar but consider follow-up with a chest CT with no contrast in 6 months  Severe bilateral groundglass opacity due to Covid-19  · CXR (1/29): Worsening diffuse ground glass opacities throughout the lung parenchyma consistent with COVID-19 pneumonia  · Using 15 L [address] + NRB as needed -- desaturates quickly when removing NRB    Plan:  - Covid: severe pathway   - IV Decadron 6mg day 9/10 (weight based)  - Baricitinib day 9/14 (did receive during prior admission)   - continue with Eliquis  - patient confirmed level 1 code status, would accept endotracheal intubation if required   - continue respiratory protocol, supplemental O2 and titrate to maintain sats > 82%  - pulmonology on board  - OOB TID, Self-proning if able, Incentive spirometry  - PT recs: post acute rehab    Acute pulmonary embolism without acute cor pulmonale (HCC)  Assessment & Plan  · POA, in the setting of recent COVID infection  · No evidence of right heart strain on CT  · Echo (1/27): LVEF 67%, grade 1 diastolic dysfunction, mild AR and MR     Plan:  - continue eliquis    Fall at home  Assessment & Plan  · Presented as a trauma level C post fall home   Patient and wife report he had taken off supplemental O2 when going to the bathroom  · Lightheadedness after voiding and fell, wife reports mild headstrike as she caught his fall  Head and left upper extremity sustaining skin tears with head abrasion  · Was evaluated and cleared by Trauma in the ED    Plan:  - PT/OT evaluation: post acute rehab   - Case management working on placement    Transaminitis  Assessment & Plan  · Likely in the setting of acute viral infection  Also has history of hepatitis C infection  · Reported with chronic transaminitis at baseline  · Recent outpatient ultrasound on 1/7/22 was unremarkable  · Trending upwards    but is on baricitinib  Lab Results   Component Value Date     (H) 02/03/2022     (H) 02/03/2022    ALKPHOS 242 (H) 02/03/2022    BILITOT 0 7 08/14/2017     Plan:  - Continue to monitor, AST and ALT    Type 2 diabetes mellitus with hyperglycemia, with long-term current use of insulin [facility])  Assessment & Plan  Lab Results   Component Value Date    HGBA1C 10 1 (H) 01/26/2022     Recent Labs     02/02/22  1118 02/02/22  1539 02/02/22  2121 02/03/22  0712   POCGLU 373* 355* 371* 230*     Blood Sugar Average: Last 72 hrs:  · (P) 174 75,   · hemoglobin A1c 10 1 this admission     Plan:  - Adjust to 8 units Lantus and 3 with each meal   - SSI coverage  - Continue to adjust insulin as indicated while on steroids      VTE Pharmacologic Prophylaxis: VTE Score: 8 High Risk (Score >/= 5) - Pharmacological DVT Prophylaxis Ordered: apixaban (Eliquis)  Sequential Compression Devices Ordered  Patient Centered Rounds: I performed bedside rounds with nursing staff today  Discussions with Specialists or Other Care Team Provider: medical team, case management    Education and Discussions with Family / Patient: Will call patient's daughter shortly  Time Spent for Care: 30 minutes  More than 50% of total time spent on counseling and coordination of care as described above      Current Length of Stay: 8 day(s)  Current Patient Status: Inpatient   Certification Statement: The patient will continue to require additional inpatient hospital stay due to oxygen requirements  Discharge Plan: TBD    Code Status: Level 1 - Full Code    Subjective:   Patient resting comfortably in bed my assessment  From outside the room, patient reading newspaper, and satting at 95% with 15L + NRB  Then when I went in the room with his breakfast, he removed his NRB, and desaturated to 87%  He was tachycardic and breathing heavily before he dropped  He denies any chest pain, dizziness  He does state that he hasn't used his incentive spirometry  Objective:   Vitals:   Temp (24hrs), Av 5 °F (36 4 °C), Min:97 °F (36 1 °C), Max:97 9 °F (36 6 °C)    Temp:  [97 °F (36 1 °C)-97 9 °F (36 6 °C)] 97 °F (36 1 °C)  HR:  [68-79] 68  Resp:  [18-20] 18  BP: (111-113)/(59-65) 111/65  SpO2:  [87 %-100 %] 97 %  Body mass index is 20 23 kg/m²  Input and Output Summary (last 24 hours): Intake/Output Summary (Last 24 hours) at 2/3/2022 1010  Last data filed at 2/3/2022 0844  Gross per 24 hour   Intake 460 ml   Output 1425 ml   Net -965 ml       Physical Exam:   Physical Exam  Vitals and nursing note reviewed  Constitutional:       General: He is not in acute distress  HENT:      Mouth/Throat:      Mouth: Mucous membranes are moist       Pharynx: Oropharynx is clear  Eyes:      Extraocular Movements: Extraocular movements intact  Conjunctiva/sclera: Conjunctivae normal       Pupils: Pupils are equal, round, and reactive to light  Cardiovascular:      Rate and Rhythm: Regular rhythm  Tachycardia present  Pulses: Normal pulses  Heart sounds: Normal heart sounds  No murmur heard  Pulmonary:      Effort: No respiratory distress  Breath sounds: Rales (Slight in the bilateral bases) present  Abdominal:      General: Abdomen is flat  Bowel sounds are normal  There is no distension  Palpations: Abdomen is soft  Tenderness: There is no abdominal tenderness  Musculoskeletal:         General: No swelling or tenderness  Cervical back: Neck supple  Skin:     General: Skin is warm and dry  Neurological:      Mental Status: He is alert and oriented to person, place, and time  Additional Data:     Labs:  Results from last 7 days   Lab Units 02/01/22  0821 01/31/22  0559 01/31/22  0559 01/29/22  0526 01/28/22  0633   WBC Thousand/uL 28 95*   < > 26 13*   < > 22 85*   HEMOGLOBIN g/dL 14 2   < > 12 7   < > 11 7*   HEMATOCRIT % 43 3   < > 39 1   < > 36 6   PLATELETS Thousands/uL 256   < > 267   < > 224   BANDS PCT %  --   --   --   --  7   NEUTROS PCT %  --   --  89*  --   --    LYMPHS PCT %  --   --  4*  --   --    LYMPHO PCT   --   --   --    < > 5*   MONOS PCT %  --   --  5  --   --    MONO PCT   --   --   --    < > 0*   EOS PCT %  --   --  0   < > 0    < > = values in this interval not displayed       Results from last 7 days   Lab Units 02/03/22  0455   SODIUM mmol/L 138   POTASSIUM mmol/L 5 3   CHLORIDE mmol/L 105   CO2 mmol/L 25   BUN mg/dL 39*   CREATININE mg/dL 0 76   ANION GAP mmol/L 8   CALCIUM mg/dL 8 4   ALBUMIN g/dL 2 1*   TOTAL BILIRUBIN mg/dL 0 84   ALK PHOS U/L 242*   ALT U/L 492*   AST U/L 149*   GLUCOSE RANDOM mg/dL 240*         Results from last 7 days   Lab Units 02/03/22  0712 02/02/22  2121 02/02/22  1539 02/02/22  1118 02/02/22  0653 02/02/22  0258 02/02/22  0020 02/01/22  2137 02/01/22  2110 02/01/22  2108 02/01/22  1514 02/01/22  1028   POC GLUCOSE mg/dl 230* 371* 355* 373* 189* 206* 200* 77 36* 38* 183* 369*         Results from last 7 days   Lab Units 01/31/22  0530 01/30/22  0603   PROCALCITONIN ng/ml 0 08 0 09       Lines/Drains:  Invasive Devices  Report    Peripheral Intravenous Line            Long-Dwell Peripheral IV (Midline) 56/01/75 Left Basilic 3 days              Imaging: Reviewed radiology reports from this admission including: chest xray    Recent Cultures (last 7 days):         Last 24 Hours Medication List:   Current Facility-Administered Medications   Medication Dose Route Frequency Provider Last Rate    acetaminophen  650 mg Oral Q6H PRN Jose Bang DO      albuterol  2 puff Inhalation Q4H PRN Cathy Estrada MD      ALPRAZolam  0 25 mg Oral HS PRN Kishore Wilson MD      apixaban  10 mg Oral BID Angela Herrera DO      Followed by   Marilee Alberto ON 2/9/2022] apixaban  5 mg Oral BID Angela Herrera DO      aspirin  81 mg Oral Daily Diane Hein MD      Baricitinib  4 mg Oral Q24H Cathy Estrada MD      benzonatate  200 mg Oral TID PRN Jose Bang,       calcium carbonate  500 mg Oral Daily PRN Angela Herrera DO      dexamethasone  0 1 mg/kg Intravenous Q12H Gilmar Armendariz MD      ezetimibe  10 mg Oral Daily Diane Hein MD      insulin glargine  8 Units Subcutaneous HS Gilmar Armendariz MD      insulin lispro  1-5 Units Subcutaneous TID AC Diane Hein MD      insulin lispro  1-5 Units Subcutaneous HS Diane Hein MD      insulin lispro  3 Units Subcutaneous TID With Meals Gilmar Armendariz MD      losartan  25 mg Oral Daily Gilmar Armendariz MD      melatonin  3 mg Oral HS Kishore Wilson MD      pramipexole  1 5 mg Oral Daily Diane Hein MD      sodium chloride  1 spray Each Nare Q1H PRN Austyn Hardin MD          Today, Patient Was Seen By: Angela Herrera DO    **Please Note: This note may have been constructed using a voice recognition system  **

## 2022-02-03 NOTE — PROGRESS NOTES
Progress Note - Pulmonary   Tobin LOPEZ [place] 80 y o  male MRN: [id number]  Unit/Bed#: S -01 Encounter: [id number]    Assessment/Plan:    Acute hypoxic respiratory failure due to abnormal chest imaging consistent with COVID-19 pneumonia              Titrate oxygen to maintain saturations greater than or equal to 89%  We discussed goal oxygen saturations and he will trial off non-rebreather now  Continue COVID-19 pathway as below  Positive Test:  January 14, 2022  · Dexamethasone: On day 9 of 10 of 6 milligrams daily  · Remdesivir:  Completed five day course  · Antibiotics:  Completed five day course  · IL-6 therapy:  Continue Baricitinib  · Anticoagulation/DVT prophylaxis:  Per protocol  · Monitor CRP  · Activity, self-proning, and incentive spirometry as able      CLL              Management per primary team      Discussed with nursing  Chief Complaint:    I am okay      Subjective:    Tobin Boggs is resting in bed with the non-rebreather in addition to his mid flow nasal cannula  He reports that his breathing does not particularly feel worse but he is hoping that his oxygen saturations will be 100% to make him feel better  He is resting on his side and did attempt proning  Objective:    Vitals: Blood pressure 111/65, pulse 68, temperature (!) 97 °F (36 1 °C), temperature source Axillary, resp  rate 18, height 5' 10" (1 778 m), weight 64 kg (141 lb), SpO2 97 %  15 liters mid flow nasal cannula with 100% non-rebreather,Body mass index is 20 23 kg/m²  Intake/Output Summary (Last 24 hours) at 2/3/2022 0919  Last data filed at 2/3/2022 0844  Gross per 24 hour   Intake 460 ml   Output 1425 ml   Net -965 ml       Invasive Devices  Report    Peripheral Intravenous Line            Long-Dwell Peripheral IV (Midline) 03/92/07 Left Basilic 3 days                Physical Exam:     Physical Exam  Vitals reviewed  Constitutional:       General: He is not in acute distress       Appearance: He is well-developed  He is not toxic-appearing or diaphoretic  Interventions: Nasal cannula and face mask in place  HENT:      Head: Normocephalic and atraumatic  Eyes:      General: No scleral icterus  Neck:      Trachea: No tracheal deviation  Cardiovascular:      Rate and Rhythm: Normal rate and regular rhythm  Heart sounds: S1 normal and S2 normal  No murmur heard  No friction rub  No gallop  Pulmonary:      Effort: Pulmonary effort is normal  No tachypnea, accessory muscle usage or respiratory distress  Breath sounds: Normal breath sounds  No stridor  No decreased breath sounds, wheezing, rhonchi or rales  Chest:      Chest wall: No tenderness  Abdominal:      General: Bowel sounds are normal  There is no distension  Palpations: Abdomen is soft  Tenderness: There is no abdominal tenderness  Musculoskeletal:         General: No tenderness  Cervical back: Neck supple  Skin:     General: Skin is warm and dry  Findings: No rash  Neurological:      Mental Status: He is alert and oriented to person, place, and time  GCS: GCS eye subscore is 4  GCS verbal subscore is 5  GCS motor subscore is 6  Psychiatric:         Speech: Speech normal          Behavior: Behavior normal  Behavior is cooperative  Labs:   CMP:   Lab Results   Component Value Date    SODIUM 138 02/03/2022    K 5 3 02/03/2022     02/03/2022    CO2 25 02/03/2022    BUN 39 (H) 02/03/2022    CREATININE 0 76 02/03/2022    CALCIUM 8 4 02/03/2022     (H) 02/03/2022     (H) 02/03/2022    ALKPHOS 242 (H) 02/03/2022    EGFR 86 02/03/2022     Imaging and other studies: I have personally reviewed pertinent reports  and Chest x-ray done yesterday shows no interval change in bilateral patchy ground-glass infiltrates

## 2022-02-04 NOTE — CASE MANAGEMENT
Case Management Progress Note    Patient name Ever Shultz  Location S /S -01 MRN [id number]  : 1940 Date 2022       LOS (days): 9  Geometric Mean LOS (GMLOS) (days): 4 10  Days to GMLOS:-5 1        OBJECTIVE:        Current admission status: Inpatient  Preferred Pharmacy:   [address], [place] [address] [address]  Phone: [phone] Fax: [phone]    Primary Care Provider: Idalmis Bryson MD    Primary Insurance: MEDICARE  Secondary Insurance: [facility] HEALTH OPTIONS PROGRAM    PROGRESS NOTE:  As per SLIM rounds, patient is not yet stable for dc  He remains on 15L [address] + NRB and Pulm is still following  SLIM discussed the STR recommendation with patient this morning and he reports he is interested in same at dc  CM Dept will continue to follow and will connect with patient and family re: available STR options at dc

## 2022-02-04 NOTE — PROGRESS NOTES
[facility]  Progress Note [place] 1940, 80 y o  male MRN: [id number]  Unit/Bed#: S -Jef Encounter: [id number]  Primary Care Provider: Alexis Joseph MD   Date and time admitted to hospital: 1/26/2022  8:43 AM    * Acute hypoxemic respiratory failure due to COVID-19 [facility])  Assessment & Plan  · POA, tested positive for COVID on 1/14/22  Admitted 01/19 -1/24/22, and DC home on 5L O2   · CTA chest (1/26): few tiny subsegmental filling defects in all lobes of the right lung and in the left lower lobe  1 3 x 0 8 cm juxtapleural nodule in the EDUARDO  It is associated with traction bronchiolectasis and is likely a scar but consider follow-up with a chest CT with no contrast in 6 months  Severe bilateral groundglass opacity due to Covid-19  · CXR (1/29): Worsening diffuse ground glass opacities throughout the lung parenchyma consistent with COVID-19 pneumonia  · Using 15 L [address] + NRB as needed -- this am on my exam, wearing only NRB with 100% oxygen saturation and good waveforms  · While anxiety is a factor, xanax made the patient feel more anxious  Plan:  - Covid: severe pathway   - Continue IV Decadron weight based steroids-- > will switch to 6mg steroids starting tomorrow  - Baricitinib day 10/14 (did receive during prior admission)   - continue with Eliquis  - patient confirmed level 1 code status  - continue respiratory protocol, supplemental O2 and titrate to maintain sats > 82%  - pulmonology on board  - OOB TID, Self-proning if able, Incentive spirometry  - PT recs: post acute rehab    Acute pulmonary embolism without acute cor pulmonale (HCC)  Assessment & Plan  · POA, in the setting of recent COVID infection  · No evidence of right heart strain on CT  · Echo (1/27): LVEF 39%, grade 1 diastolic dysfunction, mild AR and MR     Plan:  - continue eliquis    Fall at home  Assessment & Plan  · Presented as a trauma level C post fall home   Patient and wife report he had taken off supplemental O2 when going to the bathroom  · Lightheadedness after voiding and fell, wife reports mild headstrike as she caught his fall  Head and left upper extremity sustaining skin tears with head abrasion  · Was evaluated and cleared by Trauma in the ED    Plan:  - PT/OT evaluation: post acute rehab   - Case management working on placement  - feels anxious to ambulate due to hypoxia currently    Transaminitis  Assessment & Plan  · Likely in the setting of acute viral infection  Also has history of hepatitis C infection  · Reported with chronic transaminitis at baseline  · Recent outpatient ultrasound on 1/7/22 was unremarkable  · Trending upwards    but is on baricitinib  Lab Results   Component Value Date     (H) 02/04/2022     (H) 02/04/2022    ALKPHOS 276 (H) 02/04/2022    BILITOT 0 7 08/14/2017     Plan:  - Continue to monitor, AST and ALT--   - if rising further, can consider holding baricitinib    Type 2 diabetes mellitus with hyperglycemia, with long-term current use of insulin [facility])  Assessment & Plan  Lab Results   Component Value Date    HGBA1C 10 1 (H) 01/26/2022     Recent Labs     02/03/22  1122 02/03/22  1456 02/03/22  1557 02/03/22  2148   POCGLU >500* 272* 298* 329*     Blood Sugar Average: Last 72 hrs:  · (P) 168 [id number],   · hemoglobin A1c 10 1 this admission     Plan:  - Continue with 12 units Lantus and 5 with each meal   - SSI coverage  - Continue to adjust insulin as indicated while on steroids      VTE Pharmacologic Prophylaxis: VTE Score: 8 High Risk (Score >/= 5) - Pharmacological DVT Prophylaxis Ordered: apixaban (Eliquis)  Sequential Compression Devices Ordered  Patient Centered Rounds: I performed bedside rounds with nursing staff today  Discussions with Specialists or Other Care Team Provider: medical team, case management    Education and Discussions with Family / Patient: Updated  (daughter) via phone      Time Spent for Care: 30 minutes  More than 50% of total time spent on counseling and coordination of care as described above  Current Length of Stay: 9 day(s)  Current Patient Status: Inpatient   Certification Statement: The patient will continue to require additional inpatient hospital stay due to oxygen requirements  Discharge Plan: TBD    Code Status: Level 1 - Full Code    Subjective:   Patient in bed, not tachypneic, but breathing heavily with only non-rebreather on  He endorses feeling very anxious to get up and go to the bathroom, fearing that he will desaturate or feel short of breath  He did try xanax this morning, but he said it made him feel more anxious  We talked for a long time about this and encouraged him to continue trying to wean and ambulate  Also encouraged incentive spirometry use  Objective:   Vitals:   Temp (24hrs), Av 9 °F (36 6 °C), Min:97 5 °F (36 4 °C), Max:98 2 °F (36 8 °C)    Temp:  [97 5 °F (36 4 °C)-98 2 °F (36 8 °C)] 98 2 °F (36 8 °C)  HR:  [65-79] 75  Resp:  [21-22] 21  BP: ()/(49-63) 95/49  SpO2:  [93 %-100 %] 93 %  Body mass index is 20 23 kg/m²  Input and Output Summary (last 24 hours): Intake/Output Summary (Last 24 hours) at 2022 1015  Last data filed at 2022 0451  Gross per 24 hour   Intake --   Output 900 ml   Net -900 ml       Physical Exam:   Physical Exam  Vitals and nursing note reviewed  Constitutional:       General: He is not in acute distress  HENT:      Mouth/Throat:      Mouth: Mucous membranes are moist       Pharynx: Oropharynx is clear  Eyes:      Extraocular Movements: Extraocular movements intact  Conjunctiva/sclera: Conjunctivae normal       Pupils: Pupils are equal, round, and reactive to light  Cardiovascular:      Rate and Rhythm: Normal rate and regular rhythm  Pulses: Normal pulses  Heart sounds: Normal heart sounds  No murmur heard  Pulmonary:      Effort: Pulmonary effort is normal  No respiratory distress        Breath sounds: No rales  Abdominal:      General: Abdomen is flat  Bowel sounds are normal  There is no distension  Palpations: Abdomen is soft  Tenderness: There is no abdominal tenderness  Musculoskeletal:         General: No swelling or tenderness  Cervical back: Neck supple  Skin:     General: Skin is warm and dry  Neurological:      Mental Status: He is alert and oriented to person, place, and time  Additional Data:     Labs:  Results from last 7 days   Lab Units 02/01/22  0821 01/31/22  0559 01/31/22  0559   WBC Thousand/uL 28 95*   < > 26 13*   HEMOGLOBIN g/dL 14 2   < > 12 7   HEMATOCRIT % 43 3   < > 39 1   PLATELETS Thousands/uL 256   < > 267   NEUTROS PCT %  --   --  89*   LYMPHS PCT %  --   --  4*   MONOS PCT %  --   --  5   EOS PCT %  --   --  0    < > = values in this interval not displayed       Results from last 7 days   Lab Units 02/04/22  0445   SODIUM mmol/L 142   POTASSIUM mmol/L 5 1   CHLORIDE mmol/L 107   CO2 mmol/L 29   BUN mg/dL 45*   CREATININE mg/dL 0 78   ANION GAP mmol/L 6   CALCIUM mg/dL 8 8   ALBUMIN g/dL 2 4*   TOTAL BILIRUBIN mg/dL 0 95   ALK PHOS U/L 276*   ALT U/L 509*   AST U/L 170*   GLUCOSE RANDOM mg/dL 155*         Results from last 7 days   Lab Units 02/03/22  2148 02/03/22  1557 02/03/22  1456 02/03/22  1122 02/03/22  0712 02/02/22  2121 02/02/22  1539 02/02/22  1118 02/02/22  0653 02/02/22  0258 02/02/22  0020 02/01/22  2137   POC GLUCOSE mg/dl 329* 298* 272* >500* 230* 371* 355* 373* 189* 206* 200* 77         Results from last 7 days   Lab Units 01/31/22  0530 01/30/22  0603   PROCALCITONIN ng/ml 0 08 0 09       Lines/Drains:  Invasive Devices  Report    Peripheral Intravenous Line            Long-Dwell Peripheral IV (Midline) 27/57/02 Left Basilic 4 days              Imaging: Reviewed radiology reports from this admission including: chest xray    Recent Cultures (last 7 days):         Last 24 Hours Medication List:   Current Facility-Administered Medications   Medication Dose Route Frequency Provider Last Rate    acetaminophen  650 mg Oral Q6H PRN Earle Taylor DO      albuterol  2 puff Inhalation Q4H PRN Charlie Gore MD      ALPRAZolam  0 25 mg Oral HS PRN Estephania Hernández MD      apixaban  10 mg Oral BID Dior Clarke DO      Followed by   Jeison Pearson ON 2/9/2022] apixaban  5 mg Oral BID Dior Clarke DO      aspirin  81 mg Oral Daily Diane Lebron MD      Baricitinib  4 mg Oral Q24H Charlie Gore MD      benzonatate  200 mg Oral TID PRN Earle Taylor DO      calcium carbonate  500 mg Oral Daily PRN Dior Clarke DO      dexamethasone  0 1 mg/kg Intravenous Q12H Manav Mg MD      ezetimibe  10 mg Oral Daily Diane Lebrno MD      insulin glargine  12 Units Subcutaneous HS Earle Taylor DO      insulin lispro  1-5 Units Subcutaneous TID AC Diane Lebron MD      insulin lispro  1-5 Units Subcutaneous HS Diane Lebron MD      insulin lispro  5 Units Subcutaneous TID With Meals Manav Mg MD      losartan  25 mg Oral Daily Manav Mg MD      melatonin  3 mg Oral HS Estephania Hernández MD      pramipexole  1 5 mg Oral Daily Diane Lebron MD      sodium chloride  1 spray Each Nare Q1H PRN Jori Vaca MD          Today, Patient Was Seen By: Dior Clarke DO    **Please Note: This note may have been constructed using a voice recognition system  **

## 2022-02-04 NOTE — PLAN OF CARE
Problem: Potential for Falls  Goal: Patient will remain free of falls  Description: INTERVENTIONS:  - Educate patient/family on patient safety including physical limitations  - Instruct patient to call for assistance with activity   - Consult OT/PT to assist with strengthening/mobility   - Keep Call bell within reach  - Keep bed low and locked with side rails adjusted as appropriate  - Keep care items and personal belongings within reach  - Initiate and maintain comfort rounds  - Make Fall Risk Sign visible to staff  - Offer Toileting every  Hours, in advance of need  - Initiate/Maintain alarm  - Obtain necessary fall risk management equipment:   - Apply yellow socks and bracelet for high fall risk patients  - Consider moving patient to room near nurses station  Outcome: Progressing     Problem: MOBILITY - ADULT  Goal: Maintain or return to baseline ADL function  Description: INTERVENTIONS:  -  Assess patient's ability to carry out ADLs; assess patient's baseline for ADL function and identify physical deficits which impact ability to perform ADLs (bathing, care of mouth/teeth, toileting, grooming, dressing, etc )  - Assess/evaluate cause of self-care deficits   - Assess range of motion  - Assess patient's mobility; develop plan if impaired  - Assess patient's need for assistive devices and provide as appropriate  - Encourage maximum independence but intervene and supervise when necessary  - Involve family in performance of ADLs  - Assess for home care needs following discharge   - Consider OT consult to assist with ADL evaluation and planning for discharge  - Provide patient education as appropriate  Outcome: Progressing  Goal: Maintains/Returns to pre admission functional level  Description: INTERVENTIONS:  - Perform BMAT or MOVE assessment daily    - Set and communicate daily mobility goal to care team and patient/family/caregiver     - Collaborate with rehabilitation services on mobility goals if consulted  - Perform Range of Motion  times a day  - Reposition patient every  hours    - Dangle patient  times a day  - Stand patient  times a day  - Ambulate patient  times a day  - Out of bed to chair  times a day   - Out of bed for meals  times a day  - Out of bed for toileting  - Record patient progress and toleration of activity level   Outcome: Progressing     Problem: CARDIOVASCULAR - ADULT  Goal: Maintains optimal cardiac output and hemodynamic stability  Description: INTERVENTIONS:  - Monitor I/O, vital signs and rhythm  - Monitor for S/S and trends of decreased cardiac output  - Administer and titrate ordered vasoactive medications to optimize hemodynamic stability  - Assess quality of pulses, skin color and temperature  - Assess for signs of decreased coronary artery perfusion  - Instruct patient to report change in severity of symptoms  Outcome: Progressing  Goal: Absence of cardiac dysrhythmias or at baseline rhythm  Description: INTERVENTIONS:  - Continuous cardiac monitoring, vital signs, obtain 12 lead EKG if ordered  - Administer antiarrhythmic and heart rate control medications as ordered  - Monitor electrolytes and administer replacement therapy as ordered  Outcome: Progressing     Problem: RESPIRATORY - ADULT  Goal: Achieves optimal ventilation and oxygenation  Description: INTERVENTIONS:  - Assess for changes in respiratory status  - Assess for changes in mentation and behavior  - Position to facilitate oxygenation and minimize respiratory effort  - Oxygen administered by appropriate delivery if ordered  - Initiate smoking cessation education as indicated  - Encourage broncho-pulmonary hygiene including cough, deep breathe, Incentive Spirometry  - Assess the need for suctioning and aspirate as needed  - Assess and instruct to report SOB or any respiratory difficulty  - Respiratory Therapy support as indicated  Outcome: Progressing     Problem: HEMATOLOGIC - ADULT  Goal: Maintains hematologic stability  Description: INTERVENTIONS  - Assess for signs and symptoms of bleeding or hemorrhage  - Monitor labs  - Administer supportive blood products/factors as ordered and appropriate  Outcome: Progressing     Problem: Nutrition/Hydration-ADULT  Goal: Nutrient/Hydration intake appropriate for improving, restoring or maintaining nutritional needs  Description: Monitor and assess patient's nutrition/hydration status for malnutrition  Collaborate with interdisciplinary team and initiate plan and interventions as ordered  Monitor patient's weight and dietary intake as ordered or per policy  Utilize nutrition screening tool and intervene as necessary  Determine patient's food preferences and provide high-protein, high-caloric foods as appropriate       INTERVENTIONS:  - Monitor oral intake, urinary output, labs, and treatment plans  - Assess nutrition and hydration status and recommend course of action  - Evaluate amount of meals eaten  - Assist patient with eating if necessary   - Allow adequate time for meals  - Recommend/ encourage appropriate diets, oral nutritional supplements, and vitamin/mineral supplements  - Order, calculate, and assess calorie counts as needed  - Recommend, monitor, and adjust tube feedings and TPN/PPN based on assessed needs  - Assess need for intravenous fluids  - Provide specific nutrition/hydration education as appropriate  - Include patient/family/caregiver in decisions related to nutrition  Outcome: Progressing     Problem: Prexisting or High Potential for Compromised Skin Integrity  Goal: Skin integrity is maintained or improved  Description: INTERVENTIONS:  - Identify patients at risk for skin breakdown  - Assess and monitor skin integrity  - Assess and monitor nutrition and hydration status  - Monitor labs   - Assess for incontinence   - Turn and reposition patient  - Assist with mobility/ambulation  - Relieve pressure over bony prominences  - Avoid friction and shearing  - Provide appropriate hygiene as needed including keeping skin clean and dry  - Evaluate need for skin moisturizer/barrier cream  - Collaborate with interdisciplinary team   - Patient/family teaching  - Consider wound care consult   Outcome: Progressing     Problem: METABOLIC, FLUID AND ELECTROLYTES - ADULT  Goal: Glucose maintained within target range  Description: INTERVENTIONS:  - Monitor Blood Glucose as ordered  - Assess for signs and symptoms of hyperglycemia and hypoglycemia  - Administer ordered medications to maintain glucose within target range  - Assess nutritional intake and initiate nutrition service referral as needed  Outcome: Progressing

## 2022-02-04 NOTE — PROGRESS NOTES
Progress Note - Pulmonary   Jessenia Martinezsco 80 y o  male MRN: [id number]  Unit/Bed#: S -01 Encounter: [id number]    Assessment:  Acute hypoxic respiratory failure  COVID pneumonia  CLL    Plan:  Continue COVID pathway patient continues to improve attempt to take him off non-rebreather wean Decadron today continue baricitinib  Will see on Monday    Chief Complaint:   I feel okay    Subjective:   Patient looks much better he denies any shortness of breath he appears less visually dyspneic    Objective:     Vitals: Blood pressure (!) 95/49, pulse 75, temperature 98 2 °F (36 8 °C), temperature source Oral, resp  rate 21, height 5' 10" (1 778 m), weight 64 kg (141 lb), SpO2 93 %  ,Body mass index is 20 23 kg/m²        Intake/Output Summary (Last 24 hours) at 2/4/2022 1303  Last data filed at 2/4/2022 1121  Gross per 24 hour   Intake 120 ml   Output 1400 ml   Net -1280 ml       Invasive Devices  Report    Peripheral Intravenous Line            Long-Dwell Peripheral IV (Midline) 94/11/94 Left Basilic 5 days                Physical Exam: BP (!) 95/49   Pulse 75   Temp 98 2 °F (36 8 °C) (Oral)   Resp 21   Ht 5' 10" (1 778 m)   Wt 64 kg (141 lb)   SpO2 93%   BMI 20 23 kg/m²   General appearance: alert and oriented, in no acute distress  Neck: no adenopathy, no carotid bruit, no JVD, supple, symmetrical, trachea midline and thyroid not enlarged, symmetric, no tenderness/mass/nodules  Lungs: clear to auscultation bilaterally  Heart: regular rate and rhythm, S1, S2 normal, no murmur, click, rub or gallop  Abdomen: soft, non-tender; bowel sounds normal; no masses,  no organomegaly  Extremities: extremities normal, warm and well-perfused; no cyanosis, clubbing, or edema  Lymph nodes: Cervical, supraclavicular, and axillary nodes normal   Neurologic: Grossly normal     Labs:   CBC: No results found for: WBC, HGB, HCT, MCV, PLT, ADJUSTEDWBC, MCH, MCHC, RDW, MPV, NRBC, CMP:   Lab Results   Component Value Date SODIUM 142 02/04/2022    K 5 1 02/04/2022     02/04/2022    CO2 29 02/04/2022    BUN 45 (H) 02/04/2022    CREATININE 0 78 02/04/2022    CALCIUM 8 8 02/04/2022     (H) 02/04/2022     (H) 02/04/2022    ALKPHOS 276 (H) 02/04/2022    EGFR 85 02/04/2022     Imaging and other studies: I have personally reviewed pertinent films in PACS

## 2022-02-05 NOTE — PLAN OF CARE
Problem: RESPIRATORY - ADULT  Goal: Achieves optimal ventilation and oxygenation  Description: INTERVENTIONS:  - Assess for changes in respiratory status  - Assess for changes in mentation and behavior  - Position to facilitate oxygenation and minimize respiratory effort  - Oxygen administered by appropriate delivery if ordered  - Initiate smoking cessation education as indicated  - Encourage broncho-pulmonary hygiene including cough, deep breathe, Incentive Spirometry  - Assess the need for suctioning and aspirate as needed  - Assess and instruct to report SOB or any respiratory difficulty  - Respiratory Therapy support as indicated  Outcome: Not Progressing  Note: Patient continually tries to take off oxygen causing him to desaturate  RN explains that oxygen therapy is what is keeping alive

## 2022-02-05 NOTE — PLAN OF CARE
Problem: Potential for Falls  Goal: Patient will remain free of falls  Description: INTERVENTIONS:  - Educate patient/family on patient safety including physical limitations  - Instruct patient to call for assistance with activity   - Consult OT/PT to assist with strengthening/mobility   - Keep Call bell within reach  - Keep bed low and locked with side rails adjusted as appropriate  - Keep care items and personal belongings within reach  - Initiate and maintain comfort rounds  - Make Fall Risk Sign visible to staff  - Offer Toileting every 2 Hours, in advance of need  - Initiate/Maintain bed/chair alarm  - Obtain necessary fall risk management equipment  - Apply yellow socks and bracelet for high fall risk patients  - Consider moving patient to room near nurses station  Outcome: Progressing     Problem: MOBILITY - ADULT  Goal: Maintain or return to baseline ADL function  Description: INTERVENTIONS:  -  Assess patient's ability to carry out ADLs; assess patient's baseline for ADL function and identify physical deficits which impact ability to perform ADLs (bathing, care of mouth/teeth, toileting, grooming, dressing, etc )  - Assess/evaluate cause of self-care deficits   - Assess range of motion  - Assess patient's mobility; develop plan if impaired  - Assess patient's need for assistive devices and provide as appropriate  - Encourage maximum independence but intervene and supervise when necessary  - Involve family in performance of ADLs  - Assess for home care needs following discharge   - Consider OT consult to assist with ADL evaluation and planning for discharge  - Provide patient education as appropriate  Outcome: Progressing  Goal: Maintains/Returns to pre admission functional level  Description: INTERVENTIONS:  - Perform BMAT or MOVE assessment daily    - Set and communicate daily mobility goal to care team and patient/family/caregiver     - Collaborate with rehabilitation services on mobility goals if consulted  - Perform Range of Motion   - Reposition patient   - Dangle patient  - Stand patient   - Ambulate patient   - Out of bed to chair   - Out of bed for meals  - Out of bed for toileting  - Record patient progress and toleration of activity level   Outcome: Progressing     Problem: CARDIOVASCULAR - ADULT  Goal: Maintains optimal cardiac output and hemodynamic stability  Description: INTERVENTIONS:  - Monitor I/O, vital signs and rhythm  - Monitor for S/S and trends of decreased cardiac output  - Administer and titrate ordered vasoactive medications to optimize hemodynamic stability  - Assess quality of pulses, skin color and temperature  - Assess for signs of decreased coronary artery perfusion  - Instruct patient to report change in severity of symptoms  Outcome: Progressing  Goal: Absence of cardiac dysrhythmias or at baseline rhythm  Description: INTERVENTIONS:  - Continuous cardiac monitoring, vital signs, obtain 12 lead EKG if ordered  - Administer antiarrhythmic and heart rate control medications as ordered  - Monitor electrolytes and administer replacement therapy as ordered  Outcome: Progressing     Problem: HEMATOLOGIC - ADULT  Goal: Maintains hematologic stability  Description: INTERVENTIONS  - Assess for signs and symptoms of bleeding or hemorrhage  - Monitor labs  - Administer supportive blood products/factors as ordered and appropriate  Outcome: Progressing     Problem: Nutrition/Hydration-ADULT  Goal: Nutrient/Hydration intake appropriate for improving, restoring or maintaining nutritional needs  Description: Monitor and assess patient's nutrition/hydration status for malnutrition  Collaborate with interdisciplinary team and initiate plan and interventions as ordered  Monitor patient's weight and dietary intake as ordered or per policy  Utilize nutrition screening tool and intervene as necessary  Determine patient's food preferences and provide high-protein, high-caloric foods as appropriate  INTERVENTIONS:  - Monitor oral intake, urinary output, labs, and treatment plans  - Assess nutrition and hydration status and recommend course of action  - Evaluate amount of meals eaten  - Assist patient with eating if necessary   - Allow adequate time for meals  - Recommend/ encourage appropriate diets, oral nutritional supplements, and vitamin/mineral supplements  - Order, calculate, and assess calorie counts as needed  - Recommend, monitor, and adjust tube feedings and TPN/PPN based on assessed needs  - Assess need for intravenous fluids  - Provide specific nutrition/hydration education as appropriate  - Include patient/family/caregiver in decisions related to nutrition  Outcome: Progressing     Problem: Prexisting or High Potential for Compromised Skin Integrity  Goal: Skin integrity is maintained or improved  Description: INTERVENTIONS:  - Identify patients at risk for skin breakdown  - Assess and monitor skin integrity  - Assess and monitor nutrition and hydration status  - Monitor labs   - Assess for incontinence   - Turn and reposition patient  - Assist with mobility/ambulation  - Relieve pressure over bony prominences  - Avoid friction and shearing  - Provide appropriate hygiene as needed including keeping skin clean and dry  - Evaluate need for skin moisturizer/barrier cream  - Collaborate with interdisciplinary team   - Patient/family teaching  - Consider wound care consult   Outcome: Progressing     Problem: METABOLIC, FLUID AND ELECTROLYTES - ADULT  Goal: Glucose maintained within target range  Description: INTERVENTIONS:  - Monitor Blood Glucose as ordered  - Assess for signs and symptoms of hyperglycemia and hypoglycemia  - Administer ordered medications to maintain glucose within target range  - Assess nutritional intake and initiate nutrition service referral as needed  Outcome: Progressing

## 2022-02-05 NOTE — PROGRESS NOTES
[facility]  Progress Note Colletta Minor [place] 1940, 80 y o  male MRN: [id number]  Unit/Bed#: S -01 Encounter: [id number]  Primary Care Provider: Katie Villalobos MD   Date and time admitted to hospital: 1/26/2022  8:43 AM    * Acute hypoxemic respiratory failure due to COVID-19 [facility])  Assessment & Plan  · POA, tested positive for COVID on 1/14/22  Admitted 01/19 -1/24/22, and DC home on 5L O2   · CTA chest (1/26): few tiny subsegmental filling defects in all lobes of the right lung and in the left lower lobe  1 3 x 0 8 cm juxtapleural nodule in the EDUARDO  It is associated with traction bronchiolectasis and is likely a scar but consider follow-up with a chest CT with no contrast in 6 months  Severe bilateral groundglass opacity due to Covid-19  · CXR (1/29): Worsening diffuse ground glass opacities throughout the lung parenchyma consistent with COVID-19 pneumonia  · Using 15 L MFNC + NRB : having trouble maintaining good wave forms on the pulse oximeter  · While anxiety is a factor, xanax made the patient feel more anxious  Plan:  - Covid: severe pathway   - Continue IV Decadron 6mg   - Baricitinib day 10/14 (did receive during prior admission)   - continue with Eliquis  - patient confirmed level 1 code status  - continue respiratory protocol, supplemental O2 and titrate to maintain sats > 82%  - pulmonology on board  - OOB TID, Self-proning if able, Incentive spirometry  - PT recs: post acute rehab    Acute pulmonary embolism without acute cor pulmonale (HCC)  Assessment & Plan  · POA, in the setting of recent COVID infection  · No evidence of right heart strain on CT  · Echo (1/27): LVEF 48%, grade 1 diastolic dysfunction, mild AR and MR     Plan:  - continue eliquis    Fall at home  Assessment & Plan  · Presented as a trauma level C post fall home   Patient and wife report he had taken off supplemental O2 when going to the bathroom  · Lightheadedness after voiding and fell, wife reports mild headstrike as she caught his fall  Head and left upper extremity sustaining skin tears with head abrasion  · Was evaluated and cleared by Trauma in the ED    Plan:  - PT/OT evaluation: post acute rehab   - Case management working on placement  - feels anxious to ambulate due to hypoxia currently    Transaminitis  Assessment & Plan  · Likely in the setting of acute viral infection  Also has history of hepatitis C infection  · Reported with chronic transaminitis at baseline  · Recent outpatient ultrasound on 1/7/22 was unremarkable  · Trending upwards    but is on baricitinib  Lab Results   Component Value Date     (H) 02/05/2022     (H) 02/05/2022    ALKPHOS 237 (H) 02/05/2022    BILITOT 0 7 08/14/2017     Plan:  - Continue to monitor, AST and ALT--   - if rising further, can consider holding baricitinib, but 4 more doses    Type 2 diabetes mellitus with hyperglycemia, with long-term current use of insulin [facility])  Assessment & Plan  Lab Results   Component Value Date    HGBA1C 10 1 (H) 01/26/2022     Recent Labs     02/04/22  1458 02/04/22  2106 02/05/22  0628 02/05/22  0821   POCGLU 323* 358* 69 177*     Blood Sugar Average: Last 72 hrs:  · (P) [id number] [id number],   · hemoglobin A1c 10 1 this admission     Plan:  - Continue with 12 units Lantus and 5 with each meal   - SSI coverage  - Continue to adjust insulin as indicated while on steroids      VTE Pharmacologic Prophylaxis: VTE Score: 8 High Risk (Score >/= 5) - Pharmacological DVT Prophylaxis Ordered: apixaban (Eliquis)  Sequential Compression Devices Ordered  Patient Centered Rounds: I performed bedside rounds with nursing staff today  Discussions with Specialists or Other Care Team Provider: medical team, case management    Education and Discussions with Family / Patient: Will call daughter this afternoon  Time Spent for Care: 30 minutes   More than 50% of total time spent on counseling and coordination of care as described above  Current Length of Stay: 10 day(s)  Current Patient Status: Inpatient   Certification Statement: The patient will continue to require additional inpatient hospital stay due to oxygen requirements  Discharge Plan: TBD    Code Status: Level 1 - Full Code    Subjective:   Patient in bed resting more comfortably today than prior exams  He is wearing nasal cannula and nonrebreather  His saturations are initially at 100% with good waveforms, but then drop to 87% at times  He is using the incentive spirometer often  Objective:   Vitals:   Temp (24hrs), Av 1 °F (36 7 °C), Min:97 7 °F (36 5 °C), Max:98 3 °F (36 8 °C)    Temp:  [97 7 °F (36 5 °C)-98 3 °F (36 8 °C)] 98 2 °F (36 8 °C)  HR:  [79-83] 83  Resp:  [19-22] 19  BP: (124-131)/(65-74) 124/74  SpO2:  [90 %-100 %] 90 %  Body mass index is 20 23 kg/m²  Input and Output Summary (last 24 hours): Intake/Output Summary (Last 24 hours) at 2022 0916  Last data filed at 2022 0825  Gross per 24 hour   Intake 480 ml   Output 1300 ml   Net -820 ml       Physical Exam:   Physical Exam  Vitals and nursing note reviewed  Constitutional:       General: He is not in acute distress  HENT:      Head: Normocephalic and atraumatic  Mouth/Throat:      Mouth: Mucous membranes are moist       Pharynx: Oropharynx is clear  Eyes:      Extraocular Movements: Extraocular movements intact  Conjunctiva/sclera: Conjunctivae normal       Pupils: Pupils are equal, round, and reactive to light  Cardiovascular:      Rate and Rhythm: Normal rate and regular rhythm  Pulses: Normal pulses  Heart sounds: Normal heart sounds  No murmur heard  Pulmonary:      Effort: Pulmonary effort is normal  No respiratory distress  Breath sounds: Wheezing (slight) present  No rales  Abdominal:      General: Abdomen is flat  Bowel sounds are normal  There is no distension  Palpations: Abdomen is soft  Tenderness:  There is no abdominal tenderness  Musculoskeletal:         General: No swelling or tenderness  Cervical back: Neck supple  Skin:     General: Skin is warm and dry  Neurological:      Mental Status: He is alert and oriented to person, place, and time  Additional Data:     Labs:  Results from last 7 days   Lab Units 02/01/22  0821 01/31/22  0559 01/31/22  0559   WBC Thousand/uL 28 95*   < > 26 13*   HEMOGLOBIN g/dL 14 2   < > 12 7   HEMATOCRIT % 43 3   < > 39 1   PLATELETS Thousands/uL 256   < > 267   NEUTROS PCT %  --   --  89*   LYMPHS PCT %  --   --  4*   MONOS PCT %  --   --  5   EOS PCT %  --   --  0    < > = values in this interval not displayed       Results from last 7 days   Lab Units 02/05/22  0645   SODIUM mmol/L 141   POTASSIUM mmol/L 4 6   CHLORIDE mmol/L 105   CO2 mmol/L 30   BUN mg/dL 49*   CREATININE mg/dL 0 71   ANION GAP mmol/L 6   CALCIUM mg/dL 8 7   ALBUMIN g/dL 2 6*   TOTAL BILIRUBIN mg/dL 0 92   ALK PHOS U/L 237*   ALT U/L 562*   AST U/L 191*   GLUCOSE RANDOM mg/dL 70         Results from last 7 days   Lab Units 02/05/22  0821 02/05/22  0628 02/04/22  2106 02/04/22  1458 02/04/22  1121 02/03/22  2148 02/03/22  1557 02/03/22  1456 02/03/22  1122 02/03/22  0712 02/02/22  2121 02/02/22  1539   POC GLUCOSE mg/dl 177* 69 358* 323* 299* 329* 298* 272* >500* 230* 371* 355*         Results from last 7 days   Lab Units 01/31/22  0530 01/30/22  0603   PROCALCITONIN ng/ml 0 08 0 09       Lines/Drains:  Invasive Devices  Report    Peripheral Intravenous Line            Long-Dwell Peripheral IV (Midline) 28/62/28 Left Basilic 5 days              Imaging: Reviewed radiology reports from this admission including: chest xray    Recent Cultures (last 7 days):         Last 24 Hours Medication List:   Current Facility-Administered Medications   Medication Dose Route Frequency Provider Last Rate    acetaminophen  650 mg Oral Q6H PRN Yosvany Dockeryer,       albuterol  2 puff Inhalation Q4H PRN Diane Veleta Laredo, MD      ALPRAZolam  0 25 mg Oral HS PRN Rinku Hitchcock MD      apixaban  10 mg Oral BID Ac Mendieta DO      Followed by   Dirk Ritchie ON 2/9/2022] apixaban  5 mg Oral BID Ac Mendieta DO      aspirin  81 mg Oral Daily Diane Sanchez MD      Baricitinib  4 mg Oral Q24H Diane Sanchez MD      benzonatate  200 mg Oral TID PRN Augustus Donnelly DO      calcium carbonate  500 mg Oral Daily PRN Ac Mendieta DO      dexamethasone  6 mg Intravenous Daily Ac Mendieta DO      ezetimibe  10 mg Oral Daily Diane Sanchez MD      insulin glargine  12 Units Subcutaneous HS Augustus Donnelly DO      insulin lispro  1-5 Units Subcutaneous TID AC Diane Sanchez MD      insulin lispro  1-5 Units Subcutaneous HS Diane Sanchez MD      insulin lispro  5 Units Subcutaneous TID With Meals Anna Morocho MD      losartan  25 mg Oral Daily Anna Morocho MD      melatonin  3 mg Oral HS Rinku Hitchcock MD      pramipexole  1 5 mg Oral Daily Diane Sanchez MD      sodium chloride  1 spray Each Nare Q1H PRN Emani Alegria MD          Today, Patient Was Seen By: Ac Mendieta DO    **Please Note: This note may have been constructed using a voice recognition system  **

## 2022-02-06 NOTE — PLAN OF CARE
Problem: Potential for Falls  Goal: Patient will remain free of falls  Description: INTERVENTIONS:  - Educate patient/family on patient safety including physical limitations  - Instruct patient to call for assistance with activity   - Consult OT/PT to assist with strengthening/mobility   - Keep Call bell within reach  - Keep bed low and locked with side rails adjusted as appropriate  - Keep care items and personal belongings within reach  - Initiate and maintain comfort rounds  - Make Fall Risk Sign visible to staff  - Offer Toileting every 2 Hours, in advance of need  - Initiate/Maintain bed alarm  - Apply yellow socks and bracelet for high fall risk patients  - Consider moving patient to room near nurses station  Outcome: Progressing     Problem: MOBILITY - ADULT  Goal: Maintain or return to baseline ADL function  Description: INTERVENTIONS:  -  Assess patient's ability to carry out ADLs; assess patient's baseline for ADL function and identify physical deficits which impact ability to perform ADLs (bathing, care of mouth/teeth, toileting, grooming, dressing, etc )  - Assess/evaluate cause of self-care deficits   - Assess range of motion  - Assess patient's mobility; develop plan if impaired  - Assess patient's need for assistive devices and provide as appropriate  - Encourage maximum independence but intervene and supervise when necessary  - Involve family in performance of ADLs  - Assess for home care needs following discharge   - Consider OT consult to assist with ADL evaluation and planning for discharge  - Provide patient education as appropriate  Outcome: Progressing  Goal: Maintains/Returns to pre admission functional level  Description: INTERVENTIONS:  - Perform BMAT or MOVE assessment daily    - Set and communicate daily mobility goal to care team and patient/family/caregiver  - Collaborate with rehabilitation services on mobility goals if consulted  - Perform Range of Motion 3 times a day    - Reposition patient every 2 hours    - Dangle patient 3 times a day  - Stand patient 3 times a day  - Ambulate patient 3 times a day  - Out of bed to chair 3 times a day   - Out of bed for meals 3 times a day  - Out of bed for toileting  - Record patient progress and toleration of activity level   Outcome: Progressing     Problem: CARDIOVASCULAR - ADULT  Goal: Maintains optimal cardiac output and hemodynamic stability  Description: INTERVENTIONS:  - Monitor I/O, vital signs and rhythm  - Monitor for S/S and trends of decreased cardiac output  - Administer and titrate ordered vasoactive medications to optimize hemodynamic stability  - Assess quality of pulses, skin color and temperature  - Assess for signs of decreased coronary artery perfusion  - Instruct patient to report change in severity of symptoms  Outcome: Progressing  Goal: Absence of cardiac dysrhythmias or at baseline rhythm  Description: INTERVENTIONS:  - Continuous cardiac monitoring, vital signs, obtain 12 lead EKG if ordered  - Administer antiarrhythmic and heart rate control medications as ordered  - Monitor electrolytes and administer replacement therapy as ordered  Outcome: Progressing     Problem: RESPIRATORY - ADULT  Goal: Achieves optimal ventilation and oxygenation  Description: INTERVENTIONS:  - Assess for changes in respiratory status  - Assess for changes in mentation and behavior  - Position to facilitate oxygenation and minimize respiratory effort  - Oxygen administered by appropriate delivery if ordered  - Initiate smoking cessation education as indicated  - Encourage broncho-pulmonary hygiene including cough, deep breathe, Incentive Spirometry  - Assess the need for suctioning and aspirate as needed  - Assess and instruct to report SOB or any respiratory difficulty  - Respiratory Therapy support as indicated  Outcome: Progressing     Problem: HEMATOLOGIC - ADULT  Goal: Maintains hematologic stability  Description: INTERVENTIONS  - Assess for signs and symptoms of bleeding or hemorrhage  - Monitor labs  - Administer supportive blood products/factors as ordered and appropriate  Outcome: Progressing     Problem: METABOLIC, FLUID AND ELECTROLYTES - ADULT  Goal: Glucose maintained within target range  Description: INTERVENTIONS:  - Monitor Blood Glucose as ordered  - Assess for signs and symptoms of hyperglycemia and hypoglycemia  - Administer ordered medications to maintain glucose within target range  - Assess nutritional intake and initiate nutrition service referral as needed  Outcome: Progressing     Problem: Nutrition/Hydration-ADULT  Goal: Nutrient/Hydration intake appropriate for improving, restoring or maintaining nutritional needs  Description: Monitor and assess patient's nutrition/hydration status for malnutrition  Collaborate with interdisciplinary team and initiate plan and interventions as ordered  Monitor patient's weight and dietary intake as ordered or per policy  Utilize nutrition screening tool and intervene as necessary  Determine patient's food preferences and provide high-protein, high-caloric foods as appropriate       INTERVENTIONS:  - Monitor oral intake, urinary output, labs, and treatment plans  - Assess nutrition and hydration status and recommend course of action  - Evaluate amount of meals eaten  - Assist patient with eating if necessary   - Allow adequate time for meals  - Recommend/ encourage appropriate diets, oral nutritional supplements, and vitamin/mineral supplements  - Order, calculate, and assess calorie counts as needed  - Recommend, monitor, and adjust tube feedings and TPN/PPN based on assessed needs  - Assess need for intravenous fluids  - Provide specific nutrition/hydration education as appropriate  - Include patient/family/caregiver in decisions related to nutrition  Outcome: Progressing     Problem: Prexisting or High Potential for Compromised Skin Integrity  Goal: Skin integrity is maintained or improved  Description: INTERVENTIONS:  - Identify patients at risk for skin breakdown  - Assess and monitor skin integrity  - Assess and monitor nutrition and hydration status  - Monitor labs   - Assess for incontinence   - Turn and reposition patient  - Assist with mobility/ambulation  - Relieve pressure over bony prominences  - Avoid friction and shearing  - Provide appropriate hygiene as needed including keeping skin clean and dry  - Evaluate need for skin moisturizer/barrier cream  - Collaborate with interdisciplinary team   - Patient/family teaching  - Consider wound care consult   Outcome: Progressing

## 2022-02-06 NOTE — PROGRESS NOTES
[facility]  Progress Note Alyssa Martinezsco 1940, 80 y o  male MRN: [id number]  Unit/Bed#: S -01 Encounter: [id number]  Primary Care Provider: Matias Ramirez MD   Date and time admitted to hospital: 1/26/2022  8:43 AM    * Acute hypoxemic respiratory failure due to COVID-19 [facility])  Assessment & Plan  · POA, tested positive for COVID on 1/14/22  Admitted 01/19 -1/24/22, and DC home on 5L O2   · CTA chest (1/26): few tiny subsegmental filling defects in all lobes of the right lung and in the left lower lobe  1 3 x 0 8 cm juxtapleural nodule in the EDUARDO  It is associated with traction bronchiolectasis and is likely a scar but consider follow-up with a chest CT with no contrast in 6 months  Severe bilateral groundglass opacity due to Covid-19  · CXR (1/29): Worsening diffuse ground glass opacities throughout the lung parenchyma consistent with COVID-19 pneumonia  · Using 15 L MFNC + NRB , was desatting while eating  · While anxiety is a factor, xanax made the patient feel more anxious  Plan:  - Covid: severe pathway   - Continue IV Decadron 6mg   - Baricitinib day 11/14 (did receive during prior admission)   - continue with Eliquis  - patient confirmed level 1 code status  - continue respiratory protocol, supplemental O2 and titrate to maintain sats > 82%  - pulmonology on board  - OOB TID, Self-proning if able, Incentive spirometry  -upon discussion with pulmonology, if patient is able to wean off 12 L mid flow, can be switched to Oxymizer at 10 L, since patient is mouth breather, can consider switching to mask such as Venturi mask to try weaning the patient off    This was discussed with Respiratory therapy by my attending  -patient was COVID positive on 01/14/2022, has been on airborne precaution for more than 21 days, will likely be able to discontinue, will discuss with Infectious team if okay to be removed from isolation tomorrow  - PT recs: post acute rehab    Acute pulmonary embolism without acute cor pulmonale (HCC)  Assessment & Plan  · POA, in the setting of recent COVID infection  · No evidence of right heart strain on CT  · Echo (1/27): LVEF 06%, grade 1 diastolic dysfunction, mild AR and MR     Plan:  - continue eliquis    Fall at home  [address] [facility]  · Presented as a trauma level C post fall home  Patient and wife report he had taken off supplemental O2 when going to the bathroom  · Lightheadedness after voiding and fell, wife reports mild headstrike as she caught his fall  Head and left upper extremity sustaining skin tears with head abrasion  · Was evaluated and cleared by Trauma in the ED    Plan:  - PT/OT evaluation: post acute rehab   - Case management working on placement  - feels anxious to ambulate due to hypoxia currently    Transaminitis  Assessment & Plan  · Likely in the setting of acute viral infection  Also has history of hepatitis C infection  · Reported with chronic transaminitis at baseline  · Recent outpatient ultrasound on 1/7/22 was unremarkable  · Trending upwards    but is on baricitinib  Lab Results   Component Value Date     (H) 02/05/2022     (H) 02/05/2022    ALKPHOS 237 (H) 02/05/2022    BILITOT 0 7 08/14/2017     Plan:  - Continue to monitor, AST and ALT, CMP and CRP  - if rising further, can consider holding baricitinib, but 3 more doses    Type 2 diabetes mellitus with hyperglycemia, with long-term current use of insulin [facility])  Assessment & Plan  Lab Results   Component Value Date    HGBA1C 10 1 (H) 01/26/2022     Recent Labs     02/04/22  1458 02/04/22  2106 02/05/22  0628 02/05/22  0821   POCGLU 323* 358* 69 177*     Blood Sugar Average: Last 72 hrs:  · (P) 033 [id number],   · hemoglobin A1c 10 1 this admission   · Patient had the hypoglycemic event this morning    Plan:  - will decrease  Lantus from 10-5 units and decrease lispro from 5 to 3 units with each meal   - SSI coverage  - Continue to adjust insulin as indicated while on steroids      VTE Pharmacologic Prophylaxis: VTE Score: 8 High Risk (Score >/= 5) - Pharmacological DVT Prophylaxis Ordered: apixaban (Eliquis)  Sequential Compression Devices Ordered  Patient Centered Rounds: I performed bedside rounds with nursing staff today  Discussions with Specialists or Other Care Team Provider:  Discussed plan with pulmonology and my attending    Education and Discussions with Family / Patient: Updated  (daughter) via phone  Time Spent for Care: 30 minutes  More than 50% of total time spent on counseling and coordination of care as described above  Current Length of Stay: 11 day(s)  Current Patient Status: Inpatient   Certification Statement: The patient will continue to require additional inpatient hospital stay due to oxygen requirements  Discharge Plan: TBD    Code Status: Level 1 - Full Code    Subjective:   Patient reported no changes with his breathing, denies any chest pain, he reported that he had low blood glucose this morning, however asymptomatic right now, patient desats easy while eating and has to put the 100% non-rebreather when taking a break  Objective:   Vitals:   Temp (24hrs), Av 6 °F (36 4 °C), Min:97 5 °F (36 4 °C), Max:97 7 °F (36 5 °C)    Temp:  [97 5 °F (36 4 °C)-97 7 °F (36 5 °C)] 97 7 °F (36 5 °C)  HR:  [67-83] 83  Resp:  [18-19] 18  BP: (101-128)/(56-67) 122/66  SpO2:  [90 %-99 %] 93 %  Body mass index is 20 23 kg/m²  Input and Output Summary (last 24 hours): Intake/Output Summary (Last 24 hours) at 2022 1641  Last data filed at 2022 1546  Gross per 24 hour   Intake 710 ml   Output 750 ml   Net -40 ml       Physical Exam:   Physical Exam  Vitals and nursing note reviewed  Constitutional:       General: He is not in acute distress  Appearance: He is ill-appearing  HENT:      Head: Normocephalic and atraumatic        Mouth/Throat:      Mouth: Mucous membranes are moist       Pharynx: Oropharynx is clear  Eyes:      Extraocular Movements: Extraocular movements intact  Conjunctiva/sclera: Conjunctivae normal       Pupils: Pupils are equal, round, and reactive to light  Cardiovascular:      Rate and Rhythm: Normal rate and regular rhythm  Pulses: Normal pulses  Heart sounds: Normal heart sounds  No murmur heard  Pulmonary:      Effort: Pulmonary effort is normal  No respiratory distress  Breath sounds: No wheezing or rales  Comments: Clear upper and diminished in the base  Abdominal:      General: Abdomen is flat  Bowel sounds are normal  There is no distension  Palpations: Abdomen is soft  Tenderness: There is no abdominal tenderness  Musculoskeletal:         General: No swelling or tenderness  Normal range of motion  Cervical back: Neck supple  Skin:     General: Skin is warm and dry  Neurological:      General: No focal deficit present  Mental Status: He is alert and oriented to person, place, and time  Mental status is at baseline  Additional Data:     Labs:  Results from last 7 days   Lab Units 02/01/22  0821 01/31/22  0559 01/31/22  0559   WBC Thousand/uL 28 95*   < > 26 13*   HEMOGLOBIN g/dL 14 2   < > 12 7   HEMATOCRIT % 43 3   < > 39 1   PLATELETS Thousands/uL 256   < > 267   NEUTROS PCT %  --   --  89*   LYMPHS PCT %  --   --  4*   MONOS PCT %  --   --  5   EOS PCT %  --   --  0    < > = values in this interval not displayed       Results from last 7 days   Lab Units 02/05/22  0645   SODIUM mmol/L 141   POTASSIUM mmol/L 4 6   CHLORIDE mmol/L 105   CO2 mmol/L 30   BUN mg/dL 49*   CREATININE mg/dL 0 71   ANION GAP mmol/L 6   CALCIUM mg/dL 8 7   ALBUMIN g/dL 2 6*   TOTAL BILIRUBIN mg/dL 0 92   ALK PHOS U/L 237*   ALT U/L 562*   AST U/L 191*   GLUCOSE RANDOM mg/dL 70         Results from last 7 days   Lab Units 02/06/22  1547 02/06/22  1025 02/06/22  0807 02/05/22  2111 02/05/22  1603 02/05/22  1042 02/05/22  4786 02/05/22  4721 02/04/22  2106 02/04/22  1458 02/04/22  1121 02/03/22  2148   POC GLUCOSE mg/dl 430* 201* 64* 229* 201* 212* 177* 69 358* 323* 299* 329*         Results from last 7 days   Lab Units 01/31/22  0530   PROCALCITONIN ng/ml 0 08       Lines/Drains:  Invasive Devices  Report    Peripheral Intravenous Line            Long-Dwell Peripheral IV (Midline) 69/88/90 Left Basilic 7 days              Imaging: Reviewed radiology reports from this admission including: chest xray    Recent Cultures (last 7 days):         Last 24 Hours Medication List:   Current Facility-Administered Medications   Medication Dose Route Frequency Provider Last Rate    acetaminophen  650 mg Oral Q6H PRN Annabella Idler, DO      albuterol  2 puff Inhalation Q4H PRN Alec Sheriff MD      ALPRAZolam  0 25 mg Oral HS PRN Efe Awad MD      apixaban  10 mg Oral BID Kath Moseley, DO      Followed by   Walter Castellon ON 2/9/2022] apixaban  5 mg Oral BID Pamflaca Moseley, DO      aspirin  81 mg Oral Daily Diane Barraza MD      Baricitinib  4 mg Oral Q24H Diane Barraza MD      benzonatate  200 mg Oral TID PRN Annabella Idler, DO      calcium carbonate  500 mg Oral Daily PRN Pamflaca Gory, DO      dexamethasone  6 mg Intravenous Daily Ora Balasundram, DO      dextrose           ezetimibe  10 mg Oral Daily Diane Barraza MD      insulin glargine  5 Units Subcutaneous HS Faye Salguero MD      insulin lispro  1-5 Units Subcutaneous TID AC Diane Barraza MD      insulin lispro  1-5 Units Subcutaneous HS Diane Barraza MD      insulin lispro  3 Units Subcutaneous TID With Meals Faye Salguero MD      losartan  25 mg Oral Daily Faye Salguero MD      melatonin  3 mg Oral HS Efe Awad MD      pramipexole  1 5 mg Oral Daily Diane Barraza MD      sodium chloride  1 spray Each Nare Q1H PRN Sundar Martinez MD          Today, Patient Was Seen By: Yessica Calle MD    **Please Note: This note may have been constructed using a voice recognition system  **

## 2022-02-07 NOTE — ASSESSMENT & PLAN NOTE
· Presented as a trauma level C post fall home  Patient and wife report he had taken off supplemental O2 when going to the bathroom  · Lightheadedness after voiding and fell, wife reports mild headstrike as she caught his fall     · PT/OT had evaluated but respiratory status continued to decline

## 2022-02-07 NOTE — CASE MANAGEMENT
Case Management Progress Note    Patient name Saurabh Tim  Location S /S -01 MRN [id number]  : 1940 Date 2022       LOS (days): 12  Geometric Mean LOS (GMLOS) (days): 4 10  Days to GMLOS:-8        OBJECTIVE:        Current admission status: Inpatient  Preferred Pharmacy:   [facility]pharmacy [address] [place] [address]  [address] [place] [address]  Phone: [phone] Fax: [phone]    Primary Care Provider: Joe Muñoz MD    Primary Insurance: MEDICARE  Secondary Insurance: [facility] HEALTH OPTIONS PROGRAM    PROGRESS NOTE:    CM discussed with SLIM, pt not yet medically stable for DC  Pt remains on 15L with NRB   [facility] and will assess bed availability when stable for DC

## 2022-02-07 NOTE — PROGRESS NOTES
Progress Note - Pulmonary   Anitha LOPEZ [place] 80 y o  male MRN: [id number]  Unit/Bed#: S -01 Encounter: [id number]    Assessment/Plan:    Acute hypoxic respiratory failure due to abnormal chest imaging consistent with COVID-19 pneumonia              Titrate oxygen to maintain saturations greater than or equal to 89%     Continue COVID-19 pathway as below  Positive Test:  January 14, 2022  · Dexamethasone:  Continue 6 milligrams daily for now  · Remdesivir:  Completed five day course  · Antibiotics:  Completed five day course  · IL-6 therapy:  Complete course of Baricitinib  · Anticoagulation/DVT prophylaxis:  Per protocol  Was transition from heparin drip to Eliquis; however, given protocol, discussed with primary team to return to Lovenox versus heparin  Will ultimately defer to their team   · Monitor CRP  · Activity, self-proning, and incentive spirometry as able  · Encouraged upright positioning with all meals with patient and nursing      CLL              Management per primary team      Discussed with nursing and primary team     Chief Complaint:    I am okay I guess "    Subjective:    Anitha Jin is resting on his side laying flat while attempting to eat breakfast   He reports he is not sure how his breathing is, but has had no change in cough and has no pain  He remains on 15 liters mid flow nasal cannula with 100% non-rebreather  He was repositioned to upright positioning to finish his breakfast and desaturated into the 70s, but recuperated oxygen levels quickly  Objective:    Vitals: Blood pressure 108/70, pulse 70, temperature 97 6 °F (36 4 °C), temperature source Axillary, resp  rate 18, height 5' 10" (1 778 m), weight 64 kg (141 lb), SpO2 95 %  15 liter mid flow nasal cannula with 100% non-rebreather,Body mass index is 20 23 kg/m²        Intake/Output Summary (Last 24 hours) at 2/7/2022 1152  Last data filed at 2/7/2022 1100  Gross per 24 hour   Intake 760 ml   Output 400 ml   Net 360 ml       Invasive Devices  Report    Peripheral Intravenous Line            Long-Dwell Peripheral IV (Midline) 17/04/40 Left Basilic 8 days                Physical Exam:     Physical Exam  Vitals reviewed  Constitutional:       General: He is not in acute distress  Appearance: He is well-developed  He is not toxic-appearing or diaphoretic  Interventions: Nasal cannula and face mask in place  HENT:      Head: Normocephalic and atraumatic  Eyes:      General: No scleral icterus  Neck:      Trachea: No tracheal deviation  Cardiovascular:      Rate and Rhythm: Normal rate and regular rhythm  Heart sounds: S1 normal and S2 normal  No murmur heard  No friction rub  No gallop  Pulmonary:      Effort: Pulmonary effort is normal  No tachypnea, accessory muscle usage or respiratory distress  Breath sounds: Normal breath sounds  No stridor  No decreased breath sounds, wheezing, rhonchi or rales  Chest:      Chest wall: No tenderness  Abdominal:      General: Bowel sounds are normal  There is no distension  Palpations: Abdomen is soft  Tenderness: There is no abdominal tenderness  Musculoskeletal:         General: No tenderness  Cervical back: Neck supple  Skin:     General: Skin is warm and dry  Findings: No rash  Neurological:      Mental Status: He is alert and oriented to person, place, and time  GCS: GCS eye subscore is 4  GCS verbal subscore is 5  GCS motor subscore is 6  Psychiatric:         Speech: Speech normal          Behavior: Behavior normal  Behavior is cooperative           Labs:   CMP:   Lab Results   Component Value Date    SODIUM 138 02/07/2022    K 4 8 02/07/2022     02/07/2022    CO2 30 02/07/2022    BUN 42 (H) 02/07/2022    CREATININE 0 75 02/07/2022    CALCIUM 9 1 02/07/2022     (H) 02/07/2022     (H) 02/07/2022    ALKPHOS 215 (H) 02/07/2022    EGFR 87 02/07/2022     CRP 12 3    Imaging and other studies: None new

## 2022-02-07 NOTE — ASSESSMENT & PLAN NOTE
· POA, tested positive for COVID on 1/14/22  Admitted 01/19 -1/24/22, and DC home on 5L O2   · CTA chest (1/26): few tiny subsegmental filling defects in all lobes of the right lung and in the left lower lobe  1 3 x 0 8 cm juxtapleural nodule in the EDUARDO  It is associated with traction onchiolectasis and is likely a scar but consider follow-up with a chest CT with no contrast in 6 months  Severe bilateral groundglass opacity due to Covid-19  · CXR (1/29): Worsening diffuse ground glass opacities throughout the lung parenchyma consistent with COVID-19 pneumonia  · Due to increasing O2 needs, pt was transitioned to HFNC and transferred to ICU  · Continued to have maximum O2 needs without improvement  · Discussions held with family re: poor prognosis, decision made to transition to comfort care     · Continue fentanyl gtt + ativan prn

## 2022-02-07 NOTE — ASSESSMENT & PLAN NOTE
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

## 2022-02-07 NOTE — ASSESSMENT & PLAN NOTE
· Likely in the setting of acute viral infection  Also has history of hepatitis C infection  · Reported with chronic transaminitis at baseline  · Recent outpatient ultrasound on 1/7/22 was unremarkable  · Trending upwards    but is on baricitinib  Lab Results   Component Value Date     (H) 02/10/2022     (H) 02/10/2022    ALKPHOS 148 (H) 02/10/2022    BILITOT 0 7 08/14/2017     Noted

## 2022-02-07 NOTE — ASSESSMENT & PLAN NOTE
· POA, in the setting of recent COVID infection  · No evidence of right heart strain on CT  · Echo (1/27): LVEF 86%, grade 1 diastolic dysfunction, mild AR and MR     Plan:  - hold eliquis, continue parenteral lovenox (weight based)

## 2022-02-07 NOTE — PROGRESS NOTES
[facility]  Progress Note Rodney Height Glenroy 1940, 80 y o  male MRN: [id number]  Unit/Bed#: S -01 Encounter: [id number]  Primary Care Provider: Brooke Phelps MD   Date and time admitted to hospital: 1/26/2022  8:43 AM    * Acute hypoxemic respiratory failure due to COVID-19 [facility])  Assessment & Plan  · POA, tested positive for COVID on 1/14/22  Admitted 01/19 -1/24/22, and DC home on 5L O2   · CTA chest (1/26): few tiny subsegmental filling defects in all lobes of the right lung and in the left lower lobe  1 3 x 0 8 cm juxtapleural nodule in the EDUARDO  It is associated with traction bronchiolectasis and is likely a scar but consider follow-up with a chest CT with no contrast in 6 months  Severe bilateral groundglass opacity due to Covid-19  · CXR (1/29): Worsening diffuse ground glass opacities throughout the lung parenchyma consistent with COVID-19 pneumonia  · Using 15 L [address] + NRB , keeps desatting while eating  · While anxiety is a factor, xanax made the patient feel more anxious  · Tried to wean down with Venturi mask, will try again today if able, but patient keeps desaturating while eating    Plan:  - Covid: severe pathway   - Continue IV Decadron 6mg   - Baricitinib day 12/14 -discontinue  - Switch Eliquis to lovenox weight based  - patient confirmed level 1 code status  - continue respiratory protocol, supplemental O2 and titrate to maintain sats > 82%  - pulmonology on board  - OOB TID, Self-proning if able, Incentive spirometry  -upon discussion with pulmonology, if patient is able to wean off 12 L mid flow, can be switched to Oxymizer at 10 L, since patient is mouth breather, tried/trying Venturi mask to try weaning the patient off    This was discussed with Respiratory therapy by my attending  - PT recs: post acute rehab    Acute pulmonary embolism without acute cor pulmonale (HCC)  Assessment & Plan  · POA, in the setting of recent COVID infection  · No evidence of right heart strain on CT  · Echo (1/27): LVEF 81%, grade 1 diastolic dysfunction, mild AR and MR     Plan:  - hold eliquis, switch back to parenteral lovenox (weight based)    Fall at home  [facility]  · Presented as a trauma level C post fall home  Patient and wife report he had taken off supplemental O2 when going to the bathroom  · Lightheadedness after voiding and fell, wife reports mild headstrike as she caught his fall  Head and left upper extremity sustaining skin tears with head abrasion  · Was evaluated and cleared by Trauma in the ED    Plan:  - PT/OT evaluation: post acute rehab   - Case management working on placement  - feels anxious to ambulate due to hypoxia currently    Transaminitis  Assessment & Plan  · Likely in the setting of acute viral infection  Also has history of hepatitis C infection  · Reported with chronic transaminitis at baseline  · Recent outpatient ultrasound on 1/7/22 was unremarkable  · Trending upwards    but is on baricitinib  Lab Results   Component Value Date     (H) 02/07/2022     (H) 02/07/2022    ALKPHOS 215 (H) 02/07/2022    BILITOT 0 7 08/14/2017     Plan:  - Continue to monitor, AST and ALT, CMP and CRP  - stop baricitinib    Type 2 diabetes mellitus with hyperglycemia, with long-term current use of insulin [facility])  Assessment & Plan  Lab Results   Component Value Date    HGBA1C 10 1 (H) 01/26/2022     Recent Labs     02/06/22  1547 02/06/22  2109 02/07/22  0747 02/07/22  1116   POCGLU 430* 174* 63* 268*     Blood Sugar Average: Last 72 hrs:  · (P) 219 [id number],   · hemoglobin A1c 10 1 this admission   · Patient had the hypoglycemic event this morning    Plan:  - D/c Lantus -5 units and  Continue lispro 3 units with each meal   - SSI coverage  - Continue to adjust insulin as indicated while on steroids      VTE Pharmacologic Prophylaxis: VTE Score: 8 High Risk (Score >/= 5) - Pharmacological DVT Prophylaxis Ordered: apixaban (Eliquis)   Sequential Compression Devices Ordered  Patient Centered Rounds: I performed bedside rounds with nursing staff today  Discussions with Specialists or Other Care Team Provider: medical team, case management, pulm    Education and Discussions with Family / Patient: Will call daughter this afternoon  Time Spent for Care: 30 minutes  More than 50% of total time spent on counseling and coordination of care as described above  Current Length of Stay: 12 day(s)  Current Patient Status: Inpatient   Certification Statement: The patient will continue to require additional inpatient hospital stay due to oxygen requirements  Discharge Plan: TBD    Code Status: Level 1 - Full Code    Subjective:   Patient in bed resting eating jackie crackers due to low BG this morning to 62  Patient endorsing feeling very anxious, as he continues to need to be here  Per nurse, he continues to desaturate, so NRB had to be replaced, but patient was wearing Venturi mask most of the evening yesterday  Objective:   Vitals:   Temp (24hrs), Av 7 °F (36 5 °C), Min:97 6 °F (36 4 °C), Max:97 7 °F (36 5 °C)    Temp:  [97 6 °F (36 4 °C)-97 7 °F (36 5 °C)] 97 6 °F (36 4 °C)  HR:  [70-83] 70  Resp:  [18] 18  BP: (108-123)/(66-72) 108/70  SpO2:  [90 %-100 %] 95 %  Body mass index is 20 23 kg/m²  Input and Output Summary (last 24 hours): Intake/Output Summary (Last 24 hours) at 2022 0848  Last data filed at 2022 0504  Gross per 24 hour   Intake 640 ml   Output 400 ml   Net 240 ml       Physical Exam:   Physical Exam  Vitals and nursing note reviewed  Constitutional:       General: He is not in acute distress  HENT:      Head: Normocephalic and atraumatic  Mouth/Throat:      Mouth: Mucous membranes are moist       Pharynx: Oropharynx is clear  Eyes:      Extraocular Movements: Extraocular movements intact  Conjunctiva/sclera: Conjunctivae normal       Pupils: Pupils are equal, round, and reactive to light     Cardiovascular: Rate and Rhythm: Normal rate and regular rhythm  Pulses: Normal pulses  Heart sounds: Normal heart sounds  No murmur heard  Pulmonary:      Effort: Pulmonary effort is normal  No respiratory distress  Breath sounds: No wheezing or rales  Abdominal:      General: Abdomen is flat  Bowel sounds are normal  There is no distension  Palpations: Abdomen is soft  Tenderness: There is no abdominal tenderness  Musculoskeletal:         General: No swelling or tenderness  Cervical back: Neck supple  Skin:     General: Skin is warm and dry  Neurological:      Mental Status: He is alert and oriented to person, place, and time            Additional Data:     Labs:  Results from last 7 days   Lab Units 02/01/22  0821   WBC Thousand/uL 28 95*   HEMOGLOBIN g/dL 14 2   HEMATOCRIT % 43 3   PLATELETS Thousands/uL 256     Results from last 7 days   Lab Units 02/07/22  0501   SODIUM mmol/L 138   POTASSIUM mmol/L 4 8   CHLORIDE mmol/L 104   CO2 mmol/L 30   BUN mg/dL 42*   CREATININE mg/dL 0 75   ANION GAP mmol/L 4   CALCIUM mg/dL 9 1   ALBUMIN g/dL 2 7*   TOTAL BILIRUBIN mg/dL 1 18*   ALK PHOS U/L 215*   ALT U/L 576*   AST U/L 168*   GLUCOSE RANDOM mg/dL 62*         Results from last 7 days   Lab Units 02/06/22  2109 02/06/22  1547 02/06/22  1025 02/06/22  0807 02/05/22  2111 02/05/22  1603 02/05/22  1042 02/05/22  0821 02/05/22  0628 02/04/22  2106 02/04/22  1458 02/04/22  1121   POC GLUCOSE mg/dl 174* 430* 201* 64* 229* 201* 212* 177* 69 358* 323* 299*               Lines/Drains:  Invasive Devices  Report    Peripheral Intravenous Line            Long-Dwell Peripheral IV (Midline) 24/69/97 Left Basilic 7 days              Imaging: Reviewed radiology reports from this admission including: chest xray and chest CT scan    Recent Cultures (last 7 days):         Last 24 Hours Medication List:   Current Facility-Administered Medications   Medication Dose Route Frequency Provider Last Rate    acetaminophen  650 mg Oral Q6H PRN Alla Salguero, DO      albuterol  2 puff Inhalation Q4H PRN Richelle Epps MD      ALPRAZolam  0 25 mg Oral HS PRN Landon Vasquez MD      apixaban  10 mg Oral BID Yossi Hernandez DO      Followed by   Gordon Hernandez ON 2/9/2022] apixaban  5 mg Oral BID Yossi Hernandez, DO      aspirin  81 mg Oral Daily Diane Frazier MD      Baricitinib  4 mg Oral Q24H Diane Frazier MD      benzonatate  200 mg Oral TID PRN Alla Salguero, DO      calcium carbonate  500 mg Oral Daily PRN Yossi Hernandez, DO      dexamethasone  6 mg Intravenous Daily Yossi Hernandez, DO      ezetimibe  10 mg Oral Daily Diane Frazier MD      insulin glargine  5 Units Subcutaneous HS Tez Noel MD      insulin lispro  1-5 Units Subcutaneous TID AC Diane Frazier MD      insulin lispro  1-5 Units Subcutaneous HS Diane Frazier MD      insulin lispro  3 Units Subcutaneous TID With Meals Tez Noel MD      losartan  25 mg Oral Daily Tez Noel MD      melatonin  3 mg Oral HS Landon Vasquez MD      pramipexole  1 5 mg Oral Daily Diane Frazier MD      sodium chloride  1 spray Each Nare Q1H PRN Anny Diane MD          Today, Patient Was Seen By: Yossi Hernandez DO    **Please Note: This note may have been constructed using a voice recognition system  **

## 2022-02-08 NOTE — PROGRESS NOTES
Progress Note - Pulmonary   Scarlet LOPEZ Glen[place] 80 y o  male MRN: [id number]  Unit/Bed#: S -01 Encounter: [id number]    Assessment/Plan:    Acute hypoxic respiratory failure due to abnormal chest imaging consistent with COVID-19 pneumonia              Titrate oxygen to maintain saturations greater than or equal to 89%     Continue COVID-19 pathway as below  Positive Test:  January 14, 2022  · Dexamethasone:  Continue, but wean by 2 mg q2days as tolerated  · Remdesivir:  Completed five day course  · Antibiotics:  Completed five day course  · IL-6 therapy:  Completed course of Baricitinib  · Anticoagulation/DVT prophylaxis:  Per protocol with Lovenox  · Monitor CRP  · Activity, self-proning, and incentive spirometry as able  ? D/W nursing to trial OOB to chair with pivot      CLL              Management per primary team      Discussed with nursing  Chief Complaint:    "My lungs are clearing up "    Subjective:    Scarlet Bland is sitting up in bed  He reports that his lungs feel as though they are clearing up  He denies cough or pain  He has not been OOB  Objective:    Vitals: Blood pressure 119/77, pulse 83, temperature 97 5 °F (36 4 °C), temperature source Oral, resp  rate 22, height 5' 10" (1 778 m), weight 64 kg (141 lb), SpO2 90 %  15L NC with 100% NRB,Body mass index is 20 23 kg/m²  Intake/Output Summary (Last 24 hours) at 2/8/2022 1229  Last data filed at 2/8/2022 4925  Gross per 24 hour   Intake 240 ml   Output 425 ml   Net -185 ml       Invasive Devices  Report    Peripheral Intravenous Line            Long-Dwell Peripheral IV (Midline) 84/24/26 Left Basilic 9 days                Physical Exam:     Physical Exam  Vitals reviewed  Constitutional:       General: He is not in acute distress  Appearance: He is well-developed  He is not toxic-appearing or diaphoretic  Interventions: Nasal cannula and face mask in place  HENT:      Head: Normocephalic and atraumatic     Eyes: General: No scleral icterus  Neck:      Trachea: No tracheal deviation  Cardiovascular:      Rate and Rhythm: Normal rate and regular rhythm  Heart sounds: S1 normal and S2 normal  No murmur heard  No friction rub  No gallop  Pulmonary:      Effort: Pulmonary effort is normal  No tachypnea, accessory muscle usage or respiratory distress  Breath sounds: Normal breath sounds  No stridor  No decreased breath sounds, wheezing, rhonchi or rales  Chest:      Chest wall: No tenderness  Abdominal:      General: Bowel sounds are normal  There is no distension  Palpations: Abdomen is soft  Tenderness: There is no abdominal tenderness  Musculoskeletal:         General: No tenderness  Cervical back: Neck supple  Skin:     General: Skin is warm and dry  Findings: No rash  Neurological:      Mental Status: He is alert and oriented to person, place, and time  GCS: GCS eye subscore is 4  GCS verbal subscore is 5  GCS motor subscore is 6  Psychiatric:         Speech: Speech normal          Behavior: Behavior normal  Behavior is cooperative           Labs:   CBC:   Lab Results   Component Value Date    WBC 23 51 (H) 02/08/2022    HGB 14 2 02/08/2022    HCT 42 9 02/08/2022    MCV 89 02/08/2022     02/08/2022    MCH 29 6 02/08/2022    MCHC 33 1 02/08/2022    RDW 16 4 (H) 02/08/2022    MPV 13 1 (H) 02/08/2022   , CMP:   Lab Results   Component Value Date    SODIUM 136 02/08/2022    K 4 9 02/08/2022     02/08/2022    CO2 28 02/08/2022    BUN 41 (H) 02/08/2022    CREATININE 0 80 02/08/2022    CALCIUM 8 9 02/08/2022     (H) 02/08/2022     (H) 02/08/2022    ALKPHOS 196 (H) 02/08/2022    EGFR 84 02/08/2022     CRP 10 8    Imaging and other studies: None today

## 2022-02-08 NOTE — PLAN OF CARE
Problem: Potential for Falls  Goal: Patient will remain free of falls  Description: INTERVENTIONS:  - Educate patient/family on patient safety including physical limitations  - Instruct patient to call for assistance with activity   - Consult OT/PT to assist with strengthening/mobility   - Keep Call bell within reach  - Keep bed low and locked with side rails adjusted as appropriate  - Keep care items and personal belongings within reach  - Initiate and maintain comfort rounds  - Make Fall Risk Sign visible to staff  - Offer Toileting every  Hours, in advance of need  - Initiate/Maintain alarm  - Obtain necessary fall risk management equipment:   - Apply yellow socks and bracelet for high fall risk patients  - Consider moving patient to room near nurses station  Outcome: Progressing     Problem: MOBILITY - ADULT  Goal: Maintain or return to baseline ADL function  Description: INTERVENTIONS:  -  Assess patient's ability to carry out ADLs; assess patient's baseline for ADL function and identify physical deficits which impact ability to perform ADLs (bathing, care of mouth/teeth, toileting, grooming, dressing, etc )  - Assess/evaluate cause of self-care deficits   - Assess range of motion  - Assess patient's mobility; develop plan if impaired  - Assess patient's need for assistive devices and provide as appropriate  - Encourage maximum independence but intervene and supervise when necessary  - Involve family in performance of ADLs  - Assess for home care needs following discharge   - Consider OT consult to assist with ADL evaluation and planning for discharge  - Provide patient education as appropriate  Outcome: Progressing  Goal: Maintains/Returns to pre admission functional level  Description: INTERVENTIONS:  - Perform BMAT or MOVE assessment daily    - Set and communicate daily mobility goal to care team and patient/family/caregiver     - Collaborate with rehabilitation services on mobility goals if consulted  - Perform Range of Motion  times a day  - Reposition patient every  hours    - Dangle patient  times a day  - Stand patient  times a day  - Ambulate patient  times a day  - Out of bed to chair  times a day   - Out of bed for meal times a day  - Out of bed for toileting  - Record patient progress and toleration of activity level   Outcome: Progressing     Problem: CARDIOVASCULAR - ADULT  Goal: Maintains optimal cardiac output and hemodynamic stability  Description: INTERVENTIONS:  - Monitor I/O, vital signs and rhythm  - Monitor for S/S and trends of decreased cardiac output  - Administer and titrate ordered vasoactive medications to optimize hemodynamic stability  - Assess quality of pulses, skin color and temperature  - Assess for signs of decreased coronary artery perfusion  - Instruct patient to report change in severity of symptoms  Outcome: Progressing  Goal: Absence of cardiac dysrhythmias or at baseline rhythm  Description: INTERVENTIONS:  - Continuous cardiac monitoring, vital signs, obtain 12 lead EKG if ordered  - Administer antiarrhythmic and heart rate control medications as ordered  - Monitor electrolytes and administer replacement therapy as ordered  Outcome: Progressing     Problem: RESPIRATORY - ADULT  Goal: Achieves optimal ventilation and oxygenation  Description: INTERVENTIONS:  - Assess for changes in respiratory status  - Assess for changes in mentation and behavior  - Position to facilitate oxygenation and minimize respiratory effort  - Oxygen administered by appropriate delivery if ordered  - Initiate smoking cessation education as indicated  - Encourage broncho-pulmonary hygiene including cough, deep breathe, Incentive Spirometry  - Assess the need for suctioning and aspirate as needed  - Assess and instruct to report SOB or any respiratory difficulty  - Respiratory Therapy support as indicated  Outcome: Progressing     Problem: HEMATOLOGIC - ADULT  Goal: Maintains hematologic stability  Description: INTERVENTIONS  - Assess for signs and symptoms of bleeding or hemorrhage  - Monitor labs  - Administer supportive blood products/factors as ordered and appropriate  Outcome: Progressing     Problem: Nutrition/Hydration-ADULT  Goal: Nutrient/Hydration intake appropriate for improving, restoring or maintaining nutritional needs  Description: Monitor and assess patient's nutrition/hydration status for malnutrition  Collaborate with interdisciplinary team and initiate plan and interventions as ordered  Monitor patient's weight and dietary intake as ordered or per policy  Utilize nutrition screening tool and intervene as necessary  Determine patient's food preferences and provide high-protein, high-caloric foods as appropriate       INTERVENTIONS:  - Monitor oral intake, urinary output, labs, and treatment plans  - Assess nutrition and hydration status and recommend course of action  - Evaluate amount of meals eaten  - Assist patient with eating if necessary   - Allow adequate time for meals  - Recommend/ encourage appropriate diets, oral nutritional supplements, and vitamin/mineral supplements  - Order, calculate, and assess calorie counts as needed  - Recommend, monitor, and adjust tube feedings and TPN/PPN based on assessed needs  - Assess need for intravenous fluids  - Provide specific nutrition/hydration education as appropriate  - Include patient/family/caregiver in decisions related to nutrition  Outcome: Progressing     Problem: Prexisting or High Potential for Compromised Skin Integrity  Goal: Skin integrity is maintained or improved  Description: INTERVENTIONS:  - Identify patients at risk for skin breakdown  - Assess and monitor skin integrity  - Assess and monitor nutrition and hydration status  - Monitor labs   - Assess for incontinence   - Turn and reposition patient  - Assist with mobility/ambulation  - Relieve pressure over bony prominences  - Avoid friction and shearing  - Provide appropriate hygiene as needed including keeping skin clean and dry  - Evaluate need for skin moisturizer/barrier cream  - Collaborate with interdisciplinary team   - Patient/family teaching  - Consider wound care consult   Outcome: Progressing     Problem: METABOLIC, FLUID AND ELECTROLYTES - ADULT  Goal: Glucose maintained within target range  Description: INTERVENTIONS:  - Monitor Blood Glucose as ordered  - Assess for signs and symptoms of hyperglycemia and hypoglycemia  - Administer ordered medications to maintain glucose within target range  - Assess nutritional intake and initiate nutrition service referral as needed  Outcome: Progressing

## 2022-02-08 NOTE — OCCUPATIONAL THERAPY NOTE
Occupational Therapy Progress Note     Patient Name: Jose Ramon Tim  NJRGT'R Date: 2/8/2022  Problem List  Principal Problem:    Acute hypoxemic respiratory failure due to COVID-19 [facility])  Active Problems:    Type 2 diabetes mellitus with hyperglycemia, with long-term current use of insulin (HCC)    Transaminitis    Fall at home    Acute pulmonary embolism without acute cor pulmonale ([facility] [address] )              02/08/22 1509   OT Last Visit   OT Visit Date 02/08/22   Note Type   Note Type Treatment  (with updated goal expiration date)   Restrictions/Precautions   Weight Bearing Precautions Per Order No   Other Precautions Chair Alarm; Bed Alarm;O2;Fall Risk  (15L midflow + 15L NRB)   Lifestyle   Autonomy Pt reports I w/ ADL PTA w/ out use of AD  Pt added that he was only home for 2 days and did not do much   Reciprocal Relationships Pt reports living w/ wife, Skip Fought; son in law and grand daughter   Service to Others Pt reports retired    Intrinsic Gratification Pt reports enjoying resting and relaxing  Enjoys his family, enjoys being outside on his 2 acres [place] land   Pain Assessment   Pain Assessment Tool 0-10   Pain Score No Pain   ADL   Grooming Assistance 5  Supervision/Setup   Grooming Deficit Supervision/safety;Verbal cueing;Wash/dry face   UB Dressing Assistance 4  Minimal Assistance   UB Dressing Deficit Increased time to complete;Supervision/safety;Verbal cueing   UB Dressing Comments A with line management and location of sleeves   LB Dressing Assistance 5  Supervision/Setup   LB Dressing Deficit Don/doff R sock; Don/doff L sock   Bed Mobility   Supine to Sit 6  Modified independent   Additional items Increased time required;Verbal cues   Additional Comments OOB and in chair at end of session   Transfers   Sit to Stand 5  Supervision   Additional items Increased time required;Verbal cues   Stand to Sit 5  Supervision   Additional items Increased time required;Verbal cues   Stand pivot 5  Supervision Additional items Increased time required;Verbal cues   Additional Comments A with line management, posture forward flexed and kyphotic-like during transfer   Functional Mobility   Functional Mobility 5  Supervision   Additional Comments limited distance bed to chair 2-3 steps   Cognition   Arousal/Participation Alert; Cooperative   Attention Attends with cues to redirect   Orientation Level Oriented X4   Memory Decreased recall of recent events;Decreased recall of precautions   Following Commands Follows one step commands with increased time or repetition   Comments pleasant and cooperative, noted improved memory of hospital stay from initial evaluation, would benefit from formal cognitive evaluation   Activity Tolerance   Activity Tolerance Patient tolerated treatment well   Assessment   Assessment Pt seen on this date for skilled OT treatment session  At start of session pt supine in bed  Pt agreeable and motivated to participate in session stating "I think it'll be good for me to sit up"  Pt required increased time for tasks due to de-satting with activity  Pt on midflow and NRB for entirety of session, pt de-satting to 81% with activity, with increased time and rest breaks able to recover to low 90's  Pt provided with education on PLB tasks, LB dressing tasks with tailor sit method to reduce bending conserve energy  Pt noted to make progress with cognition from previous session, able to recall respiratory therapist's name and notably more alert and able to recall information about PLOF and home set up, confirmed by wife who was present  Pt would continue to benefit from skilled OT treatment sessions in order to address remaining deficits and continue to recommend d/c to pulmonary rehab when medically stable     Plan   Goal Expiration Date 02/22/22  (Goal date extended, goals cont to remain appropriate)   OT Treatment Day 1   OT Frequency 2-3x/wk   Recommendation   OT Discharge Recommendation   (rehab vs home pending improved O2 and activity tolerance)   Additional Comments  More likely d/c to Pulmonary rehab at this time due to O2 needs and limited activity tolerance   AM-[facility] Daily Activity Inpatient   Lower Body Dressing 3   Bathing 3   Toileting 3   Upper Body Dressing 3   Grooming 4   Eating 4   Daily Activity Raw Score 20   Daily Activity Standardized Score (Calc for Raw Score >=11) 42 03   AM-PAC Applied Cognition Inpatient   Following a Speech/Presentation 3   Understanding Ordinary Conversation 4   Taking Medications 3   Remembering Where Things Are Placed or Put Away 4   Remembering List of 4-5 Errands 3   Taking Care of Complicated Tasks 3   Applied Cognition Raw Score 20   Applied Cognition Standardized Score 41 76     GOALS:   -Pt will demonstrate good attention and participation in continued evaluation of functional cognitive skills to assist in [address] will consistently follow 2 step directions during ADLs w/ good recall and no more than 1 cue /prompt to max I w/ ADLs to return home w/ wife     -Pt will complete UBD/LBD w/ mod I after set- up w/ no more than 1 rest break to max I and return home     -Pt will demonstrate good attention and understanding EC tech to max I w/ ADLs and return home     -Pt will complete functional transfers to all surfaces w/ mod I using AD, DME as needed to max I and return home to baseline level of I     -Pt will consistently engage in functional mobility using AD as needed w/ mod I to/ from bathroom to max I w/ ADL to return home to baseline level of I     -Pt will demonstrate improved functional standing tolerance for at least 5 minutes w/ fair + balance while engaged in ADL standing at sink to max I and improve engagement to return home         The patient's raw score on the AM-PAC Daily Activity inpatient short form is 20, standardized score is 42 03, greater than 39 4  Patients at this level are likely to benefit from discharge to home   Please refer to the recommendation of the Occupational Therapist for safe discharge planning      At the end of the session, all needs met and pt seated in bedside chair, chair alarm activated, LEs elevated  and call bell within reach    [facility], OTR/L

## 2022-02-08 NOTE — PROGRESS NOTES
[facility]  Progress Note Nonnie Jose M [place] 1940, 80 y o  male MRN: [id number]  Unit/Bed#: S -01 Encounter: [id number]  Primary Care Provider: Cornell Pal MD   Date and time admitted to hospital: 1/26/2022  8:43 AM    * Acute hypoxemic respiratory failure due to COVID-19 [facility])  Assessment & Plan  · POA, tested positive for COVID on 1/14/22  Admitted 01/19 -1/24/22, and DC home on 5L O2   · CTA chest (1/26): few tiny subsegmental filling defects in all lobes of the right lung and in the left lower lobe  1 3 x 0 8 cm juxtapleural nodule in the EDUARDO  It is associated with traction bronchiolectasis and is likely a scar but consider follow-up with a chest CT with no contrast in 6 months  Severe bilateral groundglass opacity due to Covid-19  · CXR (1/29): Worsening diffuse ground glass opacities throughout the lung parenchyma consistent with COVID-19 pneumonia  · Using 15 L [address] + NRB , keeps desatting while eating  · Anxiety is big factor    Plan:  - Covid: severe pathway   - Continue IV Decadron 6mg   - s/p baricitinib  - can continue lovenox weight based  - patient confirmed level 1 code status  - continue respiratory protocol, supplemental O2 and titrate to maintain sats > 82%  - pulmonology on board  - OOB TID, Self-proning if able, Incentive spirometry  -upon discussion with pulmonology, if patient is able to wean off 12 L mid flow, can be switched to Oxymizer at 10 L  - PT recs: post acute rehab    Acute pulmonary embolism without acute cor pulmonale (HCC)  Assessment & Plan  · POA, in the setting of recent COVID infection  · No evidence of right heart strain on CT  · Echo (1/27): LVEF 48%, grade 1 diastolic dysfunction, mild AR and MR     Plan:  - hold eliquis, continue parenteral lovenox (weight based)    Fall at home  Assessment & Plan  · Presented as a trauma level C post fall home   Patient and wife report he had taken off supplemental O2 when going to the bathroom  · Lightheadedness after voiding and fell, wife reports mild headstrike as she caught his fall  Head and left upper extremity sustaining skin tears with head abrasion  · Was evaluated and cleared by Trauma in the ED    Plan:  - PT/OT evaluation: post acute rehab   - Case management working on placement  - feels anxious to ambulate    Transaminitis  Assessment & Plan  · Likely in the setting of acute viral infection  Also has history of hepatitis C infection  · Reported with chronic transaminitis at baseline  · Recent outpatient ultrasound on 1/7/22 was unremarkable  · Trending upwards    but is on baricitinib  Lab Results   Component Value Date     (H) 02/08/2022     (H) 02/08/2022    ALKPHOS 196 (H) 02/08/2022    BILITOT 0 7 08/14/2017     Plan:  - Continue to monitor, AST and ALT, CMP and CRP  - stopped baricitinib    Type 2 diabetes mellitus with hyperglycemia, with long-term current use of insulin [facility])  Assessment & Plan  Lab Results   Component Value Date    HGBA1C 10 1 (H) 01/26/2022     Recent Labs     02/07/22  1652 02/07/22  2144 02/08/22  0746 02/08/22  1117   POCGLU 213* 114 125 305*     Blood Sugar Average: Last 72 hrs:  · (P) 652 [id number],   · hemoglobin A1c 10 1 this admission     Plan:  - Continue lispro 3 units with each meal   - SSI coverage  - Continue to adjust insulin as indicated while on steroids      VTE Pharmacologic Prophylaxis: VTE Score: 8 High Risk (Score >/= 5) - Pharmacological DVT Prophylaxis Ordered: enoxaparin (Lovenox)  Sequential Compression Devices Ordered  Patient Centered Rounds: I performed bedside rounds with nursing staff today  Discussions with Specialists or Other Care Team Provider: medical team, case management, pulm    Education and Discussions with Family / Patient: Updated  (daughter) via phone  Time Spent for Care: 30 minutes   More than 50% of total time spent on counseling and coordination of care as described above     Current Length of Stay: 13 day(s)  Current Patient Status: Inpatient   Certification Statement: The patient will continue to require additional inpatient hospital stay due to oxygen requirements  Discharge Plan: TBD    Code Status: Level 1 - Full Code    Subjective:   Patient in bed hyperventilating, reporting feeling very anxious  Gave atarax which provided a lot of relief  He is happy that his family will be able to visit with him  Objective:   Vitals:   Temp (24hrs), Av 8 °F (36 6 °C), Min:97 5 °F (36 4 °C), Max:98 2 °F (36 8 °C)    Temp:  [97 5 °F (36 4 °C)-98 2 °F (36 8 °C)] 97 5 °F (36 4 °C)  HR:  [76-90] 83  Resp:  [18-22] 22  BP: ()/(55-77) 119/77  SpO2:  [90 %-100 %] 90 %  Body mass index is 20 23 kg/m²  Input and Output Summary (last 24 hours): Intake/Output Summary (Last 24 hours) at 2022 1319  Last data filed at 2022 0612  Gross per 24 hour   Intake 240 ml   Output 425 ml   Net -185 ml       Physical Exam:   Physical Exam  Vitals and nursing note reviewed  Constitutional:       General: He is not in acute distress  HENT:      Head: Normocephalic and atraumatic  Mouth/Throat:      Mouth: Mucous membranes are moist       Pharynx: Oropharynx is clear  Eyes:      Extraocular Movements: Extraocular movements intact  Conjunctiva/sclera: Conjunctivae normal       Pupils: Pupils are equal, round, and reactive to light  Cardiovascular:      Rate and Rhythm: Normal rate and regular rhythm  Pulses: Normal pulses  Heart sounds: Normal heart sounds  No murmur heard  Pulmonary:      Effort: Pulmonary effort is normal  No respiratory distress  Breath sounds: No wheezing or rales  Abdominal:      General: Abdomen is flat  Bowel sounds are normal  There is no distension  Palpations: Abdomen is soft  Tenderness: There is no abdominal tenderness  Musculoskeletal:         General: No swelling or tenderness        Cervical back: Neck supple  Skin:     General: Skin is warm and dry  Neurological:      Mental Status: He is alert and oriented to person, place, and time  Psychiatric:         Mood and Affect: Mood is anxious            Additional Data:     Labs:  Results from last 7 days   Lab Units 02/08/22  0612   WBC Thousand/uL 23 51*   HEMOGLOBIN g/dL 14 2   HEMATOCRIT % 42 9   PLATELETS Thousands/uL 165     Results from last 7 days   Lab Units 02/08/22  0612   SODIUM mmol/L 136   POTASSIUM mmol/L 4 9   CHLORIDE mmol/L 101   CO2 mmol/L 28   BUN mg/dL 41*   CREATININE mg/dL 0 80   ANION GAP mmol/L 7   CALCIUM mg/dL 8 9   ALBUMIN g/dL 2 7*   TOTAL BILIRUBIN mg/dL 1 19*   ALK PHOS U/L 196*   ALT U/L 593*   AST U/L 203*   GLUCOSE RANDOM mg/dL 106         Results from last 7 days   Lab Units 02/08/22  1117 02/08/22  0746 02/07/22  2144 02/07/22  1652 02/07/22  1116 02/07/22  0747 02/06/22  2109 02/06/22  1547 02/06/22  1025 02/06/22  0807 02/05/22  2111 02/05/22  1603   POC GLUCOSE mg/dl 305* 125 114 213* 268* 63* 174* 430* 201* 64* 229* 201*               Lines/Drains:  Invasive Devices  Report    Peripheral Intravenous Line            Long-Dwell Peripheral IV (Midline) 96/19/86 Left Basilic 9 days              Imaging: Reviewed radiology reports from this admission including: chest xray and chest CT scan    Recent Cultures (last 7 days):         Last 24 Hours Medication List:   Current Facility-Administered Medications   Medication Dose Route Frequency Provider Last Rate    acetaminophen  650 mg Oral Q6H PRN Hines Wang, DO      albuterol  2 puff Inhalation Q4H PRN Stephanie Estevez MD      ALPRAZolam  0 25 mg Oral HS PRN Sadiq Anthony MD      aspirin  81 mg Oral Daily Diane David Mo MD      benzonatate  200 mg Oral TID PRN Hines Wang, DO      calcium carbonate  500 mg Oral Daily PRN Rosetta Peabody, DO      dexamethasone  6 mg Intravenous Daily Ora Balasundram, DO      enoxaparin  1 mg/kg Subcutaneous Q12H [facility] Winston Bettencourt DO      ezetimibe  10 mg Oral Daily Diane Mosley MD      hydrOXYzine HCL  25 mg Oral Q6H PRN Wilfredo Yao DO      insulin lispro  1-5 Units Subcutaneous HS Winston Bettencourt DO      insulin lispro  1-6 Units Subcutaneous TID [facility]      insulin lispro  5 Units Subcutaneous TID With Meals Winston Bettencourt DO      losartan  25 mg Oral Daily Remington Lucero MD      melatonin  3 mg Oral HS Len Danielle MD      pramipexole  1 5 mg Oral Daily Diane Mosley MD      sodium chloride  1 spray Each Nare Q1H PRN Josephine Stevenson MD          Today, Patient Was Seen By: Wilfredo Yao DO    **Please Note: This note may have been constructed using a voice recognition system  **

## 2022-02-08 NOTE — PLAN OF CARE
Problem: OCCUPATIONAL THERAPY ADULT  Goal: Performs self-care activities at highest level of function for planned discharge setting  See evaluation for individualized goals  Description: Treatment Interventions: ADL retraining,Functional transfer training,UE strengthening/ROM,Endurance training,Patient/family training,Equipment evaluation/education,Compensatory technique education,Continued evaluation,Activityengagement,Energy conservation  Equipment Recommended: Shower/Tub chair with back ($)       See flowsheet documentation for full assessment, interventions and recommendations  Outcome: Progressing  Note: Limitation: Decreased ADL status,Decreased UE strength,Decreased Safe judgement during ADL,Decreased endurance,Decreased self-care trans,Decreased high-level ADLs  Prognosis: Fair  Assessment: Pt seen on this date for skilled OT treatment session  At start of session pt supine in bed  Pt agreeable and motivated to participate in session stating "I think it'll be good for me to sit up"  Pt required increased time for tasks due to de-satting with activity  Pt on midflow and NRB for entirety of session, pt de-satting to 81% with activity, with increased time and rest breaks able to recover to low 90's  Pt provided with education on PLB tasks, LB dressing tasks with tailor sit method to reduce bending conserve energy  Pt noted to make progress with cognition from previous session, able to recall respiratory therapist's name and notably more alert and able to recall information about PLOF and home set up, confirmed by wife who was present  Pt would continue to benefit from skilled OT treatment sessions in order to address remaining deficits and continue to recommend d/c to pulmonary rehab when medically stable       OT Discharge Recommendation:  (rehab vs home pending improved O2 and activity tolerance)

## 2022-02-09 NOTE — PROGRESS NOTES
[facility]  Progress Note Colletta Minor [place] 1940, 80 y o  male MRN: [id number]  Unit/Bed#: S -01 Encounter: [id number]  Primary Care Provider: Katie Villalobos MD   Date and time admitted to hospital: 1/26/2022  8:43 AM    * Acute hypoxemic respiratory failure due to COVID-19 [facility])  Assessment & Plan  · POA, tested positive for COVID on 1/14/22  Admitted 01/19 -1/24/22, and DC home on 5L O2   · CTA chest (1/26): few tiny subsegmental filling defects in all lobes of the right lung and in the left lower lobe  1 3 x 0 8 cm juxtapleural nodule in the EDUARDO  It is associated with traction bronchiolectasis and is likely a scar but consider follow-up with a chest CT with no contrast in 6 months  Severe bilateral groundglass opacity due to Covid-19  · CXR (1/29): Worsening diffuse ground glass opacities throughout the lung parenchyma consistent with COVID-19 pneumonia  · Using 15 L [address] + NRB , keeps desatting while eating  · Anxiety is big factor    Plan:  - Covid: severe pathway   - Continue IV Decadron 4mg   - s/p baricitinib  - can continue lovenox weight based  - patient confirmed level 1 code status  - continue respiratory protocol, supplemental O2 and titrate to maintain sats > 82%  - pulmonology on board  - OOB TID, Self-proning if able, Incentive spirometry  -upon discussion with pulmonology, if patient is able to wean off 12 L mid flow, can be switched to Oxymizer at 10 L  - PT recs: post acute rehab    Acute pulmonary embolism without acute cor pulmonale (HCC)  Assessment & Plan  · POA, in the setting of recent COVID infection  · No evidence of right heart strain on CT  · Echo (1/27): LVEF 29%, grade 1 diastolic dysfunction, mild AR and MR     Plan:  - hold eliquis, continue parenteral lovenox (weight based)    Fall at home  Assessment & Plan  · Presented as a trauma level C post fall home   Patient and wife report he had taken off supplemental O2 when going to the bathroom  · Lightheadedness after voiding and fell, wife reports mild headstrike as she caught his fall  Head and left upper extremity sustaining skin tears with head abrasion  · Was evaluated and cleared by Trauma in the ED    Plan:  - PT/OT evaluation: post acute rehab   - Case management working on placement  - feels anxious to ambulate    Transaminitis  Assessment & Plan  · Likely in the setting of acute viral infection  Also has history of hepatitis C infection  · Reported with chronic transaminitis at baseline  · Recent outpatient ultrasound on 1/7/22 was unremarkable  · Trending upwards    but is on baricitinib  Lab Results   Component Value Date     (H) 02/09/2022     (H) 02/09/2022    ALKPHOS 153 (H) 02/09/2022    BILITOT 0 7 08/14/2017     Plan:  - Continue to monitor, AST and ALT, CMP and CRP  - stopped baricitinib, improving    Type 2 diabetes mellitus with hyperglycemia, with long-term current use of insulin [facility])  Assessment & Plan  Lab Results   Component Value Date    HGBA1C 10 1 (H) 01/26/2022     Recent Labs     02/08/22  1117 02/08/22  1523 02/08/22  2126 02/09/22  0642   POCGLU 305* 235* 156* 139     Blood Sugar Average: Last 72 hrs:  · (P) 191 [id number],   · hemoglobin A1c 10 1 this admission     Plan:  - Continue lispro 3 units with each meal   - SSI coverage  - Continue to adjust insulin as indicated while on steroids      VTE Pharmacologic Prophylaxis: VTE Score: 8 High Risk (Score >/= 5) - Pharmacological DVT Prophylaxis Ordered: enoxaparin (Lovenox)  Sequential Compression Devices Ordered  Patient Centered Rounds: I performed bedside rounds with nursing staff today  Discussions with Specialists or Other Care Team Provider: medical team, case management, pulm    Education and Discussions with Family / Patient: Will call daughter later        Time Spent for Care: 30 minutes   More than 50% of total time spent on counseling and coordination of care as described above     Current Length of Stay: 14 day(s)  Current Patient Status: Inpatient   Certification Statement: The patient will continue to require additional inpatient hospital stay due to oxygen requirements  Discharge Plan: TBD    Code Status: Level 1 - Full Code    Subjective:   Patient is moving to chair on my assessment and endorses feeling very anxious  His pulse ox is reading 70%, but with poor waveforms  He does report good response to the atarax, will schedule it  He was able to have his wife visit yesterday, which made him feel better  Objective:   Vitals:   Temp (24hrs), Av 4 °F (36 9 °C), Min:98 °F (36 7 °C), Max:98 7 °F (37 1 °C)    Temp:  [98 °F (36 7 °C)-98 7 °F (37 1 °C)] 98 7 °F (37 1 °C)  HR:  [67-87] 68  Resp:  [16-19] 19  BP: ()/(50-65) 108/65  SpO2:  [97 %-100 %] 99 %  Body mass index is 20 23 kg/m²  Input and Output Summary (last 24 hours): Intake/Output Summary (Last 24 hours) at 2022 1057  Last data filed at 2022 0734  Gross per 24 hour   Intake 480 ml   Output 450 ml   Net 30 ml       Physical Exam:   Physical Exam  Vitals and nursing note reviewed  Constitutional:       General: He is not in acute distress  HENT:      Head: Normocephalic and atraumatic  Mouth/Throat:      Mouth: Mucous membranes are moist       Pharynx: Oropharynx is clear  Eyes:      Extraocular Movements: Extraocular movements intact  Conjunctiva/sclera: Conjunctivae normal       Pupils: Pupils are equal, round, and reactive to light  Cardiovascular:      Rate and Rhythm: Normal rate and regular rhythm  Pulses: Normal pulses  Heart sounds: Normal heart sounds  No murmur heard  Pulmonary:      Effort: Pulmonary effort is normal  Tachypnea present  No respiratory distress  Breath sounds: No wheezing or rales  Abdominal:      General: Abdomen is flat  Bowel sounds are normal  There is no distension  Palpations: Abdomen is soft  Tenderness:  There is no abdominal tenderness  Musculoskeletal:         General: No swelling or tenderness  Cervical back: Neck supple  Skin:     General: Skin is warm and dry  Neurological:      Mental Status: He is alert and oriented to person, place, and time  Psychiatric:         Mood and Affect: Mood is anxious            Additional Data:     Labs:  Results from last 7 days   Lab Units 02/09/22  0452   WBC Thousand/uL 19 64*   HEMOGLOBIN g/dL 12 1   HEMATOCRIT % 37 3   PLATELETS Thousands/uL 116*     Results from last 7 days   Lab Units 02/09/22  0452   SODIUM mmol/L 137   POTASSIUM mmol/L 4 8   CHLORIDE mmol/L 104   CO2 mmol/L 30   BUN mg/dL 41*   CREATININE mg/dL 0 73   ANION GAP mmol/L 3*   CALCIUM mg/dL 8 3   ALBUMIN g/dL 2 2*   TOTAL BILIRUBIN mg/dL 0 94   ALK PHOS U/L 153*   ALT U/L 454*   AST U/L 132*   GLUCOSE RANDOM mg/dL 123         Results from last 7 days   Lab Units 02/09/22  0642 02/08/22  2126 02/08/22  1523 02/08/22  1117 02/08/22  0746 02/07/22  2144 02/07/22  1652 02/07/22  1116 02/07/22  0747 02/06/22  2109 02/06/22  1547 02/06/22  1025   POC GLUCOSE mg/dl 139 156* 235* 305* 125 114 213* 268* 63* 174* 430* 201*               Lines/Drains:  Invasive Devices  Report    Peripheral Intravenous Line            Long-Dwell Peripheral IV (Midline) 24/90/39 Left Basilic 10 days              Imaging: Reviewed radiology reports from this admission including: chest xray and chest CT scan    Recent Cultures (last 7 days):         Last 24 Hours Medication List:   Current Facility-Administered Medications   Medication Dose Route Frequency Provider Last Rate    acetaminophen  650 mg Oral Q6H PRN Val Section, DO      albuterol  2 puff Inhalation Q4H PRN Priscilla Austin MD      aspirin  81 mg Oral Daily Diane Cordova MD      benzonatate  200 mg Oral TID PRN Val Section, DO      calcium carbonate  500 mg Oral Daily PRN Richard Brush, DO      dexamethasone  4 mg Intravenous Daily Launanna Levasy YVETTE Huffman      enoxaparin  1 mg/kg Subcutaneous Q12H [address] Luciano Rodríguez,       ezetimibe  10 mg Oral Daily Diane Faust MD      hydrOXYzine HCL  25 mg Oral BID Joseph Evans DO      insulin lispro  1-5 Units Subcutaneous HS Charley Gutierrez DO      insulin lispro  1-6 Units Subcutaneous TID AC Luciano Rodríguez,       insulin lispro  5 Units Subcutaneous TID With Meals Charley Gutierrez DO      losartan  25 mg Oral Daily Cosme Carroll MD      melatonin  3 mg Oral HS Ayaka Moura MD      pramipexole  1 5 mg Oral Daily Diane Faust MD      sodium chloride  1 spray Each Nare Q1H PRN Antonio Sprague MD          Today, Patient Was Seen By: Joseph Evans DO    **Please Note: This note may have been constructed using a voice recognition system  **

## 2022-02-10 NOTE — PROGRESS NOTES
[facility]  Progress Note Chrissy Martinezsco 1940, 80 y o  male MRN: [id number]  Unit/Bed#: S -01 Encounter: [id number]  Primary Care Provider: Bia Peter MD   Date and time admitted to hospital: 1/26/2022  8:43 AM    * Acute hypoxemic respiratory failure due to COVID-19 [facility])  Assessment & Plan  · POA, tested positive for COVID on 1/14/22  Admitted 01/19 -1/24/22, and DC home on 5L O2   · CTA chest (1/26): few tiny subsegmental filling defects in all lobes of the right lung and in the left lower lobe  1 3 x 0 8 cm juxtapleural nodule in the EDUAROD  It is associated with traction bronchiolectasis and is likely a scar but consider follow-up with a chest CT with no contrast in 6 months  Severe bilateral groundglass opacity due to Covid-19  · CXR (1/29): Worsening diffuse ground glass opacities throughout the lung parenchyma consistent with COVID-19 pneumonia  · Using 15 L [address] + NRB , keeps desatting while eating-- transitioned back to high flow and sent to ICU  · Anxiety is big factor    Plan:  - Covid: severe pathway   - Continue IV Decadron- switching to weight based  - s/p baricitinib  - can continue lovenox weight based  - patient confirmed level 1 code status  - continue respiratory protocol, supplemental O2 and titrate to maintain sats > 89%  - pulmonology on board  - OOB TID, Self-proning if able, Incentive spirometry    Acute pulmonary embolism without acute cor pulmonale (HCC)  Assessment & Plan  · POA, in the setting of recent COVID infection  · No evidence of right heart strain on CT  · Echo (1/27): LVEF 45%, grade 1 diastolic dysfunction, mild AR and MR     Plan:  - hold eliquis, continue parenteral lovenox (weight based)    Fall at home  Assessment & Plan  · Presented as a trauma level C post fall home   Patient and wife report he had taken off supplemental O2 when going to the bathroom  · Lightheadedness after voiding and fell, wife reports mild headstrike as she caught his fall  Head and left upper extremity sustaining skin tears with head abrasion  · Was evaluated and cleared by Trauma in the ED    Plan:  - PT/OT evaluation: post acute rehab   - Case management working on placement  - feels anxious to ambulate    Transaminitis  Assessment & Plan  · Likely in the setting of acute viral infection  Also has history of hepatitis C infection  · Reported with chronic transaminitis at baseline  · Recent outpatient ultrasound on 1/7/22 was unremarkable  · Trending upwards    but is on baricitinib  Lab Results   Component Value Date     (H) 02/10/2022     (H) 02/10/2022    ALKPHOS 148 (H) 02/10/2022    BILITOT 0 7 08/14/2017     Plan:  - Continue to monitor  - stopped baricitinib, improving    Type 2 diabetes mellitus with hyperglycemia, with long-term current use of insulin [facility])  Assessment & Plan  Lab Results   Component Value Date    HGBA1C 10 1 (H) 01/26/2022     Recent Labs     02/09/22  1555 02/09/22  2100 02/10/22  0635 02/10/22  1038   POCGLU 390* 303* 164* 139     Blood Sugar Average: Last 72 hrs:  · (P) 198 [id number],   · hemoglobin A1c 10 1 this admission     Plan:  - Continue SSI coverage  - Continue to adjust insulin as indicated while on steroids      VTE Pharmacologic Prophylaxis: VTE Score: 8 High Risk (Score >/= 5) - Pharmacological DVT Prophylaxis Ordered: enoxaparin (Lovenox)  Sequential Compression Devices Ordered  Patient Centered Rounds: I performed bedside rounds with nursing staff today  Discussions with Specialists or Other Care Team Provider: medical team, case management, pulm    Education and Discussions with Family / Patient: Will call daughter  Time Spent for Care: 30 minutes  More than 50% of total time spent on counseling and coordination of care as described above      Current Length of Stay: 15 day(s)  Current Patient Status: Inpatient   Certification Statement: The patient will continue to require additional inpatient hospital stay due to oxygen requirements  Discharge Plan: TBD    Code Status: Level 1 - Full Code    Subjective:   Patient is lying on his side, comfortably  He does get tachypneic whenever the pulse ox alarm goes off, due to anxiety  When he removes his NRB his saturations drop but again, with poor waveforms  Overnight also had soft BP  Discontinued losartan 25mg  Objective:   Vitals:   Temp (24hrs), Av 6 °F (36 4 °C), Min:97 2 °F (36 2 °C), Max:98 °F (36 7 °C)    Temp:  [97 2 °F (36 2 °C)-98 °F (36 7 °C)] 98 °F (36 7 °C)  HR:  [76-96] 82  Resp:  [18-20] 20  BP: ()/(48-60) 98/60  SpO2:  [91 %-100 %] 92 %  Body mass index is 20 23 kg/m²  Input and Output Summary (last 24 hours): Intake/Output Summary (Last 24 hours) at 2/10/2022 1234  Last data filed at 2/10/2022 0900  Gross per 24 hour   Intake 700 ml   Output 520 ml   Net 180 ml       Physical Exam:   Physical Exam  Vitals and nursing note reviewed  Constitutional:       General: He is not in acute distress  HENT:      Head: Normocephalic and atraumatic  Mouth/Throat:      Mouth: Mucous membranes are moist       Pharynx: Oropharynx is clear  Eyes:      Extraocular Movements: Extraocular movements intact  Conjunctiva/sclera: Conjunctivae normal       Pupils: Pupils are equal, round, and reactive to light  Cardiovascular:      Rate and Rhythm: Normal rate and regular rhythm  Pulses: Normal pulses  Heart sounds: Normal heart sounds  No murmur heard  Pulmonary:      Effort: Pulmonary effort is normal  Tachypnea present  No respiratory distress  Breath sounds: No wheezing or rales  Abdominal:      General: Abdomen is flat  Bowel sounds are normal  There is no distension  Palpations: Abdomen is soft  Tenderness: There is no abdominal tenderness  Musculoskeletal:         General: No swelling or tenderness  Cervical back: Neck supple  Skin:     General: Skin is warm and dry     Neurological: Mental Status: He is alert and oriented to person, place, and time  Psychiatric:         Mood and Affect: Mood is anxious            Additional Data:     Labs:  Results from last 7 days   Lab Units 02/09/22  0452   WBC Thousand/uL 19 64*   HEMOGLOBIN g/dL 12 1   HEMATOCRIT % 37 3   PLATELETS Thousands/uL 116*     Results from last 7 days   Lab Units 02/10/22  0433   SODIUM mmol/L 140   POTASSIUM mmol/L 4 9   CHLORIDE mmol/L 104   CO2 mmol/L 32   BUN mg/dL 45*   CREATININE mg/dL 0 77   ANION GAP mmol/L 4   CALCIUM mg/dL 8 7   ALBUMIN g/dL 2 4*   TOTAL BILIRUBIN mg/dL 0 91   ALK PHOS U/L 148*   ALT U/L 438*   AST U/L 124*   GLUCOSE RANDOM mg/dL 81         Results from last 7 days   Lab Units 02/10/22  1038 02/10/22  0635 02/09/22  2100 02/09/22  1555 02/09/22  1109 02/09/22  0642 02/08/22  2126 02/08/22  1523 02/08/22  1117 02/08/22  0746 02/07/22  2144 02/07/22  1652   POC GLUCOSE mg/dl 139 164* 303* 390* 167* 139 156* 235* 305* 125 114 213*               Lines/Drains:  Invasive Devices  Report    Peripheral Intravenous Line            Long-Dwell Peripheral IV (Midline) 35/37/51 Left Basilic 11 days              Imaging: Reviewed radiology reports from this admission including: chest xray and chest CT scan    Recent Cultures (last 7 days):         Last 24 Hours Medication List:   Current Facility-Administered Medications   Medication Dose Route Frequency Provider Last Rate    acetaminophen  650 mg Oral Q6H PRN Amparo Villalba, DO      albuterol  2 puff Inhalation Q4H PRN Neeraj Gil MD      aspirin  81 mg Oral Daily Diane Eisenberg MD      benzonatate  200 mg Oral TID PRN Amparo Villalba, DO      calcium carbonate  500 mg Oral Daily PRN Sarah brenner, DO      dexamethasone  0 1 mg/kg Intravenous Q12H [facility] YVETTE Ríos      enoxaparin  1 mg/kg Subcutaneous Q12H [facility] Luciano Bernal,       ezetimibe  10 mg Oral Daily Diane Eisenberg MD      hydrOXYzine HCL  25 mg Oral BID Abdias Tran DO      insulin lispro  1-5 Units Subcutaneous HS Dwayne Pope, DO      insulin lispro  1-6 Units Subcutaneous TID AC Luciano Rodríguez, DO      melatonin  6 mg Oral HS [place] [facility] Otilio Ramírez, CRNP      pramipexole  1 5 mg Oral Daily Diane Bains MD      sodium chloride  1 spray Each Nare Q1H PRN Kary Kee MD          Today, Patient Was Seen By: Abdias Tran DO    **Please Note: This note may have been constructed using a voice recognition system  **

## 2022-02-11 NOTE — PROGRESS NOTES
Reassessing pt vitals at approx 0145 bp 65/36, hr 55, rr 14, 02 sat 100%  Notified charge RN immediately,  Precedex was stopped, pt was arousable to sternal rub  ICU PA also notified, he came to the bedside immediately 1L NSS bolus ordered wide open and adm, , 1mg of epi was given and 1 mg of atropine  BP recovered to 119/65 after epi, fluid bolus complete, bp's q 10 min , BP is dropping again, ICU PA aware, no new orders at this time  Continue to monitor closely

## 2022-02-11 NOTE — ACP (ADVANCE CARE PLANNING)
Patient's wife and daughter at bedside, decision was made to transition to level 4 CMO  Orders placed

## 2022-02-11 NOTE — PROGRESS NOTES
Pt recovered fully from low bp episode  He is alert and oriented to self only  He is interacting verbally, again asking what he's suppose to be doing, smiling and appears comfortable, VSS and continues on high flow with non rebreather, desat quickly and significantly when either non rebreather or high flow is removed  Continues with posey, and soft limb restraints to keep pt safe from falling , since he attempted to get oob twice last evening, and to prevent disruption of care, ie removing 02

## 2022-02-11 NOTE — OCCUPATIONAL THERAPY NOTE
Occupational Therapy Cancel Note     Patient Name: Jazmin Tim  [id number]'[id number] Date: 2/11/2022  Problem List  Principal Problem:    Acute hypoxemic respiratory failure due to COVID-19 [facility])  Active Problems:    Type 2 diabetes mellitus with hyperglycemia, with long-term current use of insulin (HCC)    Transaminitis    Fall at home    Acute pulmonary embolism without acute cor pulmonale ([facility] [address] )       02/11/22 1501   OT Last Visit   OT Visit Date 02/11/22  (Friday)   Note Type   Note Type Treatment   Cancel Reasons   (transition to level 4 Comfort care)   Assessment   Assessment Chart review completed  Pt transitioned to level 4 comfort care  Will DC OT  Please re consult if acute need arise   HAMILTON Moyer

## 2022-02-11 NOTE — PROGRESS NOTES
Pt attempted to get oob twice thus far this evening, took his high flow and non- rebreather off a few times  Each time this happened , 02 sats dropped into the 50's- 60's  Pt is oriented to self only, continually asks where he is, what he's doing  Reoriented pt as often as necessary however, short term memory is poor at this time  He was restless, anxious and confused  Critical Care PA ordered precedex, started at 2220 by Charge RN and titrated up to 0 7 by 2315 because of the severe anxiety, impulsiveness and the restlessness resulting in pt removing the oxygen  CAM assessment completed, results + for delirium  Posey restraint applied after the second time pt was found getting oob and 02 removed  Pt was repositioned, linens changed prior to this last episode  He had a lg formed bm in the bedpan  , heparin stopped for 1 hour, restarted at 15u/kg/hr  Pt is finally resting at this time, after a long night of restlessness

## 2022-02-11 NOTE — CASE MANAGEMENT
Case Management Progress Note    Patient name Cecil Tim  Location ICU /ICU  MRN [id number]  : 1940 Date 2022       LOS (days): 16  Geometric Mean LOS (GMLOS) (days): 5 40  Days to GMLOS:-10 8        OBJECTIVE:        Current admission status: Inpatient  Preferred Pharmacy:   [facility] [address] [place] [address]  Phone: [phone] Fax: [phone]    Primary Care Provider: Alfreda Moreira MD    Primary Insurance: MEDICARE  Secondary Insurance: [facility] HEALTH OPTIONS PROGRAM    PROGRESS NOTE:    CM notified during rounds with Critical Care AP that family has elected to transition to Comfort Measures only  Family has opted to transition patient to comfort care measures only    Additional family is coming in to visit and then they will decrease he O2      CM department will continue to follow for support and any additional referral needed

## 2022-02-11 NOTE — QUICK NOTE
Spoke with patient's wife, who is in the room, and updated her on the patient's current condition, care management plan, and goals  All questions were answered to their satisfaction

## 2022-02-11 NOTE — PROGRESS NOTES
Daily Progress Note - Critical Care   Jose Ramon Tim 80 y o  male MRN: [id number]  Unit/Bed#: ICU 01 Encounter: [id number]        ----------------------------------------------------------------------------------------  HPI/24hr events: After admission to the ICU the patient had O2 saturation >90% on HFNC 50/100%  Overnight the patient became acutely agitated and confused and was pulling off his oxygen causing desaturation  Precedex was started and titrated up to 0 7 with significant improvement of agitation  Cam assessment was performed and was positive for delirium  Posey restraints were applied to the patient after he attempted to get out of bed and removed his O2  After roughly 2 hours of Precedex the patient became acutely hypotensive and push dose epi was given 1 mg as well as 1 mg of atropine with improvement of heart rate and blood pressure  The patient was also given a fluid bolus  ---------------------------------------------------------------------------------------  Janna Costa is a 73yo M with PMH of DM, CKD, CAD, hyperlipidemia, HTN, CLL with last chemotherapy about 6 months ago in [place], that was admitted 1/26 due to acute hypoxemic respiratory failure associated with COVID infection  The patient is vaccinated x2 but not boosted  He tested positive for COVID 1/14  We was originally admitted 1/19 and discharged home on 5L O2 on 1/24  He then had a fall at home after becoming dizzy with removal of supplemental O2, leading to readmission on 1/26  Since readmission he has had a wall thickening of his pulmonary status, requiring transition to high-flow nasal cannula to maintain saturations greater than 89%  Treatment has included 5 day course of remdesivir, 5 day course of antibiotics, completed course of baricitinib, and received therapeutic anticoagulation due to pulmonary embolism  Currently he is receiving therapeutic Lovenox    Due to increased O2 requirement the patient is brought in under critical care  Review of Systems   Constitutional: Negative for chills and fever  HENT: Negative for ear pain and sore throat  Eyes: Negative for pain and visual disturbance  Respiratory: Positive for shortness of breath (mild)  Negative for cough  Cardiovascular: Negative for chest pain and palpitations  Gastrointestinal: Negative for abdominal pain, constipation, diarrhea, nausea and vomiting  Genitourinary: Negative for dysuria and hematuria  Musculoskeletal: Negative for arthralgias and back pain  Skin: Positive for wound (healing, L forearm )  Negative for color change and rash  Neurological: Negative for dizziness, light-headedness and headaches  All other systems reviewed and are negative  Review of systems was reviewed and negative unless stated above in HPI/24-hour events   ---------------------------------------------------------------------------------------  Assessment and Plan:    Neuro:   · Diagnosis:  Delirium  · Overnight 2/10-2/11 the patient became acutely agitated and confused and was pulling off his oxygen causing desaturation  · Cam assessment was performed and was positive for delirium  · Precedex was started and titrated up to 0 7 with significant improvement of agitation  · Posey restraints were applied to the patient after he attempted to get out of bed and removed his O2     · After roughly 2 hours of Precedex the patient became acutely hypotensive and push dose epi was given 1 mg as well as 1 mg of atropine with improvement of heart rate and blood pressure  The patient was also given a fluid bolus  · Plan:  Delirium precautions including: frequent orientation, sleep wake cycle, and fluid/ electrolyte management  · Avoid benzos  · Consider nighttime Seroquel  · Continue melatonin    · Diagnosis:  Anxiety  · Patient with history of anxiety, made worse by concern for O2 saturation status  · Patient becomes acutely anxious with any beeping of the monitor  · Restart Atarax if indicated      CV:    Diagnosis:  Hyperlipidemia  o Plan:  Continue ezetimibe     Diagnosis:  Hypotension  o Plan:  Upon admission patient was borderline hypotensive with plan to give albumin if patient became persistently hypotensive  o Overnight patient required Precedex for agitation and became acutely hypotensive  - Patient given 1 mg epi and 1 mg atropine with improvement of hypotension  o Monitor vital signs  o Give albumin for hypotension as needed  o Avoid sedation medications where possible        Pulm:   Diagnosis:  Acute hypoxemic respiratory failure due to COVID-19  o Plan:  Patient is positive for COVID-19 on 01/14/2022 and was admitted to the hospital from 01/19 to 1/24  He was discharged home on 5 L O2   o CXR on 01/26 also revealed worsening of GGO  o Over the course of hospitalization from 01/26 through 2/10 the patient has had increasing O2 requirement  o Treatment so far has included 5 day course of remdesivir, 5 day course of antibiotics, completed course of baricitinib, and steroids  o Repeat CXR 2/10 revealed no worsening of GGO but did identify a small right pneumothorax  o Patient is transitioned to high-flow nasal cannula and escalated care under critical care team  - Have goals of care discussion with the patient with regard to high intubation and ventilation  o Monitor CRP  o Consider repeat echo  o Continue dexamethasone 6 4mg IV twice daily  o If patient has appropriate blood pressure, attempt diuresis with 20mg Lasix for possible improvement in O2 saturation  o Consider starting and dornase  o Pulmonary team following     Diagnosis:  Acute pulmonary embolism without acute cor pulmonale, resolved   o Plan: CTA chest on 01/26, that was performed due to chest pain and shortness of breath revealed subsegmental filling defects associated with pulmonary embolism    - No evidence of right heart strain on CT  - Echo performed 1/27 revealed LVEF of 70% and grade 1 diastolic dysfunction  o Patient started on Eliquis and transition to therapeutic Lovenox on 02/07  o Repeat CTA chest PE study performed 2/10 reveals resolution of pulmonary embolism but did identify pneumomediastinum tracking into the pleural space causing small pneumothorax on the R  - Continue heparin drip  - Monitor CBC and vitals     Diagnosis:  Pneumomediastinum with small right pneumothorax  o Commonly seen in the setting of COVID-19 infection   - Likely due to COVID-19 infection and increased work of breathing  o Plan:  Provide support and allow self resolution of pneumomediastinum and pneumothorax  - Avoid intubation  - Monitor physical exam and vital signs      GI:    Diagnosis:  Transaminates  Lab Results   Component Value Date     (H) 02/11/2022     (H) 02/10/2022     (H) 02/09/2022     (H) 02/11/2022     (H) 02/10/2022     (H) 02/09/2022     o Plan:  Transaminitis down trending after completion of baricitinib  o Patient has a history of hepatitis-C infection and has chronic transaminitis at baseline  - Outpatient ultrasound on 01/07 was unremarkable  o Monitor daily CMP      :   · No acute concerns      F/E/N:    Fluids:  Monitor strict I/Os  No maintenance fluid administration at this time     Electrolytes:  Electrolytes WNL at this time, monitor daily CMP and replete as necessary   Nutrition:  Continue Gaston/CHO controlled, carb level 1 diet, advance as tolerated       Heme/Onc:    Diagnosis: CLL  o Plan:   o Chart review reveals chemotherapy treatment in 2014 for 6 months  o Patient states that he last had chemotherapy 6 months ago in [place] but does not remember the name of the treatment  - He reports 6 rounds of chemotherapy every 6 months  o Monitor CBC with differential        Endo:    Diagnosis:  Type 2 diabetes mellitus with hyperglycemia, with long-term use of insulin  Lab Results   Component Value Date    HGBA1C 10 1 (H) 01/26/2022    POCGLU 311 (H) 02/11/2022    POCGLU 306 (H) 02/10/2022    POCGLU 292 (H) 02/10/2022    POCGLU 267 (H) 02/10/2022    POCGLU 139 02/10/2022     o Plan:  Sugar poorly controlled currently  - Patient on steroid currently and increasing dosage, likely worsening hyperglycemia  o Continue Accu-Chek q 6 hours  o On SSI algorithm 4  o Escalate SSI algorithm as necessary and start Lantus 10 qHS       ID:    Diagnosis:  COVID 19 infection  o Plan:  As discussed above in "pulmonary"      MSK/Skin:    Diagnosis:  Fall at home  o Plan:  Patient presented to the ED on 01/26 as a trauma level C    - The patient took off his supplemental O2 when he went to the bathroom and had lightheadedness after voiding causing him to fall  - The patient did have head strike, without LOC  - The patient also had several skin tears to his left upper extremity an abrasion to the head  - The patient was evaluated and cleared in the emergency department via CT scan and physical exam  o PT/OT evaluation and care      Patient appropriate for transfer out of the ICU today?: No  Disposition: Continue Stepdown Level 1 level of care   Code Status: Level 1 - Full Code  ---------------------------------------------------------------------------------------  ICU CORE MEASURES    Prophylaxis   VTE Pharmacologic Prophylaxis: Heparin Drip  VTE Mechanical Prophylaxis: sequential compression device  Stress Ulcer Prophylaxis: Prophylaxis Not Indicated     ABCDE Protocol (if indicated)  Plan to perform spontaneous awakening trial today? Not applicable  Plan to perform spontaneous breathing trial today? Not applicable  Obvious barriers to extubation?  Not applicable  CAM-ICU: Positive    Invasive Devices Review  Invasive Devices  Report    Peripheral Intravenous Line            Long-Dwell Peripheral IV (Midline) 69/55/41 Left Basilic 11 days    Peripheral IV 02/10/22 Right;Upper;Ventral (anterior) Arm <1 day    Peripheral IV 02/11/22 Distal;Left;Ventral (anterior) Forearm <1 day              Can any invasive devices be discontinued today? No  ---------------------------------------------------------------------------------------  OBJECTIVE    Vitals   Vitals:    22 0645 22 0700 22 0734 22 0740   BP: 109/68 106/66 114/65    BP Location:   Right arm    Pulse: 69 65 65    Resp: (!) 32 22 20    Temp:   97 8 °F (36 6 °C)    TempSrc:   Axillary    SpO2: (!) 89% 97% 96% 90%   Weight:       Height:         Temp (24hrs), Av °F (36 7 °C), Min:97 8 °F (36 6 °C), Max:98 2 °F (36 8 °C)  Current: Temperature: 97 8 °F (36 6 °C)  HR: 65  BP: 114/65  RR: 20  SpO2: 90% on 50L/100% HFNC    Respiratory:  SpO2: SpO2: 90 %       Invasive/non-invasive ventilation settings   Respiratory  Report   Lab Data (Last 4 hours)    None         O2/Vent Data (Last 4 hours)       0740          Non-Invasive Ventilation Mode HFNC (High flow)                   Physical Exam  Vitals and nursing note reviewed  Constitutional:       General: He is not in acute distress  Appearance: He is underweight  He is not ill-appearing  HENT:      Head: Normocephalic and atraumatic  Right Ear: External ear normal       Left Ear: External ear normal       Nose: Nose normal       Mouth/Throat:      Pharynx: Oropharynx is clear  No oropharyngeal exudate or posterior oropharyngeal erythema  Eyes:      Extraocular Movements: Extraocular movements intact  Conjunctiva/sclera: Conjunctivae normal    Cardiovascular:      Rate and Rhythm: Normal rate and regular rhythm  Pulses: Normal pulses  Heart sounds: Normal heart sounds  No murmur heard  Pulmonary:      Effort: Pulmonary effort is normal  No respiratory distress  Breath sounds: Normal breath sounds  No wheezing, rhonchi or rales  Abdominal:      General: Abdomen is flat  Palpations: Abdomen is soft  Tenderness: There is no abdominal tenderness   There is no guarding or rebound  Musculoskeletal:         General: No swelling, tenderness or deformity  Normal range of motion  Cervical back: Normal range of motion and neck supple  Right lower leg: No edema  Left lower leg: No edema  Skin:     General: Skin is warm and dry  Capillary Refill: Capillary refill takes less than 2 seconds  Comments: Well-healing wound to left forearm   Neurological:      Mental Status: He is alert and oriented to person, place, and time  Motor: No weakness  Laboratory and Diagnostics:  Results from last 7 days   Lab Units 02/10/22  1444 02/09/22  0452 02/08/22  0612   WBC Thousand/uL 23 28* 19 64* 23 51*   HEMOGLOBIN g/dL 12 7 12 1 14 2   HEMATOCRIT % 38 1 37 3 42 9   PLATELETS Thousands/uL 115* 116* 165     Results from last 7 days   Lab Units 02/11/22  0620 02/10/22  0433 02/09/22  0452 02/08/22  0612 02/07/22  0501 02/05/22  0645   SODIUM mmol/L 138 140 137 136 138 141   POTASSIUM mmol/L 5 1 4 9 4 8 4 9 4 8 4 6   CHLORIDE mmol/L 104 104 104 101 104 105   CO2 mmol/L 27 32 30 28 30 30   ANION GAP mmol/L 7 4 3* 7 4 6   BUN mg/dL 42* 45* 41* 41* 42* 49*   CREATININE mg/dL 0 84 0 77 0 73 0 80 0 75 0 71   CALCIUM mg/dL 8 3 8 7 8 3 8 9 9 1 8 7   GLUCOSE RANDOM mg/dL 292* 81 123 106 62* 70   ALT U/L 392* 438* 454* 593* 576* 562*   AST U/L 109* 124* 132* 203* 168* 191*   ALK PHOS U/L 152* 148* 153* 196* 215* 237*   ALBUMIN g/dL 2 4* 2 4* 2 2* 2 7* 2 7* 2 6*   TOTAL BILIRUBIN mg/dL 0 97 0 91 0 94 1 19* 1 18* 0 92     Results from last 7 days   Lab Units 02/11/22  0620   MAGNESIUM mg/dL 2 4   PHOSPHORUS mg/dL 3 5      Results from last 7 days   Lab Units 02/11/22  0620 02/10/22  2236 02/10/22  1444   INR   --   --  1 17   PTT seconds 116* 128* 38*              ABG:    VBG:    Results from last 7 days   Lab Units 02/11/22  0620 02/10/22  0433   PROCALCITONIN ng/ml 0 14 0 16       Micro        Imaging:  I have personally reviewed pertinent reports       CTA chest pe study Final Result by Marilee Alberto MD (02/10 8080)      1  No pulmonary embolism  The previously identified small pulmonary emboli are not evident on current study  2   New pneumomediastinum and new small right hydropneumothorax  3   Diffuse ground glass opacities and interlobular septal thickening (representing COVID 19) are overall not significantly different from January 26, 2022; concomitant edema is possible  Few areas of consolidation are slightly more dense, and this can    represent atelectasis and/or worsening infection    4  Persistent left upper lobe juxtapleural nodule with associated traction bronchiectasis which may represent scarring  Follow-up unenhanced chest CT is advised in  6 months  I personally discussed this [facility] Burger [id number]/25:35 PM5:35                     Workstation performed: [id number]         XR chest portable   Final Result by Jelly Thompson DO (02/10 1510)      Unchanged bilateral consolidations  Findings concerning for small right pneumothorax  Consider CT of the chest for complete evaluation  I personally discussed this study with Osmar Camp on 2/10/2022 at 3:09 PM                   Workstation performed: [id number]         XR chest portable   Final Result by Clari Parada MD (02/02 1544)      No interval change in the bilateral patchy groundglass infiltrates of known Covid pneumonia  Workstation performed: [id number]         XR chest portable   Final Result by Mary Castro MD (01/30 0732)      Worsening diffuse ground glass opacities throughout the lung parenchyma consistent with COVID-19 pneumonia                     Workstation performed: [id number]         CTA ED chest PE study   Final Result by Manjinder Watt MD (01/26 9343)      A few tiny subsegmental filling defects in all lobes of the right lung and in the left lower lobe, too small to determine if they are acute or chronic pulmonary emboli  A small web in the right interlobar pulmonary artery is the sequela of a chronic    pulmonary embolus  1 3 x 0 8 cm juxtapleural nodule in the left upper lobe  It is associated with traction bronchiolectasis and is likely a scar but consider follow-up with a chest CT with no contrast in 6 months  Severe bilateral groundglass opacity due to Covid-19  I notified Lesly Knutson and Nadine Dasilva by secure text on 1/26/2022 at 11:31 AM   They both responded immediately  Workstation performed: [id number]         XR chest 1 view portable   Final Result by Manish Viveros MD (01/26 4268)      Slightly increased pulmonary infiltrates compared with 1/19/2022  No acute traumatic injury seen  Workstation performed: [id number]         CT head without contrast   Final Result by Manish Viveros MD (01/26 9926)      No acute intracranial abnormality  Report was telephoned to the emergency department physician caring for the patient at 9:34 AM on 1/26/2022                  Workstation performed: [id number]               Intake and Output  I/O       02/09 0701  02/10 0700 02/10 0701  02/11 0700    P  O  590 350    I V  (mL/kg)  28 7 (0 5)    Total Intake(mL/kg) 590 (9 2) 378 7 (6 9)    Urine (mL/kg/hr) 920 (0 6) 300 (0 2)    Stool 0     Total Output 920 300    Net -330 +78 7          Unmeasured Urine Occurrence 1 x     Unmeasured Stool Occurrence 1 x           Height and Weights   Height: 5' 10" (177 8 cm)  IBW (Ideal Body Weight): 73 kg  Body mass index is 17 37 kg/m²    Weight (last 2 days)     Date/Time Weight    02/10/22 1300 54 9 (121 03)            Nutrition       Diet Orders   (From admission, onward)             Start     Ordered    02/10/22 1748  Diet Gaston/CHO Controlled; Consistent Carbohydrate Diet Level 1 (4 carb servings/60 grams CHO/meal)  Diet effective now        References:    Nutrtion Support Algorithm Enteral vs  Parenteral Question Answer Comment   Diet Type Gaston/CHO Controlled    Gaston/CHO Controlled Consistent Carbohydrate Diet Level 1 (4 carb servings/60 grams CHO/meal)    RD to adjust diet per protocol?  Yes        02/10/22 1748    02/04/22 1416  Dietary nutrition supplements  Once        Question Answer Comment   Select Supplement: Lisa Omalley    Frequency Breakfast        02/04/22 1415    01/27/22 1350  Room Service  Once        Question:  Type of Service  Answer:  Room Service - Appropriate with Assistance    01/27/22 1349                  Active Medications  Scheduled Meds:  Current Facility-Administered Medications   Medication Dose Route Frequency Provider Last Rate    acetaminophen  650 mg Oral Q6H PRN Laveta Oppenheim, MD      albuterol  2 puff Inhalation Q4H PRN Laveta Oppenheim, MD      aspirin  81 mg Oral Daily Laveta Oppenheim, MD      benzonatate  200 mg Oral TID PRN Laveta Oppenheim, MD      calcium carbonate  500 mg Oral Daily PRN Laveta Oppenheim, MD      dexamethasone  0 1 mg/kg Intravenous Q12H Kei Quintanilla MD      dexmedetomidine  0 1-0 7 mcg/kg/hr (Ideal) Intravenous Titrated Salas Mckeon DO Stopped (02/11/22 0151)    ezetimibe  10 mg Oral Daily Laveta Oppenheim, MD      heparin (porcine)  3-30 Units/kg/hr (Order-Specific) Intravenous Titrated Laveta Oppenheim, MD Stopped (02/11/22 9952)    hydrOXYzine HCL  25 mg Oral BID Laveta Oppenheim, MD      insulin lispro  2-12 Units Subcutaneous TID AC Laveta Oppenheim, MD      melatonin  6 mg Oral HS Laveta Oppenheim, MD      pramipexole  1 5 mg Oral Daily Laveta Oppenheim, MD      sodium chloride  1 spray Each Nare Q1H PRN Laveta Oppenheim, MD       Continuous Infusions:  dexmedetomidine, 0 1-0 7 mcg/kg/hr (Ideal), Last Rate: Stopped (02/11/22 0151)  heparin (porcine), 3-30 Units/kg/hr (Order-Specific), Last Rate: Stopped (02/11/22 0748)      PRN Meds:   acetaminophen, 650 mg, Q6H PRN  albuterol, 2 puff, Q4H PRN  benzonatate, 200 mg, TID PRN  calcium carbonate, 500 mg, Daily PRN  sodium chloride, 1 spray, Q1H PRN        Allergies   Allergies   Allergen Reactions    Other      Seasonal allergies     ---------------------------------------------------------------------------------------  Advance Directive and Living Will:      Power of :    POLST:    ---------------------------------------------------------------------------------------  Care Time Delivered:   Upon my evaluation, this patient had a high probability of imminent or life-threatening deterioration due to Acute hypoxic respiratory failure, which required my direct attention, intervention, and personal management  I have personally provided 30 minutes (0700 to 0730) of critical care time, exclusive of procedures, teaching, family meetings, and any prior time recorded by providers other than myself  Baby Dy, DO      Portions of the record may have been created with voice recognition software  Occasional wrong word or "sound a like" substitutions may have occurred due to the inherent limitations of voice recognition software    Read the chart carefully and recognize, using context, where substitutions have occurred

## 2022-02-12 NOTE — PROGRESS NOTES
[facility]  Progress Note Marianne Martinezsco 1940, 80 y o  male MRN: [id number]  Unit/Bed#: ICU 01 Encounter: [id number]  Primary Care Provider: Edenilson Dietrich MD   Date and time admitted to hospital: 1/26/2022  8:43 AM    * Acute hypoxemic respiratory failure due to COVID-19 [facility])  Assessment & Plan  · POA, tested positive for COVID on 1/14/22  Admitted 01/19 -1/24/22, and DC home on 5L O2   · CTA chest (1/26): few tiny subsegmental filling defects in all lobes of the right lung and in the left lower lobe  1 3 x 0 8 cm juxtapleural nodule in the EDUARDO  It is associated with traction onchiolectasis and is likely a scar but consider follow-up with a chest CT with no contrast in 6 months  Severe bilateral groundglass opacity due to Covid-19  · CXR (1/29): Worsening diffuse ground glass opacities throughout the lung parenchyma consistent with COVID-19 pneumonia  · Due to increasing O2 needs, pt was transitioned to HFNC and transferred to ICU  · Continued to have maximum O2 needs without improvement  · Discussions held with family re: poor prognosis, decision made to transition to comfort care  · Continue fentanyl gtt + ativan prn       Acute pulmonary embolism without acute cor pulmonale (HCC)  Assessment & Plan  · POA, in the setting of recent COVID infection  · No evidence of right heart strain on CT  · Echo (1/27): LVEF 72%, grade 1 diastolic dysfunction, mild AR and MR     Plan:  - hold eliquis, continue parenteral lovenox (weight based)    Transaminitis  Assessment & Plan  · Likely in the setting of acute viral infection  Also has history of hepatitis C infection  · Reported with chronic transaminitis at baseline  · Recent outpatient ultrasound on 1/7/22 was unremarkable  · Trending upwards    but is on baricitinib  Lab Results   Component Value Date     (H) 02/10/2022     (H) 02/10/2022    ALKPHOS 148 (H) 02/10/2022    BILITOT 0 7 08/14/2017     Noted     Type 2 diabetes mellitus with hyperglycemia, with long-term current use of insulin 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     Fall at home  [address] [place] [address]  · Presented as a trauma level C post fall home  Patient and wife report he had taken off supplemental O2 when going to the bathroom  · Lightheadedness after voiding and fell, wife reports mild headstrike as she caught his fall  · PT/OT had evaluated but respiratory status continued to decline         VTE Pharmacologic Prophylaxis:   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   Mechanical VTE Prophylaxis in Place: No    Patient Centered Rounds: I have performed bedside rounds with nursing staff today  Discussions with Specialists or Other Care Team Provider:     Education and Discussions with Family / Patient: family at bedside     Time Spent for Care: 30 minutes  More than 50% of total time spent on counseling and coordination of care as described above  Current Length of Stay: 17 day(s)    Current Patient Status: Inpatient   Certification Statement: The patient will continue to require additional inpatient hospital stay due to hypoxic respiratory failure  Discharge Plan / Estimated Discharge Date: TBD based on clinical course    Code Status: Level 4 - Comfort Care    Subjective:   UTO     Objective:     Vitals:   No data recorded  HR:  [72] 72  Resp:  [12] 12  SpO2:  [95 %] 95 %  Body mass index is 17 37 kg/m²  Input and Output Summary (last 24 hours):        Intake/Output Summary (Last 24 hours) at 2/12/2022 1402  Last data filed at 2/12/2022 0800  Gross per 24 hour   Intake 107 51 ml   Output --   Net 107 51 ml       Physical Exam:     Physical Exam  Vitals and nursing note reviewed  Constitutional:       General: He is not in acute distress  Appearance: He is ill-appearing  He is not toxic-appearing or diaphoretic  Comments: Waxing and waning level of alertness  Pulmonary:      Effort: No respiratory distress  Breath sounds: No wheezing or rhonchi  Abdominal:      General: Abdomen is flat  Additional Data:     Labs:    Results from last 7 days   Lab Units 02/11/22  0620   WBC Thousand/uL 16 81*   HEMOGLOBIN g/dL 11 4*   HEMATOCRIT % 36 9   PLATELETS Thousands/uL 101*   LYMPHO PCT % 4*   MONO PCT % 2*   EOS PCT % 0     Results from last 7 days   Lab Units 02/11/22  0620   POTASSIUM mmol/L 5 1   CHLORIDE mmol/L 104   CO2 mmol/L 27   BUN mg/dL 42*   CREATININE mg/dL 0 84   CALCIUM mg/dL 8 3   ALK PHOS U/L 152*   ALT U/L 392*   AST U/L 109*     Results from last 7 days   Lab Units 02/10/22  1444   INR  1 17       * I Have Reviewed All Lab Data Listed Above  * Additional Pertinent Lab Tests Reviewed:  All Labs Within Last 24 Hours Reviewed    Imaging:    Imaging Reports Reviewed Today Include:   Imaging Personally Reviewed by Myself Includes:      Recent Cultures (last 7 days):           Last 24 Hours Medication List:   Current Facility-Administered Medications   Medication Dose Route Frequency Provider Last Rate    acetaminophen  650 mg Oral Q6H PRN Tio Yang MD      calcium carbonate  500 mg Oral Daily PRN Tio Yang MD      fentaNYL  50 mcg/hr Intravenous Continuous Tio aYng MD 50 mcg/hr (02/11/22 1817)    glycopyrrolate  0 1 mg Intravenous Q4H PRN Tio Yang MD      LORazepam  1 mg Intravenous Q15 Min PRN Idania Melendez PA-C      morphine injection  2 mg Intravenous Q1H PRN Tio Yang MD      OLANZapine  5 mg Oral Daily PRN Tio Yang MD      sodium chloride  1 spray Each Nare Q1H PRN Tio Yang MD          Today, Patient Was Seen By: Marissa Jacobson MD    ** Please Note: This note has been constructed using a voice recognition system   **

## 2022-02-12 NOTE — PHYSICAL THERAPY NOTE
Physical Therapy Cancellation Note      Order received to discontinue PT      Josemanuel Longoria, PT

## 2022-02-13 NOTE — ASSESSMENT & PLAN NOTE
· POA, in the setting of recent COVID infection  · No evidence of right heart strain on CT  · Echo (1/27): LVEF 37%, grade 1 diastolic dysfunction, mild AR and MR     Plan:  - hold eliquis

## 2022-02-13 NOTE — ASSESSMENT & PLAN NOTE
· Likely in the setting of acute viral infection  Also has history of hepatitis C infection  · Reported with chronic transaminitis at baseline  · Recent outpatient ultrasound on 1/7/22 was unremarkable  · Trending upwards    but is on baricitinib  Lab Results   Component Value Date     (H) 02/11/2022     (H) 02/11/2022    ALKPHOS 152 (H) 02/11/2022    BILITOT 0 7 08/14/2017     Noted

## 2022-02-13 NOTE — PROGRESS NOTES
New bag of fentanyl hung, [place] wasted 20ml or 40mcg with primary RN  Pt resting comfortably  Will continue to monitor   Anton Brambila RN

## 2022-02-13 NOTE — ASSESSMENT & PLAN NOTE
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

## 2022-02-13 NOTE — ASSESSMENT & PLAN NOTE
· Presented as a trauma level C post fall home  Patient and wife report he had taken off supplemental O2 when going to the bathroom  · Lightheadedness after voiding and fell, wife reports mild headstrike as she caught his fall     · PT/OT had evaluated but respiratory status continued to decline no anemia, no easy bruising, no jaundice, no swollen lymph nodes.

## 2022-02-13 NOTE — PROGRESS NOTES
[facility]  Progress Note Taran Ruvalcabarancesco 1940, 80 y o  male MRN: [id number]  Unit/Bed#: S -01 Encounter: [id number]  Primary Care Provider: Idalmis Bryson MD   Date and time admitted to hospital: 1/26/2022  8:43 AM    * Acute hypoxemic respiratory failure due to COVID-19 [facility])  Assessment & Plan  · POA, tested positive for COVID on 1/14/22  Admitted 01/19 -1/24/22, and DC home on 5L O2   · CTA chest (1/26): few tiny subsegmental filling defects in all lobes of the right lung and in the left lower lobe  1 3 x 0 8 cm juxtapleural nodule in the EDUARDO  It is associated with traction onchiolectasis and is likely a scar but consider follow-up with a chest CT with no contrast in 6 months  Severe bilateral groundglass opacity due to Covid-19  · CXR (1/29): Worsening diffuse ground glass opacities throughout the lung parenchyma consistent with COVID-19 pneumonia  · Due to increasing O2 needs, pt was transitioned to HFNC and transferred to ICU  · Continued to have maximum O2 needs without improvement  · Discussions held with family re: poor prognosis, decision made to transition to comfort care  · Continue fentanyl gtt + ativan prn       Acute pulmonary embolism without acute cor pulmonale (HCC)  Assessment & Plan  · POA, in the setting of recent COVID infection  · No evidence of right heart strain on CT  · Echo (1/27): LVEF 62%, grade 1 diastolic dysfunction, mild AR and MR     Plan:  - hold eliquis    Transaminitis  Assessment & Plan  · Likely in the setting of acute viral infection  Also has history of hepatitis C infection  · Reported with chronic transaminitis at baseline  · Recent outpatient ultrasound on 1/7/22 was unremarkable  · Trending upwards    but is on baricitinib  Lab Results   Component Value Date     (H) 02/11/2022     (H) 02/11/2022    ALKPHOS 152 (H) 02/11/2022    BILITOT 0 7 08/14/2017     Noted     Type 2 diabetes mellitus with hyperglycemia, with long-term current use of insulin [facility])  Assessment & Plan  Lab Results   Component Value Date    HGBA1C 10 1 (H) 2022     Recent Labs     02/10/22  2130 02/10/22  2243 22  0731 22  1152   POCGLU 292* 306* 311* 288*     Blood Sugar Average: Last 72 hrs:  · (P) 252 [id number],   · hemoglobin A1c 10 1 this admission     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     Fall at home  [address] [facility]  · Presented as a trauma level C post fall home  Patient and wife report he had taken off supplemental O2 when going to the bathroom  · Lightheadedness after voiding and fell, wife reports mild headstrike as she caught his fall  · PT/OT had evaluated but respiratory status continued to decline       VTE Pharmacologic Prophylaxis:   Pharmacologic: no DVT ppx indicated   Mechanical VTE Prophylaxis in Place: Yes    Patient Centered Rounds: I have performed bedside rounds with nursing staff today  Discussions with Specialists or Other Care Team Provider:     Education and Discussions with Family / Patient: family at bedside     Time Spent for Care: 30 minutes  More than 50% of total time spent on counseling and coordination of care as described above  Current Length of Stay: 18 day(s)    Current Patient Status: Inpatient   Certification Statement: The patient will continue to require additional inpatient hospital stay due to respiratory failure, end of life care  Discharge Plan / Estimated Discharge Date: suspect pt's demise is imminent     Code Status: Level 4 - Comfort Care    Subjective:   UTO     Objective:     Vitals:   Temp (24hrs), Av °F (37 2 °C), Min:97 8 °F (36 6 °C), Max:100 2 °F (37 9 °C)    Temp:  [97 8 °F (36 6 °C)-100 2 °F (37 9 °C)] 100 2 °F (37 9 °C)  HR:  [] 125  Resp:  [10-18] 10  BP: (95)/(52) 95/52  SpO2:  [62 %-83 %] 83 %  Body mass index is 17 37 kg/m²  Input and Output Summary (last 24 hours):        Intake/Output Summary (Last 24 hours) at 2022 1435  Last data filed at 2/12/2022 2007  Gross per 24 hour   Intake 60 58 ml   Output --   Net 60 58 ml       Physical Exam:     Physical Exam  Vitals and nursing note reviewed  Constitutional:       General: He is not in acute distress  Appearance: He is ill-appearing  HENT:      Head: Normocephalic  Cardiovascular:      Rate and Rhythm: Tachycardia present  Pulmonary:      Effort: Bradypnea and accessory muscle usage present  Skin:     General: Skin is warm  Findings: No bruising or erythema  Neurological:      Mental Status: He is alert  Psychiatric:         Mood and Affect: Mood normal          Behavior: Behavior normal            Additional Data:     Labs:    Results from last 7 days   Lab Units 02/11/22  0620   WBC Thousand/uL 16 81*   HEMOGLOBIN g/dL 11 4*   HEMATOCRIT % 36 9   PLATELETS Thousands/uL 101*   LYMPHO PCT % 4*   MONO PCT % 2*   EOS PCT % 0     Results from last 7 days   Lab Units 02/11/22  0620   POTASSIUM mmol/L 5 1   CHLORIDE mmol/L 104   CO2 mmol/L 27   BUN mg/dL 42*   CREATININE mg/dL 0 84   CALCIUM mg/dL 8 3   ALK PHOS U/L 152*   ALT U/L 392*   AST U/L 109*     Results from last 7 days   Lab Units 02/10/22  1444   INR  1 17       * I Have Reviewed All Lab Data Listed Above  * Additional Pertinent Lab Tests Reviewed:  All Labs Within Last 24 Hours Reviewed    Imaging:    Imaging Reports Reviewed Today Include:   Imaging Personally Reviewed by Myself Includes:      Recent Cultures (last 7 days):           Last 24 Hours Medication List:   Current Facility-Administered Medications   Medication Dose Route Frequency Provider Last Rate    acetaminophen  650 mg Oral Q6H PRN Evangelist Srinivasan PA-C      calcium carbonate  500 mg Oral Daily PRN VIKTOR Manley-MARY CARMEN      fentaNYL  50 mcg/hr Intravenous Continuous Evangelist Srinivasan, PA-C 50 mcg/hr (02/13/22 1236)    glycopyrrolate  0 1 mg Intravenous Q4H PRN Evangelist Srinivasan PA-MARY CARMEN      LORazepam  1 mg Intravenous Q15 Min PRN Maribell Choudhury Alejandro Deutsch PA-C      morphine injection  2 mg Intravenous Q1H PRN Yoel Banuelos PA-C      OLANZapine  5 mg Oral Daily PRN Yoel Banuelos PA-C      sodium chloride  1 spray Each Nare Q1H PRN Yoel Banuelos PA-C          Today, Patient Was Seen By: Wong Walton MD    ** Please Note: This note has been constructed using a voice recognition system   **

## 2022-02-14 NOTE — DEATH NOTE
INPATIENT DEATH NOTE  María Ruvalcabarancesco 80 y o  male MRN: [id number]  Unit/Bed#: S -01 Encounter: [id number]    Date, Time and Cause of Death    Date of Death: 22  Time of Death:  9:47 PM  Preliminary Cause of Death: Respiratory failure ([facility] [address] )  Entered by: Rachael Garcia PA-C[MS1 1]     Attribution     MS1 1 Tere Ellison PA-C 22 21:54           Patient's Information  Pronounced by: Tere Ellison PA-C  Did the patient's death occur in the ED?: No  Did the patient's death occur in the OR?: No  Did the patient's death occur less than 10 days post-op?: No  Did the patient's death occur within 24 hours of admission?: No  Was code status DNR at the time of death?: Yes    PHYSICAL EXAM:  Unresponsive to noxious stimuli, Spontaneous respirations absent, Breath sounds absent, Carotid pulse absent, Heart sounds absent, Pupillary light reflex absent and Corneal blink reflex absent    Medical Examiner notification criteria:  Death due to an illness which is a threat to public health (TB, Anthrax, Smallpox, etc )   Medical Examiner's office notified?:  Yes   Medical Examiner accepted case?:  No  Name of Medical Examiner: Martinkathryn Seals      Family Notification  Was the family notified?: Yes  Date Notified: 22  Time Notified: 399  Notified by: Tere Ellison PA-C  Name of Family Notified of Death: Delgado Few   Relationship to Patient: Daughter  Family Notification Route: Telephone  Was the family told to contact a  home?: Yes  Name of  Home[de-identified] [address]    Autopsy Options:  Post-mortem examination declined by next of kin    Primary Service Attending Physician notified?:  yes - Attending:  Hien Stewart MD    Physician/Resident responsible for completing Discharge Summary:  Hien Stewart MD

## 2022-02-14 NOTE — ASSESSMENT & PLAN NOTE
· POA, in the setting of recent COVID infection  · No evidence of right heart strain on CT  · Echo (1/27): LVEF 35%, grade 1 diastolic dysfunction, mild AR and MR     Plan:  - hold eliquis

## 2022-02-14 NOTE — PROGRESS NOTES
80 ML of Fentanyl solution remaining in the bag (s/p patient expiration) were wasted and witnessed by myself and Jenise Mon RN as per hospital protocol

## 2022-02-14 NOTE — ASSESSMENT & PLAN NOTE
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

## 2022-02-14 NOTE — PROGRESS NOTES
Upon assessment, patient noted to be unresponsive to sternal rub,  pulseless  No lung sounds heard b/l  Pupils fixated  Slim made aware and at bedside  Patient pronounced dead at 2147  Family made aware  Charge nurse, supervisor, gift of life,  And  made aware  Per family, body to be sent to [facility] home, no autopsy case  Per [facility], patient isn't a candidate for organ donation  Per ,  It is permitted to release the body to [facility]  Medical records notified of the death  Belongings (rosary) sent along with body

## 2022-02-14 NOTE — ASSESSMENT & PLAN NOTE
· Likely in the setting of acute viral infection  Also has history of hepatitis C infection  · Reported with chronic transaminitis at baseline  · Recent outpatient ultrasound on 1/7/22 was unremarkable  · Trending upwards;   Was on baricitinib  Lab Results   Component Value Date     (H) 02/11/2022     (H) 02/11/2022    ALKPHOS 152 (H) 02/11/2022    BILITOT 0 7 08/14/2017     Noted

## 2022-02-14 NOTE — PROGRESS NOTES
Patient transferred to the [facility] accompanied by RN and security  Belongings sent along

## 2022-02-14 NOTE — DISCHARGE SUMMARY
[facility]  Discharge- [place] A [place] 1940, 80 y o  male MRN: [id number]  Unit/Bed#: S -01 Encounter: [id number]  Primary Care Provider: Maykel Stroud MD   Date and time admitted to hospital: 1/26/2022  8:43 AM    * Acute hypoxemic respiratory failure due to COVID-19 [facility])  Assessment & Plan  · POA, tested positive for COVID on 1/14/22  Admitted 01/19 -1/24/22, and DC home on 5L O2   · CTA chest (1/26): few tiny subsegmental filling defects in all lobes of the right lung and in the left lower lobe  1 3 x 0 8 cm juxtapleural nodule in the EDUARDO  It is associated with traction onchiolectasis and is likely a scar but consider follow-up with a chest CT with no contrast in 6 months  Severe bilateral groundglass opacity due to Covid-19  · CXR (1/29): Worsening diffuse ground glass opacities throughout the lung parenchyma consistent with COVID-19 pneumonia  · Due to increasing O2 needs, pt was transitioned to HFNC and transferred to ICU  · Continued to have maximum O2 needs without improvement  · Discussions held with family re: poor prognosis, decision made to transition to comfort care  · Continue fentanyl gtt + ativan prn       Acute pulmonary embolism without acute cor pulmonale (HCC)  Assessment & Plan  · POA, in the setting of recent COVID infection  · No evidence of right heart strain on CT  · Echo (1/27): LVEF 92%, grade 1 diastolic dysfunction, mild AR and MR     Plan:  - hold eliquis    Transaminitis  Assessment & Plan  · Likely in the setting of acute viral infection  Also has history of hepatitis C infection  · Reported with chronic transaminitis at baseline  · Recent outpatient ultrasound on 1/7/22 was unremarkable  · Trending upwards;   Was on baricitinib  Lab Results   Component Value Date     (H) 02/11/2022     (H) 02/11/2022    ALKPHOS 152 (H) 02/11/2022    BILITOT 0 7 08/14/2017     Noted     Type 2 diabetes mellitus with hyperglycemia, with long-term current use of insulin (HCC)  Assessment & Plan  Lab Results   Component Value Date    HGBA1C 10 1 (H) 01/26/2022     No results for input(s): POCGLU in the last 72 hours  Blood Sugar Average: Last 72 hrs:  · (P) 299 5,   · hemoglobin A1c 10 1 this admission     Plan:   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     Fall at home  [address] [place] [address]  · Presented as a trauma level C post fall home  Patient and wife report he had taken off supplemental O2 when going to the bathroom  · Lightheadedness after voiding and fell, wife reports mild headstrike as she caught his fall  · PT/OT had evaluated but respiratory status continued to decline     Discharging Physician / Practitioner: Stephanie Akins MD  PCP: Matias Ramirez MD  Admission Date:   Admission Orders (From admission, onward)     Ordered        01/26/22 1146  Inpatient Admission  Once                      Discharge Date: 2/13/22     Medical Problems             Resolved Problems  Date Reviewed: 2/11/2022    None                Consultations During Hospital Stay:  · Pulmonology     Procedures Performed:   · None     Significant Findings / Test Results:   CT chest: 2/10/22   IMPRESSION:  1  No pulmonary embolism  The previously identified small pulmonary emboli are not evident on current study  2   New pneumomediastinum and new small right hydropneumothorax  3   Diffuse ground glass opacities and interlobular septal thickening (representing COVID 19) are overall not significantly different from January 26, 2022; concomitant edema is possible  Few areas of consolidation are slightly more dense, and this can   represent atelectasis and/or worsening infection   4  Persistent left upper lobe juxtapleural nodule with associated traction bronchiectasis which may represent scarring  Follow-up unenhanced chest CT is advised in  6 months        Incidental Findings:   · None      Test Results Pending at Discharge (will require follow up):    · None      Outpatient Tests Requested:  · None     Complications:  None     Reason for Admission:  Fall and SOB     Hospital Course:     Roslyn Kwong is a 80 y o  male patient history of CLL, CKD, CAD who originally presented to the hospital on 2022 due to a fall at home  Patient had recently been admitted to the hospital for about 5 days from  -   Patient was discharged home on oral steroids and supplemental oxygen at that time  Returned to the hospital after a fall  On admission patient's oxygen needs rapidly escalated any required up to 15 L of supplemental oxygen to maintain adequate SpO2  Patient was resumed on IV steroids as well as baricitinib  Unfortunately, patient's oxygen needs continued to worsen and he required transfer to the ICU  Patient was transitioned to high-flow nasal cannula and required 100% FiO2 to maintain adequate oxygenation  Goals of care discussions were had with family given difficulties in oxygenating and lack of progress  Ultimately family and patient decided to pursue comfort measures  Was transferred out of the ICU and placed on a comfort orders  He passed away on   Please see above list of diagnoses and related plan for additional information  Condition at Discharge: fair     Discharge Day Visit / Exam:     * Please refer to separate progress note for these details *      Discharge instructions/Information to patient and family:   See after visit summary for information provided to patient and family  Provisions for Follow-Up Care:  See after visit summary for information related to follow-up care and any pertinent home health orders  Disposition:     Other:      Planned Readmission: none      Discharge Statement:  I spent 40 minutes discharging the patient  This time was spent on the day of discharge  I had direct contact with the patient on the day of discharge   Greater than 50% of the total time was spent examining patient, answering all patient questions, arranging and discussing plan of care with patient as well as directly providing post-discharge instructions  Additional time then spent on discharge activities  Discharge Medications:  See after visit summary for reconciled discharge medications provided to patient and family        ** Please Note: This note has been constructed using a voice recognition system **

## 2025-04-26 NOTE — PROGRESS NOTES
Progress Note - Pulmonary   Darya LOPEZ [place] 80 y o  male MRN: [id number]  Unit/Bed#: S -01 Encounter: [id number]    Assessment/Plan:    Acute hypoxic respiratory failure due to abnormal chest imaging consistent with COVID-19 pneumonia   Now with worsening of his pulmonary status  Transition to high-flow nasal cannula to maintain saturations greater than or equal to 89%  Continue COVID-19 pathway as below  Positive Test:  January 14, 2022  · Dexamethasone:  Increase to 0 1 milligram/kilogram IV twice daily  · Remdesivir:  Completed five day course  · Antibiotics:  Completed five day course  · IL-6 therapy:  Completed course of Baricitinib  · Anticoagulation/DVT prophylaxis:  Per protocol with Lovenox  · Monitor CRP  Check again this morning  ? Recheck chest x-ray now as well      CLL              Management per primary team     Discussed with nursing, primary team, and critical care  If requires high-flow nasal cannula greater than 70%, will need likely transfer to the intensive care unit/step-down unit  Chief Complaint:    "I'm having a hard time "    Subjective:    Darya Henson is sitting on the bedside commode hunched over with non-rebreather and mid flow nasal cannula in place  He reports he is having a hard time catching his breath  Nursing reports that any minimal activity or displacement of non-rebreather causes saturations in the 60s  Objective:    Vitals: Blood pressure 98/60, pulse 82, temperature 98 °F (36 7 °C), temperature source Oral, resp  rate 20, height 5' 10" (1 778 m), weight 64 kg (141 lb), SpO2 98 %  15L NC with 100% non-rebreather,Body mass index is 20 23 kg/m²        Intake/Output Summary (Last 24 hours) at 2/10/2022 0835  Last data filed at 2/10/2022 6005  Gross per 24 hour   Intake 350 ml   Output 720 ml   Net -370 ml       Invasive Devices  Report    Peripheral Intravenous Line            Long-Dwell Peripheral IV (Midline) 85/42/55 Left Basilic 10 days Physical Exam:     Physical Exam  Vitals reviewed  Constitutional:       General: He is not in acute distress  Appearance: He is well-developed  He is not toxic-appearing or diaphoretic  Interventions: Nasal cannula and face mask in place  HENT:      Head: Normocephalic and atraumatic  Eyes:      General: No scleral icterus  Neck:      Trachea: No tracheal deviation  Cardiovascular:      Rate and Rhythm: Normal rate and regular rhythm  Heart sounds: S1 normal and S2 normal  No murmur heard  No friction rub  No gallop  Pulmonary:      Effort: Tachypnea and accessory muscle usage present  No respiratory distress  Breath sounds: No stridor  Decreased breath sounds present  No wheezing, rhonchi or rales  Chest:      Chest wall: No tenderness  Abdominal:      General: Bowel sounds are normal  There is no distension  Palpations: Abdomen is soft  Tenderness: There is no abdominal tenderness  Musculoskeletal:         General: No tenderness  Cervical back: Neck supple  Skin:     General: Skin is warm and dry  Findings: No rash  Neurological:      Mental Status: He is alert and oriented to person, place, and time  GCS: GCS eye subscore is 4  GCS verbal subscore is 5  GCS motor subscore is 6  Psychiatric:         Speech: Speech normal          Behavior: Behavior normal  Behavior is cooperative         Labs:   CMP:   Lab Results   Component Value Date    SODIUM 140 02/10/2022    K 4 9 02/10/2022     02/10/2022    CO2 32 02/10/2022    BUN 45 (H) 02/10/2022    CREATININE 0 77 02/10/2022    CALCIUM 8 7 02/10/2022     (H) 02/10/2022     (H) 02/10/2022    ALKPHOS 148 (H) 02/10/2022    EGFR 86 02/10/2022     Imaging and other studies: None today DISCHARGE

## 2025-05-04 NOTE — TELEPHONE ENCOUNTER
Please let him know he doesn't need another urine protein test but He would be due for BMP and A1C  Please order if he wants done before visit otherwise we can do at visit  No

## (undated) DEVICE — CLEARCUT® SLIT KNIFE INTREPID MICRO-COAXIAL SYSTEM 2.4 SB: Brand: CLEARCUT®; INTREPID

## (undated) DEVICE — THE MONARCH® "D" CARTRIDGE IS A SINGLE-USE POLYPROPYLENE CARTRIDGE FOR POSTERIOR CHAMBER IOL DELIVERY: Brand: MONARCH® III

## (undated) DEVICE — CENTURION VISION SYSTEM

## (undated) DEVICE — EYE PACK CUSTOM -FINNEGAN

## (undated) DEVICE — INTREPID® TRANSFORMER IA HP: Brand: INTREPID®

## (undated) DEVICE — AIR INJECT CANNULA 27GA: Brand: OPHTHALMIC CANNULA

## (undated) DEVICE — GLOVE SRG BIOGEL 7.5

## (undated) DEVICE — B-H IRRIGATING CAN 19GA FLAT ANGLED 8MM: Brand: OPHTHALMIC CANNULA

## (undated) DEVICE — 0.9MM MICROSMOOTH NULTRA INFUSION SLEEVE KIT: Brand: INFINIT, MICROSMOOTH, ALCON

## (undated) DEVICE — ACTIVE FMS W/ INTREPID* ULTRA SLEEVES, 0.9MM 45° ABS* INTREPID* BALANCED TIP: Brand: ALCON

## (undated) DEVICE — 45° KELMAN®, 0.9 MM TURBOSONICS® MINI-FLARED ABS® TIP: Brand: ALCON, KELMAN, TURBOSONICS, MINI-FLARED ABS

## (undated) DEVICE — MICROSURGICAL INSTRUMENT IRR. CYSTITOME 25GA STRAIGHT-REVERSE CUTTING: Brand: ALCON